# Patient Record
Sex: MALE | Race: WHITE | NOT HISPANIC OR LATINO | URBAN - METROPOLITAN AREA
[De-identification: names, ages, dates, MRNs, and addresses within clinical notes are randomized per-mention and may not be internally consistent; named-entity substitution may affect disease eponyms.]

---

## 2017-09-29 ENCOUNTER — OUTPATIENT (OUTPATIENT)
Dept: OUTPATIENT SERVICES | Facility: HOSPITAL | Age: 77
LOS: 1 days | Discharge: HOME | End: 2017-09-29

## 2017-09-29 DIAGNOSIS — R68.89 OTHER GENERAL SYMPTOMS AND SIGNS: ICD-10-CM

## 2017-09-29 DIAGNOSIS — I10 ESSENTIAL (PRIMARY) HYPERTENSION: ICD-10-CM

## 2021-03-08 ENCOUNTER — INPATIENT (INPATIENT)
Facility: HOSPITAL | Age: 81
LOS: 38 days | Discharge: NURSING, REHAB OR LONG-TERM CARE | End: 2021-04-16
Attending: HOSPITALIST | Admitting: HOSPITALIST
Payer: MEDICARE

## 2021-03-08 VITALS
SYSTOLIC BLOOD PRESSURE: 141 MMHG | TEMPERATURE: 97 F | OXYGEN SATURATION: 100 % | WEIGHT: 210.1 LBS | HEIGHT: 71 IN | DIASTOLIC BLOOD PRESSURE: 60 MMHG | HEART RATE: 107 BPM | RESPIRATION RATE: 19 BRPM

## 2021-03-08 DIAGNOSIS — R26.2 DIFFICULTY IN WALKING, NOT ELSEWHERE CLASSIFIED: ICD-10-CM

## 2021-03-08 DIAGNOSIS — M53.3 SACROCOCCYGEAL DISORDERS, NOT ELSEWHERE CLASSIFIED: ICD-10-CM

## 2021-03-08 DIAGNOSIS — I50.9 HEART FAILURE, UNSPECIFIED: ICD-10-CM

## 2021-03-08 DIAGNOSIS — Z95.1 PRESENCE OF AORTOCORONARY BYPASS GRAFT: Chronic | ICD-10-CM

## 2021-03-08 DIAGNOSIS — M27.8 OTHER SPECIFIED DISEASES OF JAWS: ICD-10-CM

## 2021-03-08 LAB
ALBUMIN SERPL ELPH-MCNC: 4.1 G/DL — SIGNIFICANT CHANGE UP (ref 3.5–5.2)
ALP SERPL-CCNC: 213 U/L — HIGH (ref 30–115)
ALT FLD-CCNC: 8 U/L — SIGNIFICANT CHANGE UP (ref 0–41)
ANION GAP SERPL CALC-SCNC: 13 MMOL/L — SIGNIFICANT CHANGE UP (ref 7–14)
ANION GAP SERPL CALC-SCNC: 9 MMOL/L — SIGNIFICANT CHANGE UP (ref 7–14)
APPEARANCE UR: CLEAR — SIGNIFICANT CHANGE UP
APTT BLD: 30.6 SEC — SIGNIFICANT CHANGE UP (ref 27–39.2)
AST SERPL-CCNC: 21 U/L — SIGNIFICANT CHANGE UP (ref 0–41)
BASOPHILS # BLD AUTO: 0.02 K/UL — SIGNIFICANT CHANGE UP (ref 0–0.2)
BASOPHILS NFR BLD AUTO: 0.2 % — SIGNIFICANT CHANGE UP (ref 0–1)
BILIRUB SERPL-MCNC: 0.6 MG/DL — SIGNIFICANT CHANGE UP (ref 0.2–1.2)
BILIRUB UR-MCNC: NEGATIVE — SIGNIFICANT CHANGE UP
BUN SERPL-MCNC: 26 MG/DL — HIGH (ref 10–20)
BUN SERPL-MCNC: 26 MG/DL — HIGH (ref 10–20)
CALCIUM SERPL-MCNC: 10.7 MG/DL — HIGH (ref 8.5–10.1)
CALCIUM SERPL-MCNC: 11.7 MG/DL — HIGH (ref 8.5–10.1)
CHLORIDE SERPL-SCNC: 104 MMOL/L — SIGNIFICANT CHANGE UP (ref 98–110)
CHLORIDE SERPL-SCNC: 107 MMOL/L — SIGNIFICANT CHANGE UP (ref 98–110)
CO2 SERPL-SCNC: 21 MMOL/L — SIGNIFICANT CHANGE UP (ref 17–32)
CO2 SERPL-SCNC: 24 MMOL/L — SIGNIFICANT CHANGE UP (ref 17–32)
COLOR SPEC: YELLOW — SIGNIFICANT CHANGE UP
CREAT SERPL-MCNC: 0.9 MG/DL — SIGNIFICANT CHANGE UP (ref 0.7–1.5)
CREAT SERPL-MCNC: 0.9 MG/DL — SIGNIFICANT CHANGE UP (ref 0.7–1.5)
DIFF PNL FLD: NEGATIVE — SIGNIFICANT CHANGE UP
EOSINOPHIL # BLD AUTO: 0.01 K/UL — SIGNIFICANT CHANGE UP (ref 0–0.7)
EOSINOPHIL NFR BLD AUTO: 0.1 % — SIGNIFICANT CHANGE UP (ref 0–8)
GLUCOSE SERPL-MCNC: 105 MG/DL — HIGH (ref 70–99)
GLUCOSE SERPL-MCNC: 113 MG/DL — HIGH (ref 70–99)
GLUCOSE UR QL: NEGATIVE MG/DL — SIGNIFICANT CHANGE UP
HCT VFR BLD CALC: 37.2 % — LOW (ref 42–52)
HCT VFR BLD CALC: 41 % — LOW (ref 42–52)
HGB BLD-MCNC: 11.9 G/DL — LOW (ref 14–18)
HGB BLD-MCNC: 13.1 G/DL — LOW (ref 14–18)
IMM GRANULOCYTES NFR BLD AUTO: 0.2 % — SIGNIFICANT CHANGE UP (ref 0.1–0.3)
INR BLD: 1.29 RATIO — SIGNIFICANT CHANGE UP (ref 0.65–1.3)
KETONES UR-MCNC: NEGATIVE — SIGNIFICANT CHANGE UP
LEUKOCYTE ESTERASE UR-ACNC: NEGATIVE — SIGNIFICANT CHANGE UP
LYMPHOCYTES # BLD AUTO: 1.08 K/UL — LOW (ref 1.2–3.4)
LYMPHOCYTES # BLD AUTO: 12.9 % — LOW (ref 20.5–51.1)
MAGNESIUM SERPL-MCNC: 1.9 MG/DL — SIGNIFICANT CHANGE UP (ref 1.8–2.4)
MCHC RBC-ENTMCNC: 27.1 PG — SIGNIFICANT CHANGE UP (ref 27–31)
MCHC RBC-ENTMCNC: 27.1 PG — SIGNIFICANT CHANGE UP (ref 27–31)
MCHC RBC-ENTMCNC: 32 G/DL — SIGNIFICANT CHANGE UP (ref 32–37)
MCHC RBC-ENTMCNC: 32 G/DL — SIGNIFICANT CHANGE UP (ref 32–37)
MCV RBC AUTO: 84.7 FL — SIGNIFICANT CHANGE UP (ref 80–94)
MCV RBC AUTO: 84.9 FL — SIGNIFICANT CHANGE UP (ref 80–94)
MONOCYTES # BLD AUTO: 0.76 K/UL — HIGH (ref 0.1–0.6)
MONOCYTES NFR BLD AUTO: 9.1 % — SIGNIFICANT CHANGE UP (ref 1.7–9.3)
NEUTROPHILS # BLD AUTO: 6.49 K/UL — SIGNIFICANT CHANGE UP (ref 1.4–6.5)
NEUTROPHILS NFR BLD AUTO: 77.5 % — HIGH (ref 42.2–75.2)
NITRITE UR-MCNC: NEGATIVE — SIGNIFICANT CHANGE UP
NRBC # BLD: 0 /100 WBCS — SIGNIFICANT CHANGE UP (ref 0–0)
NRBC # BLD: 0 /100 WBCS — SIGNIFICANT CHANGE UP (ref 0–0)
NT-PROBNP SERPL-SCNC: 2564 PG/ML — HIGH (ref 0–300)
PH UR: 5.5 — SIGNIFICANT CHANGE UP (ref 5–8)
PLATELET # BLD AUTO: 294 K/UL — SIGNIFICANT CHANGE UP (ref 130–400)
PLATELET # BLD AUTO: 315 K/UL — SIGNIFICANT CHANGE UP (ref 130–400)
POTASSIUM SERPL-MCNC: 4.9 MMOL/L — SIGNIFICANT CHANGE UP (ref 3.5–5)
POTASSIUM SERPL-MCNC: 5.2 MMOL/L — HIGH (ref 3.5–5)
POTASSIUM SERPL-SCNC: 4.9 MMOL/L — SIGNIFICANT CHANGE UP (ref 3.5–5)
POTASSIUM SERPL-SCNC: 5.2 MMOL/L — HIGH (ref 3.5–5)
PROT SERPL-MCNC: 7.5 G/DL — SIGNIFICANT CHANGE UP (ref 6–8)
PROT UR-MCNC: NEGATIVE MG/DL — SIGNIFICANT CHANGE UP
PROTHROM AB SERPL-ACNC: 14.8 SEC — HIGH (ref 9.95–12.87)
RAPID RVP RESULT: SIGNIFICANT CHANGE UP
RBC # BLD: 4.39 M/UL — LOW (ref 4.7–6.1)
RBC # BLD: 4.83 M/UL — SIGNIFICANT CHANGE UP (ref 4.7–6.1)
RBC # FLD: 14.5 % — SIGNIFICANT CHANGE UP (ref 11.5–14.5)
RBC # FLD: 14.6 % — HIGH (ref 11.5–14.5)
SARS-COV-2 RNA SPEC QL NAA+PROBE: SIGNIFICANT CHANGE UP
SODIUM SERPL-SCNC: 138 MMOL/L — SIGNIFICANT CHANGE UP (ref 135–146)
SODIUM SERPL-SCNC: 140 MMOL/L — SIGNIFICANT CHANGE UP (ref 135–146)
SP GR SPEC: >=1.03 (ref 1.01–1.03)
TROPONIN T SERPL-MCNC: <0.01 NG/ML — SIGNIFICANT CHANGE UP
UROBILINOGEN FLD QL: 0.2 MG/DL — SIGNIFICANT CHANGE UP (ref 0.2–0.2)
WBC # BLD: 7.16 K/UL — SIGNIFICANT CHANGE UP (ref 4.8–10.8)
WBC # BLD: 8.38 K/UL — SIGNIFICANT CHANGE UP (ref 4.8–10.8)
WBC # FLD AUTO: 7.16 K/UL — SIGNIFICANT CHANGE UP (ref 4.8–10.8)
WBC # FLD AUTO: 8.38 K/UL — SIGNIFICANT CHANGE UP (ref 4.8–10.8)

## 2021-03-08 PROCEDURE — 99223 1ST HOSP IP/OBS HIGH 75: CPT

## 2021-03-08 PROCEDURE — 74176 CT ABD & PELVIS W/O CONTRAST: CPT | Mod: 26

## 2021-03-08 PROCEDURE — 70490 CT SOFT TISSUE NECK W/O DYE: CPT | Mod: 26

## 2021-03-08 PROCEDURE — 71045 X-RAY EXAM CHEST 1 VIEW: CPT | Mod: 26

## 2021-03-08 PROCEDURE — 99285 EMERGENCY DEPT VISIT HI MDM: CPT

## 2021-03-08 RX ORDER — SODIUM CHLORIDE 9 MG/ML
1000 INJECTION INTRAMUSCULAR; INTRAVENOUS; SUBCUTANEOUS ONCE
Refills: 0 | Status: COMPLETED | OUTPATIENT
Start: 2021-03-08 | End: 2021-03-08

## 2021-03-08 RX ORDER — SODIUM CHLORIDE 9 MG/ML
1000 INJECTION, SOLUTION INTRAVENOUS
Refills: 0 | Status: DISCONTINUED | OUTPATIENT
Start: 2021-03-08 | End: 2021-03-08

## 2021-03-08 RX ORDER — ACETAMINOPHEN 500 MG
650 TABLET ORAL EVERY 6 HOURS
Refills: 0 | Status: DISCONTINUED | OUTPATIENT
Start: 2021-03-08 | End: 2021-03-15

## 2021-03-08 RX ORDER — MORPHINE SULFATE 50 MG/1
2 CAPSULE, EXTENDED RELEASE ORAL EVERY 4 HOURS
Refills: 0 | Status: DISCONTINUED | OUTPATIENT
Start: 2021-03-08 | End: 2021-03-12

## 2021-03-08 RX ORDER — LATANOPROST 0.05 MG/ML
1 SOLUTION/ DROPS OPHTHALMIC; TOPICAL AT BEDTIME
Refills: 0 | Status: DISCONTINUED | OUTPATIENT
Start: 2021-03-08 | End: 2021-04-16

## 2021-03-08 RX ORDER — SODIUM CHLORIDE 9 MG/ML
1000 INJECTION INTRAMUSCULAR; INTRAVENOUS; SUBCUTANEOUS
Refills: 0 | Status: DISCONTINUED | OUTPATIENT
Start: 2021-03-08 | End: 2021-03-08

## 2021-03-08 RX ORDER — BRIMONIDINE TARTRATE 2 MG/MG
1 SOLUTION/ DROPS OPHTHALMIC EVERY 12 HOURS
Refills: 0 | Status: DISCONTINUED | OUTPATIENT
Start: 2021-03-08 | End: 2021-03-15

## 2021-03-08 RX ORDER — KETOROLAC TROMETHAMINE 30 MG/ML
15 SYRINGE (ML) INJECTION EVERY 6 HOURS
Refills: 0 | Status: DISCONTINUED | OUTPATIENT
Start: 2021-03-08 | End: 2021-03-12

## 2021-03-08 RX ORDER — METOPROLOL TARTRATE 50 MG
50 TABLET ORAL DAILY
Refills: 0 | Status: DISCONTINUED | OUTPATIENT
Start: 2021-03-08 | End: 2021-03-11

## 2021-03-08 RX ORDER — DEXAMETHASONE 0.5 MG/5ML
4 ELIXIR ORAL EVERY 6 HOURS
Refills: 0 | Status: DISCONTINUED | OUTPATIENT
Start: 2021-03-08 | End: 2021-03-20

## 2021-03-08 RX ORDER — LISINOPRIL 2.5 MG/1
10 TABLET ORAL
Refills: 0 | Status: DISCONTINUED | OUTPATIENT
Start: 2021-03-08 | End: 2021-03-11

## 2021-03-08 RX ORDER — CHLORHEXIDINE GLUCONATE 213 G/1000ML
1 SOLUTION TOPICAL DAILY
Refills: 0 | Status: DISCONTINUED | OUTPATIENT
Start: 2021-03-08 | End: 2021-04-02

## 2021-03-08 RX ORDER — FUROSEMIDE 40 MG
20 TABLET ORAL DAILY
Refills: 0 | Status: DISCONTINUED | OUTPATIENT
Start: 2021-03-08 | End: 2021-03-11

## 2021-03-08 RX ORDER — DEXAMETHASONE 0.5 MG/5ML
10 ELIXIR ORAL ONCE
Refills: 0 | Status: COMPLETED | OUTPATIENT
Start: 2021-03-08 | End: 2021-03-08

## 2021-03-08 RX ORDER — DEXAMETHASONE 0.5 MG/5ML
10 ELIXIR ORAL ONCE
Refills: 0 | Status: DISCONTINUED | OUTPATIENT
Start: 2021-03-08 | End: 2021-03-08

## 2021-03-08 RX ORDER — APIXABAN 2.5 MG/1
5 TABLET, FILM COATED ORAL
Refills: 0 | Status: DISCONTINUED | OUTPATIENT
Start: 2021-03-08 | End: 2021-03-08

## 2021-03-08 RX ORDER — SPIRONOLACTONE 25 MG/1
25 TABLET, FILM COATED ORAL DAILY
Refills: 0 | Status: DISCONTINUED | OUTPATIENT
Start: 2021-03-08 | End: 2021-03-11

## 2021-03-08 RX ADMIN — Medication 4 MILLIGRAM(S): at 11:36

## 2021-03-08 RX ADMIN — SODIUM CHLORIDE 125 MILLILITER(S): 9 INJECTION, SOLUTION INTRAVENOUS at 05:06

## 2021-03-08 RX ADMIN — MORPHINE SULFATE 2 MILLIGRAM(S): 50 CAPSULE, EXTENDED RELEASE ORAL at 11:54

## 2021-03-08 RX ADMIN — MORPHINE SULFATE 2 MILLIGRAM(S): 50 CAPSULE, EXTENDED RELEASE ORAL at 17:09

## 2021-03-08 RX ADMIN — Medication 50 MILLIGRAM(S): at 06:45

## 2021-03-08 RX ADMIN — LATANOPROST 1 DROP(S): 0.05 SOLUTION/ DROPS OPHTHALMIC; TOPICAL at 22:00

## 2021-03-08 RX ADMIN — Medication 15 MILLIGRAM(S): at 07:46

## 2021-03-08 RX ADMIN — Medication 102 MILLIGRAM(S): at 08:42

## 2021-03-08 RX ADMIN — CHLORHEXIDINE GLUCONATE 1 APPLICATION(S): 213 SOLUTION TOPICAL at 11:33

## 2021-03-08 RX ADMIN — Medication 20 MILLIGRAM(S): at 06:45

## 2021-03-08 RX ADMIN — LISINOPRIL 10 MILLIGRAM(S): 2.5 TABLET ORAL at 17:28

## 2021-03-08 RX ADMIN — LISINOPRIL 10 MILLIGRAM(S): 2.5 TABLET ORAL at 06:45

## 2021-03-08 RX ADMIN — SODIUM CHLORIDE 166.67 MILLILITER(S): 9 INJECTION INTRAMUSCULAR; INTRAVENOUS; SUBCUTANEOUS at 01:17

## 2021-03-08 RX ADMIN — Medication 4 MILLIGRAM(S): at 17:28

## 2021-03-08 RX ADMIN — SPIRONOLACTONE 25 MILLIGRAM(S): 25 TABLET, FILM COATED ORAL at 06:45

## 2021-03-08 RX ADMIN — Medication 15 MILLIGRAM(S): at 17:09

## 2021-03-08 RX ADMIN — APIXABAN 5 MILLIGRAM(S): 2.5 TABLET, FILM COATED ORAL at 06:45

## 2021-03-08 NOTE — H&P ADULT - NSHPPHYSICALEXAM_GEN_ALL_CORE
Vital Signs Last 24 Hrs  T(C): 36 (03-08-21 @ 00:20)  T(F): 96.8 (03-08-21 @ 00:20), Max: 96.8 (03-08-21 @ 00:20)  HR: 84 (03-08-21 @ 05:13) (83 - 107)  BP: 126/58 (03-08-21 @ 05:13)  BP(mean): --  RR: 18 (03-08-21 @ 05:04) (18 - 19)  SpO2: 99% (03-08-21 @ 05:04) (99% - 100%)  Wt(kg): --  < from: CT Neck Soft Tissue No Cont (03.08.21 @ 02:39) >      IMPRESSION:    Large right mandibular soft tissue density mass measuring 6.5 x 7.1 x 9.2 cm likely arising from the right mandible extending from the right mandibular condyle to the body of the mandible. There is an associated extensive bony erosion of the right mandible within the mass and the posterior wall of the right maxilla. Findings are highly suspicious for malignancy.    < end of copied text >  < from: CT Abdomen and Pelvis No Cont (03.08.21 @ 02:42) >      IMPRESSION:    Soft tissue mass within the sacrum at the levelsS2-S4 measuring approximately 6.1 x 5.2 x 4.9 cm. Additional smaller soft tissue mass within the right S1 transverse process measuring 2.6 x 2.3 cm. Lytic lesion in the L2 vertebral body. Findings are consistent with metastatic disease.    Nonspecific dilation of the appendix measuring 0.9 cm without surrounding inflammatory changes. Neoplasm cannot be excluded.    < end of copied text >

## 2021-03-08 NOTE — ED PROVIDER NOTE - PHYSICAL EXAMINATION
CONSTITUTIONAL: chronic ill elderly male; in no apparent distress.   EYES: PERRL; EOM intact.   ENT: + swelling ? mass to right cheek/mandible. non-tender. + mass/swelling with discharge noted to right side gum in mouth.   NECK: Supple; non-tender;   CARDIOVASCULAR: Normal S1, S2; 2/6 murmurs best heard over mitral region. irregular rhythm    RESPIRATORY: Normal chest excursion with respiration; breath sounds clear and equal bilaterally; no wheezes, rhonchi, or rales.  GI/: Normal bowel sounds; non-distended; non-tender; + round mobile mass to anterior abdominal wall ? lipoma non-tender. + large right inguinal hernia to right scrotal sac. non-tender. + bowel sound to hernia.   MS: 1 to 2+ pitting edema to b/l ankles.   SKIN: Normal for age and race; warm; dry; good turgor; no apparent lesions or exudate.   NEURO/PSYCH: A & O x 4; grossly unremarkable. CONSTITUTIONAL: chronic ill elderly male; in no apparent distress.   EYES: PERRL; EOM intact.   ENT: + swelling ? mass to right cheek/mandible. non-tender. + mass/swelling with discharge noted to right side gum in mouth.   NECK: Supple; non-tender;   CARDIOVASCULAR: Normal S1, S2; 2/6 murmurs best heard over mitral region. irregular rhythm    RESPIRATORY: Normal chest excursion with respiration; breath sounds clear and equal bilaterally; no wheezes, rhonchi, or rales.  GI/: Normal bowel sounds; non-distended; non-tender; + round mobile mass to anterior abdominal wall ? lipoma non-tender. + large right inguinal hernia to right scrotal sac. non-tender. + bowel sound to hernia.   MS: 1 to 2+ pitting edema to b/l ankles.   SKIN: Normal for age and race; warm; dry; good turgor; no apparent lesions or exudate.   NEURO/PSYCH: A & O x 4; CN II-XII grossly unremarkable. speaking coherently.

## 2021-03-08 NOTE — H&P ADULT - NSHPLABSRESULTS_GEN_ALL_CORE
13.1   8.38  )-----------( 315      ( 08 Mar 2021 01:05 )             41.0       03-08    138  |  104  |  26<H>  ----------------------------<  113<H>  4.9   |  21  |  0.9    Ca    11.7<H>      08 Mar 2021 01:05  Mg     1.9     03-08    TPro  7.5  /  Alb  4.1  /  TBili  0.6  /  DBili  x   /  AST  21  /  ALT  8   /  AlkPhos  213<H>  03-08              Urinalysis Basic - ( 08 Mar 2021 04:35 )    Color: Yellow / Appearance: Clear / SG: >=1.030 / pH: x  Gluc: x / Ketone: Negative  / Bili: Negative / Urobili: 0.2 mg/dL   Blood: x / Protein: Negative mg/dL / Nitrite: Negative   Leuk Esterase: Negative / RBC: x / WBC x   Sq Epi: x / Non Sq Epi: x / Bacteria: x        PT/INR - ( 08 Mar 2021 01:05 )   PT: 14.80 sec;   INR: 1.29 ratio         PTT - ( 08 Mar 2021 01:05 )  PTT:30.6 sec    Lactate Trend      CARDIAC MARKERS ( 08 Mar 2021 01:05 )  x     / <0.01 ng/mL / x     / x     / x            CAPILLARY BLOOD GLUCOSE

## 2021-03-08 NOTE — PROGRESS NOTE ADULT - SUBJECTIVE AND OBJECTIVE BOX
Spoke with Medical Attending regarding consult placed in gentleman with large sacral mass and new onset LE weakness, also has large right mandibular mass and further bony lesions in spin all suggestive of metastatic disease. Unknown primary. Recc transfer to [facility] for MRI +/- spine survery, MRI brain/Jaw +/-. Pt will require ENT consult as well for large facial mass. This was discussed with hospitalist wh was in agreement.

## 2021-03-08 NOTE — ED ADULT NURSE NOTE - NSIMPLEMENTINTERV_GEN_ALL_ED
Implemented All Fall with Harm Risk Interventions:  [place] to call system. Call bell, personal items and telephone within reach. Instruct patient to call for assistance. Room bathroom lighting operational. Non-slip footwear when patient is off stretcher. Physically safe environment: no spills, clutter or unnecessary equipment. Stretcher in lowest position, wheels locked, appropriate side rails in place. Provide visual cue, wrist band, yellow gown, etc. Monitor gait and stability. Monitor for mental status changes and reorient to person, place, and time. Review medications for side effects contributing to fall risk. Reinforce activity limits and safety measures with patient and family. Provide visual clues: red socks.

## 2021-03-08 NOTE — ED PROVIDER NOTE - CLINICAL SUMMARY MEDICAL DECISION MAKING FREE TEXT BOX
Labs unremarkable except for BNP 2564.  Covid negative.  EKG afib with controlled rate no stemi.  CXR right upper lung mass.  CT neck large mass right mandible.  CT abdo sacral mass, right inguinal hernia, enlarged prostate with distended bladder.  Patient unable to care for himself.  Will admit. Labs unremarkable except for BNP 2564.  Covid negative.  EKG afib with controlled rate no stemi.  CXR right upper lung mass.  CT neck large mass right mandible.  CT abdo sacral mass, right inguinal hernia, enlarged prostate with distended bladder.  Patient has ambulatory difficulty and is unable to care for himself.  Will admit.

## 2021-03-08 NOTE — ED PROVIDER NOTE - CARE PLAN
Principal Discharge DX:	Ambulatory dysfunction  Secondary Diagnosis:	Mass of jaw  Secondary Diagnosis:	Mass of sacrum  Secondary Diagnosis:	Mass of lung

## 2021-03-08 NOTE — H&P ADULT - ASSESSMENT
80 YEAR OLD MALE ADMIT TO THE MEDICAL FLOOR FOR AMBULATORY DYSFUNCTION, MASS IN THE JOW, MASS IN THE SACRUM AND MASS IN THE LUNG .

## 2021-03-08 NOTE — ED PROVIDER NOTE - OBJECTIVE STATEMENT
79 yo male hx of CAD s/p CABG s/p 20+ years ago/ CHF/ right sided large inguinal hernia BIBA from hotel room 2/2 unable to ambulate with right thigh weakness and numbness. reported it started from knee to hip. denies fall and injury. He was just sitting and the symptoms started. reported off/on back pain in the past. denies fever/chill/recent illness/coughing/chest pain/abd pain/n/v/d and urinary sxs.   Also has right sided facial swelling for about 1 year after 3 teeth extraction. reports swelling worsens after eating. swelling was better in the past and started swelling again in the past few months. didn't follow up with his dentist 2/2 COVID.   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 he used to be able to ambulate without assistant and he commutes with his car.

## 2021-03-08 NOTE — ED PROVIDER NOTE - NS ED ROS FT
Constitutional: no fever, chills, no recent weight loss, change in appetite or malaise  Eyes: no redness/discharge/pain/vision changes  ENT: see hpi  Cardiac: No chest pain, SOB or edema.  Respiratory: No cough or respiratory distress  GI: No nausea, vomiting, diarrhea or abdominal pain.  : No dysuria, frequency, urgency or hematuria  MS: see hpi  Neuro: see hpi. No headache or weakness. No LOC.  Skin: No skin rash.  Endocrine: No history of thyroid disease or diabetes.

## 2021-03-08 NOTE — H&P ADULT - ATTENDING COMMENTS
Patient seen at bedside independent of medical PA. Agree with assessment and plan with the following observations. When possible his cardiac history needs to be reviewed with his usual cardiologist, Dr Keita in particular his history of CHF (which based on meds I suspect a chronic HFrEF) and his Atrial Fibrillation (which I also suspect is chronic based on his mention of "skipped beats" and previous use of DOAC (which he stopped due to financial concerns) Patient seen at bedside independent of medical PA. Agree with assessment and plan with the following observations. When possible his cardiac history needs to be reviewed with his usual cardiologist, Dr Keita in particular his history of CHF (which based on meds I suspect a chronic HFrEF) and his Atrial Fibrillation (which I also suspect is chronic based on his mention of "on and off irregular heart beat when at [employer]" and previous use of DOAC (which he stopped due to financial concerns). As for hypercalcemia, hesitant to start IV fluids in view of history of CHF so await oncology input omn thet matter. In view of CAT scan findings at sacral area, will start IV decadron- suspect will need MRI studies of several areas

## 2021-03-08 NOTE — CONSULT NOTE ADULT - SUBJECTIVE AND OBJECTIVE BOX
INTERVENTIONAL RADIOLOGY CONSULT:     Procedure Requested: Sacral mass biopsy.    HPI:   79 yo male hx of CAD s/p CABG s/p 20+ years ago/ CHF/ right sided large inguinal hernia BIBA from hotel room 2/2 unable to ambulate with right thigh weakness and numbness. reported it started from knee to hip. denies fall and injury. He was just sitting and the symptoms started. reported off/on back pain in the past. denies fever/chill/recent illness/coughing/chest pain/abd pain/n/v/d and urinary sxs.   	Also has right sided facial swelling for about 1 year after 3 teeth extraction. reports swelling worsens after eating. swelling was better in the past and started swelling again in the past few months. didn't follow up with his dentist 2/2 COVID.   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 he used to be able to ambulate without assistant and he commutes with his car. (08 Mar 2021 05:03)      PAST MEDICAL & SURGICAL HISTORY:  Inguinal hernia    CHF (congestive heart failure)    S/P CABG x 3        MEDICATIONS  (STANDING):  chlorhexidine 4% Liquid 1 Application(s) Topical daily  dexAMETHasone  Injectable 4 milliGRAM(s) IV Push every 6 hours  furosemide    Tablet 20 milliGRAM(s) Oral daily  lisinopril 10 milliGRAM(s) Oral two times a day  metoprolol succinate ER 50 milliGRAM(s) Oral daily  spironolactone 25 milliGRAM(s) Oral daily    MEDICATIONS  (PRN):  acetaminophen   Tablet .. 650 milliGRAM(s) Oral every 6 hours PRN Mild Pain (1 - 3)  ketorolac   Injectable 15 milliGRAM(s) IV Push every 6 hours PRN Moderate Pain (4 - 6)  morphine  - Injectable 2 milliGRAM(s) IV Push every 4 hours PRN Severe Pain (7 - 10)      Allergies    No Known Allergies    Intolerances      Physical Exam:   Vital Signs Last 24 Hrs  T(C): 35.6 (08 Mar 2021 06:34), Max: 36 (08 Mar 2021 00:20)  T(F): 96 (08 Mar 2021 06:34), Max: 96.8 (08 Mar 2021 00:20)  HR: 72 (08 Mar 2021 06:34) (72 - 107)  BP: 142/74 (08 Mar 2021 06:34) (126/58 - 142/74)  BP(mean): --  RR: 18 (08 Mar 2021 06:34) (18 - 19)  SpO2: 99% (08 Mar 2021 05:04) (99% - 100%)    Labs:                         11.9   7.16  )-----------( 294      ( 08 Mar 2021 10:59 )             37.2     03-08    140  |  107  |  26<H>  ----------------------------<  105<H>  5.2<H>   |  24  |  0.9    Ca    10.7<H>      08 Mar 2021 10:59  Mg     1.9     03-08    TPro  7.5  /  Alb  4.1  /  TBili  0.6  /  DBili  x   /  AST  21  /  ALT  8   /  AlkPhos  213<H>  03-08    PT/INR - ( 08 Mar 2021 01:05 )   PT: 14.80 sec;   INR: 1.29 ratio         PTT - ( 08 Mar 2021 01:05 )  PTT:30.6 sec    Pertinent labs:                      11.9   7.16  )-----------( 294      ( 08 Mar 2021 10:59 )             37.2       03-08    140  |  107  |  26<H>  ----------------------------<  105<H>  5.2<H>   |  24  |  0.9    Ca    10.7<H>      08 Mar 2021 10:59  Mg     1.9     03-08    TPro  7.5  /  Alb  4.1  /  TBili  0.6  /  DBili  x   /  AST  21  /  ALT  8   /  AlkPhos  213<H>  03-08      PT/INR - ( 08 Mar 2021 01:05 )   PT: 14.80 sec;   INR: 1.29 ratio         PTT - ( 08 Mar 2021 01:05 )  PTT:30.6 sec    Radiology & Additional Studies:     < from: CT Abdomen and Pelvis No Cont (03.08.21 @ 02:42) >  IMPRESSION:    Findings compatible with metastatic disease as demonstrated by multiple bony lesions, the largest soft tissue mass centered at S2-S3 measuring up to 7.4 cm with associated bony destruction and obliteration of the exiting neural foramina. Additional smaller lesions at the right sacral ala, L2 and L3.    Moderate to severe bilateral hydroureteronephrosis to the level of a markedly distended urinary bladder. Findings presumably secondary to urinary bladder outlet obstruction or urinary retention. Enlarged prostate gland.    < end of copied text >      Radiology imaging reviewed.       ASSESSMENT/ PLAN:     80yoM w/extensive cardiac hx, p/w leg weakness and new sacral mass found on CT. IR consulted for biopsy of the sacral mass.  - Plan bx for Wednesday, 3/10  - Continue holding Eliquis. Plan last dose lovenox Tuesday night.   - Make NPO Tuesday night      Thank you for the courtesy of this consult, please call [phone] with any further questions.    INTERVENTIONAL RADIOLOGY CONSULT:     Procedure Requested: Sacral mass biopsy.    HPI:   79 yo male hx of CAD s/p CABG s/p 20+ years ago/ CHF/ right sided large inguinal hernia BIBA from hotel room 2/2 unable to ambulate with right thigh weakness and numbness. reported it started from knee to hip. denies fall and injury. He was just sitting and the symptoms started. reported off/on back pain in the past. denies fever/chill/recent illness/coughing/chest pain/abd pain/n/v/d and urinary sxs.   	Also has right sided facial swelling for about 1 year after 3 teeth extraction. reports swelling worsens after eating. swelling was better in the past and started swelling again in the past few months. didn't follow up with his dentist 2/2 COVID.   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 he used to be able to ambulate without assistant and he commutes with his car. (08 Mar 2021 05:03)      PAST MEDICAL & SURGICAL HISTORY:  Inguinal hernia    CHF (congestive heart failure)    S/P CABG x 3        MEDICATIONS  (STANDING):  chlorhexidine 4% Liquid 1 Application(s) Topical daily  dexAMETHasone  Injectable 4 milliGRAM(s) IV Push every 6 hours  furosemide    Tablet 20 milliGRAM(s) Oral daily  lisinopril 10 milliGRAM(s) Oral two times a day  metoprolol succinate ER 50 milliGRAM(s) Oral daily  spironolactone 25 milliGRAM(s) Oral daily    MEDICATIONS  (PRN):  acetaminophen   Tablet .. 650 milliGRAM(s) Oral every 6 hours PRN Mild Pain (1 - 3)  ketorolac   Injectable 15 milliGRAM(s) IV Push every 6 hours PRN Moderate Pain (4 - 6)  morphine  - Injectable 2 milliGRAM(s) IV Push every 4 hours PRN Severe Pain (7 - 10)      Allergies    No Known Allergies    Intolerances      Physical Exam:   Vital Signs Last 24 Hrs  T(C): 35.6 (08 Mar 2021 06:34), Max: 36 (08 Mar 2021 00:20)  T(F): 96 (08 Mar 2021 06:34), Max: 96.8 (08 Mar 2021 00:20)  HR: 72 (08 Mar 2021 06:34) (72 - 107)  BP: 142/74 (08 Mar 2021 06:34) (126/58 - 142/74)  BP(mean): --  RR: 18 (08 Mar 2021 06:34) (18 - 19)  SpO2: 99% (08 Mar 2021 05:04) (99% - 100%)    Labs:                         11.9   7.16  )-----------( 294      ( 08 Mar 2021 10:59 )             37.2     03-08    140  |  107  |  26<H>  ----------------------------<  105<H>  5.2<H>   |  24  |  0.9    Ca    10.7<H>      08 Mar 2021 10:59  Mg     1.9     03-08    TPro  7.5  /  Alb  4.1  /  TBili  0.6  /  DBili  x   /  AST  21  /  ALT  8   /  AlkPhos  213<H>  03-08    PT/INR - ( 08 Mar 2021 01:05 )   PT: 14.80 sec;   INR: 1.29 ratio         PTT - ( 08 Mar 2021 01:05 )  PTT:30.6 sec    Radiology & Additional Studies:     < from: CT Abdomen and Pelvis No Cont (03.08.21 @ 02:42) >  IMPRESSION:    Findings compatible with metastatic disease as demonstrated by multiple bony lesions, the largest soft tissue mass centered at S2-S3 measuring up to 7.4 cm with associated bony destruction and obliteration of the exiting neural foramina. Additional smaller lesions at the right sacral ala, L2 and L3.    Moderate to severe bilateral hydroureteronephrosis to the level of a markedly distended urinary bladder. Findings presumably secondary to urinary bladder outlet obstruction or urinary retention. Enlarged prostate gland.    < end of copied text >      Radiology imaging reviewed.       ASSESSMENT/ PLAN:     80yoM w/extensive cardiac hx, p/w leg weakness and new sacral mass found on CT. IR consulted for biopsy of the sacral mass.  - Plan bx for Wednesday, 3/10  - Continue holding Eliquis. Plan last dose lovenox Tuesday night.   - Make NPO Tuesday night      Thank you for the courtesy of this consult, please call [phone] with any further questions.

## 2021-03-08 NOTE — CHART NOTE - NSCHARTNOTEFT_GEN_A_CORE
Patient is adamant that he does not want to be transferred to [facility] (he has continuously indicated this since arrival to our ER). Further explained that [facility] does not have facilities to assess and treat his condition

## 2021-03-08 NOTE — CHART NOTE - NSCHARTNOTEFT_GEN_A_CORE
Patient was seen by kassi today   admitted with acute onset weakness of lower extremity   patient have large abdominal mass with obliteration of the exiting neural foramina  plan discussed with neurosurgery and hem/onc over phone - best plan would be transfer to [place] for further care as patient need multiple MRIs, IR biopsy and possible radiation onc.     Patient does not want to go to [place] as his wife  there. discussed with patient that it would be best for his care.

## 2021-03-08 NOTE — ED PROVIDER NOTE - PROGRESS NOTE DETAILS
CT found sacral lesion and patient c/o right thigh/penile numbness. d/w patient regarding admission to [facility] for more subspecialities consultation. patient refuses to be admitted to [place] because his wife passed away there and prefer to stay in [facility] now. patient understands the lack of subspecialities in [facility].

## 2021-03-08 NOTE — ED PROVIDER NOTE - ATTENDING CONTRIBUTION TO CARE
I personally evaluated the patient. I reviewed the Resident’s or Physician Assistant’s note (as assigned above), and agree with the findings and plan except as documented in my note.  Chart reviewed.  H/O CABG, CHF, brought in from extended stay hotel by EMS, complaining of right thigh numbness, RUQ pain and back pain.  Exam shows alert patient in no distress, HEENT swollen right jaw, poor dentition, throat clear, lungs clear, irregular rhythm, abdomen soft +BS +large non-tender rigt inguinal hernia, bilateral leg edema, neuro A&OX3 CN intact, no focal weakness. I personally evaluated the patient. I reviewed the Resident’s or Physician Assistant’s note (as assigned above), and agree with the findings and plan except as documented in my note.  Chart reviewed.  H/O CABG, CHF, brought in from extended stay hotel by EMS, complaining of right thigh numbness, RUQ pain, back pain and inability to ambulate.  States he hasn't eaten in 3 days.  Exam shows alert frail elderly patient in no distress, HEENT swollen right jaw, poor dentition, throat clear, lungs clear, irregular rhythm, abdomen soft +BS +large non-tender right inguinal hernia, bilateral leg edema, neuro A&OX3 CN intact, no focal weakness.

## 2021-03-09 LAB
ANION GAP SERPL CALC-SCNC: 10 MMOL/L — SIGNIFICANT CHANGE UP (ref 7–14)
BASOPHILS # BLD AUTO: 0.01 K/UL — SIGNIFICANT CHANGE UP (ref 0–0.2)
BASOPHILS NFR BLD AUTO: 0.1 % — SIGNIFICANT CHANGE UP (ref 0–1)
BUN SERPL-MCNC: 30 MG/DL — HIGH (ref 10–20)
CALCIUM SERPL-MCNC: 10.2 MG/DL — HIGH (ref 8.5–10.1)
CHLORIDE SERPL-SCNC: 106 MMOL/L — SIGNIFICANT CHANGE UP (ref 98–110)
CO2 SERPL-SCNC: 23 MMOL/L — SIGNIFICANT CHANGE UP (ref 17–32)
CREAT SERPL-MCNC: 1 MG/DL — SIGNIFICANT CHANGE UP (ref 0.7–1.5)
CULTURE RESULTS: NO GROWTH — SIGNIFICANT CHANGE UP
EOSINOPHIL # BLD AUTO: 0 K/UL — SIGNIFICANT CHANGE UP (ref 0–0.7)
EOSINOPHIL NFR BLD AUTO: 0 % — SIGNIFICANT CHANGE UP (ref 0–8)
GLUCOSE SERPL-MCNC: 135 MG/DL — HIGH (ref 70–99)
HCT VFR BLD CALC: 34.9 % — LOW (ref 42–52)
HGB BLD-MCNC: 10.9 G/DL — LOW (ref 14–18)
IMM GRANULOCYTES NFR BLD AUTO: 0.3 % — SIGNIFICANT CHANGE UP (ref 0.1–0.3)
INR BLD: 1.28 RATIO — SIGNIFICANT CHANGE UP (ref 0.65–1.3)
LYMPHOCYTES # BLD AUTO: 0.69 K/UL — LOW (ref 1.2–3.4)
LYMPHOCYTES # BLD AUTO: 7.4 % — LOW (ref 20.5–51.1)
MAGNESIUM SERPL-MCNC: 1.8 MG/DL — SIGNIFICANT CHANGE UP (ref 1.8–2.4)
MCHC RBC-ENTMCNC: 27 PG — SIGNIFICANT CHANGE UP (ref 27–31)
MCHC RBC-ENTMCNC: 31.2 G/DL — LOW (ref 32–37)
MCV RBC AUTO: 86.4 FL — SIGNIFICANT CHANGE UP (ref 80–94)
MONOCYTES # BLD AUTO: 0.39 K/UL — SIGNIFICANT CHANGE UP (ref 0.1–0.6)
MONOCYTES NFR BLD AUTO: 4.2 % — SIGNIFICANT CHANGE UP (ref 1.7–9.3)
NEUTROPHILS # BLD AUTO: 8.15 K/UL — HIGH (ref 1.4–6.5)
NEUTROPHILS NFR BLD AUTO: 88 % — HIGH (ref 42.2–75.2)
NRBC # BLD: 0 /100 WBCS — SIGNIFICANT CHANGE UP (ref 0–0)
PHOSPHATE SERPL-MCNC: 3.8 MG/DL — SIGNIFICANT CHANGE UP (ref 2.1–4.9)
PLATELET # BLD AUTO: 269 K/UL — SIGNIFICANT CHANGE UP (ref 130–400)
POTASSIUM SERPL-MCNC: 4.7 MMOL/L — SIGNIFICANT CHANGE UP (ref 3.5–5)
POTASSIUM SERPL-SCNC: 4.7 MMOL/L — SIGNIFICANT CHANGE UP (ref 3.5–5)
PROTHROM AB SERPL-ACNC: 14.7 SEC — HIGH (ref 9.95–12.87)
RBC # BLD: 4.04 M/UL — LOW (ref 4.7–6.1)
RBC # FLD: 14.5 % — SIGNIFICANT CHANGE UP (ref 11.5–14.5)
SARS-COV-2 IGG SERPL QL IA: POSITIVE
SARS-COV-2 IGM SERPL IA-ACNC: 184 INDEX — HIGH
SODIUM SERPL-SCNC: 139 MMOL/L — SIGNIFICANT CHANGE UP (ref 135–146)
SPECIMEN SOURCE: SIGNIFICANT CHANGE UP
WBC # BLD: 9.27 K/UL — SIGNIFICANT CHANGE UP (ref 4.8–10.8)
WBC # FLD AUTO: 9.27 K/UL — SIGNIFICANT CHANGE UP (ref 4.8–10.8)

## 2021-03-09 PROCEDURE — 99223 1ST HOSP IP/OBS HIGH 75: CPT

## 2021-03-09 PROCEDURE — 99233 SBSQ HOSP IP/OBS HIGH 50: CPT

## 2021-03-09 RX ADMIN — Medication 4 MILLIGRAM(S): at 11:09

## 2021-03-09 RX ADMIN — Medication 4 MILLIGRAM(S): at 06:13

## 2021-03-09 RX ADMIN — Medication 4 MILLIGRAM(S): at 02:03

## 2021-03-09 RX ADMIN — MORPHINE SULFATE 2 MILLIGRAM(S): 50 CAPSULE, EXTENDED RELEASE ORAL at 13:26

## 2021-03-09 RX ADMIN — BRIMONIDINE TARTRATE 1 DROP(S): 2 SOLUTION/ DROPS OPHTHALMIC at 17:39

## 2021-03-09 RX ADMIN — SPIRONOLACTONE 25 MILLIGRAM(S): 25 TABLET, FILM COATED ORAL at 06:12

## 2021-03-09 RX ADMIN — LATANOPROST 1 DROP(S): 0.05 SOLUTION/ DROPS OPHTHALMIC; TOPICAL at 21:17

## 2021-03-09 RX ADMIN — Medication 20 MILLIGRAM(S): at 06:12

## 2021-03-09 RX ADMIN — MORPHINE SULFATE 2 MILLIGRAM(S): 50 CAPSULE, EXTENDED RELEASE ORAL at 09:41

## 2021-03-09 RX ADMIN — Medication 4 MILLIGRAM(S): at 17:40

## 2021-03-09 RX ADMIN — Medication 4 MILLIGRAM(S): at 23:50

## 2021-03-09 RX ADMIN — MORPHINE SULFATE 2 MILLIGRAM(S): 50 CAPSULE, EXTENDED RELEASE ORAL at 13:19

## 2021-03-09 RX ADMIN — CHLORHEXIDINE GLUCONATE 1 APPLICATION(S): 213 SOLUTION TOPICAL at 11:10

## 2021-03-09 RX ADMIN — BRIMONIDINE TARTRATE 1 DROP(S): 2 SOLUTION/ DROPS OPHTHALMIC at 06:13

## 2021-03-09 NOTE — PROGRESS NOTE ADULT - SUBJECTIVE AND OBJECTIVE BOX
Patient admitted with acute onset weakness of lower extremity- patient has large abdominal mass with obliteration of the exiting neural foramina  plan discussed with neurosurgery and hem/onc over phone - best plan would be transfer to [place] for further care as patient need multiple MRIs, IR biopsy and possible radiation onc.    Today:  Patient seen at bedside, refusing to be transferred [facility] for fear of COVID 19.  I had an extensive discussion with him as did our RN manager regarding the reason for [place].  He understands and still refuses at this time.  He states he wants to speak face to face with a specialist from [facility] before making his decision on being transferred.  Oncologist Dr. Bagley will see him today.  Delaying transfer any further is detrimental to patient's care and patient has been made aware of this.          REVIEW OF SYSTEMS:  LE weakness      MEDICATIONS  (STANDING):  brimonidine 0.1% Ophthalmic Solution 1 Drop(s) Both EYES every 12 hours  chlorhexidine 4% Liquid 1 Application(s) Topical daily  dexAMETHasone  Injectable 4 milliGRAM(s) IV Push every 6 hours  furosemide    Tablet 20 milliGRAM(s) Oral daily  latanoprost 0.005% Ophthalmic Solution 1 Drop(s) Both EYES at bedtime  lisinopril 10 milliGRAM(s) Oral two times a day  metoprolol succinate ER 50 milliGRAM(s) Oral daily  spironolactone 25 milliGRAM(s) Oral daily    MEDICATIONS  (PRN):  acetaminophen   Tablet .. 650 milliGRAM(s) Oral every 6 hours PRN Mild Pain (1 - 3)  ketorolac   Injectable 15 milliGRAM(s) IV Push every 6 hours PRN Moderate Pain (4 - 6)  morphine  - Injectable 2 milliGRAM(s) IV Push every 4 hours PRN Severe Pain (7 - 10)      Allergies  No Known Allergies          Vital Signs Last 24 Hrs  T(C): 35.9 (09 Mar 2021 14:08), Max: 36.9 (08 Mar 2021 21:23)  T(F): 96.7 (09 Mar 2021 14:08), Max: 98.4 (08 Mar 2021 21:23)  HR: 69 (09 Mar 2021 14:08) (55 - 77)  BP: 92/49 (09 Mar 2021 14:08) (92/49 - 106/52)  RR: 16 (09 Mar 2021 14:08) (16 - 16)      PHYSICAL EXAM:    GENERAL: NAD, well-groomed, well-developed  HEAD:  Atraumatic, Normocephalic  EYES: EOMI, PERRLA, conjunctiva and sclera clear  NECK: Supple, No JVD, Normal thyroid  NERVOUS SYSTEM:  Alert & Oriented X3  CHEST/LUNG: Clear to percussion bilaterally; No rales, rhonchi, wheezing, or rubs  HEART: Regular rate and rhythm; No murmurs, rubs, or gallops  ABDOMEN: Soft, Nontender, Nondistended; Bowel sounds present      LABS:                        10.9   9.27  )-----------( 269      ( 09 Mar 2021 07:14 )             34.9     03-09    139  |  106  |  30<H>  ----------------------------<  135<H>  4.7   |  23  |  1.0    Ca    10.2<H>      09 Mar 2021 07:14  Phos  3.8     03-09  Mg     1.8     03-09    TPro  7.5  /  Alb  4.1  /  TBili  0.6  /  DBili  x   /  AST  21  /  ALT  8   /  AlkPhos  213<H>  03-08    PT/INR - ( 09 Mar 2021 07:14 )   PT: 14.70 sec;   INR: 1.28 ratio         PTT - ( 08 Mar 2021 01:05 )  PTT:30.6 sec  Urinalysis Basic - ( 08 Mar 2021 04:35 )    Color: Yellow / Appearance: Clear / SG: >=1.030 / pH: x  Gluc: x / Ketone: Negative  / Bili: Negative / Urobili: 0.2 mg/dL   Blood: x / Protein: Negative mg/dL / Nitrite: Negative   Leuk Esterase: Negative / RBC: x / WBC x   Sq Epi: x / Non Sq Epi: x / Bacteria: x

## 2021-03-09 NOTE — CONSULT NOTE ADULT - SUBJECTIVE AND OBJECTIVE BOX
79 yo man is admitted for difficuly ambulating. CT A/P 3.8.21 (results below) indicate suspicion for bone mets. CT neck also done due to right jaw swelling (results below) 3.8.21. Neurosurgery were called and advised to transfer pt to [place] immediately to coordinate his care including establishing the diagnosis and getting MRI C/T/L spine asap  to r/o cord compression and MR BRAIN/JAW to evaluate further jaw mass/swelling. IR were consulted and were ready to proceed with biopsy. Pt apparently refused the transfer on 3.8.21. Steroids were started with minimal improvement of LE muscle strength    PMH; CAD/ CABG/ CHF  ROS unable to ambulate since 2 days prior to admission; no fever; no cough; no hemoptysis; no chest pain ;no sob;   PMH; smoking is not clear  FH :;is not clear    EXAM  NAD  wears a face mask  RRR S1S2NL no murmurs  CTA B/L  no organomegaly  no legs edema        EXAM:  CT NECK SOFT TISSUE          *** ADDENDUM 03/08/2021  ***    ADDENDUM: Also identified is a soft tissue mass in the medial right posterior pleural/paravertebral region partially encasing the medial right second rib. There is mixed lytic/blastic changes of the encased medial right second rib and the right lateral T2 vertebra and transverse process.    *** END OF ADDENDUM 03/08/2021  ***        PROCEDURE DATE:  03/08/2021            INTERPRETATION:  CLINICAL HISTORY/REASON FOR EXAM: Right neck mass.    TECHNIQUE: Contiguous axial CT images of the soft tissues of the neck were obtained without the administration of intravenous contrast . Sagittal and coronal reformatted images were generated.    COMPARISON: None.      FINDINGS:    Large right mandibular soft tissue mass measuring 6.5 x 7.1 x 9.2 cm likely arising from the right mandible extending from the right condyle to body of the mandible. There is an associated extensive bony erosion of the right mandible within the mass and the posterior wall of the right maxilla.    Displacement of the right parotid gland by the mass. Atrophic bilateral parotid and submandibular glands. No sialoliths.    The visualized brain parenchyma is unremarkable. Mucosal thickening in the left maxillary sinus. The remaining visualized paranasal sinuses and mastoid air cells are unremarkable.    Thyroid gland unremarkable.    No lymphadenopathy.    The visualized upper lung fields included on the study demonstrate atelectatic changes. The visualized upper mediastinum is unremarkable.    Degenerative changes of the cervical spine.      IMPRESSION:    Large right mandibular soft tissue density mass measuring 6.5 x 7.1 x 9.2 cm likely arising from the right mandible extending from the right mandibular condyle to the body of the mandible. There is an associated extensive bony erosion of the right mandible within the mass and the posterior wall of the right maxilla. Findings are highly suspicious for malignancy.        ATTENDING ADDENDUM:  Agree with the resident report.  Limited noncontrast CT demonstrates a large neoplastic soft tissue mass centered in the right mandible with associated severe erosion of the body/ramus of the right mandible (sparing the mandibular condyle), as well as the posterior lateral wall of the right maxillary sinus and the buccal cortex of the right maxillary alveolar process and the involved molar/premolars. The right pterygoid plates appear preserved. The parapharyngeal space appears preserved. 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    The mass infiltrates the muscles of the right  space, as well as the right medial/lateral pterygoid muscles. The right parotid gland is displaced posterolaterally and there is a questionable invasion of the tumor. The mass infiltrates the right sublingual space.    There is a borderline enlarged right level III lymph node measuring 1.2 cm (series 4, image 91) abutting the right hyoid.      ***Please see the addendum at the top of this report. It may contain additional important information or changes.****      JOSE RAUL SALTER M.D., RESIDENT RADIOLOGIST  This document has been electronically signed.  KAILA MATHIAS M.D., ATTENDING RADIOLOGIST  This document has been electronically signed. Mar  8 2021  9:57AM  Addend:KAILA MATHIAS M.D., ATTENDING RADIOLOGIST          EXAM:  CT ABDOMEN AND PELVIS            PROCEDURE DATE:  03/08/2021            INTERPRETATION:  CLINICAL STATEMENT: Abdominal pain, right leg numbness.    TECHNIQUE: Contiguous axial CT images were obtained from the lower chest to the pubic symphysis without intravenous contrast.  Oral contrast was not administered.  Reformatted images in the coronal and sagittal planes were acquired.    COMPARISON CT: None.      FINDINGS:    LOWER CHEST: Bilateral interlobular septal thickening and subpleural reticulations. Granulomas at the left lung base. Cardiomegaly.    HEPATOBILIARY: Unremarkable.    SPLEEN: Unremarkable aside from punctate granulomas.    PANCREAS: Unremarkable.    ADRENAL GLANDS: Unremarkable.    KIDNEYS: Moderate to severe bilateral hydroureteronephrosis to the level of a markedly distended urinary bladder without evidence of obstructing renal calculus.    ABDOMINOPELVIC NODES: Unremarkable.    PELVIC ORGANS: Markedly distended urinary bladder. Enlarged prostate gland.    PERITONEUM/MESENTERY/BOWEL: No evidence for bowel obstruction. Essentially unremarkable appendix. A large right inguinal hernia contains multiple loops of nonobstructed small bowel. No ascites or free air. 6 cm fat-containing ventral epigastric hernia.    BONES/SOFT TISSUES: There is a sacral soft tissue mass predominantly centered at S2-S3 measuring approximately 7.4 x 5.2 x 6.2 cm. There is associated bony destruction and extension into the presacral space as well as obliteration of the exiting neural foramen. Additional bony soft tissue lesions at the right sacrum, measuring 3 x 2.4 cm (4/185) and presumably additional metastatic lesions at the vertebral bodies of L2 and L3 with marrow heterogeneity.    OTHER: Atherosclerotic vascular calcifications.      IMPRESSION:    Findings compatible with metastatic disease as demonstrated by multiple bony lesions, the largest soft tissue mass centered at S2-S3 measuring up to 7.4 cm with associated bony destruction and obliteration of the exiting neural foramina. Additional smaller lesions at the right sacral ala, L2 and L3.    Moderate to severe bilateral hydroureteronephrosis to the level of a markedly distended urinary bladder. Findings presumably secondary to urinary bladder outlet obstruction or urinary retention. Enlarged prostate gland.                        JOSE RAUL SALTER M.D., RESIDENT RADIOLOGIST  This document has been electronically signed.  ANDREINA PETTIT MD; Attending Radiologist

## 2021-03-09 NOTE — PROGRESS NOTE ADULT - ASSESSMENT
80yoM w/extensive cardiac hx, p/w leg weakness and new sacral mass found on CT, new onset LE weakness, also has large right mandibular mass and further bony lesions in spin all suggestive of metastatic disease. Unknown primary    Sacral mass with spinal involvement:  -Needs IR-guided Bx  -neurosurgery consult appreciated; will need eval at [facility] given bony lesions in spine.  Neurosurgery has recommended transferring the patient to [facility].  -Patient currently refusing transfer [facility]  -Continue Decadron    Facial mass:  -Needs ENT, refusing [facility]    CHF with unknown EF though likely systolic based on meds:  -Continue Lasix, lisinopril, BB, aldactone    Suspected Afib:  Based on medications (DOAC, stopped himself due to financial reasons)  BB  -Eliquis on hold for possible IR-guided Bx of sacral mass though patient currently refusing transfer [facility].    Dispo:  In order to manage patient he requires transfer [place] as he will need a several specialists and procedures which we do not offer at [facility].  This has been explained to the patient several times and he has refused transfer due to concerns over COVID 19.  He states that he will consider transfer [facility] if our oncologist sees him here at bedside at [facility] and advises transfer [facility].  Transfer [facility] has been approved by Dr. Mabry and was supposed to happen yesterday but patient refused at that time as per the chart.

## 2021-03-10 ENCOUNTER — RESULT REVIEW (OUTPATIENT)
Age: 81
End: 2021-03-10

## 2021-03-10 LAB
ALBUMIN SERPL ELPH-MCNC: 3.6 G/DL — SIGNIFICANT CHANGE UP (ref 3.5–5.2)
ALP SERPL-CCNC: 154 U/L — HIGH (ref 30–115)
ALT FLD-CCNC: 10 U/L — SIGNIFICANT CHANGE UP (ref 0–41)
ANION GAP SERPL CALC-SCNC: 12 MMOL/L — SIGNIFICANT CHANGE UP (ref 7–14)
AST SERPL-CCNC: 27 U/L — SIGNIFICANT CHANGE UP (ref 0–41)
BASOPHILS # BLD AUTO: 0.01 K/UL — SIGNIFICANT CHANGE UP (ref 0–0.2)
BASOPHILS NFR BLD AUTO: 0.1 % — SIGNIFICANT CHANGE UP (ref 0–1)
BILIRUB SERPL-MCNC: 0.3 MG/DL — SIGNIFICANT CHANGE UP (ref 0.2–1.2)
BUN SERPL-MCNC: 38 MG/DL — HIGH (ref 10–20)
CALCIUM SERPL-MCNC: 10.2 MG/DL — HIGH (ref 8.5–10.1)
CHLORIDE SERPL-SCNC: 99 MMOL/L — SIGNIFICANT CHANGE UP (ref 98–110)
CO2 SERPL-SCNC: 24 MMOL/L — SIGNIFICANT CHANGE UP (ref 17–32)
CREAT SERPL-MCNC: 1 MG/DL — SIGNIFICANT CHANGE UP (ref 0.7–1.5)
EOSINOPHIL # BLD AUTO: 0 K/UL — SIGNIFICANT CHANGE UP (ref 0–0.7)
EOSINOPHIL NFR BLD AUTO: 0 % — SIGNIFICANT CHANGE UP (ref 0–8)
GLUCOSE SERPL-MCNC: 117 MG/DL — HIGH (ref 70–99)
HCT VFR BLD CALC: 37.5 % — LOW (ref 42–52)
HGB BLD-MCNC: 11.8 G/DL — LOW (ref 14–18)
IMM GRANULOCYTES NFR BLD AUTO: 0.5 % — HIGH (ref 0.1–0.3)
LYMPHOCYTES # BLD AUTO: 0.58 K/UL — LOW (ref 1.2–3.4)
LYMPHOCYTES # BLD AUTO: 5.1 % — LOW (ref 20.5–51.1)
MCHC RBC-ENTMCNC: 27.1 PG — SIGNIFICANT CHANGE UP (ref 27–31)
MCHC RBC-ENTMCNC: 31.5 G/DL — LOW (ref 32–37)
MCV RBC AUTO: 86 FL — SIGNIFICANT CHANGE UP (ref 80–94)
MONOCYTES # BLD AUTO: 0.4 K/UL — SIGNIFICANT CHANGE UP (ref 0.1–0.6)
MONOCYTES NFR BLD AUTO: 3.5 % — SIGNIFICANT CHANGE UP (ref 1.7–9.3)
NEUTROPHILS # BLD AUTO: 10.25 K/UL — HIGH (ref 1.4–6.5)
NEUTROPHILS NFR BLD AUTO: 90.8 % — HIGH (ref 42.2–75.2)
NRBC # BLD: 0 /100 WBCS — SIGNIFICANT CHANGE UP (ref 0–0)
PLATELET # BLD AUTO: 291 K/UL — SIGNIFICANT CHANGE UP (ref 130–400)
POTASSIUM SERPL-MCNC: 5.3 MMOL/L — HIGH (ref 3.5–5)
POTASSIUM SERPL-SCNC: 5.3 MMOL/L — HIGH (ref 3.5–5)
PROT SERPL-MCNC: 6.5 G/DL — SIGNIFICANT CHANGE UP (ref 6–8)
RBC # BLD: 4.36 M/UL — LOW (ref 4.7–6.1)
RBC # FLD: 14.5 % — SIGNIFICANT CHANGE UP (ref 11.5–14.5)
SODIUM SERPL-SCNC: 135 MMOL/L — SIGNIFICANT CHANGE UP (ref 135–146)
WBC # BLD: 11.3 K/UL — HIGH (ref 4.8–10.8)
WBC # FLD AUTO: 11.3 K/UL — HIGH (ref 4.8–10.8)

## 2021-03-10 PROCEDURE — 77012 CT SCAN FOR NEEDLE BIOPSY: CPT | Mod: 26

## 2021-03-10 PROCEDURE — 99232 SBSQ HOSP IP/OBS MODERATE 35: CPT

## 2021-03-10 PROCEDURE — 88172 CYTP DX EVAL FNA 1ST EA SITE: CPT | Mod: 26

## 2021-03-10 PROCEDURE — 88341 IMHCHEM/IMCYTCHM EA ADD ANTB: CPT | Mod: 26

## 2021-03-10 PROCEDURE — 99152 MOD SED SAME PHYS/QHP 5/>YRS: CPT

## 2021-03-10 PROCEDURE — 20225 BONE BIOPSY TROCAR/NDL DEEP: CPT

## 2021-03-10 PROCEDURE — 88305 TISSUE EXAM BY PATHOLOGIST: CPT | Mod: 26

## 2021-03-10 PROCEDURE — 88342 IMHCHEM/IMCYTCHM 1ST ANTB: CPT | Mod: 26

## 2021-03-10 PROCEDURE — 88173 CYTOPATH EVAL FNA REPORT: CPT | Mod: 26

## 2021-03-10 RX ORDER — GABAPENTIN 400 MG/1
100 CAPSULE ORAL THREE TIMES A DAY
Refills: 0 | Status: DISCONTINUED | OUTPATIENT
Start: 2021-03-10 | End: 2021-03-15

## 2021-03-10 RX ORDER — OXYCODONE AND ACETAMINOPHEN 5; 325 MG/1; MG/1
1 TABLET ORAL ONCE
Refills: 0 | Status: DISCONTINUED | OUTPATIENT
Start: 2021-03-10 | End: 2021-03-11

## 2021-03-10 RX ADMIN — LISINOPRIL 10 MILLIGRAM(S): 2.5 TABLET ORAL at 05:21

## 2021-03-10 RX ADMIN — MORPHINE SULFATE 2 MILLIGRAM(S): 50 CAPSULE, EXTENDED RELEASE ORAL at 19:12

## 2021-03-10 RX ADMIN — MORPHINE SULFATE 2 MILLIGRAM(S): 50 CAPSULE, EXTENDED RELEASE ORAL at 05:37

## 2021-03-10 RX ADMIN — MORPHINE SULFATE 2 MILLIGRAM(S): 50 CAPSULE, EXTENDED RELEASE ORAL at 21:20

## 2021-03-10 RX ADMIN — LATANOPROST 1 DROP(S): 0.05 SOLUTION/ DROPS OPHTHALMIC; TOPICAL at 21:19

## 2021-03-10 RX ADMIN — Medication 20 MILLIGRAM(S): at 05:21

## 2021-03-10 RX ADMIN — Medication 50 MILLIGRAM(S): at 05:21

## 2021-03-10 RX ADMIN — Medication 4 MILLIGRAM(S): at 17:28

## 2021-03-10 RX ADMIN — SPIRONOLACTONE 25 MILLIGRAM(S): 25 TABLET, FILM COATED ORAL at 05:20

## 2021-03-10 RX ADMIN — MORPHINE SULFATE 2 MILLIGRAM(S): 50 CAPSULE, EXTENDED RELEASE ORAL at 07:03

## 2021-03-10 RX ADMIN — CHLORHEXIDINE GLUCONATE 1 APPLICATION(S): 213 SOLUTION TOPICAL at 12:35

## 2021-03-10 RX ADMIN — Medication 4 MILLIGRAM(S): at 05:21

## 2021-03-10 RX ADMIN — Medication 4 MILLIGRAM(S): at 12:35

## 2021-03-10 RX ADMIN — LISINOPRIL 10 MILLIGRAM(S): 2.5 TABLET ORAL at 17:28

## 2021-03-10 RX ADMIN — BRIMONIDINE TARTRATE 1 DROP(S): 2 SOLUTION/ DROPS OPHTHALMIC at 05:09

## 2021-03-10 NOTE — DIETITIAN INITIAL EVALUATION ADULT. - CONTINUE CURRENT NUTRITION CARE PLAN
1. As medically able, provide dysphagia III mechanical soft, cut-up, thin liquid diet PRN. Provide extra sauce + gravy to ensure food is soft & moist enough to chew PRN. 2. Add Ensure Enlive BID. 3. Monitor signd refeeding syndrome since pt reports inconsistent/ inadequate access to food for extended period of time.

## 2021-03-10 NOTE — DIETITIAN INITIAL EVALUATION ADULT. - OTHER INFO
Pt presented to [facility] for worsening inability to ambulate & swelling to jaw. Hospital course complicated by findings of new sacral mass w. spinal involvement & large right mandibular mass, concerning for metastatic disease. Transferred to [facility] for workup. s/p IR guided bx of sacral mass today 3/10. Awaiting ENT c/s. Per NeuroSx needs MRI C/T/L spine w/wo contrast to r/o cord compression and MR head and jaw w/wo IVC. HemeOnc following, plan pending nature of diagnosis. Suspected Afib. CHF with unknown EF though.

## 2021-03-10 NOTE — DIETITIAN INITIAL EVALUATION ADULT. - MALNUTRITION
severe PCM in setting of social/environmental circumstances (r/t food insecurity) & suspected chronic illness (high suspicion of malignancy) AEB <50% energy intake x>1 month, severe muscle wasting (clavicle & shoulder regions) & severe subcutaneous fat loss (tricep region).

## 2021-03-10 NOTE — DIETITIAN INITIAL EVALUATION ADULT. - PHYSCIAL ASSESSMENT
wt loss above stated by pt, no EMR wt hx to confirm but pt reliable historian. wt loss does not fit PCM criteria but muscle wasting & fat loss observed, also considering pt consistent food insecurity w. now possible malignancy pt meeting PCM criteria. see NFPE below.  stage II pressure ulcer to sacral spine.

## 2021-03-10 NOTE — DIETITIAN INITIAL EVALUATION ADULT. - ENERGY INTAKE
Currently NPO < 5 days Pt NPO today for IR procedure, awaiting MD clearance for meal. Pt able to eat yesterday & reports consuming 75% or more if food is soft, tolerated salmon & broccoli well last night. Agreeable to Ensure when able. Recs d/w LIP (Dr. Woodruff).

## 2021-03-10 NOTE — PROCEDURE NOTE - PROCEDURE FINDINGS AND DETAILS
Patient was positioned and prepped in a sterile fashion. Biopsy of sacral mass was performed in a sterile manner. Three biopsy samples were obtained. Pathology was available during the procedure to confirm adequate samples.

## 2021-03-10 NOTE — DIETITIAN INITIAL EVALUATION ADULT. - PERSON TAUGHT/METHOD
Pt aware of need for softer foods & benefit of supplements. Pt aware of healthy diet just did not have means to do so PTA. Declined informational packet. will reassess need for further ed throughout stay./verbal instruction/patient instructed

## 2021-03-10 NOTE — DIETITIAN NUTRITION RISK NOTIFICATION - TREATMENT: THE FOLLOWING DIET HAS BEEN RECOMMENDED
Diet, Dysphagia 3 Soft-Thin Liquids:   Supplement Feeding Modality:  Oral  Ensure Enlive Servings Per Day:  1       Frequency:  Two Times a day (03-10-21 @ 12:51) [Pending Verification By Attending]  Diet, NPO:   NPO for Procedure/Test     NPO Start Date: 10-Mar-2021,   NPO Start Time: 00:00 (03-10-21 @ 03:53) [Active]

## 2021-03-10 NOTE — DIETITIAN INITIAL EVALUATION ADULT. - FEEDING SKILL
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 Pt has been residing in hotel x1yr, previously received food from food pantries but stopped going to facilities d/t fear of COVID exposure while waiting on line for food. Reported that friends would sometimes deliver food to hotel, hotel would provide pt w. nonperishable food items, received delivery of boxes of food from food pantry but nothing was consistent. Pt reports meal consumption of 1-2 per day if he had access to food. Also notes that swelling in mouth significantly worsened x2-3months & was unable to chew most foods he had access too. Received Ensure but also not consistent, enjoys vanilla flavor. NKFA. No cultural/Muslim food preferences.

## 2021-03-10 NOTE — DIETITIAN INITIAL EVALUATION ADULT. - ADD RECOMMEND
RD will monitor diet order, energy intake, nutrition related labs, body composition, NFPF (PO tolerance, GI s/s).

## 2021-03-10 NOTE — DIETITIAN NUTRITION RISK NOTIFICATION - COMMENTS
Additional Recommendations: 1. Provide extra sauce + gravy to ensure food is soft & moist enough to chew PRN. 2. Monitor signs refeeding syndrome since pt reports inconsistent/ inadequate access to food for extended period of time.

## 2021-03-11 LAB
ALBUMIN SERPL ELPH-MCNC: 3.2 G/DL — LOW (ref 3.5–5.2)
ALP SERPL-CCNC: 129 U/L — HIGH (ref 30–115)
ALT FLD-CCNC: 10 U/L — SIGNIFICANT CHANGE UP (ref 0–41)
ANION GAP SERPL CALC-SCNC: 11 MMOL/L — SIGNIFICANT CHANGE UP (ref 7–14)
AST SERPL-CCNC: 16 U/L — SIGNIFICANT CHANGE UP (ref 0–41)
BASOPHILS # BLD AUTO: 0 K/UL — SIGNIFICANT CHANGE UP (ref 0–0.2)
BASOPHILS NFR BLD AUTO: 0 % — SIGNIFICANT CHANGE UP (ref 0–1)
BILIRUB SERPL-MCNC: 0.3 MG/DL — SIGNIFICANT CHANGE UP (ref 0.2–1.2)
BUN SERPL-MCNC: 48 MG/DL — HIGH (ref 10–20)
CALCIUM SERPL-MCNC: 9.5 MG/DL — SIGNIFICANT CHANGE UP (ref 8.5–10.1)
CHLORIDE SERPL-SCNC: 103 MMOL/L — SIGNIFICANT CHANGE UP (ref 98–110)
CO2 SERPL-SCNC: 23 MMOL/L — SIGNIFICANT CHANGE UP (ref 17–32)
CREAT SERPL-MCNC: 1.1 MG/DL — SIGNIFICANT CHANGE UP (ref 0.7–1.5)
EOSINOPHIL # BLD AUTO: 0 K/UL — SIGNIFICANT CHANGE UP (ref 0–0.7)
EOSINOPHIL NFR BLD AUTO: 0 % — SIGNIFICANT CHANGE UP (ref 0–8)
GLUCOSE SERPL-MCNC: 126 MG/DL — HIGH (ref 70–99)
HCT VFR BLD CALC: 35.1 % — LOW (ref 42–52)
HGB BLD-MCNC: 11.2 G/DL — LOW (ref 14–18)
IMM GRANULOCYTES NFR BLD AUTO: 0.5 % — HIGH (ref 0.1–0.3)
LYMPHOCYTES # BLD AUTO: 0.51 K/UL — LOW (ref 1.2–3.4)
LYMPHOCYTES # BLD AUTO: 5.4 % — LOW (ref 20.5–51.1)
MAGNESIUM SERPL-MCNC: 1.9 MG/DL — SIGNIFICANT CHANGE UP (ref 1.8–2.4)
MCHC RBC-ENTMCNC: 27 PG — SIGNIFICANT CHANGE UP (ref 27–31)
MCHC RBC-ENTMCNC: 31.9 G/DL — LOW (ref 32–37)
MCV RBC AUTO: 84.6 FL — SIGNIFICANT CHANGE UP (ref 80–94)
MONOCYTES # BLD AUTO: 0.42 K/UL — SIGNIFICANT CHANGE UP (ref 0.1–0.6)
MONOCYTES NFR BLD AUTO: 4.4 % — SIGNIFICANT CHANGE UP (ref 1.7–9.3)
NEUTROPHILS # BLD AUTO: 8.48 K/UL — HIGH (ref 1.4–6.5)
NEUTROPHILS NFR BLD AUTO: 89.7 % — HIGH (ref 42.2–75.2)
NON-GYNECOLOGICAL CYTOLOGY STUDY: SIGNIFICANT CHANGE UP
NRBC # BLD: 0 /100 WBCS — SIGNIFICANT CHANGE UP (ref 0–0)
PLATELET # BLD AUTO: 295 K/UL — SIGNIFICANT CHANGE UP (ref 130–400)
POTASSIUM SERPL-MCNC: 5.2 MMOL/L — HIGH (ref 3.5–5)
POTASSIUM SERPL-SCNC: 5.2 MMOL/L — HIGH (ref 3.5–5)
PROT SERPL-MCNC: 5.8 G/DL — LOW (ref 6–8)
RBC # BLD: 4.15 M/UL — LOW (ref 4.7–6.1)
RBC # FLD: 14.5 % — SIGNIFICANT CHANGE UP (ref 11.5–14.5)
SODIUM SERPL-SCNC: 137 MMOL/L — SIGNIFICANT CHANGE UP (ref 135–146)
WBC # BLD: 9.46 K/UL — SIGNIFICANT CHANGE UP (ref 4.8–10.8)
WBC # FLD AUTO: 9.46 K/UL — SIGNIFICANT CHANGE UP (ref 4.8–10.8)

## 2021-03-11 PROCEDURE — 70540 MRI ORBIT/FACE/NECK W/O DYE: CPT | Mod: 26

## 2021-03-11 PROCEDURE — 72148 MRI LUMBAR SPINE W/O DYE: CPT | Mod: 26

## 2021-03-11 PROCEDURE — 31575 DIAGNOSTIC LARYNGOSCOPY: CPT

## 2021-03-11 PROCEDURE — 99232 SBSQ HOSP IP/OBS MODERATE 35: CPT | Mod: GC

## 2021-03-11 PROCEDURE — 99232 SBSQ HOSP IP/OBS MODERATE 35: CPT

## 2021-03-11 RX ORDER — LIDOCAINE 4 G/100G
2 CREAM TOPICAL ONCE
Refills: 0 | Status: COMPLETED | OUTPATIENT
Start: 2021-03-11 | End: 2021-03-11

## 2021-03-11 RX ORDER — PANTOPRAZOLE SODIUM 20 MG/1
40 TABLET, DELAYED RELEASE ORAL
Refills: 0 | Status: DISCONTINUED | OUTPATIENT
Start: 2021-03-11 | End: 2021-03-13

## 2021-03-11 RX ORDER — APIXABAN 2.5 MG/1
5 TABLET, FILM COATED ORAL EVERY 12 HOURS
Refills: 0 | Status: DISCONTINUED | OUTPATIENT
Start: 2021-03-11 | End: 2021-03-12

## 2021-03-11 RX ORDER — SODIUM ZIRCONIUM CYCLOSILICATE 10 G/10G
5 POWDER, FOR SUSPENSION ORAL ONCE
Refills: 0 | Status: COMPLETED | OUTPATIENT
Start: 2021-03-11 | End: 2021-03-11

## 2021-03-11 RX ADMIN — LIDOCAINE 2 PATCH: 4 CREAM TOPICAL at 22:52

## 2021-03-11 RX ADMIN — APIXABAN 5 MILLIGRAM(S): 2.5 TABLET, FILM COATED ORAL at 17:47

## 2021-03-11 RX ADMIN — Medication 4 MILLIGRAM(S): at 05:35

## 2021-03-11 RX ADMIN — LIDOCAINE 2 PATCH: 4 CREAM TOPICAL at 17:47

## 2021-03-11 RX ADMIN — Medication 4 MILLIGRAM(S): at 00:31

## 2021-03-11 RX ADMIN — Medication 4 MILLIGRAM(S): at 11:19

## 2021-03-11 RX ADMIN — LATANOPROST 1 DROP(S): 0.05 SOLUTION/ DROPS OPHTHALMIC; TOPICAL at 22:52

## 2021-03-11 RX ADMIN — GABAPENTIN 100 MILLIGRAM(S): 400 CAPSULE ORAL at 22:52

## 2021-03-11 RX ADMIN — SODIUM ZIRCONIUM CYCLOSILICATE 5 GRAM(S): 10 POWDER, FOR SUSPENSION ORAL at 17:37

## 2021-03-11 RX ADMIN — OXYCODONE AND ACETAMINOPHEN 1 TABLET(S): 5; 325 TABLET ORAL at 02:09

## 2021-03-11 RX ADMIN — LISINOPRIL 10 MILLIGRAM(S): 2.5 TABLET ORAL at 05:35

## 2021-03-11 RX ADMIN — Medication 4 MILLIGRAM(S): at 17:46

## 2021-03-11 RX ADMIN — Medication 30 MILLILITER(S): at 00:59

## 2021-03-11 RX ADMIN — OXYCODONE AND ACETAMINOPHEN 1 TABLET(S): 5; 325 TABLET ORAL at 00:34

## 2021-03-11 RX ADMIN — GABAPENTIN 100 MILLIGRAM(S): 400 CAPSULE ORAL at 00:59

## 2021-03-11 NOTE — CONSULT NOTE ADULT - SUBJECTIVE AND OBJECTIVE BOX
ENT: Pt is a 81y/o M admitted with inability to ambulate. ENT consulted for large mandibular mass seen on CT scan. Pt reports smoking hx in his teens - cigarettes/cigars/pipe - lasting two years, more of a social smoker. 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 Pt states that he has had to have teeth pulled on three separate occasions over the past year due to loose teeth, and that during the second extraction he developed right sided facial swelling which has persisted until now. States that he has had pain upon chewing for the past year, has been chewing on the left side of his mouth due to feeling extreme pain when chewing on the right side. He also needs all his food to be chopped up/pureed due to pain experienced during mastication. Denies any choking with solids/liquids, SOB, or difficulty breathing.     < from: CT Neck Soft Tissue No Cont (03.08.21 @ 02:39) >  IMPRESSION:  Large right mandibular soft tissue density mass measuring 6.5 x 7.1 x 9.2 cm likely arising from the right mandible extending from the right mandibular condyle to the body of the mandible. There is an associated extensive bony erosion of the right mandible within the mass and the posterior wall of the right maxilla. Findings are highly suspicious for malignancy.    ATTENDING ADDENDUM:  Agree with the resident report.  Limited noncontrast CT demonstrates a large neoplastic soft tissue mass centered in the right mandible with associated severe erosion of the body/ramus of the right mandible (sparing the mandibular condyle), as well as the posterior lateral wall of the right maxillary sinus and the buccal cortex of the right maxillary alveolar process and the involved molar/premolars. The right pterygoid plates appear preserved. The parapharyngeal space appears preserved. 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  The mass infiltrates the muscles of the right  space, as well as the right medial/lateral pterygoid muscles. The right parotid gland is displaced posterolaterally and there is a questionable invasion of the tumor. The mass infiltrates the right sublingual space.  There is a borderline enlarged right level III lymph node measuring 1.2 cm (series 4, image 91) abutting the right hyoid.    < end of copied text >    HPI:   79 yo male hx of CAD s/p CABG s/p 20+ years ago/ CHF/ right sided large inguinal hernia BIBA from hotel room 2/2 unable to ambulate with right thigh weakness and numbness. reported it started from knee to hip. denies fall and injury. He was just sitting and the symptoms started. reported off/on back pain in the past. denies fever/chill/recent illness/coughing/chest pain/abd pain/n/v/d and urinary sxs.   	Also has right sided facial swelling for about 1 year after 3 teeth extraction. reports swelling worsens after eating. swelling was better in the past and started swelling again in the past few months. didn't follow up with his dentist 2/2 COVID.   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 he used to be able to ambulate without assistant and he commutes with his car. (08 Mar 2021 05:03)      PAST MEDICAL & SURGICAL HISTORY:  Inguinal hernia  CHF (congestive heart failure)  S/P CABG x 3    Allergies  No Known Allergies    MEDICATIONS  (STANDING):  apixaban 5 milliGRAM(s) Oral every 12 hours  brimonidine 0.1% Ophthalmic Solution 1 Drop(s) Both EYES every 12 hours  chlorhexidine 4% Liquid 1 Application(s) Topical daily  dexAMETHasone  Injectable 4 milliGRAM(s) IV Push every 6 hours  latanoprost 0.005% Ophthalmic Solution 1 Drop(s) Both EYES at bedtime  pantoprazole    Tablet 40 milliGRAM(s) Oral before breakfast    MEDICATIONS  (PRN):  acetaminophen   Tablet .. 650 milliGRAM(s) Oral every 6 hours PRN Mild Pain (1 - 3)  aluminum hydroxide/magnesium hydroxide/simethicone Suspension 30 milliLiter(s) Oral every 6 hours PRN Dyspepsia  gabapentin 100 milliGRAM(s) Oral three times a day PRN Neuropathic pain  ketorolac   Injectable 15 milliGRAM(s) IV Push every 6 hours PRN Moderate Pain (4 - 6)  morphine  - Injectable 2 milliGRAM(s) IV Push every 4 hours PRN Severe Pain (7 - 10)    FAMILY HISTORY:    Social History:  PATIENT IS LIVE AT [facility] BY HIM SELF AND HE IS NOT SMOKING . (08 Mar 2021 05:03)      ROS:   ENT: all negative except as noted in HPI   CV: denies palpitations  Pulm: denies SOB, cough, hemoptysis  GI: denies change in apetite, indigestion, n/v  : denies pertinent urinary symptoms, urgency  Neuro: denies numbness/tingling, loss of sensation  Psych: denies anxiety  MS: denies muscle weakness, instability  Heme: denies easy bruising or bleeding  Endo: denies heat/cold intolerance, excessive sweating  Vascular: denies LE edema    Vital Signs Last 24 Hrs  T(C): 36.2 (11 Mar 2021 14:32), Max: 36.7 (10 Mar 2021 20:13)  T(F): 97.2 (11 Mar 2021 14:32), Max: 98.1 (10 Mar 2021 20:13)  HR: 56 (11 Mar 2021 14:32) (46 - 61)  BP: 105/59 (11 Mar 2021 14:32) (92/45 - 123/45)  RR: 19 (11 Mar 2021 14:32) (18 - 20)               11.2   9.46  )-----------( 295      ( 11 Mar 2021 06:59 )             35.1    03-11    137  |  103  |  48<H>  ----------------------------<  126<H>  5.2<H>   |  23  |  1.1    Ca    9.5      11 Mar 2021 06:59  Mg     1.9     03-11    TPro  5.8<L>  /  Alb  3.2<L>  /  TBili  0.3  /  DBili  x   /  AST  16  /  ALT  10  /  AlkPhos  129<H>  03-11     PHYSICAL EXAM:  Gen: awake, alert, NAD. No drooling or pooling of secretions. No trismus. Good vocal quality, no changes as per pt   Skin: No obvious rashes or bruises  HEENT: Head NC/AT. Nares bilaterally patent with no blood/discharge noted. Large round right facial mass extending from right TMJ down to submandibular area, over parotid area, +firm, tender by TMJ and on palpating center of mass - feels pain intraorally. Oral cavity with extremely poor dentition, edentulous along bottom right gum, irregular smooth tissue replacing bottom right gum, contiguous with right buccal mucosa, firm and tender upon palpation. Flexible Laryngoscopy performed at bedside, no obvious masses noted in NP/post OP. Possible BOT mass noted. Hypopharyngeal structures intact, TVC approximating well, tolerating secretions.   Resp: breathing easily, no stridor, no accessory muscle use   CV: no peripheral edema/cyanosis  GI: soft, nontender, nondistended  Neuro: A&Ox3  Psych: normal mood, normal affect    IMAGING/ADDITIONAL STUDIES:   < from: CT Neck Soft Tissue No Cont (03.08.21 @ 02:39) >    EXAM:  CT NECK SOFT TISSUE          *** ADDENDUM 03/08/2021  ***  ADDENDUM: Also identified is a soft tissue mass in the medial right posterior pleural/paravertebral region partially encasing the medial right second rib. There is mixed lytic/blastic changes of the encased medial right second rib and the right lateral T2 vertebra and transverse process.  *** END OF ADDENDUM 03/08/2021  ***    PROCEDURE DATE:  03/08/2021    INTERPRETATION:  CLINICAL HISTORY/REASON FOR EXAM: Right neck mass.  TECHNIQUE: Contiguous axial CT images of the soft tissues of the neck were obtained without the administration of intravenous contrast . Sagittal and coronal reformatted images were generated.  COMPARISON: None.    FINDINGS:  Largeright mandibular soft tissue mass measuring 6.5 x 7.1 x 9.2 cm likely arising from the right mandible extending from the right condyle to body of the mandible. There is an associated extensive bony erosion of the right mandible within the mass and the posterior wall of the right maxilla.  Displacement of the right parotid gland by the mass. Atrophic bilateral parotid and submandibular glands. No sialoliths.  The visualized brain parenchyma is unremarkable. Mucosal thickening in the left maxillary sinus. The remaining visualized paranasal sinuses and mastoid air cells are unremarkable.  Thyroid gland unremarkable.  No lymphadenopathy.  The visualized upper lung fields included on the study demonstrate atelectatic changes. The visualized upper mediastinum is unremarkable.  Degenerative changes of the cervical spine.    IMPRESSION:  Large right mandibular soft tissue density mass measuring 6.5 x 7.1 x 9.2 cm likely arising from the right mandible extending from the right mandibular condyle to the body of the mandible. There is an associated extensive bony erosion of the right mandible within the mass and the posterior wall of the right maxilla. Findings are highly suspicious for malignancy.    ATTENDING ADDENDUM:    Agree with the resident report.  Limited noncontrast CT demonstrates a large neoplastic soft tissue mass centered in the right mandible with associated severe erosion of the body/ramus of the right mandible (sparing the mandibular condyle), as well as the posterior lateral wall of the right maxillary sinus and the buccal cortex of the right maxillary alveolar process and the involved molar/premolars. The right pterygoid plates appear preserved. The parapharyngeal space appears preserved. 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  The mass infiltrates the muscles of the right  space, as well as the right medial/lateral pterygoid muscles. The right parotid gland is displaced posterolaterally and there is a questionable invasion of the tumor. The mass infiltrates the right sublingual space.  There is a borderline enlarged right level III lymph node measuring 1.2 cm (series 4, image 91) abutting the right hyoid.  ***Please see the addendum atthe top of this report. It may contain additional important information or changes.****    JOSE RAUL SALTER M.D., RESIDENT RADIOLOGIST  This document has been electronically signed.  KAILA MATHIAS M.D., ATTENDING RADIOLOGIST  This document has been electronically signed. Mar  8 2021  9:57AM  Addend:KAILA MATHIAS M.D., ATTENDING RADIOLOGIST  This addendum was electronically signed on: Mar  8 2021 11:17AM.    < from: CT Abdomen and Pelvis No Cont (03.08.21 @ 02:42) >    EXAM:  CT ABDOMEN AND PELVIS          PROCEDURE DATE:  03/08/2021    INTERPRETATION:  CLINICAL STATEMENT: Abdominal pain, right leg numbness.  TECHNIQUE: Contiguous axial CT images were obtained from the lower chest to the pubic symphysis without intravenous contrast.  Oral contrast was not administered.  Reformatted images in the coronal and sagittal planes were acquired.  COMPARISON CT: None.    FINDINGS:  LOWER CHEST: Bilateral interlobular septal thickening and subpleural reticulations. Granulomas at the left lung base. Cardiomegaly.  HEPATOBILIARY: Unremarkable.  SPLEEN: Unremarkable aside from punctate granulomas.  PANCREAS: Unremarkable.  ADRENAL GLANDS: Unremarkable.  KIDNEYS: Moderate to severe bilateralhydroureteronephrosis to the level of a markedly distended urinary bladder without evidence of obstructing renal calculus.  ABDOMINOPELVIC NODES: Unremarkable.  PELVIC ORGANS: Markedly distended urinary bladder. Enlarged prostate gland.  PERITONEUM/MESENTERY/BOWEL: No evidence for bowel obstruction. Essentially unremarkable appendix. A large right inguinal hernia contains multiple loops of nonobstructed small bowel. No ascites or free air. 6 cm fat-containing ventral epigastric hernia.  BONES/SOFT TISSUES: There is a sacral soft tissue mass predominantly centered at S2-S3 measuring approximately 7.4 x 5.2 x 6.2 cm. There is associated bony destruction and extension into the presacral space as well as obliteration of the exiting neuralforamen. Additional bony soft tissue lesions at the right sacrum, measuring 3 x 2.4 cm (4/185) and presumably additional metastatic lesions at the vertebral bodies of L2 and L3 with marrow heterogeneity.  OTHER: Atherosclerotic vascular calcifications.    IMPRESSION:  Findings compatible with metastatic disease as demonstrated by multiple bony lesions, the largest soft tissue mass centered at S2-S3 measuring up to 7.4 cm with associated bony destruction and obliteration of the exiting neural foramina. Additional smaller lesions at the right sacral ala, L2 and L3.  Moderate to severe bilateral hydroureteronephrosis to the level of a markedly distended urinary bladder. Findings presumably secondary to urinary bladder outlet obstruction or urinary retention. Enlarged prostate gland.    JOSE RAUL SALTER M.D., RESIDENT RADIOLOGIST  This document has been electronically signed.  ANDREINA PETTIT MD; Attending Radiologist  This document has been electronically signed. Mar  8 2021  5:31AM

## 2021-03-11 NOTE — PHYSICAL THERAPY INITIAL EVALUATION ADULT - IMPAIRMENTS CONTRIBUTING TO GAIT DEVIATIONS, PT EVAL
to amb further due to impaired balance and safety concerns/impaired balance/impaired coordination/narrow base of support/decreased strength

## 2021-03-11 NOTE — PHYSICAL THERAPY INITIAL EVALUATION ADULT - TRANSFER TRAINING, PT EVAL
The patient will improve transfers to Min A using rolling walker by discharge  to decrease burden of care.

## 2021-03-11 NOTE — PHYSICAL THERAPY INITIAL EVALUATION ADULT - GAIT TRAINING, PT EVAL
The patient will improve gait using  rolling walker with Min A for 50 feet by discharge to decrease burden of care.

## 2021-03-11 NOTE — CONSULT NOTE ADULT - ASSESSMENT
81y/o M with a 6.5 x 7.1 x 9.2cm RIGHT mandibular mass with severe erosion of the RIGHT mandible, posterior lateral wall of RIGHT maxillary sinus, and the buccal cortex of the RIGHT maxillary alveolar process and involved molar/premolars, as well as multiple other bony mets, and is s/p IR biopsy of a sacral mass POD # 1.     - Case and imaging discussed with Dr. Hernández, plan as per attending.   - Follow up MRI neck/brain  - Will re-scope with attending, will possibly perform bedside biopsy of mandibular mass.

## 2021-03-11 NOTE — PROGRESS NOTE ADULT - SUBJECTIVE AND OBJECTIVE BOX
----------Daily Progress Note----------    HISTORY OF PRESENT ILLNESS:  Patient is a 80y old Male who presents with a chief complaint of 80 YEAR OLD MALE C/O MULTIPLE MEDICAL COMPLAINTS . (11 Mar 2021 08:02)    Currently admitted to medicine with the primary diagnosis of Ambulatory dysfunction       Today is hospital day 3d.     INTERVAL HOSPITAL COURSE / OVERNIGHT EVENTS:    Patient was examined and seen at bedside. This morning patients blood pressure medications were held given low BP 90/40 and HR 46, however asymptomatic. No overnight events    Review of Systems: Otherwise unremarkable     <<<<<PAST MEDICAL & SURGICAL HISTORY>>>>>  Inguinal hernia    CHF (congestive heart failure)    S/P CABG x 3      ALLERGIES  No Known Allergies    MEDICATIONS  STANDING MEDICATIONS  apixaban 5 milliGRAM(s) Oral every 12 hours  brimonidine 0.1% Ophthalmic Solution 1 Drop(s) Both EYES every 12 hours  chlorhexidine 4% Liquid 1 Application(s) Topical daily  dexAMETHasone  Injectable 4 milliGRAM(s) IV Push every 6 hours  latanoprost 0.005% Ophthalmic Solution 1 Drop(s) Both EYES at bedtime  pantoprazole    Tablet 40 milliGRAM(s) Oral before breakfast  sodium zirconium cyclosilicate 5 Gram(s) Oral once    PRN MEDICATIONS  acetaminophen   Tablet .. 650 milliGRAM(s) Oral every 6 hours PRN  aluminum hydroxide/magnesium hydroxide/simethicone Suspension 30 milliLiter(s) Oral every 6 hours PRN  gabapentin 100 milliGRAM(s) Oral three times a day PRN  ketorolac   Injectable 15 milliGRAM(s) IV Push every 6 hours PRN  morphine  - Injectable 2 milliGRAM(s) IV Push every 4 hours PRN    VITALS:  T(F): 97.6  HR: 52  BP: 123/45  RR: 20  SpO2: --    <<<<<LABS>>>>>                        11.2   9.46  )-----------( 295      ( 11 Mar 2021 06:59 )             35.1     03-11    137  |  103  |  48<H>  ----------------------------<  126<H>  5.2<H>   |  23  |  1.1    Ca    9.5      11 Mar 2021 06:59  Mg     1.9     03-11    TPro  5.8<L>  /  Alb  3.2<L>  /  TBili  0.3  /  DBili  x   /  AST  16  /  ALT  10  /  AlkPhos  129<H>  03-11            [id number]        <<<<<RADIOLOGY>>>>>    <<<<<PHYSICAL EXAM>>>>>  GENERAL: resting comfortably in bed, cachetic   HEENT: Normocephalic, atraumatic, no icterus  Neck: + Right mandibular mass  PULMONARY: Clear to auscultation bilaterally. No rales, rhonchi, or wheezing.  CARDIOVASCULAR: Regular rate and rhythm, S1-S2, no murmurs  GASTROINTESTINAL: Soft, non-tender, non-distended, no guarding.  RENAL: No CVA tenderness.  SKIN/EXTREMITIES: No clubbing or edema  NEUROLOGIC/MUSCULOSKELETAL: AOx4, grossly moving all extremities, no focal deficits.      -----------------------------------------------------------------------------------------------------------------------------------------------------------------------------------------------

## 2021-03-11 NOTE — PROGRESS NOTE ADULT - ATTENDING COMMENTS
seen/examined w/hemonc fellow  plan discussed  note reviewed    s/p sacral bx; will follow result; pathology to state if molecular markers could be sent from bone specimen : if not, recommend jaw mass bx for better evaluation including PDL1/ NGS    MRI CTL spine  MRI brain   MRI jaw    ENT consult  Neurosurgery f/u    management will depend on bx result and MRI results and further staging    cont steroids  consider PSYCH eval

## 2021-03-11 NOTE — PHYSICAL THERAPY INITIAL EVALUATION ADULT - GENERAL OBSERVATIONS, REHAB EVAL
PT IE completed. 0930-1000. Patient encountered semifowler in bed, in NAD, +IV lock, +swelling R jaw area, VS: pre:102/45 60 post: 115/52 62. No c/o pain or dizziness.

## 2021-03-11 NOTE — CONSULT NOTE ADULT - SUBJECTIVE AND OBJECTIVE BOX
HPI:   79 yo male hx of CAD s/p CABG s/p 20+ years ago/ CHF/ right sided large inguinal hernia BIBA from hotel room 2/2 unable to ambulate with right thigh weakness and numbness. reported it started from knee to hip. denies fall and injury. He was just sitting and the symptoms started. reported off/on back pain in the past. denies fever/chill/recent illness/coughing/chest pain/abd pain/n/v/d and urinary sxs.   	Also has right sided facial swelling for about 1 year after 3 teeth extraction. reports swelling worsens after eating. swelling was better in the past and started swelling again in the past few months. didn't follow up with his dentist 2/2 COVID.   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 he used to be able to ambulate without assistant and he commutes with his car. (08 Mar 2021 05:03)        PAST MEDICAL & SURGICAL HISTORY:  Inguinal hernia    CHF (congestive heart failure)    S/P CABG x 3        Home Medications:  Eliquis 5 mg oral tablet: 1 tab(s) orally 2 times a day (08 Mar 2021 05:11)  Lasix 20 mg oral tablet: 10  orally once a day, As Needed (08 Mar 2021 05:11)  lisinopril 10 mg oral tablet: 10  orally 2 times a day (08 Mar 2021 05:11)  metoprolol succinate 50 mg oral tablet, extended release: 1 tab(s) orally once a day (08 Mar 2021 05:11)  spironolactone 25 mg oral tablet: 1 tab(s) orally 2 times a day (08 Mar 2021 05:11)      Allergies    No Known Allergies    Intolerances        MEDICATIONS  (STANDING):  apixaban 5 milliGRAM(s) Oral every 12 hours  brimonidine 0.1% Ophthalmic Solution 1 Drop(s) Both EYES every 12 hours  chlorhexidine 4% Liquid 1 Application(s) Topical daily  dexAMETHasone  Injectable 4 milliGRAM(s) IV Push every 6 hours  latanoprost 0.005% Ophthalmic Solution 1 Drop(s) Both EYES at bedtime  pantoprazole    Tablet 40 milliGRAM(s) Oral before breakfast    MEDICATIONS  (PRN):  acetaminophen   Tablet .. 650 milliGRAM(s) Oral every 6 hours PRN Mild Pain (1 - 3)  aluminum hydroxide/magnesium hydroxide/simethicone Suspension 30 milliLiter(s) Oral every 6 hours PRN Dyspepsia  gabapentin 100 milliGRAM(s) Oral three times a day PRN Neuropathic pain  ketorolac   Injectable 15 milliGRAM(s) IV Push every 6 hours PRN Moderate Pain (4 - 6)  morphine  - Injectable 2 milliGRAM(s) IV Push every 4 hours PRN Severe Pain (7 - 10)      ICU Vital Signs Last 24 Hrs  T(C): 36.2 (11 Mar 2021 14:32), Max: 36.7 (10 Mar 2021 20:13)  T(F): 97.2 (11 Mar 2021 14:32), Max: 98.1 (10 Mar 2021 20:13)  HR: 56 (11 Mar 2021 14:32) (46 - 61)  BP: 105/59 (11 Mar 2021 14:32) (92/45 - 123/45)  BP(mean): --  ABP: --  ABP(mean): --  RR: 19 (11 Mar 2021 14:32) (18 - 20)  SpO2: --      I&O's Detail    10 Mar 2021 07:01  -  11 Mar 2021 07:00  --------------------------------------------------------  IN:  Total IN: 0 mL    OUT:    Indwelling Catheter - Urethral (mL): 1400 mL  Total OUT: 1400 mL    Total NET: -1400 mL          CBC Full  -  ( 11 Mar 2021 06:59 )  WBC Count : 9.46 K/uL  RBC Count : 4.15 M/uL  Hemoglobin : 11.2 g/dL  Hematocrit : 35.1 %  Platelet Count - Automated : 295 K/uL  Mean Cell Volume : 84.6 fL  Mean Cell Hemoglobin : 27.0 pg  Mean Cell Hemoglobin Concentration : 31.9 g/dL  Auto Neutrophil # : 8.48 K/uL  Auto Lymphocyte # : 0.51 K/uL  Auto Monocyte # : 0.42 K/uL  Auto Eosinophil # : 0.00 K/uL  Auto Basophil # : 0.00 K/uL  Auto Neutrophil % : 89.7 %  Auto Lymphocyte % : 5.4 %  Auto Monocyte % : 4.4 %  Auto Eosinophil % : 0.0 %  Auto Basophil % : 0.0 %    03-11    137  |  103  |  48<H>  ----------------------------<  126<H>  5.2<H>   |  23  |  1.1    Ca    9.5      11 Mar 2021 06:59  Mg     1.9     03-11    TPro  5.8<L>  /  Alb  3.2<L>  /  TBili  0.3  /  DBili  x   /  AST  16  /  ALT  10  /  AlkPhos  129<H>  03-11            AAOX3. Verbal function intact  tongue midline, facial motions symmetric  PERRLA, EOMI  Pronator Drift: neg  Finger to Nose intact  Motor: MAEx4, 5/5 power in b/l UE,  LLE4/5, RLE prox 3+/5, PF 3/5, DF 1/5  Sensation: intact to touch in all extremities      Imaging: < from: CT Abdomen and Pelvis No Cont (03.08.21 @ 02:42) >    IMPRESSION:    Findings compatible with metastatic disease as demonstrated by multiple bony lesions, the largest soft tissue mass centered at S2-S3 measuring up to 7.4 cm with associated bony destruction and obliteration of the exiting neural foramina. Additional smaller lesions at the right sacral ala, L2 and L3.    Moderate to severe bilateral hydroureteronephrosis to the level of a markedly distended urinary bladder. Findings presumably secondary to urinary bladder outlet obstruction or urinary retention. Enlarged prostate gland.    < end of copied text >      Assessment/Plan  Sacral mass, lower extremity weakness improvement noted on steroids, recommend ENT consult MRI +/- contrast of C/T/LS spine, continue decadron

## 2021-03-11 NOTE — PHYSICAL THERAPY INITIAL EVALUATION ADULT - PERTINENT HX OF CURRENT PROBLEM, REHAB EVAL
81 yo male hx of CAD s/p CABG s/p 20+ years ago/ CHF/ right sided large inguinal hernia BIBA from hotel room 2/2 unable to ambulate with right thigh weakness and numbness. reported it started from knee to hip, denies fall and injury.

## 2021-03-11 NOTE — PROGRESS NOTE ADULT - SUBJECTIVE AND OBJECTIVE BOX
24H events:  IR biopsy of sacral mass completed 3/10    PAST MEDICAL & SURGICAL HISTORY  Inguinal hernia    CHF (congestive heart failure)    S/P CABG x 3      SOCIAL HISTORY:  Negative for smoking/alcohol/drug use.     ALLERGIES:  No Known Allergies    MEDICATIONS:  STANDING MEDICATIONS  brimonidine 0.1% Ophthalmic Solution 1 Drop(s) Both EYES every 12 hours  chlorhexidine 4% Liquid 1 Application(s) Topical daily  dexAMETHasone  Injectable 4 milliGRAM(s) IV Push every 6 hours  furosemide    Tablet 20 milliGRAM(s) Oral daily  latanoprost 0.005% Ophthalmic Solution 1 Drop(s) Both EYES at bedtime  lisinopril 10 milliGRAM(s) Oral two times a day  metoprolol succinate ER 50 milliGRAM(s) Oral daily  spironolactone 25 milliGRAM(s) Oral daily    PRN MEDICATIONS  acetaminophen   Tablet .. 650 milliGRAM(s) Oral every 6 hours PRN  aluminum hydroxide/magnesium hydroxide/simethicone Suspension 30 milliLiter(s) Oral every 6 hours PRN  gabapentin 100 milliGRAM(s) Oral three times a day PRN  ketorolac   Injectable 15 milliGRAM(s) IV Push every 6 hours PRN  morphine  - Injectable 2 milliGRAM(s) IV Push every 4 hours PRN    VITALS:   T(F): 97.6  HR: 46  BP: 92/45  RR: 20  SpO2: 100%    LABS:                        11.8   11.30 )-----------( 291      ( 10 Mar 2021 07:05 )             37.5     03-10    135  |  99  |  38<H>  ----------------------------<  117<H>  5.3<H>   |  24  |  1.0    Ca    10.2<H>      10 Mar 2021 07:05    TPro  6.5  /  Alb  3.6  /  TBili  0.3  /  DBili  x   /  AST  27  /  ALT  10  /  AlkPhos  154<H>  03-10                  RADIOLOGY:  < from: CT Abdomen and Pelvis No Cont (03.08.21 @ 02:42) >  IMPRESSION:    Findings compatible with metastatic disease as demonstrated by multiple bony lesions, the largest soft tissue mass centered at S2-S3 measuring up to 7.4 cm with associated bony destruction and obliteration of the exiting neural foramina. Additional smaller lesions at the right sacral ala, L2 and L3.    Moderate to severe bilateral hydroureteronephrosis to the level of a markedly distended urinary bladder. Findings presumably secondary to urinary bladder outlet obstruction or urinary retention. Enlarged prostate gland.    < end of copied text >  < from: CT Neck Soft Tissue No Cont (03.08.21 @ 02:39) >  IMPRESSION:    Large right mandibular soft tissue density mass measuring 6.5 x 7.1 x 9.2 cm likely arising from the right mandible extending from the right mandibular condyle to the body of the mandible. There is an associated extensive bony erosion of the right mandible within the mass and the posterior wall of the right maxilla. Findings are highly suspicious for malignancy.        ATTENDING ADDENDUM:  Agree with the resident report.  Limited noncontrast CT demonstrates a large neoplastic soft tissue mass centered in the right mandible with associated severe erosion of the body/ramus of the right mandible (sparing the mandibular condyle), as well as the posterior lateral wall of the right maxillary sinus and the buccal cortex of the right maxillary alveolar process and the involved molar/premolars. The right pterygoid plates appear preserved. The parapharyngeal space appears preserved. 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    The mass infiltrates the muscles of the right  space, as well as the right medial/lateral pterygoid muscles. The right parotid gland is displaced posterolaterally and there is a questionable invasion of the tumor. The mass infiltrates the right sublingual space.    There is a borderline enlarged right level III lymph node measuring 1.2 cm (series 4, image 91) abutting the right hyoid.    < end of copied text >      PHYSICAL EXAM:  GEN: No acute distress  HENT: Mandibular mass  LYMPH: No appreciable adenopathy  LUNGS: No respiratory distress, clear to auscultation bilaterally   HEART: regular rate   ABD: Soft, non-tender, non-distended  SKIN: No rash  EXT: No edema  NEURO:      24H events:  IR biopsy of sacral mass completed 3/10    PAST MEDICAL & SURGICAL HISTORY  Inguinal hernia    CHF (congestive heart failure)    S/P CABG x 3      SOCIAL HISTORY:  Negative for smoking/alcohol/drug use.     ALLERGIES:  No Known Allergies    MEDICATIONS:  STANDING MEDICATIONS  brimonidine 0.1% Ophthalmic Solution 1 Drop(s) Both EYES every 12 hours  chlorhexidine 4% Liquid 1 Application(s) Topical daily  dexAMETHasone  Injectable 4 milliGRAM(s) IV Push every 6 hours  furosemide    Tablet 20 milliGRAM(s) Oral daily  latanoprost 0.005% Ophthalmic Solution 1 Drop(s) Both EYES at bedtime  lisinopril 10 milliGRAM(s) Oral two times a day  metoprolol succinate ER 50 milliGRAM(s) Oral daily  spironolactone 25 milliGRAM(s) Oral daily    PRN MEDICATIONS  acetaminophen   Tablet .. 650 milliGRAM(s) Oral every 6 hours PRN  aluminum hydroxide/magnesium hydroxide/simethicone Suspension 30 milliLiter(s) Oral every 6 hours PRN  gabapentin 100 milliGRAM(s) Oral three times a day PRN  ketorolac   Injectable 15 milliGRAM(s) IV Push every 6 hours PRN  morphine  - Injectable 2 milliGRAM(s) IV Push every 4 hours PRN    VITALS:   T(F): 97.6  HR: 46  BP: 92/45  RR: 20  SpO2: 100%    LABS:                        11.8   11.30 )-----------( 291      ( 10 Mar 2021 07:05 )             37.5     03-10    135  |  99  |  38<H>  ----------------------------<  117<H>  5.3<H>   |  24  |  1.0    Ca    10.2<H>      10 Mar 2021 07:05    TPro  6.5  /  Alb  3.6  /  TBili  0.3  /  DBili  x   /  AST  27  /  ALT  10  /  AlkPhos  154<H>  03-10                  RADIOLOGY:  < from: CT Abdomen and Pelvis No Cont (03.08.21 @ 02:42) >  IMPRESSION:    Findings compatible with metastatic disease as demonstrated by multiple bony lesions, the largest soft tissue mass centered at S2-S3 measuring up to 7.4 cm with associated bony destruction and obliteration of the exiting neural foramina. Additional smaller lesions at the right sacral ala, L2 and L3.    Moderate to severe bilateral hydroureteronephrosis to the level of a markedly distended urinary bladder. Findings presumably secondary to urinary bladder outlet obstruction or urinary retention. Enlarged prostate gland.    < end of copied text >  < from: CT Neck Soft Tissue No Cont (03.08.21 @ 02:39) >  IMPRESSION:    Large right mandibular soft tissue density mass measuring 6.5 x 7.1 x 9.2 cm likely arising from the right mandible extending from the right mandibular condyle to the body of the mandible. There is an associated extensive bony erosion of the right mandible within the mass and the posterior wall of the right maxilla. Findings are highly suspicious for malignancy.        ATTENDING ADDENDUM:  Agree with the resident report.  Limited noncontrast CT demonstrates a large neoplastic soft tissue mass centered in the right mandible with associated severe erosion of the body/ramus of the right mandible (sparing the mandibular condyle), as well as the posterior lateral wall of the right maxillary sinus and the buccal cortex of the right maxillary alveolar process and the involved molar/premolars. The right pterygoid plates appear preserved. The parapharyngeal space appears preserved. 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    The mass infiltrates the muscles of the right  space, as well as the right medial/lateral pterygoid muscles. The right parotid gland is displaced posterolaterally and there is a questionable invasion of the tumor. The mass infiltrates the right sublingual space.    There is a borderline enlarged right level III lymph node measuring 1.2 cm (series 4, image 91) abutting the right hyoid.    < end of copied text >      PHYSICAL EXAM:  GEN: No acute distress  HENT: Mandibular swelling  LYMPH: No appreciable adenopathy  LUNGS: No respiratory distress, clear to auscultation bilaterally   HEART: regular rate   ABD: Soft, non-tender, non-distended  SKIN: No rash  EXT: LLE edema  NEURO: nowmoving all extremities

## 2021-03-11 NOTE — PROGRESS NOTE ADULT - ASSESSMENT
81 yo male hx of CAD s/p CABG s/p 20+ years ago/ CHF/ right sided large inguinal hernia BIBA from hotel room 2/2 unable to ambulate with right thigh weakness and numbness, now found to have metastatic disease with a mandibular mass and large sacral mass    # Multiple metastatic osseous lesions  # Lower extremity weakness  - s/p IR biopsy of sacral mass 3/10  - Needs MR C/T/L w/wo and MR brain and jaw w/wo  - NSx f/u  PLAN;   - had prolonged discussion w/ the pt  - explained the management will depend on the nature of diagnosis (benign vs malignant; which malignancy if present) , extent of the disease (stage), his performance status, his motivation to undergo treatment, the social support system  - he has multiple questions, all based on his fear of what his diagnosis is and his prognosis is; I suggested that we need to concentrate on establishing dx soon and its extent asap; he agreed: floor attending was contacted to reinitiate the transfer   - once transferred;  a) iR re-evaluation to get tissue bx asap  b) MRI as suggested by neurosurgery: MRI C/T/L spine w/wo contrast to r/o cord comppression and MR head and jaw w/wo IVC  C) ENT consult  d) neurosurg consult  e) yfn steroids  f) radonc consult pending MRI results  g) palliative care consut, /    consult  dentist consult  nutrition consutt  DVT prophylaxis.    79 yo male hx of CAD s/p CABG s/p 20+ years ago/ CHF/ right sided large inguinal hernia BIBA from hotel room 2/2 unable to ambulate with right thigh weakness and numbness, now found to have metastatic disease with a mandibular mass and large sacral mass    # Multiple metastatic osseous lesions  # Lower extremity weakness  - s/p IR biopsy of sacral mass 3/10  - Needs MR C/T/L w/wo and MR brain and jaw w/wo  - NSx f/u  - Currently on dexamethasone 4mg q6, f/u NSx for taper  - ENT consult/dental consult  - Nutrition consult  - Rad onc following pathology report   - Dr. Bagley discussed with patient that management will depend on the biopsy findings as well as his performance status, motivation and social support system    # Mild normocytic anemia  - Can check iron studies    # Mild hypercalcemia, likely due to bone mets    DVT ppx if no contraindication (Eliquis currently stopped)   79 yo male hx of CAD s/p CABG s/p 20+ years ago/ CHF/ right sided large inguinal hernia BIBA from hotel room 2/2 unable to ambulate with right thigh weakness and numbness, now found to have metastatic disease with a mandibular mass and large sacral mass    # Multiple metastatic osseous lesions  # Lower extremity weakness  - s/p IR biopsy of sacral mass 3/10  - Needs MR C/T/L w/wo and MR brain and jaw w/wo  - NSx f/u  - Currently on dexamethasone 4mg q6, f/u NSx for taper  - ENT consult/dental consult  - Nutrition consult  - Rad onc following pathology report   - Dr. Bagley discussed with patient that management will depend on the biopsy findings as well as his performance status, motivation and social support system    # Mild normocytic anemia  - Can check iron studies    # Mild hypercalcemia, likely due to bone mets    # LLE edema  - Check LE US    DVT ppx if no contraindication (Eliquis currently stopped)

## 2021-03-11 NOTE — PROGRESS NOTE ADULT - ASSESSMENT
81 yo male hx of CAD s/p CABG s/p 20+ years ago/ CHF/ right sided large inguinal hernia BIBA from hotel room 2/2 unable to ambulate with right thigh weakness and numbness, now found to have metastatic disease with a mandibular mass and large sacral mass    # Multiple metastatic osseous lesions  # Lower extremity weakness  - s/p IR biopsy of sacral mass 3/10  - Needs MR C/T/L w/wo and MR brain and jaw w/wo  - NSx f/u  - Currently on dexamethasone 4mg q6, f/u NSx for taper  - ENT consult/dental consult  - Nutrition consult  - Rad onc following pathology report   - Dr. Bagley discussed with patient that management will depend on the biopsy findings as well as his performance status, motivation and social support system    # Mild normocytic anemia  - Can check iron studies    #Hx of Afib  - on eliquis 5 mg bid rate and rhythm controlled     # Mild hypercalcemia, likely due to bone mets    # LLE edema  - Check LE US      Diet: dash  Activity: as tolerated  DVT Prophylaxis: eliquis 5 mg bid  GI Prophylaxis: protonix  CHG Order  Code Status: full  Disposition: pending as patient was living in [facility] prior; awaiting NSX recommendations, MRI results and biopsy reports

## 2021-03-12 LAB
ALBUMIN SERPL ELPH-MCNC: 3.1 G/DL — LOW (ref 3.5–5.2)
ALP SERPL-CCNC: 127 U/L — HIGH (ref 30–115)
ALT FLD-CCNC: 17 U/L — SIGNIFICANT CHANGE UP (ref 0–41)
ANION GAP SERPL CALC-SCNC: 13 MMOL/L — SIGNIFICANT CHANGE UP (ref 7–14)
AST SERPL-CCNC: 17 U/L — SIGNIFICANT CHANGE UP (ref 0–41)
BASOPHILS # BLD AUTO: 0.01 K/UL — SIGNIFICANT CHANGE UP (ref 0–0.2)
BASOPHILS NFR BLD AUTO: 0.1 % — SIGNIFICANT CHANGE UP (ref 0–1)
BILIRUB SERPL-MCNC: 0.3 MG/DL — SIGNIFICANT CHANGE UP (ref 0.2–1.2)
BUN SERPL-MCNC: 58 MG/DL — HIGH (ref 10–20)
CALCIUM SERPL-MCNC: 9.2 MG/DL — SIGNIFICANT CHANGE UP (ref 8.5–10.1)
CHLORIDE SERPL-SCNC: 102 MMOL/L — SIGNIFICANT CHANGE UP (ref 98–110)
CO2 SERPL-SCNC: 21 MMOL/L — SIGNIFICANT CHANGE UP (ref 17–32)
CREAT SERPL-MCNC: 1.2 MG/DL — SIGNIFICANT CHANGE UP (ref 0.7–1.5)
EOSINOPHIL # BLD AUTO: 0 K/UL — SIGNIFICANT CHANGE UP (ref 0–0.7)
EOSINOPHIL NFR BLD AUTO: 0 % — SIGNIFICANT CHANGE UP (ref 0–8)
GLUCOSE SERPL-MCNC: 125 MG/DL — HIGH (ref 70–99)
HCT VFR BLD CALC: 36.8 % — LOW (ref 42–52)
HGB BLD-MCNC: 11.5 G/DL — LOW (ref 14–18)
IMM GRANULOCYTES NFR BLD AUTO: 0.8 % — HIGH (ref 0.1–0.3)
LYMPHOCYTES # BLD AUTO: 0.72 K/UL — LOW (ref 1.2–3.4)
LYMPHOCYTES # BLD AUTO: 6.8 % — LOW (ref 20.5–51.1)
MAGNESIUM SERPL-MCNC: 1.9 MG/DL — SIGNIFICANT CHANGE UP (ref 1.8–2.4)
MCHC RBC-ENTMCNC: 26.7 PG — LOW (ref 27–31)
MCHC RBC-ENTMCNC: 31.3 G/DL — LOW (ref 32–37)
MCV RBC AUTO: 85.4 FL — SIGNIFICANT CHANGE UP (ref 80–94)
MONOCYTES # BLD AUTO: 0.64 K/UL — HIGH (ref 0.1–0.6)
MONOCYTES NFR BLD AUTO: 6 % — SIGNIFICANT CHANGE UP (ref 1.7–9.3)
NEUTROPHILS # BLD AUTO: 9.16 K/UL — HIGH (ref 1.4–6.5)
NEUTROPHILS NFR BLD AUTO: 86.3 % — HIGH (ref 42.2–75.2)
NRBC # BLD: 0 /100 WBCS — SIGNIFICANT CHANGE UP (ref 0–0)
PLATELET # BLD AUTO: 289 K/UL — SIGNIFICANT CHANGE UP (ref 130–400)
POTASSIUM SERPL-MCNC: 5.3 MMOL/L — HIGH (ref 3.5–5)
POTASSIUM SERPL-SCNC: 5.3 MMOL/L — HIGH (ref 3.5–5)
PROT SERPL-MCNC: 5.6 G/DL — LOW (ref 6–8)
RBC # BLD: 4.31 M/UL — LOW (ref 4.7–6.1)
RBC # FLD: 14.5 % — SIGNIFICANT CHANGE UP (ref 11.5–14.5)
SODIUM SERPL-SCNC: 136 MMOL/L — SIGNIFICANT CHANGE UP (ref 135–146)
WBC # BLD: 10.62 K/UL — SIGNIFICANT CHANGE UP (ref 4.8–10.8)
WBC # FLD AUTO: 10.62 K/UL — SIGNIFICANT CHANGE UP (ref 4.8–10.8)

## 2021-03-12 PROCEDURE — 99222 1ST HOSP IP/OBS MODERATE 55: CPT

## 2021-03-12 PROCEDURE — 99232 SBSQ HOSP IP/OBS MODERATE 35: CPT | Mod: GC

## 2021-03-12 RX ORDER — OXYCODONE AND ACETAMINOPHEN 5; 325 MG/1; MG/1
2 TABLET ORAL EVERY 4 HOURS
Refills: 0 | Status: DISCONTINUED | OUTPATIENT
Start: 2021-03-12 | End: 2021-03-14

## 2021-03-12 RX ORDER — ENOXAPARIN SODIUM 100 MG/ML
70 INJECTION SUBCUTANEOUS
Refills: 0 | Status: DISCONTINUED | OUTPATIENT
Start: 2021-03-12 | End: 2021-03-14

## 2021-03-12 RX ORDER — LIDOCAINE 4 G/100G
2 CREAM TOPICAL DAILY
Refills: 0 | Status: DISCONTINUED | OUTPATIENT
Start: 2021-03-12 | End: 2021-04-16

## 2021-03-12 RX ADMIN — Medication 4 MILLIGRAM(S): at 00:10

## 2021-03-12 RX ADMIN — LIDOCAINE 2 PATCH: 4 CREAM TOPICAL at 22:11

## 2021-03-12 RX ADMIN — Medication 15 MILLIGRAM(S): at 12:58

## 2021-03-12 RX ADMIN — LIDOCAINE 2 PATCH: 4 CREAM TOPICAL at 17:02

## 2021-03-12 RX ADMIN — LATANOPROST 1 DROP(S): 0.05 SOLUTION/ DROPS OPHTHALMIC; TOPICAL at 21:30

## 2021-03-12 RX ADMIN — Medication 4 MILLIGRAM(S): at 11:33

## 2021-03-12 RX ADMIN — OXYCODONE AND ACETAMINOPHEN 2 TABLET(S): 5; 325 TABLET ORAL at 01:11

## 2021-03-12 RX ADMIN — APIXABAN 5 MILLIGRAM(S): 2.5 TABLET, FILM COATED ORAL at 05:51

## 2021-03-12 RX ADMIN — PANTOPRAZOLE SODIUM 40 MILLIGRAM(S): 20 TABLET, DELAYED RELEASE ORAL at 05:52

## 2021-03-12 RX ADMIN — ENOXAPARIN SODIUM 70 MILLIGRAM(S): 100 INJECTION SUBCUTANEOUS at 17:03

## 2021-03-12 RX ADMIN — OXYCODONE AND ACETAMINOPHEN 2 TABLET(S): 5; 325 TABLET ORAL at 01:36

## 2021-03-12 RX ADMIN — Medication 4 MILLIGRAM(S): at 05:50

## 2021-03-12 RX ADMIN — Medication 4 MILLIGRAM(S): at 17:03

## 2021-03-12 NOTE — PROGRESS NOTE ADULT - SUBJECTIVE AND OBJECTIVE BOX
ENT: Pt is a 81y/o M with large mandibular mass. Pt seen and examined at bedside with Dr. Hernández. No acute events overnight, denies any diff. breathing, SOB, or worsened swallowing issues.     [ x ] A 10 Point Review of Systems was negative except where noted.    Vital Signs Last 24 Hrs  T(C): 36.5 (12 Mar 2021 19:01), Max: 36.5 (12 Mar 2021 12:54)  T(F): 97.7 (12 Mar 2021 19:01), Max: 97.7 (12 Mar 2021 12:54)  HR: 74 (12 Mar 2021 19:01) (67 - 74)  BP: 92/40 (12 Mar 2021 19:01) (92/40 - 98/47)  RR: 19 (12 Mar 2021 19:01) (19 - 20)    PHYSICAL EXAM:  Gen: NAD, well-developed  Skin: Good color  HEENT: Head NC/AT. Nares bilaterally patent with no blood/discharge noted. Large right facial mass extending from right TMJ down to submandibular area, over parotid area, +firm, tender by TMJ and on palpating center of mass - feels pain intraorally. Oral cavity with extremely poor dentition, tumor seeming to involve right bottom alveolar ridge and gingiva, contiguous with right buccal mucosa, firm and tender upon palpation  Resp: breathing easily, no stridor  CV: no peripheral edema/cyanosis  Abd: soft, nontender  Ext: CRUZ x 4    LABS:                     11.5   10.62 )-----------( 289      ( 12 Mar 2021 06:32 )             36.8

## 2021-03-12 NOTE — PROGRESS NOTE ADULT - ATTENDING COMMENTS
Patient seen and examined. Patient states steroids have improved his LE weakness significantly. C/w decadron, f/u ENT, Neuro-sx f/u, Oncology f/u.      #Progress Note Handoff  Pending (specify):  ENT, Neurosx f/u, Oncology f/u   Family discussion: d/w pt plan as above   Disposition: Home

## 2021-03-12 NOTE — PROGRESS NOTE ADULT - SUBJECTIVE AND OBJECTIVE BOX
----------Daily Progress Note----------    HISTORY OF PRESENT ILLNESS:  Patient is a 80y old Male who presents with a chief complaint of 80 YEAR OLD MALE C/O MULTIPLE MEDICAL COMPLAINTS . (11 Mar 2021 19:14)    Currently admitted to medicine with the primary diagnosis of Ambulatory dysfunction       Today is hospital day 4d.     INTERVAL HOSPITAL COURSE / OVERNIGHT EVENTS:    Patient was examined and seen at bedside. This morning he is resting comfortably in bed. Over night patient was scheduled for MRI neck soft tissue, MR cervical/thoracic/lumbar however refusing MR thoracic and lumbar spine as he felt overwhelmed. Patient was hypotensive today morning BP:94/52 HR:67 asymptomatic; blood pressure meds currently on hold.     Review of Systems: Otherwise unremarkable     <<<<<PAST MEDICAL & SURGICAL HISTORY>>>>>  Inguinal hernia    CHF (congestive heart failure)    S/P CABG x 3      ALLERGIES  No Known Allergies    MEDICATIONS  STANDING MEDICATIONS  brimonidine 0.1% Ophthalmic Solution 1 Drop(s) Both EYES every 12 hours  chlorhexidine 4% Liquid 1 Application(s) Topical daily  dexAMETHasone  Injectable 4 milliGRAM(s) IV Push every 6 hours  enoxaparin Injectable 70 milliGRAM(s) SubCutaneous two times a day  latanoprost 0.005% Ophthalmic Solution 1 Drop(s) Both EYES at bedtime  pantoprazole    Tablet 40 milliGRAM(s) Oral before breakfast    PRN MEDICATIONS  acetaminophen   Tablet .. 650 milliGRAM(s) Oral every 6 hours PRN  aluminum hydroxide/magnesium hydroxide/simethicone Suspension 30 milliLiter(s) Oral every 6 hours PRN  gabapentin 100 milliGRAM(s) Oral three times a day PRN  ketorolac   Injectable 15 milliGRAM(s) IV Push every 6 hours PRN  oxycodone    5 mG/acetaminophen 325 mG 2 Tablet(s) Oral every 4 hours PRN    VITALS:  T(F): 95.9  HR: 67  BP: 94/52  RR: 20  SpO2: --    <<<<<LABS>>>>>                        11.5   10.62 )-----------( 289      ( 12 Mar 2021 06:32 )             36.8     03-12    136  |  102  |  58<H>  ----------------------------<  125<H>  5.3<H>   |  21  |  1.2    Ca    9.2      12 Mar 2021 06:32  Mg     1.9     03-12    TPro  5.6<L>  /  Alb  3.1<L>  /  TBili  0.3  /  DBili  x   /  AST  17  /  ALT  17  /  AlkPhos  127<H>  03-12            [id number]  < from: CT Abdomen and Pelvis No Cont (03.08.21 @ 02:42) >    IMPRESSION:    Findings compatible with metastatic disease as demonstrated by multiple bony lesions, the largest soft tissue mass centered at S2-S3 measuring up to 7.4 cm with associated bony destruction and obliteration of the exiting neural foramina. Additional smaller lesions at the right sacral ala, L2 and L3.    Moderate to severe bilateral hydroureteronephrosis to the level of a markedly distended urinary bladder. Findings presumably secondary to urinary bladder outlet obstruction or urinary retention. Enlarged prostate gland.      < end of copied text >        < from: CT Neck Soft Tissue No Cont (03.08.21 @ 02:39) >  Large right mandibular soft tissue density mass measuring 6.5 x 7.1 x 9.2 cm likely arising from the right mandible extending from the right mandibular condyle to the body of the mandible. There is an associated extensive bony erosion of the right mandible within the mass and the posterior wall of the right maxilla. Findings are highly suspicious for malignancy.        ATTENDING ADDENDUM:  Agree with the resident report.  Limited noncontrast CT demonstrates a large neoplastic soft tissue mass centered in the right mandible with associated severe erosion of the body/ramus of the right mandible (sparing the mandibular condyle), as well as the posterior lateral wall of the right maxillary sinus and the buccal cortex of the right maxillary alveolar process and the involved molar/premolars. The right pterygoid plates appear preserved. The parapharyngeal space appears preserved. 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    The mass infiltrates the muscles of the right  space, as well as the right medial/lateral pterygoid muscles. The right parotid gland is displaced posterolaterally and there is a questionable invasion of the tumor. The mass infiltrates the right sublingual space.    There is a borderline enlarged right level III lymph node measuring 1.2 cm (series 4, image 91) abutting the right hyoid.      < end of copied text >      < from: MR Lumbar Spine No Cont (03.11.21 @ 21:02) >  MPRESSION:    1.  Findings consistent with extensive diffuse osseous metastatic disease.    2.  The largest lesion is within the sacrum (S2-S4) measuring 8.4 cm, impinging and obliterating the neural foramen.    3.  There is also right S1 mass invading the right L5-S1 neural foramen, and a mildly expansile L2 mass without significant spinal stenosis.    < end of copied text >            <<<<<PHYSICAL EXAM>>>>>  GENERAL: Well developed, well nourished and in no acute distress. Resting comfortably in bed.  HEENT: Normocephalic, atraumatic, mucous membranes moist, EOMI, PERRLA, bilateral sclera anicteric, no conjunctival injection  Neck: Supple, non-tender, no lymphadenopathy.  PULMONARY: Clear to auscultation bilaterally. No rales, rhonchi, or wheezing.  CARDIOVASCULAR: Regular rate and rhythm, S1-S2, no murmurs  < from: MR Orbit, Face, and/or Neck No Cont (03.11.21 @ 21:40) >  IMPRESSION:    Please note the patient declined IV contrast which limits tumor assessment. There is also motion artifact.    1.  Numerous foci of bone marrow signal abnormality consistent with osseous metastatic disease. Notable lesions include:    2.  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    3.  Metastatic lesions of the T2 and T3 vertebral bodies on the right with a associated large rightparaspinal soft tissue mass invading the right second rib and right T2-3 neural foramen. Small right lateral epidural component noted without cord compression.    4.  Left occipital bone mass measuring 4 cm with overlying subgaleal component and underlying small epidural component.        -----------------------------------------------------------------------------------------------------------------------------------------------------------------------------------------------

## 2021-03-12 NOTE — PROGRESS NOTE ADULT - ASSESSMENT
81 yo male hx of CAD s/p CABG s/p 20+ years ago/ CHF/ right sided large inguinal hernia BIBA from hotel room 2/2 unable to ambulate with right thigh weakness and numbness, now found to have metastatic disease with a mandibular mass and large sacral mass    # Multiple metastatic osseous lesions with newly diagnosed sacral and mandibular mass (unknown primary) with LE weakness concerning for spinal cord compression  - Patient noticed symptoms for past year however due to pandemic was scared to address it in hospital setting  - Was initially admitted to [facility] and started on IV decadron for concern of cord compression; patient was reluctant to coming to [facility] as he required additional oncological workup  - CT scan abd/pelvis on admission: Findings compatible with metastatic disease as demonstrated by multiple bony lesions, the largest soft tissue mass centered at S2-S3 measuring up to 7.4 cm with associated bony destruction and obliteration of the exiting neural foramina. Additional smaller lesions at the right sacral ala, L2 and L3.  Moderate to severe bilateral hydroureteronephrosis to the level of a markedly distended urinary bladder. Findings presumably secondary to urinary bladder outlet obstruction or urinary retention. Enlarged prostate gland.  - CT Neck Soft Tissue No Cont:Large right mandibular soft tissue density mass measuring 6.5 x 7.1 x 9.2 cm likely arising from the right mandible extending from the right mandibular condyle to the body of the mandible. There is an associated extensive bony erosion of the right mandible within the mass and the posterior wall of the right maxilla. Findings are highly suspicious for malignancy.  - s/p IR biopsy of sacral mass 3/10  - Patient requring additional imaging such as MR C/T/L w/wo and MR brain and jaw w/wo however refusing -> only underwent MR soft tissue neck and MR cervical spine. -> refusing thoracic and lumbar   - Currently on dexamethasone 4mg q6, f/u NSx for taper  - As per neurosurgery: continue steroids as patients weakness is improving  - ENT consult placed: patient was scoped, plan for possible bedside mandibular biopsy  - will consult oral maxillofacial surgery for further intervention   - Rad onc following pathology report   - Dr. Bagley discussed with patient that management will depend on the biopsy findings as well as his performance status, motivation and social support system    # Mild normocytic anemia  - Can check iron studies    #Hx of Afib  - on eliquis 5 mg bid rate and rhythm controlled     #HTN  - BP running on lower end in morning, hence BP med currently on hold     # Mild hypercalcemia, likely due to bone mets    # LLE edema  - f/u LE duplex       Diet: dash  Activity: as tolerated  DVT Prophylaxis: eliquis 5 mg bid  GI Prophylaxis: protonix  CHG Order  Code Status: full  Disposition: pending as patient was living in hotel prior; awaiting NSX recommendations, MRI results and biopsy reports

## 2021-03-12 NOTE — PROGRESS NOTE ADULT - ASSESSMENT
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, IHC pending, patient aware  - Completed MR jaw and lumbar, results above, refusing further imaging (requested MR cervical, thoracic spine and MR head)    # Mild normocytic anemia  - Can check iron studies    # Mild hypercalcemia, likely due to bone mets    # LLE edema  - Check LE US    DVT ppx if no contraindication (Eliquis currently stopped)   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

## 2021-03-12 NOTE — PROGRESS NOTE ADULT - ATTENDING COMMENTS
seen/examined w/hemonc fellow  note reviewed   case discussed    MRI head/orbit reviewed  MRI lumbar spine reviewed    contacted neurosurgery to review and janna  contacted ENT to review and advise  biopsy of sacral mass pending  MRI C/T/L spine pending  MR jaw pending    need multidisciplinary team discussion

## 2021-03-12 NOTE — PROGRESS NOTE ADULT - SUBJECTIVE AND OBJECTIVE BOX
24H events:    His sacral biopsy was positive for malignant cells, IHC pending  He was not able to complete all of the MRIs and is refusing further imaging  His LE strength is improving    PAST MEDICAL & SURGICAL HISTORY  Inguinal hernia    CHF (congestive heart failure)    S/P CABG x 3      SOCIAL HISTORY:  Negative for smoking/alcohol/drug use.     ALLERGIES:  No Known Allergies    MEDICATIONS:  STANDING MEDICATIONS  brimonidine 0.1% Ophthalmic Solution 1 Drop(s) Both EYES every 12 hours  chlorhexidine 4% Liquid 1 Application(s) Topical daily  dexAMETHasone  Injectable 4 milliGRAM(s) IV Push every 6 hours  enoxaparin Injectable 70 milliGRAM(s) SubCutaneous two times a day  latanoprost 0.005% Ophthalmic Solution 1 Drop(s) Both EYES at bedtime  pantoprazole    Tablet 40 milliGRAM(s) Oral before breakfast    PRN MEDICATIONS  acetaminophen   Tablet .. 650 milliGRAM(s) Oral every 6 hours PRN  aluminum hydroxide/magnesium hydroxide/simethicone Suspension 30 milliLiter(s) Oral every 6 hours PRN  gabapentin 100 milliGRAM(s) Oral three times a day PRN  ketorolac   Injectable 15 milliGRAM(s) IV Push every 6 hours PRN  oxycodone    5 mG/acetaminophen 325 mG 2 Tablet(s) Oral every 4 hours PRN    VITALS:   T(F): 97.7  HR: 68  BP: 98/47  RR: 19  SpO2: --    LABS:                        11.5   10.62 )-----------( 289      ( 12 Mar 2021 06:32 )             36.8     03-12    136  |  102  |  58<H>  ----------------------------<  125<H>  5.3<H>   |  21  |  1.2    Ca    9.2      12 Mar 2021 06:32  Mg     1.9     03-12    TPro  5.6<L>  /  Alb  3.1<L>  /  TBili  0.3  /  DBili  x   /  AST  17  /  ALT  17  /  AlkPhos  127<H>  03-12                  RADIOLOGY:  < from: MR Orbit, Face, and/or Neck No Cont (03.11.21 @ 21:40) >  IMPRESSION:    Please note the patient declined IV contrast which limits tumor assessment. There is also motion artifact.    1.  Numerous foci of bone marrow signal abnormality consistent with osseous metastatic disease. Notable lesions include:    2.  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    3.  Metastatic lesions of the T2 and T3 vertebral bodies on the right with a associated large rightparaspinal soft tissue mass invading the right second rib and right T2-3 neural foramen. Small right lateral epidural component noted without cord compression.    4.  Left occipital bone mass measuring 4 cm with overlying subgaleal component and underlying small epidural component.        < from: MR Lumbar Spine No Cont (03.11.21 @ 21:02) >    IMPRESSION:    1.  Findings consistent with extensive diffuse osseous metastatic disease.    2.  The largest lesion is within the sacrum (S2-S4) measuring 8.4 cm, impinging and obliterating the neural foramen.    3.  There is also right S1 mass invading the right L5-S1 neural foramen, and a mildly expansile L2 mass without significant spinal stenosis.    < end of copied text >      PHYSICAL EXAM:  GEN: No acute distress  HENT: NCAT, EOMI  LYMPH: No appreciable adenopathy  LUNGS: No respiratory distress,   HEART: regular rate and rhythm  ABD: Soft, non-tender, non-distended  SKIN: No rash  NEURO: AAOX3, improving strength in legs

## 2021-03-12 NOTE — PROGRESS NOTE ADULT - ASSESSMENT
81y/o M with RIGHT mandibular mass    - Pt seen and examined at bedside with Dr. Hernández, imaging reviewed, plan as per attending  - Mandibular biopsy deferred at this time, will await final IR path results.  - Case was discussed with Dr. Méndez  - No acute ENT intervention at this time.   - Will follow.

## 2021-03-12 NOTE — CHART NOTE - NSCHARTNOTEFT_GEN_A_CORE
Registered Dietitian Follow-Up     Patient Profile Reviewed                           Yes [x]   No []     Nutrition History Previously Obtained        Yes [x]  No []       Pertinent Subjective Information: Pt sitting comfortably in bed at time of assessment. Reports appetite remains adequate & food is soft enough for pt to chew & swallow. As per pt he's ordering a lot of salmon as it is softest source of protein, has done well w. turkey & chicken also but needs side of gravy. Kitchen staff aware of need & delivers w. meals. Pt will eat % meal if soft enough. Drinks Ensure 1-2x/day if supplement is cold. RD poured in cup of ice for pt. RD let pt know of options of ground & pureed foods if he starts to experience worsening pain w intake, pt verbalizes understanding but denies need for texture change at this time. RD to continue to f/u.      Pertinent Medical Interventions:  1. Multiple metastatic osseous lesions with newly diagnosed sacral and mandibular mass  --per ENT: pt scoped, plan for possible bedside bx of mandibular mass. oral maxillofacial surgery c/s pending.   --s/p MRI soft tissue neck and cervical spine but refusing MRI thoracic & lumbar spine.  --per HemeOnc: sacral Bx positive for malignant cells . IHC pending. treatment recs pending final pathology.   2. Mild hypercalcemia--likely d/t bone mets.  3. LLE edema--f/u LE duplex   4. Lower extremity weakness likely d/t osseous lesions     Diet order: Dysphagia III mechanical Soft, cut-up, thin liquids + Ensure Enlive BID.     Anthropometrics:  - Ht. 180.3cm   - Wt. 67kg (3/8/21) no new wt   - BMI 20.6   - IBW     Pertinent Lab Data: (3/12/21) RBC 4.31, H/H 11.5/36.8, K+ 5.3, BUN 58, glucose 125, GFR 57     Pertinent Meds: lovenox, maalox, protonix, gabapentin, decadron,      Physical Findings:  - Appearance: AAOx4   - GI function: none per pt, LBM 3/9   - Tubes: n/a   - Oral/Mouth cavity: tolerating current texture well   - Skin: stage II PU to sacrum; no edema per RN flowsheet       Nutrition Requirements  Weight Used: 67kg (continued from RD initial assessment)      est kcal needs = 1695-1975kcal/day (MSJ x1.2-1.4 considering PCM, possible metastatic disease & avd age). est protein needs = 80-94 g/day (1.2-1.4 g/kg CBW, reasons mentioned above).  est fluid needs = 1mL/kcal or per LIP.     Nutrient Intake: intake varies based on meal options but typically % + supplements      [x] Previous Nutrition Diagnosis: (1) severe PCM; (2) inadequate oral intake--both ongoing but improving             [x] Ongoing          [] Resolved     Nutrition Intervention: meals & snacks, medical food supplements  Recommend:  1. Continue Dysphagia III mechanical Soft, cut-up, thin liquids + Ensure Enlive BID. Diet texture adjustment per pt request. Provide extra gravy w. meals.      Goal/Expected Outcome: Pt to consistently consume 75% or more of meals & supplements upon f/u in 4 days.      Indicator/Monitoring: RD will monitor diet order, energy intake, nutrition related labs, body composition, NFPF (PO tolerance, GI s/s).

## 2021-03-13 LAB
ALBUMIN SERPL ELPH-MCNC: 2.9 G/DL — LOW (ref 3.5–5.2)
ALP SERPL-CCNC: 118 U/L — HIGH (ref 30–115)
ALT FLD-CCNC: 81 U/L — HIGH (ref 0–41)
ANION GAP SERPL CALC-SCNC: 10 MMOL/L — SIGNIFICANT CHANGE UP (ref 7–14)
AST SERPL-CCNC: 66 U/L — HIGH (ref 0–41)
BASOPHILS # BLD AUTO: 0.03 K/UL — SIGNIFICANT CHANGE UP (ref 0–0.2)
BASOPHILS NFR BLD AUTO: 0.2 % — SIGNIFICANT CHANGE UP (ref 0–1)
BILIRUB SERPL-MCNC: 0.2 MG/DL — SIGNIFICANT CHANGE UP (ref 0.2–1.2)
BUN SERPL-MCNC: 77 MG/DL — CRITICAL HIGH (ref 10–20)
CALCIUM SERPL-MCNC: 9.5 MG/DL — SIGNIFICANT CHANGE UP (ref 8.5–10.1)
CHLORIDE SERPL-SCNC: 100 MMOL/L — SIGNIFICANT CHANGE UP (ref 98–110)
CO2 SERPL-SCNC: 20 MMOL/L — SIGNIFICANT CHANGE UP (ref 17–32)
CREAT SERPL-MCNC: 1.2 MG/DL — SIGNIFICANT CHANGE UP (ref 0.7–1.5)
D DIMER BLD IA.RAPID-MCNC: 224 NG/ML DDU — SIGNIFICANT CHANGE UP (ref 0–230)
EOSINOPHIL # BLD AUTO: 0 K/UL — SIGNIFICANT CHANGE UP (ref 0–0.7)
EOSINOPHIL NFR BLD AUTO: 0 % — SIGNIFICANT CHANGE UP (ref 0–8)
FERRITIN SERPL-MCNC: 164 NG/ML — SIGNIFICANT CHANGE UP (ref 30–400)
GLUCOSE SERPL-MCNC: 139 MG/DL — HIGH (ref 70–99)
HCT VFR BLD CALC: 34.7 % — LOW (ref 42–52)
HGB BLD-MCNC: 11.3 G/DL — LOW (ref 14–18)
IMM GRANULOCYTES NFR BLD AUTO: 2.1 % — HIGH (ref 0.1–0.3)
IRON SATN MFR SERPL: 141 UG/DL — SIGNIFICANT CHANGE UP (ref 35–150)
IRON SATN MFR SERPL: 67 % — HIGH (ref 15–50)
LYMPHOCYTES # BLD AUTO: 1.05 K/UL — LOW (ref 1.2–3.4)
LYMPHOCYTES # BLD AUTO: 7.2 % — LOW (ref 20.5–51.1)
MAGNESIUM SERPL-MCNC: 2 MG/DL — SIGNIFICANT CHANGE UP (ref 1.8–2.4)
MCHC RBC-ENTMCNC: 27.4 PG — SIGNIFICANT CHANGE UP (ref 27–31)
MCHC RBC-ENTMCNC: 32.6 G/DL — SIGNIFICANT CHANGE UP (ref 32–37)
MCV RBC AUTO: 84.2 FL — SIGNIFICANT CHANGE UP (ref 80–94)
MONOCYTES # BLD AUTO: 1.29 K/UL — HIGH (ref 0.1–0.6)
MONOCYTES NFR BLD AUTO: 8.8 % — SIGNIFICANT CHANGE UP (ref 1.7–9.3)
NEUTROPHILS # BLD AUTO: 11.93 K/UL — HIGH (ref 1.4–6.5)
NEUTROPHILS NFR BLD AUTO: 81.7 % — HIGH (ref 42.2–75.2)
NRBC # BLD: 0 /100 WBCS — SIGNIFICANT CHANGE UP (ref 0–0)
PLATELET # BLD AUTO: 282 K/UL — SIGNIFICANT CHANGE UP (ref 130–400)
POTASSIUM SERPL-MCNC: 5.3 MMOL/L — HIGH (ref 3.5–5)
POTASSIUM SERPL-SCNC: 5.3 MMOL/L — HIGH (ref 3.5–5)
PROT SERPL-MCNC: 5.2 G/DL — LOW (ref 6–8)
PSA FLD-MCNC: 969 NG/ML — HIGH (ref 0–4)
RBC # BLD: 4.12 M/UL — LOW (ref 4.7–6.1)
RBC # FLD: 14.6 % — HIGH (ref 11.5–14.5)
SODIUM SERPL-SCNC: 130 MMOL/L — LOW (ref 135–146)
TIBC SERPL-MCNC: 211 UG/DL — LOW (ref 220–430)
UIBC SERPL-MCNC: 70 UG/DL — LOW (ref 110–370)
WBC # BLD: 14.6 K/UL — HIGH (ref 4.8–10.8)
WBC # FLD AUTO: 14.6 K/UL — HIGH (ref 4.8–10.8)

## 2021-03-13 PROCEDURE — 99232 SBSQ HOSP IP/OBS MODERATE 35: CPT

## 2021-03-13 PROCEDURE — 99233 SBSQ HOSP IP/OBS HIGH 50: CPT | Mod: GC

## 2021-03-13 RX ORDER — PANTOPRAZOLE SODIUM 20 MG/1
40 TABLET, DELAYED RELEASE ORAL
Refills: 0 | Status: DISCONTINUED | OUTPATIENT
Start: 2021-03-13 | End: 2021-03-14

## 2021-03-13 RX ORDER — DEXLANSOPRAZOLE 30 MG/1
60 CAPSULE, DELAYED RELEASE ORAL DAILY
Refills: 0 | Status: DISCONTINUED | OUTPATIENT
Start: 2021-03-13 | End: 2021-03-14

## 2021-03-13 RX ADMIN — PANTOPRAZOLE SODIUM 40 MILLIGRAM(S): 20 TABLET, DELAYED RELEASE ORAL at 06:30

## 2021-03-13 RX ADMIN — OXYCODONE AND ACETAMINOPHEN 2 TABLET(S): 5; 325 TABLET ORAL at 15:00

## 2021-03-13 RX ADMIN — ENOXAPARIN SODIUM 70 MILLIGRAM(S): 100 INJECTION SUBCUTANEOUS at 06:30

## 2021-03-13 RX ADMIN — DEXLANSOPRAZOLE 60 MILLIGRAM(S): 30 CAPSULE, DELAYED RELEASE ORAL at 21:39

## 2021-03-13 RX ADMIN — Medication 4 MILLIGRAM(S): at 11:21

## 2021-03-13 RX ADMIN — PANTOPRAZOLE SODIUM 40 MILLIGRAM(S): 20 TABLET, DELAYED RELEASE ORAL at 18:28

## 2021-03-13 RX ADMIN — BRIMONIDINE TARTRATE 1 DROP(S): 2 SOLUTION/ DROPS OPHTHALMIC at 18:29

## 2021-03-13 RX ADMIN — OXYCODONE AND ACETAMINOPHEN 2 TABLET(S): 5; 325 TABLET ORAL at 14:35

## 2021-03-13 RX ADMIN — LIDOCAINE 2 PATCH: 4 CREAM TOPICAL at 07:00

## 2021-03-13 RX ADMIN — ENOXAPARIN SODIUM 70 MILLIGRAM(S): 100 INJECTION SUBCUTANEOUS at 18:27

## 2021-03-13 RX ADMIN — Medication 4 MILLIGRAM(S): at 18:27

## 2021-03-13 RX ADMIN — OXYCODONE AND ACETAMINOPHEN 2 TABLET(S): 5; 325 TABLET ORAL at 00:12

## 2021-03-13 RX ADMIN — LATANOPROST 1 DROP(S): 0.05 SOLUTION/ DROPS OPHTHALMIC; TOPICAL at 21:38

## 2021-03-13 RX ADMIN — LIDOCAINE 2 PATCH: 4 CREAM TOPICAL at 20:14

## 2021-03-13 RX ADMIN — Medication 4 MILLIGRAM(S): at 00:12

## 2021-03-13 RX ADMIN — GABAPENTIN 100 MILLIGRAM(S): 400 CAPSULE ORAL at 13:09

## 2021-03-13 RX ADMIN — LIDOCAINE 2 PATCH: 4 CREAM TOPICAL at 11:23

## 2021-03-13 RX ADMIN — Medication 4 MILLIGRAM(S): at 06:32

## 2021-03-13 NOTE — PROGRESS NOTE ADULT - ASSESSMENT
79 yo male hx of CAD s/p CABG s/p 20+ years ago/ CHF/ right sided large inguinal hernia BIBA from hotel room 2/2 unable to ambulate with right thigh weakness and numbness, now found to have metastatic disease with a mandibular mass and large sacral mass    # Multiple metastatic osseous lesions with newly diagnosed sacral and mandibular mass (unknown primary) with LE weakness concerning for spinal cord compression  - Patient noticed symptoms for past year however due to pandemic was scared to address it in hospital setting  - Was initially admitted to [facility] and started on IV decadron for concern of cord compression; patient was reluctant to coming to [facility] as he required additional oncological workup  - CT scan abd/pelvis on admission: Findings compatible with metastatic disease as demonstrated by multiple bony lesions, the largest soft tissue mass centered at S2-S3 measuring up to 7.4 cm with associated bony destruction and obliteration of the exiting neural foramina. Additional smaller lesions at the right sacral ala, L2 and L3.  Moderate to severe bilateral hydroureteronephrosis to the level of a markedly distended urinary bladder. Findings presumably secondary to urinary bladder outlet obstruction or urinary retention. Enlarged prostate gland.  - CT Neck Soft Tissue No Cont:Large right mandibular soft tissue density mass measuring 6.5 x 7.1 x 9.2 cm likely arising from the right mandible extending from the right mandibular condyle to the body of the mandible. There is an associated extensive bony erosion of the right mandible within the mass and the posterior wall of the right maxilla. Findings are highly suspicious for malignancy.  - s/p IR biopsy of sacral mass 3/10  - Patient requring additional imaging such as MR C/T/L w/wo and MR brain and jaw w/wo however refusing -> only underwent MR soft tissue neck and MR cervical spine. -> refusing thoracic and lumbar   - Currently on dexamethasone 4mg q6, f/u NSx for taper  - As per neurosurgery: continue steroids as patients weakness is improving  - ENT consult placed: patient was scoped, bedside mandibular biopsy deferred for now; awaiting IR final sacral biopsy reports  - will consult oral maxillofacial surgery for further intervention depending on report  - Rad onc following pathology report   - Dr. Bagley discussed with patient that management will depend on the biopsy findings as well as his performance status, motivation and social support system  - Given his MR soft tissue findings-> concern for occipital bone mass patient would most likely be beneficial will ask patient if he is agreeable and he does not want additional imaging studies prior        #Hx of Afib  - on eliquis 5 mg bid rate and rhythm controlled -> currently switched to lovenox    #HTN  - BP running on lower end in morning, hence BP med currently on hold     # Mild hypercalcemia, likely due to bone mets      Diet: dash  Activity: as tolerated  DVT Prophylaxis: lovenox  GI Prophylaxis: protonix  CHG Order  Code Status: full  Disposition: pending as patient was living in hotel prior; awaiting final sacral mass biopsy following that ONC/NSX/ENT recommendations 79 yo male hx of CAD s/p CABG s/p 20+ years ago/ CHF/ right sided large inguinal hernia BIBA from hotel room 2/2 unable to ambulate with right thigh weakness and numbness, now found to have metastatic disease with a mandibular mass and large sacral mass    # Multiple metastatic osseous lesions with newly diagnosed sacral and mandibular mass (unknown primary) with LE weakness concerning for spinal cord compression  - Patient noticed symptoms for past year however due to pandemic was scared to address it in hospital setting  - Was initially admitted to [facility] and started on IV decadron for concern of cord compression; patient was reluctant to coming to [facility] as he required additional oncological workup  - CT scan abd/pelvis on admission: Findings compatible with metastatic disease as demonstrated by multiple bony lesions, the largest soft tissue mass centered at S2-S3 measuring up to 7.4 cm with associated bony destruction and obliteration of the exiting neural foramina. Additional smaller lesions at the right sacral ala, L2 and L3.  Moderate to severe bilateral hydroureteronephrosis to the level of a markedly distended urinary bladder. Findings presumably secondary to urinary bladder outlet obstruction or urinary retention. Enlarged prostate gland.  - CT Neck Soft Tissue No Cont:Large right mandibular soft tissue density mass measuring 6.5 x 7.1 x 9.2 cm likely arising from the right mandible extending from the right mandibular condyle to the body of the mandible. There is an associated extensive bony erosion of the right mandible within the mass and the posterior wall of the right maxilla. Findings are highly suspicious for malignancy.  - s/p IR biopsy of sacral mass 3/10  - Patient requring additional imaging such as MR C/T/L w/wo and MR brain and jaw w/wo however refusing -> only underwent MR soft tissue neck and MR cervical spine. -> refusing thoracic and lumbar   - Currently on dexamethasone 4mg q6, f/u NSx for taper  - As per neurosurgery: continue steroids as patients weakness is improving  - ENT consult placed: patient was scoped, bedside mandibular biopsy deferred for now; awaiting IR final sacral biopsy reports  - will consult oral maxillofacial surgery for further intervention depending on report  - Rad onc following pathology report   - Dr. Bagley discussed with patient that management will depend on the biopsy findings as well as his performance status, motivation and social support system  - Given his MR soft tissue findings-> concern for occipital bone mass, patient would most likely benefit for dedicated MR brain to evaluate extent. Will ask patient if he is agreeable as he does not want additional imaging studies prior        #Hx of Afib  - on eliquis 5 mg bid rate and rhythm controlled -> currently switched to lovenox    #HTN  - BP running on lower end in morning, hence BP med currently on hold     # Mild hypercalcemia, likely due to bone mets      Diet: dash  Activity: as tolerated  DVT Prophylaxis: lovenox  GI Prophylaxis: protonix  CHG Order  Code Status: full  Disposition: pending as patient was living in hotel prior; awaiting final sacral mass biopsy following that ONC/NSX/ENT recommendations

## 2021-03-13 NOTE — PROGRESS NOTE ADULT - ATTENDING COMMENTS
Patient seen and examined. Patient reports heartburn. Will increase PPI to BID. Hgb is stable. C/w decadron, f/u ENT, Neuro-sx f/u, Oncology f/u.      #Progress Note Handoff  Pending (specify):  ENT, Neurosx f/u, Oncology f/u   Family discussion: d/w pt plan as above   Disposition: Home, refuses STR

## 2021-03-13 NOTE — PROGRESS NOTE ADULT - SUBJECTIVE AND OBJECTIVE BOX
----------Daily Progress Note----------    HISTORY OF PRESENT ILLNESS:  Patient is a 80y old Male who presents with a chief complaint of 80 YEAR OLD MALE C/O MULTIPLE MEDICAL COMPLAINTS . (12 Mar 2021 22:27)    Currently admitted to medicine with the primary diagnosis of Ambulatory dysfunction       Today is hospital day 5d.     INTERVAL HOSPITAL COURSE / OVERNIGHT EVENTS:    Patient was examined and seen at bedside. This morning he is resting comfortably in bed and reports no new issues or overnight events.     Review of Systems: Otherwise unremarkable     <<<<<PAST MEDICAL & SURGICAL HISTORY>>>>>  Inguinal hernia    CHF (congestive heart failure)    S/P CABG x 3      ALLERGIES  No Known Allergies    MEDICATIONS  STANDING MEDICATIONS  brimonidine 0.1% Ophthalmic Solution 1 Drop(s) Both EYES every 12 hours  chlorhexidine 4% Liquid 1 Application(s) Topical daily  dexAMETHasone  Injectable 4 milliGRAM(s) IV Push every 6 hours  enoxaparin Injectable 70 milliGRAM(s) SubCutaneous two times a day  latanoprost 0.005% Ophthalmic Solution 1 Drop(s) Both EYES at bedtime  lidocaine   Patch 2 Patch Transdermal daily  pantoprazole    Tablet 40 milliGRAM(s) Oral before breakfast    PRN MEDICATIONS  acetaminophen   Tablet .. 650 milliGRAM(s) Oral every 6 hours PRN  aluminum hydroxide/magnesium hydroxide/simethicone Suspension 30 milliLiter(s) Oral every 6 hours PRN  gabapentin 100 milliGRAM(s) Oral three times a day PRN  ketorolac   Injectable 15 milliGRAM(s) IV Push every 6 hours PRN  oxycodone    5 mG/acetaminophen 325 mG 2 Tablet(s) Oral every 4 hours PRN    VITALS:  T(F): 97.3  HR: 73  BP: 95/53  RR: 20  SpO2: --    <<<<<LABS>>>>>                        11.3   14.60 )-----------( 282      ( 13 Mar 2021 04:30 )             34.7     03-13    130<L>  |  100  |  77<HH>  ----------------------------<  139<H>  5.3<H>   |  20  |  1.2    Ca    9.5      13 Mar 2021 04:30  Mg     2.0     03-13    TPro  5.2<L>  /  Alb  2.9<L>  /  TBili  0.2  /  DBili  x   /  AST  66<H>  /  ALT  81<H>  /  AlkPhos  118<H>  03-13            [id number]        <<<<<RADIOLOGY>>>>>    < from: CT Abdomen and Pelvis No Cont (03.08.21 @ 02:42) >    IMPRESSION:    Findings compatible with metastatic disease as demonstrated by multiple bony lesions, the largest soft tissue mass centered at S2-S3 measuring up to 7.4 cm with associated bony destruction and obliteration of the exiting neural foramina. Additional smaller lesions at the right sacral ala, L2 and L3.    Moderate to severe bilateral hydroureteronephrosis to the level of a markedly distended urinary bladder. Findings presumably secondary to urinary bladder outlet obstruction or urinary retention. Enlarged prostate gland.      < end of copied text >        < from: CT Neck Soft Tissue No Cont (03.08.21 @ 02:39) >  Large right mandibular soft tissue density mass measuring 6.5 x 7.1 x 9.2 cm likely arising from the right mandible extending from the right mandibular condyle to the body of the mandible. There is an associated extensive bony erosion of the right mandible within the mass and the posterior wall of the right maxilla. Findings are highly suspicious for malignancy.        ATTENDING ADDENDUM:  Agree with the resident report.  Limited noncontrast CT demonstrates a large neoplastic soft tissue mass centered in the right mandible with associated severe erosion of the body/ramus of the right mandible (sparing the mandibular condyle), as well as the posterior lateral wall of the right maxillary sinus and the buccal cortex of the right maxillary alveolar process and the involved molar/premolars. The right pterygoid plates appear preserved. The parapharyngeal space appears preserved. 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    The mass infiltrates the muscles of the right  space, as well as the right medial/lateral pterygoid muscles. The right parotid gland is displaced posterolaterally and there is a questionable invasion of the tumor. The mass infiltrates the right sublingual space.    There is a borderline enlarged right level III lymph node measuring 1.2 cm (series 4, image 91) abutting the right hyoid.      < end of copied text >      < from: MR Lumbar Spine No Cont (03.11.21 @ 21:02) >  MPRESSION:    1.  Findings consistent with extensive diffuse osseous metastatic disease.    2.  The largest lesion is within the sacrum (S2-S4) measuring 8.4 cm, impinging and obliterating the neural foramen.    3.  There is also right S1 mass invading the right L5-S1 neural foramen, and a mildly expansile L2 mass without significant spinal stenosis.    < end of copied text >< from: MR Orbit, Face, and/or Neck No Cont (03.11.21 @ 21:40) >  IMPRESSION:    Please note the patient declined IV contrast which limits tumor assessment. There is also motion artifact.    1.  Numerous foci of bone marrow signal abnormality consistent with osseous metastatic disease. Notable lesions include:    2.  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    3.  Metastatic lesions of the T2 and T3 vertebral bodies on the right with a associated large rightparaspinal soft tissue mass invading the right second rib and right T2-3 neural foramen. Small right lateral epidural component noted without cord compression.    4.  Left occipital bone mass measuring 4 cm with overlying subgaleal component and underlying small epidural component.    <<<<<PHYSICAL EXAM>>>>>  GENERAL: Well developed, well nourished and in no acute distress. Resting comfortably in bed, trouble ambulating  HEENT: Normocephalic, atraumatic, mucous membranes moist, EOMI, PERRLA, bilateral sclera anicteric, no conjunctival injection  Neck: Supple, non-tender, no lymphadenopathy.  PULMONARY: Clear to auscultation bilaterally. No rales, rhonchi, or wheezing.  CARDIOVASCULAR: Regular rate and rhythm, S1-S2, no murmurs      -----------------------------------------------------------------------------------------------------------------------------------------------------------------------------------------------

## 2021-03-13 NOTE — PROGRESS NOTE ADULT - ASSESSMENT
81 yo male hx of CAD s/p CABG s/p 20+ years ago/ CHF/ right sided large inguinal hernia BIBA from hotel room 2/2 unable to ambulate with right thigh weakness and numbness, now found to have metastatic disease with a mandibular mass and large sacral mass    # Multiple metastatic osseous lesions  # Lower extremity weakness likely due to osseous lesions, improved with steroids  - NSx f/u  - Currently on dexamethasone 4mg q6, f/u NSx for taper  - ENT consulted; pt refused biopsy of mandibular lesion   - Nutrition on board   - Rad onc eval following pathology report   - s/p IR biopsy of sacral mass 3/10 which is positive for malignant cells, IHC pending, patient aware  - Completed MR jaw and lumbar, results above  -will order MR head and MR cervical, thoracic spine today   - Treatment recommendations to follow once pathology report is final    # Mild normocytic anemia  - Can check iron studies    # Mild hypercalcemia, likely due to bone mets    # LLE edema  - Check LE US    DVT ppx if no contraindication (Eliquis currently stopped)    Discussed with medical resident  81 yo male hx of CAD s/p CABG s/p 20+ years ago/ CHF/ right sided large inguinal hernia BIBA from hotel room 2/2 unable to ambulate with right thigh weakness and numbness, now found to have metastatic disease with a mandibular mass and large sacral mass    # Multiple metastatic osseous lesions  # Lower extremity weakness likely due to osseous lesions, improved with steroids  - NSx f/u  - Currently on dexamethasone 4mg q6, f/u NSx for taper  - ENT following the pt  - Nutrition f/u  - Rad onc eval following pathology report   - s/p IR biopsy of sacral mass 3/10 : full path report is pending  - Completed MR jaw and lumbar, results above  -will order MR head and MR cervical, thoracic spine today   - Treatment recommendations to follow once pathology report is final    # Mild normocytic anemia  - Can check iron studies    # Mild hypercalcemia, likely due to bone mets    # LLE edema  - Check LE US    DVT ppx if no contraindication (Eliquis currently stopped)    Discussed with medical resident  Anesthesia Type: 1% lidocaine with epinephrine and a 1:10 solution of 8.4% sodium bicarbonate

## 2021-03-13 NOTE — PROGRESS NOTE ADULT - SUBJECTIVE AND OBJECTIVE BOX
Patient is a 80y old  Male who presents with a chief complaint of 80 YEAR OLD MALE C/O MULTIPLE MEDICAL COMPLAINTS . (13 Mar 2021 09:46)      Subjective:  Pt seen examined , lying on bed . Reports of pain in his face . Afebrile .     Vital Signs Last 24 Hrs  T(C): 36.3 (13 Mar 2021 04:19), Max: 36.5 (12 Mar 2021 12:54)  T(F): 97.3 (13 Mar 2021 04:19), Max: 97.7 (12 Mar 2021 12:54)  HR: 73 (13 Mar 2021 04:19) (68 - 74)  BP: 95/53 (13 Mar 2021 04:19) (92/40 - 98/47)  BP(mean): --  RR: 20 (13 Mar 2021 04:19) (19 - 20)  SpO2: 97% (13 Mar 2021 09:55) (97% - 97%)    PHYSICAL EXAM  GEN: lying on bed c/o pain   HENT: right side face swelling , tender to palpation   LUNGS: cta b/l   HEART: regular rate and rhythm  ABD: Soft, non-tender, non-distended  SKIN: No rash  NEURO: AAOX3, improving strength in legs    MEDICATIONS  (STANDING):  brimonidine 0.1% Ophthalmic Solution 1 Drop(s) Both EYES every 12 hours  chlorhexidine 4% Liquid 1 Application(s) Topical daily  dexAMETHasone  Injectable 4 milliGRAM(s) IV Push every 6 hours  enoxaparin Injectable 70 milliGRAM(s) SubCutaneous two times a day  latanoprost 0.005% Ophthalmic Solution 1 Drop(s) Both EYES at bedtime  lidocaine   Patch 2 Patch Transdermal daily  pantoprazole    Tablet 40 milliGRAM(s) Oral two times a day    MEDICATIONS  (PRN):  acetaminophen   Tablet .. 650 milliGRAM(s) Oral every 6 hours PRN Mild Pain (1 - 3)  aluminum hydroxide/magnesium hydroxide/simethicone Suspension 30 milliLiter(s) Oral every 6 hours PRN Dyspepsia  gabapentin 100 milliGRAM(s) Oral three times a day PRN Neuropathic pain  ketorolac   Injectable 15 milliGRAM(s) IV Push every 6 hours PRN Moderate Pain (4 - 6)  oxycodone    5 mG/acetaminophen 325 mG 2 Tablet(s) Oral every 4 hours PRN Moderate Pain (4 - 6)      LABS:                          11.3   14.60 )-----------( 282      ( 13 Mar 2021 04:30 )             34.7         Mean Cell Volume : 84.2 fL  Mean Cell Hemoglobin : 27.4 pg  Mean Cell Hemoglobin Concentration : 32.6 g/dL  Auto Neutrophil # : 11.93 K/uL  Auto Lymphocyte # : 1.05 K/uL  Auto Monocyte # : 1.29 K/uL  Auto Eosinophil # : 0.00 K/uL  Auto Basophil # : 0.03 K/uL  Auto Neutrophil % : 81.7 %  Auto Lymphocyte % : 7.2 %  Auto Monocyte % : 8.8 %  Auto Eosinophil % : 0.0 %  Auto Basophil % : 0.2 %      Serial CBC's  03-13 @ 04:30  Hct-34.7 / Hgb-11.3 / Plat-282 / RBC-4.12 / WBC-14.60  Serial CBC's  03-12 @ 06:32  Hct-36.8 / Hgb-11.5 / Plat-289 / RBC-4.31 / WBC-10.62  Serial CBC's  03-11 @ 06:59  Hct-35.1 / Hgb-11.2 / Plat-295 / RBC-4.15 / WBC-9.46  Serial CBC's  03-10 @ 07:05  Hct-37.5 / Hgb-11.8 / Plat-291 / RBC-4.36 / WBC-11.30      03-13    130<L>  |  100  |  77<HH>  ----------------------------<  139<H>  5.3<H>   |  20  |  1.2    Ca    9.5      13 Mar 2021 04:30  Mg     2.0     03-13    TPro  5.2<L>  /  Alb  2.9<L>  /  TBili  0.2  /  DBili  x   /  AST  66<H>  /  ALT  81<H>  /  AlkPhos  118<H>  03-13          Iron - Total Binding Capacity.: 211 ug/dL (03-13 @ 07:00)                          BLOOD SMEAR INTERPRETATION:       RADIOLOGY & ADDITIONAL STUDIES:

## 2021-03-13 NOTE — PROGRESS NOTE ADULT - ATTENDING COMMENTS
seen/examined w/hemonc fellow  note reviewed  plan discussed    biopsy of sacral mass 3/10/21: result pending  MR brain and MR C/L spine are pending  pt suggested he would see dr Antonia VIERA: I do not encourage this option;   advised  to wait until bx report and MRI findings;   will need CT CAP for staging

## 2021-03-14 LAB
ABO RH CONFIRMATION: SIGNIFICANT CHANGE UP
ANION GAP SERPL CALC-SCNC: 10 MMOL/L — SIGNIFICANT CHANGE UP (ref 7–14)
ANION GAP SERPL CALC-SCNC: 11 MMOL/L — SIGNIFICANT CHANGE UP (ref 7–14)
ANION GAP SERPL CALC-SCNC: 14 MMOL/L — SIGNIFICANT CHANGE UP (ref 7–14)
BASOPHILS # BLD AUTO: 0.05 K/UL — SIGNIFICANT CHANGE UP (ref 0–0.2)
BASOPHILS # BLD AUTO: 0.05 K/UL — SIGNIFICANT CHANGE UP (ref 0–0.2)
BASOPHILS NFR BLD AUTO: 0.2 % — SIGNIFICANT CHANGE UP (ref 0–1)
BASOPHILS NFR BLD AUTO: 0.3 % — SIGNIFICANT CHANGE UP (ref 0–1)
BLD GP AB SCN SERPL QL: SIGNIFICANT CHANGE UP
BUN SERPL-MCNC: 112 MG/DL — CRITICAL HIGH (ref 10–20)
BUN SERPL-MCNC: 122 MG/DL — CRITICAL HIGH (ref 10–20)
BUN SERPL-MCNC: 123 MG/DL — CRITICAL HIGH (ref 10–20)
CALCIUM SERPL-MCNC: 8.3 MG/DL — LOW (ref 8.5–10.1)
CALCIUM SERPL-MCNC: 8.6 MG/DL — SIGNIFICANT CHANGE UP (ref 8.5–10.1)
CALCIUM SERPL-MCNC: 9.2 MG/DL — SIGNIFICANT CHANGE UP (ref 8.5–10.1)
CHLORIDE SERPL-SCNC: 103 MMOL/L — SIGNIFICANT CHANGE UP (ref 98–110)
CHLORIDE SERPL-SCNC: 97 MMOL/L — LOW (ref 98–110)
CHLORIDE SERPL-SCNC: 98 MMOL/L — SIGNIFICANT CHANGE UP (ref 98–110)
CO2 SERPL-SCNC: 15 MMOL/L — LOW (ref 17–32)
CO2 SERPL-SCNC: 17 MMOL/L — SIGNIFICANT CHANGE UP (ref 17–32)
CO2 SERPL-SCNC: 19 MMOL/L — SIGNIFICANT CHANGE UP (ref 17–32)
CREAT SERPL-MCNC: 1.6 MG/DL — HIGH (ref 0.7–1.5)
CREAT SERPL-MCNC: 1.7 MG/DL — HIGH (ref 0.7–1.5)
CREAT SERPL-MCNC: 1.9 MG/DL — HIGH (ref 0.7–1.5)
EOSINOPHIL # BLD AUTO: 0 K/UL — SIGNIFICANT CHANGE UP (ref 0–0.7)
EOSINOPHIL # BLD AUTO: 0 K/UL — SIGNIFICANT CHANGE UP (ref 0–0.7)
EOSINOPHIL NFR BLD AUTO: 0 % — SIGNIFICANT CHANGE UP (ref 0–8)
EOSINOPHIL NFR BLD AUTO: 0 % — SIGNIFICANT CHANGE UP (ref 0–8)
GLUCOSE BLDC GLUCOMTR-MCNC: 160 MG/DL — HIGH (ref 70–99)
GLUCOSE BLDC GLUCOMTR-MCNC: 227 MG/DL — HIGH (ref 70–99)
GLUCOSE SERPL-MCNC: 136 MG/DL — HIGH (ref 70–99)
GLUCOSE SERPL-MCNC: 153 MG/DL — HIGH (ref 70–99)
GLUCOSE SERPL-MCNC: 208 MG/DL — HIGH (ref 70–99)
HCT VFR BLD CALC: 25.1 % — LOW (ref 42–52)
HCT VFR BLD CALC: 27.4 % — LOW (ref 42–52)
HCT VFR BLD CALC: 28.3 % — LOW (ref 42–52)
HCT VFR BLD CALC: 31 % — LOW (ref 42–52)
HGB BLD-MCNC: 7.9 G/DL — LOW (ref 14–18)
HGB BLD-MCNC: 8.5 G/DL — LOW (ref 14–18)
HGB BLD-MCNC: 9 G/DL — LOW (ref 14–18)
HGB BLD-MCNC: 9.7 G/DL — LOW (ref 14–18)
IMM GRANULOCYTES NFR BLD AUTO: 4.2 % — HIGH (ref 0.1–0.3)
IMM GRANULOCYTES NFR BLD AUTO: 4.8 % — HIGH (ref 0.1–0.3)
LYMPHOCYTES # BLD AUTO: 1 K/UL — LOW (ref 1.2–3.4)
LYMPHOCYTES # BLD AUTO: 1.37 K/UL — SIGNIFICANT CHANGE UP (ref 1.2–3.4)
LYMPHOCYTES # BLD AUTO: 5.3 % — LOW (ref 20.5–51.1)
LYMPHOCYTES # BLD AUTO: 5.5 % — LOW (ref 20.5–51.1)
MAGNESIUM SERPL-MCNC: 1.9 MG/DL — SIGNIFICANT CHANGE UP (ref 1.8–2.4)
MCHC RBC-ENTMCNC: 26.9 PG — LOW (ref 27–31)
MCHC RBC-ENTMCNC: 26.9 PG — LOW (ref 27–31)
MCHC RBC-ENTMCNC: 27.3 PG — SIGNIFICANT CHANGE UP (ref 27–31)
MCHC RBC-ENTMCNC: 27.3 PG — SIGNIFICANT CHANGE UP (ref 27–31)
MCHC RBC-ENTMCNC: 31 G/DL — LOW (ref 32–37)
MCHC RBC-ENTMCNC: 31.3 G/DL — LOW (ref 32–37)
MCHC RBC-ENTMCNC: 31.5 G/DL — LOW (ref 32–37)
MCHC RBC-ENTMCNC: 31.8 G/DL — LOW (ref 32–37)
MCV RBC AUTO: 85.8 FL — SIGNIFICANT CHANGE UP (ref 80–94)
MCV RBC AUTO: 86.1 FL — SIGNIFICANT CHANGE UP (ref 80–94)
MCV RBC AUTO: 86.7 FL — SIGNIFICANT CHANGE UP (ref 80–94)
MCV RBC AUTO: 86.9 FL — SIGNIFICANT CHANGE UP (ref 80–94)
MONOCYTES # BLD AUTO: 0.76 K/UL — HIGH (ref 0.1–0.6)
MONOCYTES # BLD AUTO: 1.03 K/UL — HIGH (ref 0.1–0.6)
MONOCYTES NFR BLD AUTO: 3.1 % — SIGNIFICANT CHANGE UP (ref 1.7–9.3)
MONOCYTES NFR BLD AUTO: 5.4 % — SIGNIFICANT CHANGE UP (ref 1.7–9.3)
NEUTROPHILS # BLD AUTO: 16 K/UL — HIGH (ref 1.4–6.5)
NEUTROPHILS # BLD AUTO: 21.6 K/UL — HIGH (ref 1.4–6.5)
NEUTROPHILS NFR BLD AUTO: 84.2 % — HIGH (ref 42.2–75.2)
NEUTROPHILS NFR BLD AUTO: 87 % — HIGH (ref 42.2–75.2)
NRBC # BLD: 0 /100 WBCS — SIGNIFICANT CHANGE UP (ref 0–0)
PLATELET # BLD AUTO: 249 K/UL — SIGNIFICANT CHANGE UP (ref 130–400)
PLATELET # BLD AUTO: 268 K/UL — SIGNIFICANT CHANGE UP (ref 130–400)
PLATELET # BLD AUTO: 276 K/UL — SIGNIFICANT CHANGE UP (ref 130–400)
PLATELET # BLD AUTO: 308 K/UL — SIGNIFICANT CHANGE UP (ref 130–400)
POTASSIUM SERPL-MCNC: 5.4 MMOL/L — HIGH (ref 3.5–5)
POTASSIUM SERPL-MCNC: 5.8 MMOL/L — HIGH (ref 3.5–5)
POTASSIUM SERPL-MCNC: 6 MMOL/L — CRITICAL HIGH (ref 3.5–5)
POTASSIUM SERPL-SCNC: 5.4 MMOL/L — HIGH (ref 3.5–5)
POTASSIUM SERPL-SCNC: 5.8 MMOL/L — HIGH (ref 3.5–5)
POTASSIUM SERPL-SCNC: 6 MMOL/L — CRITICAL HIGH (ref 3.5–5)
RBC # BLD: 2.89 M/UL — LOW (ref 4.7–6.1)
RBC # BLD: 3.16 M/UL — LOW (ref 4.7–6.1)
RBC # BLD: 3.3 M/UL — LOW (ref 4.7–6.1)
RBC # BLD: 3.6 M/UL — LOW (ref 4.7–6.1)
RBC # FLD: 14.6 % — HIGH (ref 11.5–14.5)
RBC # FLD: 14.7 % — HIGH (ref 11.5–14.5)
RBC # FLD: 14.9 % — HIGH (ref 11.5–14.5)
RBC # FLD: 14.9 % — HIGH (ref 11.5–14.5)
SODIUM SERPL-SCNC: 126 MMOL/L — LOW (ref 135–146)
SODIUM SERPL-SCNC: 128 MMOL/L — LOW (ref 135–146)
SODIUM SERPL-SCNC: 130 MMOL/L — LOW (ref 135–146)
WBC # BLD: 19 K/UL — HIGH (ref 4.8–10.8)
WBC # BLD: 19.55 K/UL — HIGH (ref 4.8–10.8)
WBC # BLD: 20.37 K/UL — HIGH (ref 4.8–10.8)
WBC # BLD: 24.81 K/UL — HIGH (ref 4.8–10.8)
WBC # FLD AUTO: 19 K/UL — HIGH (ref 4.8–10.8)
WBC # FLD AUTO: 19.55 K/UL — HIGH (ref 4.8–10.8)
WBC # FLD AUTO: 20.37 K/UL — HIGH (ref 4.8–10.8)
WBC # FLD AUTO: 24.81 K/UL — HIGH (ref 4.8–10.8)

## 2021-03-14 PROCEDURE — 93010 ELECTROCARDIOGRAM REPORT: CPT

## 2021-03-14 PROCEDURE — 99223 1ST HOSP IP/OBS HIGH 75: CPT

## 2021-03-14 PROCEDURE — 99232 SBSQ HOSP IP/OBS MODERATE 35: CPT

## 2021-03-14 RX ORDER — BICALUTAMIDE 50 MG/1
50 TABLET, FILM COATED ORAL DAILY
Refills: 0 | Status: DISCONTINUED | OUTPATIENT
Start: 2021-03-14 | End: 2021-04-16

## 2021-03-14 RX ORDER — DEXTROSE 50 % IN WATER 50 %
50 SYRINGE (ML) INTRAVENOUS ONCE
Refills: 0 | Status: COMPLETED | OUTPATIENT
Start: 2021-03-14 | End: 2021-03-14

## 2021-03-14 RX ORDER — SODIUM ZIRCONIUM CYCLOSILICATE 10 G/10G
10 POWDER, FOR SUSPENSION ORAL THREE TIMES A DAY
Refills: 0 | Status: DISCONTINUED | OUTPATIENT
Start: 2021-03-14 | End: 2021-03-18

## 2021-03-14 RX ORDER — INSULIN HUMAN 100 [IU]/ML
10 INJECTION, SOLUTION SUBCUTANEOUS ONCE
Refills: 0 | Status: COMPLETED | OUTPATIENT
Start: 2021-03-14 | End: 2021-03-14

## 2021-03-14 RX ORDER — CALCIUM GLUCONATE 100 MG/ML
1 VIAL (ML) INTRAVENOUS ONCE
Refills: 0 | Status: COMPLETED | OUTPATIENT
Start: 2021-03-14 | End: 2021-03-14

## 2021-03-14 RX ORDER — NOREPINEPHRINE BITARTRATE/D5W 8 MG/250ML
0.05 PLASTIC BAG, INJECTION (ML) INTRAVENOUS
Qty: 8 | Refills: 0 | Status: DISCONTINUED | OUTPATIENT
Start: 2021-03-14 | End: 2021-03-17

## 2021-03-14 RX ORDER — SODIUM BICARBONATE 1 MEQ/ML
650 SYRINGE (ML) INTRAVENOUS THREE TIMES A DAY
Refills: 0 | Status: DISCONTINUED | OUTPATIENT
Start: 2021-03-14 | End: 2021-04-03

## 2021-03-14 RX ORDER — CALCIUM GLUCONATE 100 MG/ML
1 VIAL (ML) INTRAVENOUS DAILY
Refills: 0 | Status: DISCONTINUED | OUTPATIENT
Start: 2021-03-14 | End: 2021-03-15

## 2021-03-14 RX ORDER — SODIUM CHLORIDE 9 MG/ML
1000 INJECTION INTRAMUSCULAR; INTRAVENOUS; SUBCUTANEOUS ONCE
Refills: 0 | Status: COMPLETED | OUTPATIENT
Start: 2021-03-14 | End: 2021-03-14

## 2021-03-14 RX ORDER — PANTOPRAZOLE SODIUM 20 MG/1
8 TABLET, DELAYED RELEASE ORAL
Qty: 80 | Refills: 0 | Status: DISCONTINUED | OUTPATIENT
Start: 2021-03-14 | End: 2021-03-15

## 2021-03-14 RX ORDER — PANTOPRAZOLE SODIUM 20 MG/1
80 TABLET, DELAYED RELEASE ORAL ONCE
Refills: 0 | Status: COMPLETED | OUTPATIENT
Start: 2021-03-14 | End: 2021-03-14

## 2021-03-14 RX ORDER — HYDROMORPHONE HYDROCHLORIDE 2 MG/ML
0.5 INJECTION INTRAMUSCULAR; INTRAVENOUS; SUBCUTANEOUS EVERY 4 HOURS
Refills: 0 | Status: DISCONTINUED | OUTPATIENT
Start: 2021-03-14 | End: 2021-03-18

## 2021-03-14 RX ORDER — MIDODRINE HYDROCHLORIDE 2.5 MG/1
10 TABLET ORAL ONCE
Refills: 0 | Status: COMPLETED | OUTPATIENT
Start: 2021-03-14 | End: 2021-03-14

## 2021-03-14 RX ORDER — SODIUM CHLORIDE 9 MG/ML
1000 INJECTION INTRAMUSCULAR; INTRAVENOUS; SUBCUTANEOUS
Refills: 0 | Status: DISCONTINUED | OUTPATIENT
Start: 2021-03-14 | End: 2021-03-15

## 2021-03-14 RX ADMIN — PANTOPRAZOLE SODIUM 80 MILLIGRAM(S): 20 TABLET, DELAYED RELEASE ORAL at 10:44

## 2021-03-14 RX ADMIN — Medication 4 MILLIGRAM(S): at 01:03

## 2021-03-14 RX ADMIN — Medication 4 MILLIGRAM(S): at 12:34

## 2021-03-14 RX ADMIN — BICALUTAMIDE 50 MILLIGRAM(S): 50 TABLET, FILM COATED ORAL at 16:40

## 2021-03-14 RX ADMIN — HYDROMORPHONE HYDROCHLORIDE 0.5 MILLIGRAM(S): 2 INJECTION INTRAMUSCULAR; INTRAVENOUS; SUBCUTANEOUS at 17:15

## 2021-03-14 RX ADMIN — SODIUM CHLORIDE 1000 MILLILITER(S): 9 INJECTION INTRAMUSCULAR; INTRAVENOUS; SUBCUTANEOUS at 16:39

## 2021-03-14 RX ADMIN — HYDROMORPHONE HYDROCHLORIDE 0.5 MILLIGRAM(S): 2 INJECTION INTRAMUSCULAR; INTRAVENOUS; SUBCUTANEOUS at 12:32

## 2021-03-14 RX ADMIN — Medication 650 MILLIGRAM(S): at 21:49

## 2021-03-14 RX ADMIN — Medication 100 GRAM(S): at 12:32

## 2021-03-14 RX ADMIN — INSULIN HUMAN 10 UNIT(S): 100 INJECTION, SOLUTION SUBCUTANEOUS at 10:51

## 2021-03-14 RX ADMIN — BRIMONIDINE TARTRATE 1 DROP(S): 2 SOLUTION/ DROPS OPHTHALMIC at 05:17

## 2021-03-14 RX ADMIN — LIDOCAINE 2 PATCH: 4 CREAM TOPICAL at 00:53

## 2021-03-14 RX ADMIN — ENOXAPARIN SODIUM 70 MILLIGRAM(S): 100 INJECTION SUBCUTANEOUS at 05:16

## 2021-03-14 RX ADMIN — Medication 650 MILLIGRAM(S): at 16:41

## 2021-03-14 RX ADMIN — HYDROMORPHONE HYDROCHLORIDE 0.5 MILLIGRAM(S): 2 INJECTION INTRAMUSCULAR; INTRAVENOUS; SUBCUTANEOUS at 16:41

## 2021-03-14 RX ADMIN — PANTOPRAZOLE SODIUM 40 MILLIGRAM(S): 20 TABLET, DELAYED RELEASE ORAL at 05:16

## 2021-03-14 RX ADMIN — BRIMONIDINE TARTRATE 1 DROP(S): 2 SOLUTION/ DROPS OPHTHALMIC at 17:45

## 2021-03-14 RX ADMIN — SODIUM ZIRCONIUM CYCLOSILICATE 10 GRAM(S): 10 POWDER, FOR SUSPENSION ORAL at 21:49

## 2021-03-14 RX ADMIN — Medication 4 MILLIGRAM(S): at 17:46

## 2021-03-14 RX ADMIN — SODIUM CHLORIDE 80 MILLILITER(S): 9 INJECTION INTRAMUSCULAR; INTRAVENOUS; SUBCUTANEOUS at 10:52

## 2021-03-14 RX ADMIN — SODIUM ZIRCONIUM CYCLOSILICATE 10 GRAM(S): 10 POWDER, FOR SUSPENSION ORAL at 10:40

## 2021-03-14 RX ADMIN — SODIUM CHLORIDE 1000 MILLILITER(S): 9 INJECTION INTRAMUSCULAR; INTRAVENOUS; SUBCUTANEOUS at 10:52

## 2021-03-14 RX ADMIN — MIDODRINE HYDROCHLORIDE 10 MILLIGRAM(S): 2.5 TABLET ORAL at 04:59

## 2021-03-14 RX ADMIN — PANTOPRAZOLE SODIUM 10 MG/HR: 20 TABLET, DELAYED RELEASE ORAL at 10:43

## 2021-03-14 RX ADMIN — HYDROMORPHONE HYDROCHLORIDE 0.5 MILLIGRAM(S): 2 INJECTION INTRAMUSCULAR; INTRAVENOUS; SUBCUTANEOUS at 13:00

## 2021-03-14 RX ADMIN — OXYCODONE AND ACETAMINOPHEN 2 TABLET(S): 5; 325 TABLET ORAL at 01:14

## 2021-03-14 RX ADMIN — Medication 4 MILLIGRAM(S): at 05:16

## 2021-03-14 RX ADMIN — Medication 6.28 MICROGRAM(S)/KG/MIN: at 20:45

## 2021-03-14 RX ADMIN — LATANOPROST 1 DROP(S): 0.05 SOLUTION/ DROPS OPHTHALMIC; TOPICAL at 21:48

## 2021-03-14 RX ADMIN — Medication 50 MILLILITER(S): at 10:42

## 2021-03-14 RX ADMIN — LIDOCAINE 2 PATCH: 4 CREAM TOPICAL at 12:33

## 2021-03-14 NOTE — CHART NOTE - NSCHARTNOTEFT_GEN_A_CORE
Palliative care chart note - patient not seen    80 year old man with history of CAD, CHF presented with thigh weakness and numbness, found to have metastatic disease with mandinular mass and large sacral mass and concern for spinal cord compression.    Spoke with Dr. Meyer of the primary team.  The patient had been on percocet in the 24 hours prior to this afternoon and use 2 PRNs in 24 hours.  The primary team started the patient on hydromorphone 0.5mg IV q4h PRN for pain.  Per discussion with Dr. Meyer, will plan to see the patient tomorrow.    Recommendations  -continue hydromorphone 0.5mg IV q4h PRN for pain (hold for sedation, confusion, RR<10)  -will assess need for standing medication tomorrow based on speaking with patient and PRN use  -continue dexamethasone, gabapentin, lidocaine patch per primary team  -palliative care will see patient for a full consult tomorrow, 3/15    [phone]

## 2021-03-14 NOTE — CHART NOTE - NSCHARTNOTEFT_GEN_A_CORE
Called by RN, pt's BP 84/38 MAP 53, HR 76. Pt asymptomatic. Pt had drop of Hb from 11.3 to 9.0. AC. BP stabilized after 2L NS bolus.   Gastric lavage with coffee colored aspirate, JEAN MARIE negative.   GI notified, pt NPO for EGD tomorrow.  Further drop in Hb to 7.9, will get 1 unit PRBCs Called by RN, pt's BP 84/38 MAP 53, HR 76. Pt asymptomatic. Pt had drop of Hb from 11.3 to 9.0. AC. BP stabilized after 2L NS bolus.   Gastric lavage with coffee colored aspirate, JEAN MARIE negative.   GI notified, pt NPO for EGD tomorrow. Started protonix drip.  Further drop in Hb to 7.9, will get 1 unit PRBCs Called by RN, pt's BP 84/38 MAP 53, HR 76. Pt asymptomatic. Pt had drop of Hb from 11.3 to 9.0. AC. BP stabilized after 2L NS bolus.   Gastric lavage with coffee colored aspirate, JEAN MARIE negative.   GI notified, pt NPO for EGD tomorrow. Started protonix drip.  Further drop in Hb to 7.9, will get 1 unit PRBCs  Had GOC discussion, pt is full code Called by RN, pt's BP 84/38 MAP 53, HR 76. Pt asymptomatic. Pt had drop of Hb from 11.3 to 9.0. AC. BP stabilized after 2L NS bolus.   Gastric lavage with coffee colored aspirate, JEAN MARIE negative.   GI notified, pt NPO for EGD tomorrow. Started protonix drip.  Further drop in Hb to 7.9, will get 1 unit PRBCs  Had GOC discussion, pt is full code      Update:  Around 19:00  Called by RN, pt's BP 71/43, HR 82  At bedside, pt asymptomatic  Started low dose levo  Senior resident notified, who called for pt to be upgraded    SIGN OUT GIVEN TO night resident Dr. Silva

## 2021-03-14 NOTE — PROGRESS NOTE ADULT - ASSESSMENT
81 yo male hx of CAD s/p CABG s/p 20+ years ago/ CHF/ right sided large inguinal hernia BIBA from hotel room 2/2 unable to ambulate with right thigh weakness and numbness, now found to have metastatic disease with a mandibular mass and large sacral mass.     #Multiple metastatic osseous lesions with newly diagnosed sacral and mandibular mass with LE weakness concerning for spinal cord compression  - CT scan abd/pelvis on admission: Findings compatible with metastatic disease as demonstrated by multiple bony lesions, the largest soft tissue mass centered at S2-S3 measuring up to 7.4 cm with associated bony destruction and obliteration of the exiting neural foramina. Additional smaller lesions at the right sacral ala, L2 and L3.  - CT Neck Soft Tissue No Cont: Large right mandibular soft tissue density mass measuring 6.5 x 7.1 x 9.2 cm likely arising from the right mandible extending from the right mandibular condyle to the body of the mandible. There is an associated extensive bony erosion of the right mandible within the mass and the posterior wall of the right maxilla. Findings are highly suspicious for malignancy.  - s/p IR biopsy of sacral mass 3/10, showing metastatic prostate ca, PSA significantly elevated   Plan:   - C/w dexamethasone 4mg q6h  - As per neurosurgery: no surgical interventions planned   - ENT consult placed: patient was scoped, bedside mandibular biopsy deferred for now; will need f/u   - Oncology following - started on hormonal treatment   - Palliative consult for pain control     #Acute on chronic anemia, concern for possible UGIB   - hgb dropped, BUN elevated, patient on full dose Lovenox and steroids   - NPO, trend CBC, active type/screen, hold lovenox for now, SCDs for ppx   - PPI drip     #DENISE, possibly pre-renal - c/w IVF   #Metabolic acidosis - start sodium bicarbonate 650mg TID   #Hyperkalemia - s/p calcium gluconate and d50/insulin, c/w sodium bicarbonate, c/w Lokelma, trend BMP, low k diet once diet is resumed     #Acute urinary retention   - CT scan showed on admission moderate to severe bilateral hydroureteronephrosis to the level of a markedly distended urinary bladder. Findings presumably secondary to urinary bladder outlet obstruction or urinary retention. Enlarged prostate gland.   - C/w Santos catheter     #Hx of Afib  - on eliquis 5 mg bid, switched to lovenox inpatient, on hold for now     #HTN  - hold aldactone, lasix, acei and metoprolol     # Mild hypercalcemia, likely due to bone mets - resolved     DVT Prophylaxis: SCDs   GI Prophylaxis: protonix    Code Status: full     Prognosis guarded.

## 2021-03-14 NOTE — PROGRESS NOTE ADULT - SUBJECTIVE AND OBJECTIVE BOX
Patient is a 80y old  Male who presents with a chief complaint of 80 YEAR OLD MALE C/O MULTIPLE MEDICAL COMPLAINTS . (14 Mar 2021 12:29)      Subjective: Pt seen and examined . Lying on bed , reports is still in pain .   Afebrile       Vital Signs Last 24 Hrs  T(C): 36.6 (14 Mar 2021 04:50), Max: 36.6 (14 Mar 2021 04:50)  T(F): 97.8 (14 Mar 2021 04:50), Max: 97.8 (14 Mar 2021 04:50)  HR: 72 (14 Mar 2021 04:50) (70 - 72)  BP: 85/60 (14 Mar 2021 04:58) (85/60 - 92/46)  BP(mean): --  RR: 16 (14 Mar 2021 04:58) (16 - 20)  SpO2: 96% (13 Mar 2021 19:52) (96% - 96%)    PHYSICAL EXAM  GEN: lying on bed c/o pain   HENT: right side face swelling , tender to palpation   LUNGS: cta b/l   HEART: regular rate and rhythm  ABD: Soft, non-tender, non-distended  SKIN: No rash  NEURO: AAOX3, improving strength in legs    MEDICATIONS  (STANDING):  bicalutamide 50 milliGRAM(s) Oral daily  brimonidine 0.1% Ophthalmic Solution 1 Drop(s) Both EYES every 12 hours  calcium gluconate IVPB 1 Gram(s) IV Intermittent daily  chlorhexidine 4% Liquid 1 Application(s) Topical daily  dexAMETHasone  Injectable 4 milliGRAM(s) IV Push every 6 hours  latanoprost 0.005% Ophthalmic Solution 1 Drop(s) Both EYES at bedtime  lidocaine   Patch 2 Patch Transdermal daily  pantoprazole Infusion 8 mG/Hr (10 mL/Hr) IV Continuous <Continuous>  sodium chloride 0.9%. 1000 milliLiter(s) (80 mL/Hr) IV Continuous <Continuous>  sodium zirconium cyclosilicate 10 Gram(s) Oral three times a day    MEDICATIONS  (PRN):  acetaminophen   Tablet .. 650 milliGRAM(s) Oral every 6 hours PRN Mild Pain (1 - 3)  aluminum hydroxide/magnesium hydroxide/simethicone Suspension 30 milliLiter(s) Oral every 6 hours PRN Dyspepsia  gabapentin 100 milliGRAM(s) Oral three times a day PRN Neuropathic pain  HYDROmorphone  Injectable 0.5 milliGRAM(s) IV Push every 4 hours PRN Severe Pain (7 - 10)  LORazepam   Injectable 1 milliGRAM(s) IV Push once PRN see instructions      LABS:                          9.7    19.55 )-----------( 276      ( 14 Mar 2021 11:45 )             31.0         Mean Cell Volume : 86.1 fL  Mean Cell Hemoglobin : 26.9 pg  Mean Cell Hemoglobin Concentration : 31.3 g/dL  Auto Neutrophil # : x  Auto Lymphocyte # : x  Auto Monocyte # : x  Auto Eosinophil # : x  Auto Basophil # : x  Auto Neutrophil % : x  Auto Lymphocyte % : x  Auto Monocyte % : x  Auto Eosinophil % : x  Auto Basophil % : x      Serial CBC's  03-14 @ 11:45  Hct-31.0 / Hgb-9.7 / Plat-276 / RBC-3.60 / WBC-19.55  Serial CBC's  03-14 @ 07:32  Hct-28.3 / Hgb-9.0 / Plat-268 / RBC-3.30 / WBC-19.00  Serial CBC's  03-13 @ 04:30  Hct-34.7 / Hgb-11.3 / Plat-282 / RBC-4.12 / WBC-14.60  Serial CBC's  03-12 @ 06:32  Hct-36.8 / Hgb-11.5 / Plat-289 / RBC-4.31 / WBC-10.62  Serial CBC's  03-11 @ 06:59  Hct-35.1 / Hgb-11.2 / Plat-295 / RBC-4.15 / WBC-9.46      03-14    126<L>  |  97<L>  |  112<HH>  ----------------------------<  208<H>  5.4<H>   |  15<L>  |  1.7<H>    Ca    8.6      14 Mar 2021 11:45  Mg     1.9     03-14    TPro  5.2<L>  /  Alb  2.9<L>  /  TBili  0.2  /  DBili  x   /  AST  66<H>  /  ALT  81<H>  /  AlkPhos  118<H>  03-13          Iron - Total Binding Capacity.: 211 ug/dL (03-13 @ 07:00)  Ferritin, Serum: 164 ng/mL (03-13 @ 07:00)          Prostate Ca Screen, PSA Total: 969.00: Test Repeated  Method: Roche Electrochemiluminescence Immuno Assay  Values obtained with different assay methods or kits cannot be  used interchangeably.  Results cannot be interpreted as absolute evidence of the presence  or absence of malignant disease. ng/mL (03.13.21 @ 04:30)        Cytopathology - Non Gyn Report:   ACCESSION No:  [id number]    MANUELITO HEDRICK                      2        Cytopathology Addendum Report          Addendum  Reporting results of immunohistochemical studies:  The neoplastic cells are positive for AE1/AE3, PSA and CK7  (focal), while negative for PSAP, CK20, synaptophysin,  chromogranin, CD56, EMA, SOX-10, Melan-A, HMB-45, S100, TTF-1,  desmin, CD45, calretinin, WT-1, CD34, PAX-8 and SMA. CD68 shows  background staining.  The immunoprofile is in favor of metastatic carcinoma,favor  prostate origin.    Molecular testing in progress; addendum to follow.    Verified by: Marybel Enrique MD  (Electronic Signature)  Reported on: 03/12/21 14:20 EST, [facility], [address]rd Floor,  [place] [address]  Histology technical processing performed at [address] [place] [address]  Phone: [phone]   Fax: [phone]  _________________________________________________________________      Cytopathology Report            Final Diagnosis  SACRUM, CT-GUIDED FNA,CORE BIOPSY AND IMPRINTS  POSITIVE FOR MALIGNANT CELLS.  Malignant neoplasm. (See note)    Note: Cellular smears composed of cohesive groups and single-  lying cells with uniform small, round nuclei surrouded by finely  vacuolated cytoplasm.  Immunohistochemical studies on core biopsy pending; addendum to  follow.    Screened by: Batsheva WILSON(ASCP)  Verified by: Marybel Enrique MD  (Electronic Signature)  Reported on: 03/11/21 15:39 EST, [facility], [address]rd Floor,  [place] [address]  Phone: [phone]   Fax: [phone]  _________________________________________________________________              MANUELITO HEDRICK                      2        Cytopathology Report            Specimen(s) Submitted  Sacrum      Statementof Adequacy  Satisfactory for interpretation.  Immediate cytologic on-site assessment using a Diff-Quick stain  was performed at [facility], [facility], [facility] [address], [place] [address]. Specimen  acceptable for further evaluation by Dr. Marybel Enrique, 3/10/21. Dr. Malcolm was notified.      Clinical Information  80 year old male with lower extremity weakness. CT shows 7.4 x  5.2 x 6.2 cm sacral soft tissue mass with bony destruction,  likely representing metastatic disease; also has face/joint  masses; metastatic disease (squamous cell) vs chondrosarcoma vs  chordoma      Comment  This case was reviewed in intradepartmental QA conference and the  diagnosis represents a consensus opinion.  Dr. Malcolm was notified of the findings via encrypted email sent  with read receipt on 3/11/21.  Case reported to Tumor Registry.      Gross Description  Received: 4 DQ stained smears, a formalin container with multiple  tissue fragments measuring 0.1 to 0.6 cm in length with a  diameter of < 0.1 cm. Tissue fragments are submitted entirely in  3 cassettes. Also received a Cytolyt container with 30 ml of  brown fluid; one monolayer slide (Thinprep) is prepared and  stained with Papanicolaou stain. One cell block is made. All  slides and container are labeled with patient’s name and date of  birth.  Grossing by Viet Eli 3/10/2021 @ 12:10pm. (03.10.21 @ 10:50)                BLOOD SMEAR INTERPRETATION:       RADIOLOGY & ADDITIONAL STUDIES:

## 2021-03-14 NOTE — CHART NOTE - NSCHARTNOTEFT_GEN_A_CORE
HPI:   81 yo male hx of CAD s/p CABG s/p 20+ years ago/ CHF/ right sided large inguinal hernia BIBA from hotel room 2/2 unable to ambulate with right thigh weakness and numbness. reported it started from knee to hip. denies fall and injury. He was just sitting and the symptoms started. reported off/on back pain in the past. denies fever/chill/recent illness/coughing/chest pain/abd pain/n/v/d and urinary sxs.   	Also has right sided facial swelling for about 1 year after 3 teeth extraction. reports swelling worsens after eating. swelling was better in the past and started swelling again in the past few months. didn't follow up with his dentist 2/2 COVID.   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 he used to be able to ambulate without assistant and he commutes with his car. (08 Mar 2021 05:03)      Interval history    Called by RN, pt's BP 84/38 MAP 53, HR 76. Pt asymptomatic. Pt had drop of Hb from 11.3 to 9.0. AC. BP stabilized after 2L NS bolus. Gastric lavage with coffee colored aspirate, JEAN MARIE negative. GI notified, pt NPO for EGD tomorrow. Started protonix drip. Further drop in Hb to 7.9 with borderline BP. Started on low dose levo. Also received 1 unit PRBCs. Pt BP remains borderline, upgraded to CCU for closer monitoring.     F/U CBC in AM and PRN       Assessment and Plan  #Multiple metastatic osseous lesions with newly diagnosed sacral and mandibular mass with LE weakness concerning for spinal cord compression  - CT scan abd/pelvis on admission: Findings compatible with metastatic disease as demonstrated by multiple bony lesions, the largest soft tissue mass centered at S2-S3 measuring up to 7.4 cm with associated bony destruction and obliteration of the exiting neural foramina. Additional smaller lesions at the right sacral ala, L2 and L3.  - CT Neck Soft Tissue No Cont: Large right mandibular soft tissue density mass measuring 6.5 x 7.1 x 9.2 cm likely arising from the right mandible extending from the right mandibular condyle to the body of the mandible. There is an associated extensive bony erosion of the right mandible within the mass and the posterior wall of the right maxilla. Findings are highly suspicious for malignancy.  - s/p IR biopsy of sacral mass 3/10, showing metastatic prostate ca, PSA significantly elevated   Plan:   - C/w dexamethasone 4mg q6h  - As per neurosurgery: no surgical interventions planned   - ENT consult placed: patient was scoped, bedside mandibular biopsy deferred for now; will need f/u   - Oncology following - started on hormonal treatment   - Palliative consult for pain control     #Acute on chronic anemia, concern for possible UGIB   - hgb dropped, BUN elevated, patient on full dose Lovenox and steroids   - NPO, trend CBC, active type/screen, hold lovenox for now, SCDs for ppx   - PPI drip     #DENISE, possibly pre-renal - c/w IVF   #Metabolic acidosis - start sodium bicarbonate 650mg TID   #Hyperkalemia - s/p calcium gluconate and d50/insulin, c/w sodium bicarbonate, c/w Lokelma, trend BMP, low k diet once diet is resumed     #Acute urinary retention   - CT scan showed on admission moderate to severe bilateral hydroureteronephrosis to the level of a markedly distended urinary bladder. Findings presumably secondary to urinary bladder outlet obstruction or urinary retention. Enlarged prostate gland.   - C/w Santos catheter     #Hx of Afib  - on eliquis 5 mg bid, switched to lovenox inpatient, on hold for now     #HTN  - hold aldactone, lasix, acei and metoprolol     # Mild hypercalcemia, likely due to bone mets - resolved     DVT Prophylaxis: SCDs   GI Prophylaxis: protonix    Code Status: full HPI:   79 yo male hx of CAD s/p CABG s/p 20+ years ago/ CHF/ right sided large inguinal hernia BIBA from hotel room 2/2 unable to ambulate with right thigh weakness and numbness. reported it started from knee to hip. denies fall and injury. He was just sitting and the symptoms started. reported off/on back pain in the past. denies fever/chill/recent illness/coughing/chest pain/abd pain/n/v/d and urinary sxs.   	Also has right sided facial swelling for about 1 year after 3 teeth extraction. reports swelling worsens after eating. swelling was better in the past and started swelling again in the past few months. didn't follow up with his dentist 2/2 COVID.   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 he used to be able to ambulate without assistant and he commutes with his car. (08 Mar 2021 05:03)      Interval history    Called by RN, pt's BP 84/38 MAP 53, HR 76. Pt asymptomatic. Pt had drop of Hb from 11.3 to 9.0. AC. BP stabilized after 2L NS bolus. Gastric lavage with coffee colored aspirate, JEAN MARIE negative. GI notified, pt NPO for EGD tomorrow. Started protonix drip. Further drop in Hb to 7.9 with borderline BP. Started on low dose levo. Also received 1 unit PRBCs. Pt BP remains borderline, upgraded to Vent ICU but no critical care nurses available in vent ICU. Pt upgraded to CCU for closer monitoring.    F/U CBC in AM and PRN       Assessment and Plan  #Multiple metastatic osseous lesions with newly diagnosed sacral and mandibular mass with LE weakness concerning for spinal cord compression  - CT scan abd/pelvis on admission: Findings compatible with metastatic disease as demonstrated by multiple bony lesions, the largest soft tissue mass centered at S2-S3 measuring up to 7.4 cm with associated bony destruction and obliteration of the exiting neural foramina. Additional smaller lesions at the right sacral ala, L2 and L3.  - CT Neck Soft Tissue No Cont: Large right mandibular soft tissue density mass measuring 6.5 x 7.1 x 9.2 cm likely arising from the right mandible extending from the right mandibular condyle to the body of the mandible. There is an associated extensive bony erosion of the right mandible within the mass and the posterior wall of the right maxilla. Findings are highly suspicious for malignancy.  - s/p IR biopsy of sacral mass 3/10, showing metastatic prostate ca, PSA significantly elevated   Plan:   - C/w dexamethasone 4mg q6h  - As per neurosurgery: no surgical interventions planned   - ENT consult placed: patient was scoped, bedside mandibular biopsy deferred for now; will need f/u   - Oncology following - started on hormonal treatment   - Palliative consult for pain control     #Acute on chronic anemia, concern for possible UGIB   - hgb dropped, BUN elevated, patient on full dose Lovenox and steroids   - NPO, trend CBC, active type/screen, hold lovenox for now, SCDs for ppx   - PPI drip     #DENISE, possibly pre-renal - c/w IVF   #Metabolic acidosis - start sodium bicarbonate 650mg TID   #Hyperkalemia - s/p calcium gluconate and d50/insulin, c/w sodium bicarbonate, c/w Lokelma, trend BMP, low k diet once diet is resumed     #Acute urinary retention   - CT scan showed on admission moderate to severe bilateral hydroureteronephrosis to the level of a markedly distended urinary bladder. Findings presumably secondary to urinary bladder outlet obstruction or urinary retention. Enlarged prostate gland.   - C/w Santos catheter     #Hx of Afib  - on eliquis 5 mg bid, switched to lovenox inpatient, on hold for now     #HTN  - hold aldactone, lasix, acei and metoprolol     # Mild hypercalcemia, likely due to bone mets - resolved     DVT Prophylaxis: SCDs   GI Prophylaxis: protonix    Code Status: full

## 2021-03-14 NOTE — PROGRESS NOTE ADULT - SUBJECTIVE AND OBJECTIVE BOX
Pt seen and examined at bedside. Reports right jaw pain, no other complaints. No BM. No signs of bleeding.       VITAL SIGNS (Last 24 hrs):  T(C): 36.6 (03-14-21 @ 04:50), Max: 36.6 (03-14-21 @ 04:50)  HR: 72 (03-14-21 @ 04:50) (70 - 72)  BP: 84/38 (03-14-21 @ 15:14) (84/38 - 92/46)  RR: 16 (03-14-21 @ 04:58) (16 - 20)  SpO2: 96% (03-13-21 @ 19:52) (96% - 96%)       13 Mar 2021 06:01  -  14 Mar 2021 07:00  --------------------------------------------------------  IN: 4 mL / OUT: 600 mL / NET: -596 mL    14 Mar 2021 07:01  -  14 Mar 2021 15:46  --------------------------------------------------------  IN: 0 mL / OUT: 500 mL / NET: -500 mL        PHYSICAL EXAM:  GENERAL: NAD   HEAD:  Atraumatic, Normocephalic  EYES:  conjunctiva and sclera clear  NECK: Supple, No JVD  CHEST/LUNG: Clear to auscultation bilaterally; No wheeze  HEART: Regular rate and rhythm; No murmurs, rubs, or gallops  ABDOMEN: Soft, Nontender, Nondistended; Bowel sounds present  EXTREMITIES:  2+ Peripheral Pulses, No clubbing, cyanosis, or edema  PSYCH: AAOx3  SKIN: No rashes or lesions    Labs Reviewed  Spoke to patient in regards to abnormal labs.    CBC Full  -  ( 14 Mar 2021 11:45 )  WBC Count : 19.55 K/uL  Hemoglobin : 9.7 g/dL  Hematocrit : 31.0 %  Platelet Count - Automated : 276 K/uL  Mean Cell Volume : 86.1 fL  Mean Cell Hemoglobin : 26.9 pg  Mean Cell Hemoglobin Concentration : 31.3 g/dL  Auto Neutrophil # : x  Auto Lymphocyte # : x  Auto Monocyte # : x  Auto Eosinophil # : x  Auto Basophil # : x  Auto Neutrophil % : x  Auto Lymphocyte % : x  Auto Monocyte % : x  Auto Eosinophil % : x  Auto Basophil % : x    BMP:    03-14 @ 11:45    Blood Urea Nitrogen - 112  Calcium - 8.6  Carbon Dioxide - 15  Chloride - 97  Creatinine - 1.7  Glucose - 208  Potassium - 5.4  Sodium - 126      MEDICATIONS  (STANDING):  bicalutamide 50 milliGRAM(s) Oral daily  brimonidine 0.1% Ophthalmic Solution 1 Drop(s) Both EYES every 12 hours  calcium gluconate IVPB 1 Gram(s) IV Intermittent daily  chlorhexidine 4% Liquid 1 Application(s) Topical daily  dexAMETHasone  Injectable 4 milliGRAM(s) IV Push every 6 hours  latanoprost 0.005% Ophthalmic Solution 1 Drop(s) Both EYES at bedtime  lidocaine   Patch 2 Patch Transdermal daily  pantoprazole Infusion 8 mG/Hr (10 mL/Hr) IV Continuous <Continuous>  sodium bicarbonate 650 milliGRAM(s) Oral three times a day  sodium chloride 0.9% Bolus 1000 milliLiter(s) IV Bolus once  sodium chloride 0.9%. 1000 milliLiter(s) (80 mL/Hr) IV Continuous <Continuous>  sodium zirconium cyclosilicate 10 Gram(s) Oral three times a day    MEDICATIONS  (PRN):  acetaminophen   Tablet .. 650 milliGRAM(s) Oral every 6 hours PRN Mild Pain (1 - 3)  aluminum hydroxide/magnesium hydroxide/simethicone Suspension 30 milliLiter(s) Oral every 6 hours PRN Dyspepsia  gabapentin 100 milliGRAM(s) Oral three times a day PRN Neuropathic pain  HYDROmorphone  Injectable 0.5 milliGRAM(s) IV Push every 4 hours PRN Severe Pain (7 - 10)  LORazepam   Injectable 1 milliGRAM(s) IV Push once PRN see instructions

## 2021-03-14 NOTE — PROGRESS NOTE ADULT - ASSESSMENT
79 yo male hx of CAD s/p CABG s/p 20+ years ago/ CHF/ right sided large inguinal hernia BIBA from hotel room 2/2 unable to ambulate with right thigh weakness and numbness, now found to have metastatic disease with a mandibular mass and large sacral mass    # Multiple metastatic osseous lesions, elevated  suggestive of Primary Prostate Cancer     biopsy showed:  The neoplastic cells are positive for AE1/AE3, PSA and CK7  (focal)  The immunoprofile is in favor of metastatic carcinoma,favor  prostate origin.    -discussed with pt the diagnosis . He was told that we will start him on casodex 50 mg daily for 3 wks , followed by lupron .   -his symptoms will improve with rx and will get Rad onc f/u to see if pt will benefit from palliative RT .   - NSx f/u appreciated .  - will c/w  dexamethasone 4mg q6, for now.  - Nutrition f/u  - Completed MR jaw and lumbar, results above  -will order MR head and MR cervical, thoracic spine today  - will get dental eval for bisphosphonates in future .     # Mild normocytic anemia  - from metastatic disease , monitor .  -transfuse if hb < 7.    # Mild hypercalcemia, likely due to bone mets    please start DVT ppx   Discussed with pt and neurosurgery team.   81 yo male hx of CAD s/p CABG s/p 20+ years ago/ CHF/ right sided large inguinal hernia BIBA from hotel room 2/2 unable to ambulate with right thigh weakness and numbness, now found to have metastatic disease with a mandibular mass and large sacral mass    # Multiple metastatic osseous lesions, elevated  suggestive of Primary Prostate Cancer     biopsy showed:  The neoplastic cells are positive for AE1/AE3, PSA and CK7  (focal)  The immunoprofile is in favor of metastatic carcinoma,favor  prostate origin.    -discussed the significance of high PSA and suspicious diagnosis:  -follow official biopsy report: however, will start on systemic therapy with hormonal blockade regardless .   -  casodex 50 mg daily for 3 wks , followed by lupron IM regular injections (based on the MR results and plan from [facility]ON[facility]/ENT, might need to add taxotere/steroids to hormonal blockade to improve response rate  - will need MR of C/L spine to r/o cord compression and MR of brain  - following bx result , will consult [facility] and ENT about options .   .   - NSx f/u appreciated .  - will c/w  dexamethasone 4mg q6, for now.  - Nutrition f/u  - Completed MR jaw and lumbar, results above    - will get dental eval on this admssion; will neeed  bisphosphonates   - eval about disposition options  -palliative care eval    # Mild normocytic anemia  - from metastatic disease , monitor .  -transfuse if hb < 7.    # Mild hypercalcemia, likely due to bone mets    please start DVT ppx   Discussed with pt and neurosurgery team.

## 2021-03-14 NOTE — PROGRESS NOTE ADULT - SUBJECTIVE AND OBJECTIVE BOX
Subjective: Pt with improved BLE weakness on steroids, now able to stand.  PSA significantly elevated, prelim Bx of sacral mass likely prostate.     T(C): 36.6 (03-14-21 @ 04:50), Max: 36.6 (03-14-21 @ 04:50)  HR: 72 (03-14-21 @ 04:50) (70 - 72)  BP: 85/60 (03-14-21 @ 04:58) (85/60 - 99/56)  RR: 16 (03-14-21 @ 04:58) (16 - 20)  SpO2: 96% (03-13-21 @ 19:52) (96% - 96%)  Wt(kg): --      CBC Full  -  ( 14 Mar 2021 11:45 )  WBC Count : 19.55 K/uL  RBC Count : 3.60 M/uL  Hemoglobin : 9.7 g/dL  Hematocrit : 31.0 %  Platelet Count - Automated : 276 K/uL  Mean Cell Volume : 86.1 fL  Mean Cell Hemoglobin : 26.9 pg  Mean Cell Hemoglobin Concentration : 31.3 g/dL  Auto Neutrophil # : x  Auto Lymphocyte # : x  Auto Monocyte # : x  Auto Eosinophil # : x  Auto Basophil # : x  Auto Neutrophil % : x  Auto Lymphocyte % : x  Auto Monocyte % : x  Auto Eosinophil % : x  Auto Basophil % : x    03-14    128<L>  |  98  |  122<HH>  ----------------------------<  153<H>  6.0<HH>   |  19  |  1.9<H>    Ca    9.2      14 Mar 2021 07:32  Mg     1.9     03-14    TPro  5.2<L>  /  Alb  2.9<L>  /  TBili  0.2  /  DBili  x   /  AST  66<H>  /  ALT  81<H>  /  AlkPhos  118<H>  03-13            Assessment/Plan: D/w oncology. Pt with diffuse disease. Pt has had no treatment for what is likely diffuse stage 4 prostate CA. Full path pending. No neurosurgical intervention at this time. Cont steroids. chemo/radiation treatment per oncology. Please reconsult as needed.

## 2021-03-15 DIAGNOSIS — C79.51 SECONDARY MALIGNANT NEOPLASM OF BONE: ICD-10-CM

## 2021-03-15 DIAGNOSIS — R52 PAIN, UNSPECIFIED: ICD-10-CM

## 2021-03-15 DIAGNOSIS — Z51.5 ENCOUNTER FOR PALLIATIVE CARE: ICD-10-CM

## 2021-03-15 DIAGNOSIS — K59.00 CONSTIPATION, UNSPECIFIED: ICD-10-CM

## 2021-03-15 LAB
ALBUMIN SERPL ELPH-MCNC: 3 G/DL — LOW (ref 3.5–5.2)
ALP SERPL-CCNC: 87 U/L — SIGNIFICANT CHANGE UP (ref 30–115)
ALT FLD-CCNC: 41 U/L — SIGNIFICANT CHANGE UP (ref 0–41)
ANION GAP SERPL CALC-SCNC: 15 MMOL/L — HIGH (ref 7–14)
AST SERPL-CCNC: 19 U/L — SIGNIFICANT CHANGE UP (ref 0–41)
BASOPHILS # BLD AUTO: 0.12 K/UL — SIGNIFICANT CHANGE UP (ref 0–0.2)
BASOPHILS NFR BLD AUTO: 0.5 % — SIGNIFICANT CHANGE UP (ref 0–1)
BILIRUB DIRECT SERPL-MCNC: <0.2 MG/DL — SIGNIFICANT CHANGE UP (ref 0–0.2)
BILIRUB INDIRECT FLD-MCNC: >0.1 MG/DL — LOW (ref 0.2–1.2)
BILIRUB SERPL-MCNC: 0.3 MG/DL — SIGNIFICANT CHANGE UP (ref 0.2–1.2)
BUN SERPL-MCNC: 126 MG/DL — CRITICAL HIGH (ref 10–20)
CALCIUM SERPL-MCNC: 9.4 MG/DL — SIGNIFICANT CHANGE UP (ref 8.5–10.1)
CHLORIDE SERPL-SCNC: 102 MMOL/L — SIGNIFICANT CHANGE UP (ref 98–110)
CO2 SERPL-SCNC: 12 MMOL/L — LOW (ref 17–32)
CREAT SERPL-MCNC: 1.5 MG/DL — SIGNIFICANT CHANGE UP (ref 0.7–1.5)
EOSINOPHIL # BLD AUTO: 0 K/UL — SIGNIFICANT CHANGE UP (ref 0–0.7)
EOSINOPHIL NFR BLD AUTO: 0 % — SIGNIFICANT CHANGE UP (ref 0–8)
GLUCOSE BLDC GLUCOMTR-MCNC: 148 MG/DL — HIGH (ref 70–99)
GLUCOSE BLDC GLUCOMTR-MCNC: 155 MG/DL — HIGH (ref 70–99)
GLUCOSE SERPL-MCNC: 166 MG/DL — HIGH (ref 70–99)
HCT VFR BLD CALC: 29.3 % — LOW (ref 42–52)
HGB BLD-MCNC: 9.4 G/DL — LOW (ref 14–18)
IMM GRANULOCYTES NFR BLD AUTO: 4.8 % — HIGH (ref 0.1–0.3)
LYMPHOCYTES # BLD AUTO: 1.33 K/UL — SIGNIFICANT CHANGE UP (ref 1.2–3.4)
LYMPHOCYTES # BLD AUTO: 5.1 % — LOW (ref 20.5–51.1)
MAGNESIUM SERPL-MCNC: 1.9 MG/DL — SIGNIFICANT CHANGE UP (ref 1.8–2.4)
MCHC RBC-ENTMCNC: 28.1 PG — SIGNIFICANT CHANGE UP (ref 27–31)
MCHC RBC-ENTMCNC: 32.1 G/DL — SIGNIFICANT CHANGE UP (ref 32–37)
MCV RBC AUTO: 87.5 FL — SIGNIFICANT CHANGE UP (ref 80–94)
MONOCYTES # BLD AUTO: 1.47 K/UL — HIGH (ref 0.1–0.6)
MONOCYTES NFR BLD AUTO: 5.6 % — SIGNIFICANT CHANGE UP (ref 1.7–9.3)
NEUTROPHILS # BLD AUTO: 21.9 K/UL — HIGH (ref 1.4–6.5)
NEUTROPHILS NFR BLD AUTO: 84 % — HIGH (ref 42.2–75.2)
NRBC # BLD: 0 /100 WBCS — SIGNIFICANT CHANGE UP (ref 0–0)
PLATELET # BLD AUTO: 268 K/UL — SIGNIFICANT CHANGE UP (ref 130–400)
POTASSIUM SERPL-MCNC: 5.8 MMOL/L — HIGH (ref 3.5–5)
POTASSIUM SERPL-SCNC: 5.8 MMOL/L — HIGH (ref 3.5–5)
PROT SERPL-MCNC: 5.1 G/DL — LOW (ref 6–8)
RBC # BLD: 3.35 M/UL — LOW (ref 4.7–6.1)
RBC # FLD: 15.2 % — HIGH (ref 11.5–14.5)
SARS-COV-2 RNA SPEC QL NAA+PROBE: SIGNIFICANT CHANGE UP
SODIUM SERPL-SCNC: 129 MMOL/L — LOW (ref 135–146)
WBC # BLD: 26.06 K/UL — HIGH (ref 4.8–10.8)
WBC # FLD AUTO: 26.06 K/UL — HIGH (ref 4.8–10.8)

## 2021-03-15 PROCEDURE — 99233 SBSQ HOSP IP/OBS HIGH 50: CPT

## 2021-03-15 PROCEDURE — 99223 1ST HOSP IP/OBS HIGH 75: CPT

## 2021-03-15 PROCEDURE — 99221 1ST HOSP IP/OBS SF/LOW 40: CPT

## 2021-03-15 PROCEDURE — 93970 EXTREMITY STUDY: CPT | Mod: 26

## 2021-03-15 PROCEDURE — 93306 TTE W/DOPPLER COMPLETE: CPT | Mod: 26

## 2021-03-15 PROCEDURE — 99291 CRITICAL CARE FIRST HOUR: CPT

## 2021-03-15 RX ORDER — SODIUM CHLORIDE 9 MG/ML
1000 INJECTION, SOLUTION INTRAVENOUS ONCE
Refills: 0 | Status: COMPLETED | OUTPATIENT
Start: 2021-03-15 | End: 2021-03-15

## 2021-03-15 RX ORDER — SODIUM CHLORIDE 9 MG/ML
1000 INJECTION INTRAMUSCULAR; INTRAVENOUS; SUBCUTANEOUS
Refills: 0 | Status: DISCONTINUED | OUTPATIENT
Start: 2021-03-15 | End: 2021-03-16

## 2021-03-15 RX ORDER — PANTOPRAZOLE SODIUM 20 MG/1
40 TABLET, DELAYED RELEASE ORAL
Refills: 0 | Status: DISCONTINUED | OUTPATIENT
Start: 2021-03-15 | End: 2021-03-17

## 2021-03-15 RX ORDER — BRIMONIDINE TARTRATE 2 MG/MG
1 SOLUTION/ DROPS OPHTHALMIC EVERY 8 HOURS
Refills: 0 | Status: DISCONTINUED | OUTPATIENT
Start: 2021-03-15 | End: 2021-04-16

## 2021-03-15 RX ORDER — ONDANSETRON 8 MG/1
4 TABLET, FILM COATED ORAL ONCE
Refills: 0 | Status: COMPLETED | OUTPATIENT
Start: 2021-03-15 | End: 2021-03-15

## 2021-03-15 RX ORDER — LANOLIN ALCOHOL/MO/W.PET/CERES
5 CREAM (GRAM) TOPICAL ONCE
Refills: 0 | Status: COMPLETED | OUTPATIENT
Start: 2021-03-15 | End: 2021-03-15

## 2021-03-15 RX ORDER — SODIUM CHLORIDE 9 MG/ML
3000 INJECTION INTRAMUSCULAR; INTRAVENOUS; SUBCUTANEOUS ONCE
Refills: 0 | Status: COMPLETED | OUTPATIENT
Start: 2021-03-15 | End: 2021-03-15

## 2021-03-15 RX ADMIN — CHLORHEXIDINE GLUCONATE 1 APPLICATION(S): 213 SOLUTION TOPICAL at 11:03

## 2021-03-15 RX ADMIN — INSULIN HUMAN 10 UNIT(S): 100 INJECTION, SOLUTION SUBCUTANEOUS at 02:32

## 2021-03-15 RX ADMIN — HYDROMORPHONE HYDROCHLORIDE 0.5 MILLIGRAM(S): 2 INJECTION INTRAMUSCULAR; INTRAVENOUS; SUBCUTANEOUS at 13:14

## 2021-03-15 RX ADMIN — Medication 650 MILLIGRAM(S): at 15:23

## 2021-03-15 RX ADMIN — Medication 4 MILLIGRAM(S): at 05:23

## 2021-03-15 RX ADMIN — LATANOPROST 1 DROP(S): 0.05 SOLUTION/ DROPS OPHTHALMIC; TOPICAL at 22:17

## 2021-03-15 RX ADMIN — SODIUM ZIRCONIUM CYCLOSILICATE 10 GRAM(S): 10 POWDER, FOR SUSPENSION ORAL at 22:17

## 2021-03-15 RX ADMIN — SODIUM ZIRCONIUM CYCLOSILICATE 10 GRAM(S): 10 POWDER, FOR SUSPENSION ORAL at 05:22

## 2021-03-15 RX ADMIN — Medication 650 MILLIGRAM(S): at 22:16

## 2021-03-15 RX ADMIN — CHLORHEXIDINE GLUCONATE 1 APPLICATION(S): 213 SOLUTION TOPICAL at 05:21

## 2021-03-15 RX ADMIN — Medication 4 MILLIGRAM(S): at 23:11

## 2021-03-15 RX ADMIN — Medication 100 GRAM(S): at 11:03

## 2021-03-15 RX ADMIN — PANTOPRAZOLE SODIUM 40 MILLIGRAM(S): 20 TABLET, DELAYED RELEASE ORAL at 17:37

## 2021-03-15 RX ADMIN — Medication 4 MILLIGRAM(S): at 11:02

## 2021-03-15 RX ADMIN — Medication 50 MILLILITER(S): at 02:48

## 2021-03-15 RX ADMIN — Medication 650 MILLIGRAM(S): at 05:21

## 2021-03-15 RX ADMIN — Medication 4 MILLIGRAM(S): at 17:37

## 2021-03-15 RX ADMIN — SODIUM CHLORIDE 150 MILLILITER(S): 9 INJECTION INTRAMUSCULAR; INTRAVENOUS; SUBCUTANEOUS at 16:06

## 2021-03-15 RX ADMIN — BICALUTAMIDE 50 MILLIGRAM(S): 50 TABLET, FILM COATED ORAL at 11:02

## 2021-03-15 RX ADMIN — HYDROMORPHONE HYDROCHLORIDE 0.5 MILLIGRAM(S): 2 INJECTION INTRAMUSCULAR; INTRAVENOUS; SUBCUTANEOUS at 13:29

## 2021-03-15 RX ADMIN — SODIUM ZIRCONIUM CYCLOSILICATE 10 GRAM(S): 10 POWDER, FOR SUSPENSION ORAL at 15:23

## 2021-03-15 RX ADMIN — SODIUM CHLORIDE 600 MILLILITER(S): 9 INJECTION INTRAMUSCULAR; INTRAVENOUS; SUBCUTANEOUS at 12:04

## 2021-03-15 RX ADMIN — SODIUM CHLORIDE 1000 MILLILITER(S): 9 INJECTION, SOLUTION INTRAVENOUS at 18:57

## 2021-03-15 RX ADMIN — Medication 1 MILLIGRAM(S): at 23:11

## 2021-03-15 RX ADMIN — Medication 100 GRAM(S): at 02:32

## 2021-03-15 RX ADMIN — Medication 4 MILLIGRAM(S): at 02:49

## 2021-03-15 NOTE — CONSULT NOTE ADULT - ATTENDING COMMENTS
79 yo man with difficulty ambulating found to have bone masses suspicious for bone mets in addition to right jaw swelling    PLAN;   - had prolonged discussion w/ the pt  - explained the management will depend on the nature of diagnosis (benign vs malignant; which malignancy if present) , extent of the disease (stage), his performance status, his motivation to undergo treatment, the social support system  - he has multiple questions, all based on his fear of what his diagnosis is and his prognosis is; I suggested that we need to concentrate on establishing dx soon and its extent asap; he agreed: floor attending was contacted to reinitiate the transfer   - once transferred;  a) iR re-evaluation to get tissue bx asap  b) MRI as suggested by neurosurgery: MRI C/T/L spine w/wo contrast to r/o cord comppression and MR head and jaw w/wo IVC  C) ENT consult  d) neurosurg consult  e) yfn steroids  f) radonc consult pending MRI results  g) palliative care consut, /    consult  dentist consult  nutrition consutt  DVT prophylaxis
MRI lumbar reviewed  diffuse metastatic disease seen in lumbar spine with large expansile lesion of S2-S4 again seen, large right jaw lesion. Sacral biopsy prelim ? prostate. Given extensiveness of disease likely no surgical intervention and hospice to be recommended but will await final pathology to discuss with ENT and oncology for full recommendations. Cont steroids at this sasha. Recc send PSA.
Patient with mandibular mass in setting of adenocarcinoma of sacral mass, will plan for biopsy of mandible.
Recent events noted.  Diffusely metastatic prostate cancer with notable large sacral mass and mandibular mass.  High PSA.  CHF, hemodynamically unstable, in ICU. Consider starting androgen ablation for now.  May add in palliative XRT to sacrum and mandible once he's off pressors and hemodynamically stable to do so.  Will plan for 5 fractions to each site.  Please call with questions.

## 2021-03-15 NOTE — CONSULT NOTE ADULT - ASSESSMENT
80yMale being evaluated for pain control in the setting of       MEDD (morphine equivalent daily dose):      See Recs below.    Please call [phone] with questions or concerns 24/7.   We will continue to follow.    80yMale being evaluated for pain control in the setting of newly diagnosed metastatic prostate cancer to the spine and jaw bone. Heme-onc and rad onc following, hormonal therapy initiated. Patient was upgraded to CCU from floor due to acute anemia and hypotension for closer monitoring. Palliative consulted for pain management.     Used 2 PRN doses of Dilaudid IVP yesterday.       MEDD (morphine equivalent daily dose):      See Recs below.    Please call [phone] with questions or concerns 24/7.   We will continue to follow.    80y Male with PMH including CAD s/p CABG, CHF, R side inguinal hernia, being evaluated for pain control in the setting of newly diagnosed metastatic prostate cancer to the sacrum and mandible. Heme-onc and rad onc following, hormonal therapy initiated. Patient was upgraded to CCU from floor due to acute anemia and hypotension for closer monitoring. Palliative consulted for pain management.     Discussed options with patient and he is agreeable to trying Dilaudid again for the pain. Re-assured patient that likely he will become tolerant of the side effects if he uses the medication more regularly. Also discussed adding a bowel regimen to avoid opioid induced constipation.     *Plan discussed with resident physician.       MEDD (morphine equivalent daily dose): 10 mg MEDD/24 H      See Recs below.    Please call [phone] with questions or concerns 24/7.   We will continue to follow.

## 2021-03-15 NOTE — CONSULT NOTE ADULT - PROBLEM SELECTOR RECOMMENDATION 2
Start bowel regimen to avoid opioid induced constipation  - Senna 2 tabs QHS  - Miralax 17 g QD PRN for constipation

## 2021-03-15 NOTE — PROGRESS NOTE ADULT - ASSESSMENT
79 yo male hx of CAD s/p CABG s/p 20+ years ago/ CHF/ right sided large inguinal hernia BIBA from hotel room 2/2 unable to ambulate with right thigh weakness and numbness, now found to have metastatic disease with a mandibular mass and large sacral mass    Stage IV Prostate cancer with diffuse osseous involvement:  -   - Sacral biopsy favors prostate ( neoplastic cells are positive for AE1/AE3, PSA and CK7)  - Started on casodex 50mg 3/14  - Will likely start leupron in 2 weeks  - Will also likely start chemotherapy (taxotere) when stable  - Rad Onc evaluation when stable  - Recommend CT CAP when able   - Still nees MR head, and MR C/T spine  - Dental evaluation to start bisphosphonates  - will c/w  dexamethasone 4mg, f/u NSx or rad onc consult for taper  - Nutrition f/u  - Completed MR jaw and lumbar  - Palliative care evaluation for goals of care and pain symptoms      # Mild normocytic anemia  - from metastatic disease , monitor .  -transfuse if hb < 7.    # Mild hypercalcemia, likely due to bone mets    please start DVT ppx   Discussed with pt and neurosurgery team.   79 yo male hx of CAD s/p CABG s/p 20+ years ago/ CHF/ right sided large inguinal hernia BIBA from hotel room 2/2 unable to ambulate with right thigh weakness and numbness, now found to have metastatic disease with a mandibular mass and large sacral mass    Stage IV Prostate cancer with diffuse osseous involvement:  -   - Sacral biopsy favors prostate ( neoplastic cells are positive for AE1/AE3, PSA and CK7)  - Started on casodex 50mg 3/14  - Will likely start leupron in 2 weeks  - Will also likely start chemotherapy (taxotere) when stable  - Rad Onc evaluation when stable  - Recommend CT CAP when able   - Still needs MR head, and MR C/T spine  - Dental evaluation to start bisphosphonates  - will c/w  dexamethasone, f/u NSx or rad onc consult for taper  - Nutrition f/u  - Completed MR jaw and lumbar  - F/u palliative care evaluation for goals of care and symptom management    Anemia hypotension, r/o GIB: Patient became hypotensive with small drop in hemoglobin, possible coffee ground emesis  - Discussed with GI, CT AP to r/o RP bleed, if getting this please get CT chest as well for staging/visceral involvement  - Check hemolysis w/u (retic, LUCIA/[facility], hapto, LDH)    # Mild hypercalcemia, likely due to bone mets

## 2021-03-15 NOTE — CONSULT NOTE ADULT - PROBLEM SELECTOR RECOMMENDATION 9
Dilaudid 0.5 mg IVP Q 4 H PRN for mild, moderate, severe pain (1-10)   D/C Tylenol  Continue Lidoderm patches to Ribs QD Dilaudid 0.5 mg IVP Q 4 H PRN for mild, moderate, severe pain (1-10)  (hold for sedation, confusion, RR<10)  D/C Tylenol as patient cannot tolerate tylenol  Continue Lidoderm patches to Ribs QD  Continue dexamethasone

## 2021-03-15 NOTE — PROGRESS NOTE ADULT - SUBJECTIVE AND OBJECTIVE BOX
24H events:    Patient is a 80y old Male who presents with a chief complaint of 80 YEAR OLD MALE C/O MULTIPLE MEDICAL COMPLAINTS . (15 Mar 2021 09:34)    Primary diagnosis of Ambulatory dysfunction       Today is hospital day 7d.     PAST MEDICAL & SURGICAL HISTORY  Inguinal hernia    CHF (congestive heart failure)    S/P CABG x 3      SOCIAL HISTORY:  Negative for smoking/alcohol/drug use.     ALLERGIES:  No Known Allergies    MEDICATIONS:  STANDING MEDICATIONS  bicalutamide 50 milliGRAM(s) Oral daily  brimonidine 0.1% Ophthalmic Solution 1 Drop(s) Both EYES every 12 hours  chlorhexidine 4% Liquid 1 Application(s) Topical daily  dexAMETHasone  Injectable 4 milliGRAM(s) IV Push every 6 hours  latanoprost 0.005% Ophthalmic Solution 1 Drop(s) Both EYES at bedtime  lidocaine   Patch 2 Patch Transdermal daily  norepinephrine Infusion 0.05 MICROgram(s)/kG/Min IV Continuous <Continuous>  pantoprazole  Injectable 40 milliGRAM(s) IV Push two times a day  sodium bicarbonate 650 milliGRAM(s) Oral three times a day  sodium chloride 0.9%. 1000 milliLiter(s) IV Continuous <Continuous>  sodium zirconium cyclosilicate 10 Gram(s) Oral three times a day    PRN MEDICATIONS  acetaminophen   Tablet .. 650 milliGRAM(s) Oral every 6 hours PRN  aluminum hydroxide/magnesium hydroxide/simethicone Suspension 30 milliLiter(s) Oral every 6 hours PRN  HYDROmorphone  Injectable 0.5 milliGRAM(s) IV Push every 4 hours PRN  LORazepam   Injectable 1 milliGRAM(s) IV Push once PRN    VITALS:   T(F): 97.1  HR: 80  BP: 117/49  RR: 15  SpO2: 99%    LABS:                        9.4    26.06 )-----------( 268      ( 15 Mar 2021 05:18 )             29.3     03-15    129<L>  |  102  |  126<HH>  ----------------------------<  166<H>  5.8<H>   |  12<L>  |  1.5    Ca    9.4      15 Mar 2021 05:18  Mg     1.9     03-15    TPro  5.1<L>  /  Alb  3.0<L>  /  TBili  0.3  /  DBili  <0.2  /  AST  19  /  ALT  41  /  AlkPhos  87  03-15    RADIOLOGY:  < from: MR Orbit, Face, and/or Neck No Cont (03.11.21 @ 21:40) >  IMPRESSION:    Please note the patient declined IV contrast which limits tumor assessment. There is also motion artifact.    1.  Numerous foci of bone marrow signal abnormality consistent with osseous metastatic disease. Notable lesions include:    2.  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    3.  Metastatic lesions of the T2 and T3 vertebral bodies on the right with a associated large rightparaspinal soft tissue mass invading the right second rib and right T2-3 neural foramen. Small right lateral epidural component noted without cord compression.    4.  Left occipital bone mass measuring 4 cm with overlying subgaleal component and underlying small epidural component.      PHYSICAL EXAM:  GEN: No acute distress  HENT: NCAT, EOMI  LYMPH: No appreciable adenopathy  LUNGS: No respiratory distress, clear to auscultation bilaterally   HEART: regular rate and rhythm  ABD: Soft, non-tender, non-distended  SKIN: No rash  EXT: No edema  NEURO: AAOX3, lower extremity weakness improving, can hold legs up but not against resistance     24H events:  patient upgraded to MICU due to hypotension and concern for UGIB/coffee ground emesis  No on 0.2 of levophed  No evidence of bleeding    PAST MEDICAL & SURGICAL HISTORY  Inguinal hernia    CHF (congestive heart failure)    S/P CABG x 3      SOCIAL HISTORY:  Negative for smoking/alcohol/drug use.     ALLERGIES:  No Known Allergies    MEDICATIONS:  STANDING MEDICATIONS  bicalutamide 50 milliGRAM(s) Oral daily  brimonidine 0.1% Ophthalmic Solution 1 Drop(s) Both EYES every 12 hours  chlorhexidine 4% Liquid 1 Application(s) Topical daily  dexAMETHasone  Injectable 4 milliGRAM(s) IV Push every 6 hours  latanoprost 0.005% Ophthalmic Solution 1 Drop(s) Both EYES at bedtime  lidocaine   Patch 2 Patch Transdermal daily  norepinephrine Infusion 0.05 MICROgram(s)/kG/Min IV Continuous <Continuous>  pantoprazole  Injectable 40 milliGRAM(s) IV Push two times a day  sodium bicarbonate 650 milliGRAM(s) Oral three times a day  sodium chloride 0.9%. 1000 milliLiter(s) IV Continuous <Continuous>  sodium zirconium cyclosilicate 10 Gram(s) Oral three times a day    PRN MEDICATIONS  acetaminophen   Tablet .. 650 milliGRAM(s) Oral every 6 hours PRN  aluminum hydroxide/magnesium hydroxide/simethicone Suspension 30 milliLiter(s) Oral every 6 hours PRN  HYDROmorphone  Injectable 0.5 milliGRAM(s) IV Push every 4 hours PRN  LORazepam   Injectable 1 milliGRAM(s) IV Push once PRN    VITALS:   T(F): 97.1  HR: 80  BP: 117/49  RR: 15  SpO2: 99%    LABS:                        9.4    26.06 )-----------( 268      ( 15 Mar 2021 05:18 )             29.3     03-15    129<L>  |  102  |  126<HH>  ----------------------------<  166<H>  5.8<H>   |  12<L>  |  1.5    Ca    9.4      15 Mar 2021 05:18  Mg     1.9     03-15    TPro  5.1<L>  /  Alb  3.0<L>  /  TBili  0.3  /  DBili  <0.2  /  AST  19  /  ALT  41  /  AlkPhos  87  03-15    RADIOLOGY:  < from: MR Orbit, Face, and/or Neck No Cont (03.11.21 @ 21:40) >  IMPRESSION:    Please note the patient declined IV contrast which limits tumor assessment. There is also motion artifact.    1.  Numerous foci of bone marrow signal abnormality consistent with osseous metastatic disease. Notable lesions include:    2.  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    3.  Metastatic lesions of the T2 and T3 vertebral bodies on the right with a associated large rightparaspinal soft tissue mass invading the right second rib and right T2-3 neural foramen. Small right lateral epidural component noted without cord compression.    4.  Left occipital bone mass measuring 4 cm with overlying subgaleal component and underlying small epidural component.      PHYSICAL EXAM:  GEN: No acute distress  HENT: NCAT, EOMI  LYMPH: No appreciable adenopathy  LUNGS: No respiratory distress, clear to auscultation bilaterally   HEART: regular rate and rhythm  ABD: Soft, non-tender, non-distended  SKIN: No rash  EXT: No edema  NEURO: AAOX3, lower extremity weakness improving, can hold legs up but not against resistance

## 2021-03-15 NOTE — PROGRESS NOTE ADULT - SUBJECTIVE AND OBJECTIVE BOX
Patient is a 80y old  Male who presents with a chief complaint of 80 YEAR OLD MALE C/O MULTIPLE MEDICAL COMPLAINTS . (15 Mar 2021 12:40)      pt remains critically ill on vasopressors in ICU      ROS:     All ROS are negative except HPI         PHYSICAL EXAM    ICU Vital Signs Last 24 Hrs  T(C): 36.2 (15 Mar 2021 12:00), Max: 37.2 (15 Mar 2021 04:00)  T(F): 97.1 (15 Mar 2021 12:00), Max: 98.9 (15 Mar 2021 04:00)  HR: 92 (15 Mar 2021 13:00) (71 - 100)  BP: 96/48 (15 Mar 2021 13:00) (65/39 - 139/54)  BP(mean): 56 (15 Mar 2021 13:00) (47 - 84)  ABP: --  ABP(mean): --  RR: 15 (15 Mar 2021 12:00) (15 - 20)  SpO2: 99% (15 Mar 2021 13:00) (96% - 100%)      CONSTITUTIONAL:  appears critically ill on vasopressors    ENT:   right mandibular mass noted    EYES:   Pupils equal,   Round and reactive to light.    CARDIAC:   Normal rate,   Regular rhythm.    No edema      Vascular:  Normal systolic impulse  No Carotid bruits    RESPIRATORY:   No wheezing  Bilateral BS  Normal chest expansion  Not tachypneic,  No use of accessory muscles    GASTROINTESTINAL:  Abdomen soft,   Non-tender,   No guarding,   + BS    MUSCULOSKELETAL:   Range of motion is not limited,  No clubbing, cyanosis    NEUROLOGICAL:   Alert and oriented   No motor  deficits.    SKIN:   Skin normal color for race,   Warm and dry and intact.   No evidence of rash.    PSYCHIATRIC:   anxious    HEMATOLOGICAL:  No cervical  lymphadenopathy.  no inguinal lymphadenopathy      03-14-21 @ 07:01  -  03-15-21 @ 07:00  --------------------------------------------------------  IN:    Norepinephrine: 92 mL    Pantoprazole: 80 mL    sodium chloride 0.9%: 1040 mL    Sodium Chloride 0.9% Bolus: 2000 mL  Total IN: 3212 mL    OUT:    Indwelling Catheter - Urethral (mL): 1500 mL  Total OUT: 1500 mL    Total NET: 1712 mL      03-15-21 @ 07:01  -  03-15-21 @ 14:24  --------------------------------------------------------  IN:    Norepinephrine: 122 mL    Pantoprazole: 45 mL    Sodium Chloride 0.9% Bolus: 2000 mL  Total IN: 2167 mL    OUT:    Indwelling Catheter - Urethral (mL): 915 mL  Total OUT: 915 mL    Total NET: 1252 mL          LABS:                            9.4    26.06 )-----------( 268      ( 15 Mar 2021 05:18 )             29.3                                               03-15    129<L>  |  102  |  126<HH>  ----------------------------<  166<H>  5.8<H>   |  12<L>  |  1.5    Ca    9.4      15 Mar 2021 05:18  Mg     1.9     03-15    TPro  5.1<L>  /  Alb  3.0<L>  /  TBili  0.3  /  DBili  <0.2  /  AST  19  /  ALT  41  /  AlkPhos  87  03-15                                                                                           LIVER FUNCTIONS - ( 15 Mar 2021 05:18 )  Alb: 3.0 g/dL / Pro: 5.1 g/dL / ALK PHOS: 87 U/L / ALT: 41 U/L / AST: 19 U/L / GGT: x                                                                                                                                       MEDICATIONS  (STANDING):  bicalutamide 50 milliGRAM(s) Oral daily  brimonidine 0.1% Ophthalmic Solution 1 Drop(s) Both EYES every 12 hours  chlorhexidine 4% Liquid 1 Application(s) Topical daily  dexAMETHasone  Injectable 4 milliGRAM(s) IV Push every 6 hours  latanoprost 0.005% Ophthalmic Solution 1 Drop(s) Both EYES at bedtime  lidocaine   Patch 2 Patch Transdermal daily  norepinephrine Infusion 0.05 MICROgram(s)/kG/Min (6.28 mL/Hr) IV Continuous <Continuous>  ondansetron Injectable 4 milliGRAM(s) IV Push once  pantoprazole  Injectable 40 milliGRAM(s) IV Push two times a day  sodium bicarbonate 650 milliGRAM(s) Oral three times a day  sodium chloride 0.9% Bolus 3000 milliLiter(s) IV Bolus once  sodium chloride 0.9%. 1000 milliLiter(s) (150 mL/Hr) IV Continuous <Continuous>  sodium zirconium cyclosilicate 10 Gram(s) Oral three times a day    MEDICATIONS  (PRN):  acetaminophen   Tablet .. 650 milliGRAM(s) Oral every 6 hours PRN Mild Pain (1 - 3)  aluminum hydroxide/magnesium hydroxide/simethicone Suspension 30 milliLiter(s) Oral every 6 hours PRN Dyspepsia  HYDROmorphone  Injectable 0.5 milliGRAM(s) IV Push every 4 hours PRN Severe Pain (7 - 10)  LORazepam   Injectable 1 milliGRAM(s) IV Push once PRN see instructions      New X-rays reviewed:                                                                                  ECHO    CXR interpreted by me:

## 2021-03-15 NOTE — CONSULT NOTE ADULT - SUBJECTIVE AND OBJECTIVE BOX
MANUELITO HEDRICK          MRN-[id number]              HPI:     81 yo male hx of CAD s/p CABG s/p 20+ years ago/ CHF/ right sided large inguinal hernia BIBA from hotel room 2/2 unable to ambulate with right thigh weakness and numbness. reported it started from knee to hip. denies fall and injury. He was just sitting and the symptoms started. reported off/on back pain in the past. denies fever/chill/recent illness/coughing/chest pain/abd pain/n/v/d and urinary sxs.   	Also has right sided facial swelling for about 1 year after 3 teeth extraction. reports swelling worsens after eating. swelling was better in the past and started swelling again in the past few months. didn't follow up with his dentist 2/2 COVID.   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 he used to be able to ambulate without assistant and he commutes with his car. (08 Mar 2021 05:03)      PAST MEDICAL & SURGICAL HISTORY:  Inguinal hernia    CHF (congestive heart failure)    S/P CABG x 3        FAMILY HISTORY:   Reviewed and found non contributory in mother or father    SOCIAL HISTORY:     ROS:    Unable to attain due to:                      Dyspnea (Ruthy 0-10): 0                       N/V (Y/N): No                             Secretions (Y/N) : No                                          Agitation(Y/N): No                              Pain (Y/N): No                                 -Provocation/Palliation: N/A  -Quality/Quantity: N/A  -Radiating: N/A  -Severity: No pain  -Timing/Frequency: N/A  -Impact on ADLs: N/A    General:  Denied  HEENT:    Denied  Neck:  Denied  CVS:  Denied  Resp:  Denied  GI:  Denied    :  Denied  Musc:  Denied  Neuro:  Denied  Psych:  Denied  Skin:  Denied  Lymph:  Denied    Allergies    No Known Allergies    Intolerances      Opiate Naive (Y/N):   -iStop reviewed (Y/N):   Ref#:              Medications:      MEDICATIONS  (STANDING):  bicalutamide 50 milliGRAM(s) Oral daily  brimonidine 0.1% Ophthalmic Solution 1 Drop(s) Both EYES every 12 hours  calcium gluconate IVPB 1 Gram(s) IV Intermittent daily  chlorhexidine 4% Liquid 1 Application(s) Topical daily  dexAMETHasone  Injectable 4 milliGRAM(s) IV Push every 6 hours  latanoprost 0.005% Ophthalmic Solution 1 Drop(s) Both EYES at bedtime  lidocaine   Patch 2 Patch Transdermal daily  norepinephrine Infusion 0.05 MICROgram(s)/kG/Min (6.28 mL/Hr) IV Continuous <Continuous>  pantoprazole Infusion 8 mG/Hr (10 mL/Hr) IV Continuous <Continuous>  sodium bicarbonate 650 milliGRAM(s) Oral three times a day  sodium chloride 0.9%. 1000 milliLiter(s) (80 mL/Hr) IV Continuous <Continuous>  sodium zirconium cyclosilicate 10 Gram(s) Oral three times a day    MEDICATIONS  (PRN):  acetaminophen   Tablet .. 650 milliGRAM(s) Oral every 6 hours PRN Mild Pain (1 - 3)  aluminum hydroxide/magnesium hydroxide/simethicone Suspension 30 milliLiter(s) Oral every 6 hours PRN Dyspepsia  gabapentin 100 milliGRAM(s) Oral three times a day PRN Neuropathic pain  HYDROmorphone  Injectable 0.5 milliGRAM(s) IV Push every 4 hours PRN Severe Pain (7 - 10)  LORazepam   Injectable 1 milliGRAM(s) IV Push once PRN see instructions      Labs:    CBC:                        9.4    26.06 )-----------( 268      ( 15 Mar 2021 05:18 )             29.3     CMP:    03-15    129<L>  |  102  |  126<HH>  ----------------------------<  166<H>  5.8<H>   |  12<L>  |  1.5    Ca    9.4      15 Mar 2021 05:18  Mg     1.9     03-15    TPro  5.1<L>  /  Alb  3.0<L>  /  TBili  0.3  /  DBili  <0.2  /  AST  19  /  ALT  41  /  AlkPhos  87  03-15     Albumin, Serum: 3.0 g/dL (03-15-21 @ 05:18)             Imaging:  Reviewed    PEx:  T(C): 36.1 (03-15-21 @ 08:00), Max: 37.2 (03-15-21 @ 04:00)  HR: 80 (03-15-21 @ 08:00) (71 - 100)  BP: 138/50 (03-15-21 @ 08:00) (67/45 - 138/50)  RR: 18 (03-15-21 @ 08:00) (18 - 20)  SpO2: 99% (03-15-21 @ 09:22) (96% - 100%)  Wt(kg): --  Daily Height in cm: 180.34 (15 Mar 2021 00:51)    Daily Weight in k.1 (15 Mar 2021 06:00)    General: AAOx3    found in bed in NAD  HEENT:  NCAT PERRL EOMI Non icteric MOM  Neck: Supple no masses  CVS: RR S1S2 No M/G/R  Resp: Unlabored Non tachypneic No increased WOB  GI:  Soft NT ND BS+  :  Voiding / Santos / PrimaFit  Musc: No C/C/E    Neuro: Follows commands No focal deficits  Psych: Calm Pleasant  Skin: Non jaundiced   Lymph: Normal    Preadmit Karnofsky:  %           Current Karnofsky:     %  http://www.npcrc.org/files/news/karnofsky_performance_scale.pdf   http://www.npcrc.org/files/news/palliative_performance_scale_PPSv2.pdf  Cachexia (Y/N):   BMI:    Advanced Directives:     Full Code    Decision maker: The patient is able to participate in complex medical decision making conversations.       GOALS OF CARE DISCUSSION    	    REFERRALS	        MANUELITO HEDRICK          MRN-[id number]              HPI:     81 yo male hx of CAD s/p CABG s/p 20+ years ago/ CHF/ right sided large inguinal hernia BIBA from hotel room / unable to ambulate with right thigh weakness and numbness. reported it started from knee to hip. denies fall and injury. He was just sitting and the symptoms started. reported off/on back pain in the past. denies fever/chill/recent illness/coughing/chest pain/abd pain/n/v/d and urinary sxs.     Also has right sided facial swelling for about 1 year after 3 teeth extraction. reports swelling worsens after eating. swelling was better in the past and started swelling again in the past few months. didn't follow up with his dentist / COVID.   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 he used to be able to ambulate without assistant and he commutes with his car. (08 Mar 2021 05:03)    Imaging shows multiple metastatic osseous lesions with newly diagnosed sacral and mandibular mass with LE weakness concerning for spinal cord compression. s/p IR biopsy of sacral mass 3/10, showing metastatic prostate ca, PSA significantly elevated.    3/14 Pt had drop of Hb from 11.3 to 9.0. AC. Gastric lavage with coffee colored aspirate. GI notified, pt NPO for EGD tomorrow. Started protonix drip. Further drop in Hb to 7.9 with borderline BP. Started on low dose levo. Also received 1 unit PRBCs. Pt BP remains borderline, upgraded to Vent ICU but no critical care nurses available in vent ICU. Pt upgraded to CCU for closer monitoring.      PAST MEDICAL & SURGICAL HISTORY:  Inguinal hernia    CHF (congestive heart failure)    S/P CABG x 3      FAMILY HISTORY:   Reviewed and found non contributory in mother or father    SOCIAL HISTORY: Living in [facility] X 3 years, at [facility] & [facility] X 1 year. , with one step-daughter. Lives alone. Walks with cane at baseline.     ROS:    Unable to attain due to:                      Dyspnea (Ruthy 0-10): 0                       N/V (Y/N): No                             Secretions (Y/N) : No                                          Agitation(Y/N): No                              Pain (Y/N): No                                 -Provocation/Palliation: N/A  -Quality/Quantity: N/A  -Radiating: N/A  -Severity: No pain  -Timing/Frequency: N/A  -Impact on ADLs: N/A    General:  Denied  HEENT:    Denied  Neck:  Denied  CVS:  Denied  Resp:  Denied  GI:  Denied    :  Denied  Musc:  Denied  Neuro:  Denied  Psych:  Denied  Skin:  Denied  Lymph:  Denied    Allergies    No Known Allergies    Intolerances      -iStop reviewed (Y/N): Yes  Ref#:    This report was requested by: Yesica Coley | Reference #: [id number]       Medications:      MEDICATIONS  (STANDING):  bicalutamide 50 milliGRAM(s) Oral daily  brimonidine 0.1% Ophthalmic Solution 1 Drop(s) Both EYES every 12 hours  calcium gluconate IVPB 1 Gram(s) IV Intermittent daily  chlorhexidine 4% Liquid 1 Application(s) Topical daily  dexAMETHasone  Injectable 4 milliGRAM(s) IV Push every 6 hours  latanoprost 0.005% Ophthalmic Solution 1 Drop(s) Both EYES at bedtime  lidocaine   Patch 2 Patch Transdermal daily  norepinephrine Infusion 0.05 MICROgram(s)/kG/Min (6.28 mL/Hr) IV Continuous <Continuous>  pantoprazole Infusion 8 mG/Hr (10 mL/Hr) IV Continuous <Continuous>  sodium bicarbonate 650 milliGRAM(s) Oral three times a day  sodium chloride 0.9%. 1000 milliLiter(s) (80 mL/Hr) IV Continuous <Continuous>  sodium zirconium cyclosilicate 10 Gram(s) Oral three times a day    MEDICATIONS  (PRN):  acetaminophen   Tablet .. 650 milliGRAM(s) Oral every 6 hours PRN Mild Pain (1 - 3)  aluminum hydroxide/magnesium hydroxide/simethicone Suspension 30 milliLiter(s) Oral every 6 hours PRN Dyspepsia  gabapentin 100 milliGRAM(s) Oral three times a day PRN Neuropathic pain  HYDROmorphone  Injectable 0.5 milliGRAM(s) IV Push every 4 hours PRN Severe Pain (7 - 10)  LORazepam   Injectable 1 milliGRAM(s) IV Push once PRN see instructions      Labs:    CBC:                        9.4    26.06 )-----------( 268      ( 15 Mar 2021 05:18 )             29.3     CMP:    03-15    129<L>  |  102  |  126<HH>  ----------------------------<  166<H>  5.8<H>   |  12<L>  |  1.5    Ca    9.4      15 Mar 2021 05:18  Mg     1.9     03-15    TPro  5.1<L>  /  Alb  3.0<L>  /  TBili  0.3  /  DBili  <0.2  /  AST  19  /  ALT  41  /  AlkPhos  87  03-15     Albumin, Serum: 3.0 g/dL (03-15-21 @ 05:18)             Imaging:  Reviewed    PEx:  T(C): 36.1 (03-15-21 @ 08:00), Max: 37.2 (03-15-21 @ 04:00)  HR: 80 (03-15-21 @ 08:00) (71 - 100)  BP: 138/50 (03-15-21 @ 08:00) (67/45 - 138/50)  RR: 18 (03-15-21 @ 08:00) (18 - 20)  SpO2: 99% (03-15-21 @ 09:22) (96% - 100%)  Wt(kg): --  Daily Height in cm: 180.34 (15 Mar 2021 00:51)    Daily Weight in k.1 (15 Mar 2021 06:00)    General: AAOx3    found in bed in NAD  HEENT:  NCAT PERRL EOMI Non icteric MOM  Neck: Supple no masses  CVS: RR S1S2 No M/G/R  Resp: Unlabored Non tachypneic No increased WOB  GI:  Soft NT ND BS+  :  Voiding / Santos / PrimaFit  Musc: No C/C/E    Neuro: Follows commands No focal deficits  Psych: Calm Pleasant  Skin: Non jaundiced   Lymph: Normal    Preadmit Karnofsky: 90 %           Current Karnofsky:     %    Cachexia (Y/N):     Advanced Directives:     Full Code    Decision maker: The patient is able to participate in complex medical decision making conversations.       GOALS OF CARE DISCUSSION    	    REFERRALS	        MANUELITO HEDRICK          MRN-[id number]              HPI:     81 yo male hx of CAD s/p CABG s/p 20+ years ago/ CHF/ right sided large inguinal hernia BIBA from hotel room / unable to ambulate with right thigh weakness and numbness. reported it started from knee to hip. denies fall and injury. He was just sitting and the symptoms started. reported off/on back pain in the past. denies fever/chill/recent illness/coughing/chest pain/abd pain/n/v/d and urinary sxs.     Also has right sided facial swelling for about 1 year after 3 teeth extraction. reports swelling worsens after eating. swelling was better in the past and started swelling again in the past few months. didn't follow up with his dentist / COVID.   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 he used to be able to ambulate without assistant and he commutes with his car. (08 Mar 2021 05:03)    Imaging shows multiple metastatic osseous lesions with newly diagnosed sacral and mandibular mass with LE weakness concerning for spinal cord compression. s/p IR biopsy of sacral mass 3/10, showing metastatic prostate ca, PSA significantly elevated.    3/14 Pt had drop of Hb from 11.3 to 9.0. AC. Gastric lavage with coffee colored aspirate. GI notified, pt NPO for EGD tomorrow. Started protonix drip. Further drop in Hb to 7.9 with borderline BP. Started on low dose levo. Also received 1 unit PRBCs. Pt BP remains borderline, upgraded to Vent ICU but no critical care nurses available in vent ICU. Pt upgraded to CCU for closer monitoring.      PAST MEDICAL & SURGICAL HISTORY:  Inguinal hernia    CHF (congestive heart failure)    S/P CABG x 3      FAMILY HISTORY:   Reviewed and found non contributory in mother or father    SOCIAL HISTORY: Living in [facility] X 3 years, at [facility] & [facility] X 1 year. , with one step-daughter (unsure if they are in contact). Lives alone. Walks with cane at baseline. He has a very close relationship with his Scientologist. He reports he has "noone".     ROS:                Dyspnea (Ruthy 0-10): 0                       N/V (Y/N): No                             Secretions (Y/N) : No                                          Agitation(Y/N): No                              Pain (Y/N): Yes, mostly of the R jaw, and to a lesser extent the BL ribs. Lidoderm patches helped for a little bit on the ribs.                                 -Provocation/Palliation: eating, chewing, he does report the Dilaudid IVP helped, but made him feel a little "loopy" so he did not like it. It brought the pain down to a tolerable level. He expresses discomfort with taking opioid pain medications. He does not think Tylenol ever helps at all (he tried at home). He does think Advil helps, but only when taken in large doses.   -Quality/Quantity: it is aching and throbbing, on the R side jaw, he also has pain in his BL ribs  -Radiating: No   -Severity: 8/10 at this time  -Timing/Frequency: Constant, became worse after having a probing procedure last week with the oral surgeon   -Impact on ADLs: difficulty sleeping and eating    General:  + Fatigued   HEENT:    Denied  Neck:  Denied  CVS:  Denied  Resp:  Denied  GI:  + Constipation, unable to push out his BM   :  Denied  Musc:  Denied  Neuro:  + numbness and tingling and weakness in BL LE. these symptoms have lessened since starting Dexamethasone.   Psych:  Denied  Skin:  Denied  Lymph:  Denied      -iStop reviewed (Y/N): Yes  Ref#:    This report was requested by: Yesica Coley | Reference #: [id number]       Medications:      MEDICATIONS  (STANDING):  bicalutamide 50 milliGRAM(s) Oral daily  brimonidine 0.1% Ophthalmic Solution 1 Drop(s) Both EYES every 12 hours  calcium gluconate IVPB 1 Gram(s) IV Intermittent daily  chlorhexidine 4% Liquid 1 Application(s) Topical daily  dexAMETHasone  Injectable 4 milliGRAM(s) IV Push every 6 hours  latanoprost 0.005% Ophthalmic Solution 1 Drop(s) Both EYES at bedtime  lidocaine   Patch 2 Patch Transdermal daily  norepinephrine Infusion 0.05 MICROgram(s)/kG/Min (6.28 mL/Hr) IV Continuous <Continuous>  pantoprazole Infusion 8 mG/Hr (10 mL/Hr) IV Continuous <Continuous>  sodium bicarbonate 650 milliGRAM(s) Oral three times a day  sodium chloride 0.9%. 1000 milliLiter(s) (80 mL/Hr) IV Continuous <Continuous>  sodium zirconium cyclosilicate 10 Gram(s) Oral three times a day    MEDICATIONS  (PRN):  acetaminophen   Tablet .. 650 milliGRAM(s) Oral every 6 hours PRN Mild Pain (1 - 3)  aluminum hydroxide/magnesium hydroxide/simethicone Suspension 30 milliLiter(s) Oral every 6 hours PRN Dyspepsia  gabapentin 100 milliGRAM(s) Oral three times a day PRN Neuropathic pain  HYDROmorphone  Injectable 0.5 milliGRAM(s) IV Push every 4 hours PRN Severe Pain (7 - 10)  LORazepam   Injectable 1 milliGRAM(s) IV Push once PRN see instructions      Labs:    CBC:                        9.4    26.06 )-----------( 268      ( 15 Mar 2021 05:18 )             29.3     CMP:    03-15    129<L>  |  102  |  126<HH>  ----------------------------<  166<H>  5.8<H>   |  12<L>  |  1.5    Ca    9.4      15 Mar 2021 05:18  Mg     1.9     03-15    TPro  5.1<L>  /  Alb  3.0<L>  /  TBili  0.3  /  DBili  <0.2  /  AST  19  /  ALT  41  /  AlkPhos  87  03-15     Albumin, Serum: 3.0 g/dL (03-15-21 @ 05:18)        PEx:  T(C): 36.1 (03-15-21 @ 08:00), Max: 37.2 (03-15-21 @ 04:00)  HR: 80 (03-15-21 @ 08:00) (71 - 100)  BP: 138/50 (03-15-21 @ 08:00) (67/45 - 138/50)  RR: 18 (03-15-21 @ 08:00) (18 - 20)  SpO2: 99% (03-15-21 @ 09:22) (96% - 100%)  Daily Height in cm: 180.34 (15 Mar 2021 00:51)    Daily Weight in k.1 (15 Mar 2021 06:00)    General: AAOx3    found in bed in NAD  HEENT:  NCAT PERRL EOMI Non icteric MOM  Neck: Supple no masses  CVS: RR S1S2 No M/G/R  Resp: Unlabored Non tachypneic No increased WOB  GI:  Soft NT ND BS+  :  Voiding / Santos / PrimaFit  Musc: No C/C/E    Neuro: Follows commands No focal deficits  Psych: Calm Pleasant  Skin: Non jaundiced   Lymph: Normal    Preadmit Karnofsky: 90 %           Current Karnofsky:     60%    Advanced Directives:     Full Code    Decision maker: The patient is able to participate in complex medical decision making conversations.      GOALS OF CARE DISCUSSION   - not addressed today          MANUELITO HEDRICK          MRN-[id number]              HPI:     81 yo male hx of CAD s/p CABG s/p 20+ years ago/ CHF/ right sided large inguinal hernia BIBA from hotel room / unable to ambulate with right thigh weakness and numbness. reported it started from knee to hip. denies fall and injury. He was just sitting and the symptoms started. reported off/on back pain in the past. denies fever/chill/recent illness/coughing/chest pain/abd pain/n/v/d and urinary sxs.     Also has right sided facial swelling for about 1 year after 3 teeth extraction. reports swelling worsens after eating. swelling was better in the past and started swelling again in the past few months. didn't follow up with his dentist / COVID.   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 he used to be able to ambulate without assistant and he commutes with his car. (08 Mar 2021 05:03)    Imaging shows multiple metastatic osseous lesions with newly diagnosed sacral and mandibular mass with LE weakness concerning for spinal cord compression. s/p IR biopsy of sacral mass 3/10, showing metastatic prostate ca, PSA significantly elevated.    3/14 Pt had drop of Hb from 11.3 to 9.0. AC. Gastric lavage with coffee colored aspirate. GI notified, pt NPO for EGD tomorrow. Started protonix drip. Further drop in Hb to 7.9 with borderline BP. Started on low dose levo. Also received 1 unit PRBCs. Pt BP remains borderline, upgraded to Vent ICU but no critical care nurses available in vent ICU. Pt upgraded to CCU for closer monitoring.      PAST MEDICAL & SURGICAL HISTORY:  Inguinal hernia    CHF (congestive heart failure)    S/P CABG x 3      FAMILY HISTORY:   Reviewed and found non contributory in mother or father    SOCIAL HISTORY: Living in hospitals X 3 years, at [facility] & [facility] X 1 year. , with one step-daughter (unsure if they are in contact). Lives alone. Walks with cane at baseline. He has a very close relationship with his Oriental orthodox. He reports he has "noone".     ROS:                Dyspnea (Ruthy 0-10): 0                       N/V (Y/N): No                             Secretions (Y/N) : No                                          Agitation(Y/N): No                              Pain (Y/N): Yes, mostly of the R jaw, and to a lesser extent the BL ribs. Lidoderm patches helped for a little bit on the ribs.                                 -Provocation/Palliation: eating, chewing, he does report the Dilaudid IVP helped, but made him feel a little "loopy" so he did not like it. It brought the pain down to a tolerable level. He expresses discomfort with taking opioid pain medications. He does not think Tylenol ever helps at all (he tried at home). He does think Advil helps, but only when taken in large doses.   -Quality/Quantity: it is aching and throbbing, on the R side jaw, he also has pain in his BL ribs  -Radiating: No   -Severity: 8/10 at this time  -Timing/Frequency: Constant, became worse after having a probing procedure last week with the oral surgeon   -Impact on ADLs: difficulty sleeping and eating    General:  + Fatigued   HEENT:    Denied  Neck:  Denied  CVS:  Denied  Resp:  Denied  GI:  + Constipation, unable to push out his BM   :  Denied  Musc:  Denied  Neuro:  + numbness and tingling and weakness in BL LE. these symptoms have lessened since starting Dexamethasone.   Psych:  Denied  Skin:  Denied  Lymph:  Denied      -iStop reviewed (Y/N): Yes  Ref#:    This report was requested by: Yesica Coley | Reference #: [id number]       Medications:      MEDICATIONS  (STANDING):  bicalutamide 50 milliGRAM(s) Oral daily  brimonidine 0.1% Ophthalmic Solution 1 Drop(s) Both EYES every 12 hours  calcium gluconate IVPB 1 Gram(s) IV Intermittent daily  chlorhexidine 4% Liquid 1 Application(s) Topical daily  dexAMETHasone  Injectable 4 milliGRAM(s) IV Push every 6 hours  latanoprost 0.005% Ophthalmic Solution 1 Drop(s) Both EYES at bedtime  lidocaine   Patch 2 Patch Transdermal daily  norepinephrine Infusion 0.05 MICROgram(s)/kG/Min (6.28 mL/Hr) IV Continuous <Continuous>  pantoprazole Infusion 8 mG/Hr (10 mL/Hr) IV Continuous <Continuous>  sodium bicarbonate 650 milliGRAM(s) Oral three times a day  sodium chloride 0.9%. 1000 milliLiter(s) (80 mL/Hr) IV Continuous <Continuous>  sodium zirconium cyclosilicate 10 Gram(s) Oral three times a day    MEDICATIONS  (PRN):  acetaminophen   Tablet .. 650 milliGRAM(s) Oral every 6 hours PRN Mild Pain (1 - 3)  aluminum hydroxide/magnesium hydroxide/simethicone Suspension 30 milliLiter(s) Oral every 6 hours PRN Dyspepsia  gabapentin 100 milliGRAM(s) Oral three times a day PRN Neuropathic pain  HYDROmorphone  Injectable 0.5 milliGRAM(s) IV Push every 4 hours PRN Severe Pain (7 - 10)  LORazepam   Injectable 1 milliGRAM(s) IV Push once PRN see instructions      Labs:    CBC:                        9.4    26.06 )-----------( 268      ( 15 Mar 2021 05:18 )             29.3     CMP:    03-15    129<L>  |  102  |  126<HH>  ----------------------------<  166<H>  5.8<H>   |  12<L>  |  1.5    Ca    9.4      15 Mar 2021 05:18  Mg     1.9     03-15    TPro  5.1<L>  /  Alb  3.0<L>  /  TBili  0.3  /  DBili  <0.2  /  AST  19  /  ALT  41  /  AlkPhos  87  03-15     Albumin, Serum: 3.0 g/dL (03-15-21 @ 05:18)        PEx:  T(C): 36.1 (03-15-21 @ 08:00), Max: 37.2 (03-15-21 @ 04:00)  HR: 80 (03-15-21 @ 08:00) (71 - 100)  BP: 138/50 (03-15-21 @ 08:00) (67/45 - 138/50)  RR: 18 (03-15-21 @ 08:00) (18 - 20)  SpO2: 99% (03-15-21 @ 09:22) (96% - 100%)  Daily Height in cm: 180.34 (15 Mar 2021 00:51)    Daily Weight in k.1 (15 Mar 2021 06:00)    General: AAOx3  found in bed in NAD  HEENT:  NCAT EOMI Non icteric MOM: swelling of R side of face; poor dentition  Neck: Swelling on R sided of neck/face  CVS: no edema, no tachycardia  Resp: Unlabored Non tachypneic No increased WOB  GI:  nondistended, not obese  Musc: No C/C/E    Neuro: Follows commands No focal deficits; repeats stories  Psych: Calm Pleasant, mildly anxious, no depressed affect  Skin: Non jaundiced, warm/dry, no rashes  Lymph: Normal    Preadmit Karnofsky: 90 %           Current Karnofsky:     60%    Advanced Directives:     Full Code    Decision maker: The patient is able to participate in complex medical decision making conversations.      GOALS OF CARE DISCUSSION   - not addressed today

## 2021-03-15 NOTE — CONSULT NOTE ADULT - ASSESSMENT
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 started on Casodex.  patient has afib was on eliquis then switched to therapeutic lovenox last dose 3/14 Am  Also was found to have right Jaw mass scoped by dr Hernández ENT.   om 3/14 pt's BP 84/38 MAP 53, HR 76. Pt asymptomatic. Pt had drop of Hb from 11.3 to 9.0. . BP stabilized after 2L NS bolus. Gastric lavage with coffee colored aspirate?, JEAN MARIE negative. Started protonix drip / NPO. Further drop in Hb to 7.9 with borderline BP, repeat 8.4. Started on low dose levo. Also received 1 unit PRBCs. Pt BP remains borderline, upgraded to CCU for closer monitoring.  currently on levophed 0.15 and pantoprazole drip  no melena overnight, no hematemesis. Hb went up to 9.4.     *Upper GI bleed and drop in Hb  -currently on levophed low dose  -hx of heart failure, no recent echo  -hx of afib on eliquis / lovenox  -on steroids   -Hb stable 9.4. s/p 1 unit PBCs  -Right jaw mass, discussed with ENT 8580 no contraindication for EGD    Plan  -c/w PPI drip  -trend CBC every 8 hours  -active type and screen  -COVID-19 PCR sent  -cardiology risk stratification  -no endoscopic procedure as of now (needs cariology evaluation, medical optimization)  -keep NPO, if repeat Hb at 11 is stable can allow clear liquid diet    -for questions call [phone] during weekdays till 5pm and call GI service after 5pm and on weekends [phone]  81 yo male hx of CAD s/p CABG s/p 20+ years ago, HFrEF (EF 40% in 2011, follows with dr Moore), afib on eliquis right sided large inguinal hernia BIBA from hotel room 2/2 unable to ambulate with right thigh weakness and numbness, was found to have 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 started on Casodex.  patient has afib was on eliquis then switched to therapeutic lovenox last dose 3/14 Am  Also was found to have right Jaw mass scoped by dr Hernández ENT.   om 3/14 pt's BP 84/38 MAP 53, HR 76. Pt asymptomatic. Pt had drop of Hb from 11.3 to 9.0. . BP stabilized after 2L NS bolus. Gastric lavage with coffee colored aspirate?, JEAN MARIE negative. Started protonix drip / NPO. Further drop in Hb to 7.9 with borderline BP, repeat 8.4. Started on low dose levo. Also received 1 unit PRBCs. Pt BP remains borderline, upgraded to CCU for closer monitoring.  currently on levophed 0.15 and pantoprazole drip  no melena overnight, no hematemesis. Hb went up to 9.4.     *Upper GI bleed and drop in Hb  -currently on levophed low dose  -hx of heart failure, no recent echo  -hx of afib on eliquis / lovenox  -on steroids   -Hb stable 9.4. s/p 1 unit PBCs  -Right jaw mass, discussed with ENT 8512 no contraindication for EGD  -no hematemesis or melena currently     Plan  -c/w PPI drip  -trend CBC every 8 hours  -active type and screen  -COVID-19 PCR sent  -cardiology risk stratification  -no endoscopic procedure as of now (needs cariology evaluation, medical optimization)  -keep NPO, if repeat Hb at 11 is stable can allow clear liquid diet  -CT abdomen pelvis non contrast    -for questions call [phone] during weekdays till 5pm and call GI service after 5pm and on weekends [phone]  79 yo male hx of CAD s/p CABG s/p 20+ years ago, HFrEF (EF 40% in 2011, follows with dr Moore), afib on eliquis right sided large inguinal hernia BIBA from hotel room 2/2 unable to ambulate with right thigh weakness and numbness, was found to have 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 started on Casodex.  patient has afib was on eliquis then switched to therapeutic lovenox last dose 3/14 Am  Also was found to have right Jaw mass scoped by dr Hernández ENT.   om 3/14 pt's BP 84/38 MAP 53, HR 76. Pt asymptomatic. Pt had drop of Hb from 11.3 to 9.0. . BP stabilized after 2L NS bolus. Gastric lavage with coffee colored aspirate?, JEAN MARIE negative. Started protonix drip / NPO. Further drop in Hb to 7.9 with borderline BP, repeat 8.4. Started on low dose levo. Also received 1 unit PRBCs. Pt BP remains borderline, upgraded to CCU for closer monitoring.  currently on levophed 0.15 and pantoprazole drip  no melena overnight, no hematemesis. Hb went up to 9.4.     *Upper GI bleed and drop in Hb  -currently on levophed low dose  -hx of heart failure, no recent echo  -hx of afib on eliquis / lovenox  -on steroids   -Hb stable 9.4. s/p 1 unit PBCs  -Right jaw mass, discussed with ENT 8572 no contraindication for EGD  -no hematemesis or melena currently     Plan  -c/w PPI drip  -trend CBC every 8 hours  -active type and screen  -COVID-19 PCR sent  -cardiology risk stratification  -no endoscopic procedure as of now (needs cariology evaluation, medical optimization)  -keep NPO, if repeat Hb at 11 is stable can allow clear liquid diet  -CT abdomen pelvis non contrast (r/o retroperitoneal bleeding)    -for questions call [phone] during weekdays till 5pm and call GI service after 5pm and on weekends [phone]

## 2021-03-15 NOTE — CHART NOTE - NSCHARTNOTEFT_GEN_A_CORE
Palliative Care Biopsychosocial Assessment:   Pt. is a 79 y/o  male admitted for multiple complains, he has a recent diagnosis of possible mets, head and neck cancer.  Pt reports he was told to follow up with specialist by his dentist but then COVID happened and he wasn't able to follow.  He does know he has cancer.  Pt presents as A & O x 4, tangential.  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  Pt has been living in a hotel for the past two years which is paid for by his Faith.  Prior to admission pt was independent in ADLs, reports no support, no children.  He does report having step children but they are not involved.  Plan unclear at this point.  Palliative consulted for pain.  Plan:  continue to monitor for pain, further discuss GOC, HCP.  Palliative will follow. x [phone]       Patient Coping Status:       [   ]   coping well        [   ]    coping with  some difficulty       [ x  ]   difficulty coping     [   ]   other                                                       Patient Emotional Status:     [ x  ]   anxious         [   ]   depressed           [   ]  overwhelmed          [   ]   angry         [   ]accepting       [   ]   not accepting           [   ]   other     Patient Mental Status:      [x   ]   alert              [  x ]   oriented         [    ]   confused         [   ] lethargic         [   ]   non-responsive   [   ]   other     Advance Directives:     [   ]    Health Care Surrogate: Name:                             [   ]    Health Care Proxy: Name:   [   ]    MOLST  [   ]    Living Will  [   ]    DNR  [   ]    DNI    Patient Needs:     [   ]   Supportive Counseling                  [   ]   Family Meeting            [   ]    Education                           [ x  ]   Advance Care Planning         Caregiver Name:   Caregiver needs:     [   ]   Supportive Counseling      [   ]   Family Conference      [   ]   Education    [   ]   Other     Referral:      [   ]   Community Resources         [   ]   Cancer Support Group     [   ]    Hospice       [   ]   Bereavement support     [   ]   Pastoral Care      [   ]  [facility] [place]       [   ]  Child Life Services     [   ]   Other                    Spectra #: [phone]

## 2021-03-15 NOTE — PROGRESS NOTE ADULT - ATTENDING COMMENTS
seen/examined w/ [facility]monc fellow  note reviewed, and case discussed    1. prostate cancer, with large extensive mets  ECOG 1  Insight and judgement about his condition are under question  SOCIAL support is limited  - started ADH: casodex (anti-testosterone) beginning 3.14.21, after 2 weeks of which - will initiate GNRH agonist (Lupron IM)  - consider adding taxotere given immense extent of his disease (+low dose steroids  - palliative RT if/when he becomes stabilized    2. acute anemia , etiology is not clear  a) consider actute renal failure  - will get tumor lysis work up (even though TLS is not common in prostate cancer, would not be surprised given the extent  b)bone marrow infiltration is very likely present; however, doubt can attribute to the ACUTE decrease in hg  c)other reasons of acute decrease include bleeding, hemolysis , medications  - GI service is involved  - if CT is obtained to r/o retroperitoneal bleed, please get CT CAP as visceral involvement w/ cancer would be expected

## 2021-03-15 NOTE — PROGRESS NOTE ADULT - SUBJECTIVE AND OBJECTIVE BOX
Hospital Day:  7d    Subjective:    Patient is a 80y old  Male who presents with a chief complaint of 80 YEAR OLD MALE C/O MULTIPLE MEDICAL COMPLAINTS . (14 Mar 2021 15:45)      Admitted to medicine for a primary diagnosis of     Past Medical Hx:   Inguinal hernia    CHF (congestive heart failure)      Past Sx:  S/P CABG x 3      Allergies:  No Known Allergies    Current Meds:   Standng Meds:  bicalutamide 50 milliGRAM(s) Oral daily  brimonidine 0.1% Ophthalmic Solution 1 Drop(s) Both EYES every 12 hours  calcium gluconate IVPB 1 Gram(s) IV Intermittent daily  chlorhexidine 4% Liquid 1 Application(s) Topical daily  dexAMETHasone  Injectable 4 milliGRAM(s) IV Push every 6 hours  latanoprost 0.005% Ophthalmic Solution 1 Drop(s) Both EYES at bedtime  lidocaine   Patch 2 Patch Transdermal daily  norepinephrine Infusion 0.05 MICROgram(s)/kG/Min (6.28 mL/Hr) IV Continuous <Continuous>  pantoprazole Infusion 8 mG/Hr (10 mL/Hr) IV Continuous <Continuous>  sodium bicarbonate 650 milliGRAM(s) Oral three times a day  sodium chloride 0.9%. 1000 milliLiter(s) (80 mL/Hr) IV Continuous <Continuous>  sodium zirconium cyclosilicate 10 Gram(s) Oral three times a day    PRN Meds:  acetaminophen   Tablet .. 650 milliGRAM(s) Oral every 6 hours PRN Mild Pain (1 - 3)  aluminum hydroxide/magnesium hydroxide/simethicone Suspension 30 milliLiter(s) Oral every 6 hours PRN Dyspepsia  gabapentin 100 milliGRAM(s) Oral three times a day PRN Neuropathic pain  HYDROmorphone  Injectable 0.5 milliGRAM(s) IV Push every 4 hours PRN Severe Pain (7 - 10)  LORazepam   Injectable 1 milliGRAM(s) IV Push once PRN see instructions    HOME MEDICATIONS:  Eliquis 5 mg oral tablet: 1 tab(s) orally 2 times a day  Lasix 20 mg oral tablet: 10  orally once a day, As Needed  lisinopril 10 mg oral tablet: 10  orally 2 times a day  metoprolol succinate 50 mg oral tablet, extended release: 1 tab(s) orally once a day  spironolactone 25 mg oral tablet: 1 tab(s) orally 2 times a day      Vital Signs:   T(F): 98.9 (03-15-21 @ 04:00), Max: 98.9 (03-15-21 @ 04:00)  HR: 80 (03-15-21 @ 07:00) (71 - 100)  BP: 101/58 (03-15-21 @ 06:00) (67/45 - 115/69)  RR: 18 (03-15-21 @ 02:00) (18 - 20)  SpO2: 100% (03-15-21 @ 07:00) (96% - 100%)      03-14-21 @ 07:01  -  03-15-21 @ 07:00  --------------------------------------------------------  IN: 3212 mL / OUT: 1500 mL / NET: 1712 mL        Physical Exam:   GENERAL: NAD  HEENT: NCAT  CHEST/LUNG: CTAB  HEART: Regular rate and rhythm; s1 s2 appreciated, No murmurs, rubs, or gallops  ABDOMEN: Soft, Nontender, Nondistended; Bowel sounds present  EXTREMITIES: No LE edema b/l  SKIN: no rashes, no new lesions  NERVOUS SYSTEM:  Alert & Oriented X3  LINES/CATHETERS:        Labs:                         9.4    26.06 )-----------( 268      ( 15 Mar 2021 05:18 )             29.3     Neutophil% 84.0, Lymphocyte% 5.1, Monocyte% 5.6, Bands% 4.8 03-15-21 @ 05:18    14 Mar 2021 17:22    130    |  103    |  123    ----------------------------<  136    5.8     |  17     |  1.6      Ca    8.3        14 Mar 2021 17:22  Mg     1.9       15 Mar 2021 05:18    TPro  5.1    /  Alb  3.0    /  TBili  0.3    /  DBili  <0.2   /  AST  19     /  ALT  41     /  AlkPhos  87     15 Mar 2021 05:18            Serum Pro-Brain Natriuretic Peptide: 2564 pg/mL (03-08-21 @ 01:05)      Iron 141, TIBC 211, %Sat 67 Ferritin 164 03-13-21 @ 07:00               Hospital Day:  7d    Subjective:    Patient is a 80y old  Male who presents with a chief complaint of 80 YEAR OLD MALE C/O MULTIPLE MEDICAL COMPLAINTS. No overnight events. In no distress this am. states he is able to eat and would like to, c/o of severe thirst.       Admitted to medicine for a primary diagnosis of metastatic lesions     Past Medical Hx:   Inguinal hernia    CHF (congestive heart failure)      Past Sx:  S/P CABG x 3      Allergies:  No Known Allergies    Current Meds:   Stand Meds:  bicalutamide 50 milliGRAM(s) Oral daily  brimonidine 0.1% Ophthalmic Solution 1 Drop(s) Both EYES every 12 hours  calcium gluconate IVPB 1 Gram(s) IV Intermittent daily  chlorhexidine 4% Liquid 1 Application(s) Topical daily  dexAMETHasone  Injectable 4 milliGRAM(s) IV Push every 6 hours  latanoprost 0.005% Ophthalmic Solution 1 Drop(s) Both EYES at bedtime  lidocaine   Patch 2 Patch Transdermal daily  norepinephrine Infusion 0.05 MICROgram(s)/kG/Min (6.28 mL/Hr) IV Continuous <Continuous>  pantoprazole Infusion 8 mG/Hr (10 mL/Hr) IV Continuous <Continuous>  sodium bicarbonate 650 milliGRAM(s) Oral three times a day  sodium chloride 0.9%. 1000 milliLiter(s) (80 mL/Hr) IV Continuous <Continuous>  sodium zirconium cyclosilicate 10 Gram(s) Oral three times a day    PRN Meds:  acetaminophen   Tablet .. 650 milliGRAM(s) Oral every 6 hours PRN Mild Pain (1 - 3)  aluminum hydroxide/magnesium hydroxide/simethicone Suspension 30 milliLiter(s) Oral every 6 hours PRN Dyspepsia  gabapentin 100 milliGRAM(s) Oral three times a day PRN Neuropathic pain  HYDROmorphone  Injectable 0.5 milliGRAM(s) IV Push every 4 hours PRN Severe Pain (7 - 10)  LORazepam   Injectable 1 milliGRAM(s) IV Push once PRN see instructions    HOME MEDICATIONS:  Eliquis 5 mg oral tablet: 1 tab(s) orally 2 times a day  Lasix 20 mg oral tablet: 10  orally once a day, As Needed  lisinopril 10 mg oral tablet: 10  orally 2 times a day  metoprolol succinate 50 mg oral tablet, extended release: 1 tab(s) orally once a day  spironolactone 25 mg oral tablet: 1 tab(s) orally 2 times a day      Vital Signs:   T(F): 98.9 (03-15-21 @ 04:00), Max: 98.9 (03-15-21 @ 04:00)  HR: 80 (03-15-21 @ 07:00) (71 - 100)  BP: 101/58 (03-15-21 @ 06:00) (67/45 - 115/69)  RR: 18 (03-15-21 @ 02:00) (18 - 20)  SpO2: 100% (03-15-21 @ 07:00) (96% - 100%)      03-14-21 @ 07:01  -  03-15-21 @ 07:00  --------------------------------------------------------  IN: 3212 mL / OUT: 1500 mL / NET: 1712 mL        Physical Exam:   GENERAL: NAD  HEENT: right cheek and jaw enlargement  CHEST/LUNG: CTAB  HEART: Regular rate and rhythm; s1 s2 appreciated, No murmurs, rubs, or gallops  ABDOMEN: Soft, Nontender, Nondistended; Bowel sounds present  EXTREMITIES: No LE edema b/l  SKIN: no rashes, no new lesions  NERVOUS SYSTEM:  Alert & Oriented X3  LINES/CATHETERS: lemus, peripheral iv         Labs:                         9.4    26.06 )-----------( 268      ( 15 Mar 2021 05:18 )             29.3     Neutophil% 84.0, Lymphocyte% 5.1, Monocyte% 5.6, Bands% 4.8 03-15-21 @ 05:18    14 Mar 2021 17:22    130    |  103    |  123    ----------------------------<  136    5.8     |  17     |  1.6      Ca    8.3        14 Mar 2021 17:22  Mg     1.9       15 Mar 2021 05:18    TPro  5.1    /  Alb  3.0    /  TBili  0.3    /  DBili  <0.2   /  AST  19     /  ALT  41     /  AlkPhos  87     15 Mar 2021 05:18            Serum Pro-Brain Natriuretic Peptide: 2564 pg/mL (03-08-21 @ 01:05)      Iron 141, TIBC 211, %Sat 67 Ferritin 164 03-13-21 @ 07:00

## 2021-03-15 NOTE — PROGRESS NOTE ADULT - ASSESSMENT
1. Shock: most likely hypovolemic in nature from severe dehydration DENISE and possible GI bleeding, currently on levophed gtt will attempt to wean off for map greater then 65 after aggressive resuscitation Monitor for now.    2. DENISE: secondary to dehydration in setting of obstructive uropathy, tx IVF's monitor for now, continue lemus.    3. Anemia: possible GI bleed from PUD tx PPI IV BID, transfuse for hgb less then 7, monitor for now. F/u hemolysis workup.    4. B/L Hydronephrosis from Obstructive Uropathy: continue Lemus monitor I/O's now has post obstructive dieresis continue IVF's.    5. Metastatic Prostate CA with Multiple bones mets: continue decadron, f/u hem/ rad onc recs, currently on casodex, f/u Palliative care recs.    6. 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    7. CHF: unknown EF, f/u ECHO continue supportive care, monitor for now.    8. AFIB: continue supportive care, monitor for now.    9. HTN: continue supportive care, monitor for now.    10. CAD s/p CABG: continue supportive care, monitor for now.    11. DVT px: tx SCD's check lower ext US, hold AC.    12. NUT: tx clears advance as tolerated tx IVF's.    Critical Care Time not including procedures 31 mins

## 2021-03-15 NOTE — CONSULT NOTE ADULT - SUBJECTIVE AND OBJECTIVE BOX
Patient is a 80y old  Male who presents with a chief complaint of 80 YEAR OLD MALE C/O MULTIPLE MEDICAL COMPLAINTS . (15 Mar 2021 07:08)    HPI:   79 yo male hx of CAD s/p CABG s/p 20+ years ago/ CHF/ right sided large inguinal hernia BIBA from hotel room 2/2 unable to ambulate with right thigh weakness and numbness. reported it started from knee to hip. denies fall and injury. He was just sitting and the symptoms started. reported off/on back pain in the past. denies fever/chill/recent illness/coughing/chest pain/abd pain/n/v/d and urinary sxs.   	Also has right sided facial swelling for about 1 year after 3 teeth extraction. reports swelling worsens after eating. swelling was better in the past and started swelling again in the past few months. didn't follow up with his dentist 2/2 COVID.   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 he used to be able to ambulate without assistant and he commutes with his car. (08 Mar 2021 05:03)    < from: MR Orbit, Face, and/or Neck No Cont (21 @ 21:40) >  IMPRESSION:    Please note the patient declined IV contrast which limits tumor assessment. There is also motion artifact.    1.  Numerous foci of bone marrow signal abnormality consistent with osseous metastatic disease. Notable lesions include:    2.  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    3.  Metastatic lesions of the T2 and T3 vertebral bodies on the right with a associated large rightparaspinal soft tissue mass invading the right second rib and right T2-3 neural foramen. Small right lateral epidural component noted without cord compression.    4.  Left occipital bone mass measuring 4 cm with overlying subgaleal component and underlying small epidural component.    < end of copied text >  < from: MR Orbit, Face, and/or Neck No Cont (21 @ 21:40) >  IMPRESSION:    Please note the patient declined IV contrast which limits tumor assessment. There is also motion artifact.    1.  Numerous foci of bone marrow signal abnormality consistent with osseous metastatic disease. Notable lesions include:    2.  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    3.  Metastatic lesions of the T2 and T3 vertebral bodies on the right with a associated large rightparaspinal soft tissue mass invading the right second rib and right T2-3 neural foramen. Small right lateral epidural component noted without cord compression.    4.  Left occipital bone mass measuring 4 cm with overlying subgaleal component and underlying small epidural component.    < end of copied text >  < from: MR Lumbar Spine No Cont (21 @ 21:02) >  IMPRESSION:    1.  Findings consistent with extensive diffuse osseous metastatic disease.    2.  The largest lesion is within the sacrum (S2-S4) measuring 8.4 cm, impinging and obliterating the neural foramen.    3.  There is also right S1 mass invading the right L5-S1 neural foramen, and a mildly expansile L2 mass without significant spinal stenosis.    < end of copied text >  PROCEDURE:   · Procedure Name	Interventional Radiology  · Procedure Name	Sacral mass biopsy  · TIME OUT	Patient's first and last name, , procedure, and correct site confirmed prior to the start of procedure.  · Procedure Date/Time	10-Mar-2021 10:00  · Informed Consent	Benefits, risks, and possible complications of procedure explained to patient/caregiver who verbalized understanding and gave written consent.  · Procedure Performed By	Myself  · Procedure Assisted By	Resident, Ashish Lindquist MD  · Access Site (if applicable)	Midline, sacral area  · Specimen Obtained	Cores given to Cytopathology Technologist/Pathologist  · Estimated Blood Loss	Mild  · Complications	No complications  · Contrast	None  · Sedation	Conscious sedation, 25mcg IV Fentanyl and 1 mg IV Midazolam  · Local Anesthesia	1% Lidocaine  · Procedure Findings and Details	Patient was positioned and prepped in a sterile fashion. Biopsy of sacral mass was performed in a sterile manner. Three biopsy samples were obtained. Pathology was available during the procedure to confirm adequate samples.  · Patient Condition/Disposition	Back to floor  · Anticoagulation Management (if applicable)	N/A      Electronic Signatures:  Keith Malcolm)   (Signed 13-Mar-2021 10:39)  	Co-Signer: Ajit Zhou)   (Signed 10-Mar-2021 11:52)  	Authored: PROCEDURE< from: CT Abdomen and Pelvis No Cont (21 @ 02:42) >  IMPRESSION:    Findings compatible with metastatic disease as demonstrated by multiple bony lesions, the largest soft tissue mass centered at S2-S3 measuring up to 7.4 cm with associated bony destruction and obliteration of the exiting neural foramina. Additional smaller lesions at the right sacral ala, L2 and L3.    Moderate to severe bilateral hydroureteronephrosis to the level of a markedly distended urinary bladder. Findings presumably secondary to urinary bladder outlet obstruction or urinary retention. Enlarged prostate gland.        < end of copied text >  < from: CT Neck Soft Tissue No Cont (21 @ 02:39) >  ATTENDING ADDENDUM:  Agree with the resident report.  Limited noncontrast CT demonstrates a large neoplastic soft tissue mass centered in the right mandible with associated severe erosion of the body/ramus of the right mandible (sparing the mandibular condyle), as well as the posterior lateral wall of the right maxillary sinus and the buccal cortex of the right maxillary alveolar process and the involved molar/premolars. The right pterygoid plates appear preserved. The parapharyngeal space appears preserved. 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    The mass infiltrates the muscles of the right  space, as well as the right medial/lateral pterygoid muscles. The right parotid gland is displaced posterolaterally and there is a questionable invasion of the tumor. The mass infiltrates the right sublingual space.    There is a borderline enlarged right level III lymph node measuring 1.2 cm (series 4, image 91) abutting the right hyoid.      < end of copied text >   Patient is a 80y old  Male who presents with a chief complaint of 80 YEAR OLD MALE C/O MULTIPLE MEDICAL COMPLAINTS . (15 Mar 2021 07:08)    HPI:   81 yo male hx of CAD s/p CABG s/p 20+ years ago/ CHF/ right sided large inguinal hernia BIBA from hotel room 2/2 unable to ambulate with right thigh weakness and numbness. reported it started from knee to hip. denies fall and injury. He was just sitting and the symptoms started. reported off/on back pain in the past. denies fever/chill/recent illness/coughing/chest pain/abd pain/n/v/d and urinary sxs.   	Also has right sided facial swelling for about 1 year after 3 teeth extraction. reports swelling worsens after eating. swelling was better in the past and started swelling again in the past few months. didn't follow up with his dentist 2/2 COVID.   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 he used to be able to ambulate without assistant and he commutes with his car. (08 Mar 2021 05:03)    < from: MR Orbit, Face, and/or Neck No Cont (03.11.21 @ 21:40) >  IMPRESSION:    Please note the patient declined IV contrast which limits tumor assessment. There is also motion artifact.    1.  Numerous foci of bone marrow signal abnormality consistent with osseous metastatic disease. Notable lesions include:    2.  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    3.  Metastatic lesions of the T2 and T3 vertebral bodies on the right with a associated large rightparaspinal soft tissue mass invading the right second rib and right T2-3 neural foramen. Small right lateral epidural component noted without cord compression.    4.  Left occipital bone mass measuring 4 cm with overlying subgaleal component and underlying small epidural component.    < end of copied text >  < from: MR Orbit, Face, and/or Neck No Cont (03.11.21 @ 21:40) >  IMPRESSION:    Please note the patient declined IV contrast which limits tumor assessment. There is also motion artifact.    1.  Numerous foci of bone marrow signal abnormality consistent with osseous metastatic disease. Notable lesions include:    2.  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    3.  Metastatic lesions of the T2 and T3 vertebral bodies on the right with a associated large rightparaspinal soft tissue mass invading the right second rib and right T2-3 neural foramen. Small right lateral epidural component noted without cord compression.    4.  Left occipital bone mass measuring 4 cm with overlying subgaleal component and underlying small epidural component.    < end of copied text >  < from: MR Lumbar Spine No Cont (03.11.21 @ 21:02) >  IMPRESSION:    1.  Findings consistent with extensive diffuse osseous metastatic disease.    2.  The largest lesion is within the sacrum (S2-S4) measuring 8.4 cm, impinging and obliterating the neural foramen.    3.  There is also right S1 mass invading the right L5-S1 neural foramen, and a mildly expansile L2 mass without significant spinal stenosis.    IMPRESSION    Findings compatible with metastatic disease as demonstrated by multiple bony lesions, the largest soft tissue mass centered at S2-S3 measuring up to 7.4 cm with associated bony destruction and obliteration of the exiting neural foramina. Additional smaller lesions at the right sacral ala, L2 and L3.    Moderate to severe bilateral hydroureteronephrosis to the level of a markedly distended urinary bladder. Findings presumably secondary to urinary bladder outlet obstruction or urinary retention. Enlarged prostate gland.    Cytopathology Report   Final Diagnosis   SACRUM, CT-GUIDED FNA,CORE BIOPSY AND IMPRINTS   POSITIVE FOR MALIGNANT CELLS.   Malignant neoplasm. (See note)   Addendum   Reporting results of immunohistochemical studies:   The neoplastic cells are positive for AE1/AE3, PSA and CK7   (focal), while negative for PSAP, CK20, synaptophysin,   chromogranin, CD56, EMA, SOX-10, Melan-A, HMB-45, S100, TTF-1,   desmin, CD45, calretinin, WT-1, CD34, PAX-8 and SMA. CD68 shows   background staining.   The immunoprofile is in favor of metastatic carcinoma,favor   prostate origin.   Note: Cellular smears composed of cohesive groups and single-   lying cells with uniform small, round nuclei surrouded by finely   vacuolated cytoplasm.   Immunohistochemical studies on core biopsy pending; addendum to   follow.   Screened by: Batsheva WILSON(ASCP)   Verified by: Marybel Enrique MD   (Electronic Signature)   Reported on: 03/11/21 15:39 EST, [facility], 3rd Floor,   [place] [address]   Phone: [phone] Fax: [phone]     < end of copied text >    `< from: CT Neck Soft Tissue No Cont (03.08.21 @ 02:39) >  EXAM:  CT NECK SOFT TISSUE          *** ADDENDUM 03/08/2021  ***    ADDENDUM: Also identified is a soft tissue mass in the medial right posterior pleural/paravertebral region partially encasing the medial right second rib. There is mixed lytic/blastic changes of the encased medial right second rib and the right lateral T2 vertebra and transverse process.    *** END OF ADDENDUM 03/08/2021  ***    < end of copied text >  < from: CT Neck Soft Tissue No Cont (03.08.21 @ 02:39) >  Agree with the resident report.  Limited noncontrast CT demonstrates a large neoplastic soft tissue mass centered in the right mandible with associated severe erosion of the body/ramus of the right mandible (sparing the mandibular condyle), as well as the posterior lateral wall of the right maxillary sinus and the buccal cortex of the right maxillary alveolar process and the involved molar/premolars. The right pterygoid plates appear preserved. The parapharyngeal space appears preserved. 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    The mass infiltrates the muscles of the right  space, as well as the right medial/lateral pterygoid muscles. The right parotid gland is displaced posterolaterally and there is a questionable invasion of the tumor. The mass infiltrates the right sublingual space.    There is a borderline enlarged right level III lymph node measuring 1.2 cm (series 4, image 91) abutting the right hyoid.    < end of copied text >

## 2021-03-15 NOTE — CONSULT NOTE ADULT - PROBLEM SELECTOR RECOMMENDATION 3
Full Code  Will address GOC as appropriate  Would benefit from filling out HCP form Full Code (did not address today)  Will address GOC as appropriate  Would benefit from filling out HCP form

## 2021-03-15 NOTE — PROGRESS NOTE ADULT - ASSESSMENT
79 yo M hx of CAD s/p CABG s/p 20+ years ago/ CHF/ right sided large inguinal hernia BIBA from hotel room 2/2 unable to ambulate with right thigh weakness and numbness.      # Shock       #Multiple metastatic osseous lesions with newly diagnosed sacral and mandibular mass with LE weakness concerning for spinal cord compression due to malignancy   - CT scan abd/pelvis on admission: Findings compatible with metastatic disease as demonstrated by multiple bony lesions, the largest soft tissue mass centered at S2-S3 measuring up to 7.4 cm with associated bony destruction and obliteration of the exiting neural foramina. Additional smaller lesions at the right sacral ala, L2 and L3.  - CT Neck Soft Tissue No Cont: Large right mandibular soft tissue density mass measuring 6.5 x 7.1 x 9.2 cm likely arising from the right mandible extending from the right mandibular condyle to the body of the mandible. There is an associated extensive bony erosion of the right mandible within the mass and the posterior wall of the right maxilla. Findings are highly suspicious for malignancy.  - s/p IR biopsy of sacral mass 3/10, showing metastatic prostate ca, PSA significantly elevated   - Neurosx c/s appreciated: no intervention at this time  - Heme/Onc c/s appreciated: c/w Casodex; possible rad onc intervention when stable   - ENT c/s appreciated: patient was scoped, bedside mandibular biopsy deferred for now; will need f/u   - Palliative consult appreciated: Dilaudid for pain control    - C/w dexamethasone 4mg q6h    #Acute on chronic anemia  - concern for possible UGIB   - hgb dropped 9.7 -> 7.9. Currently 9.4  - BUN elevated  - GI c/s appreciated: NPO for possible scope tomorrow, CT A/P noncont  - Hold Lovenox for now, SCDs for ppx   - C/w PPI BID     #DENISE, possibly pre-renal  - Clinically dry  - c/w IVF @150cc/hr  - Give 3L bolus      #Metabolic acidosis   - C/w sodium bicarbonate 650mg TID     #Hyperkalemia  - s/p calcium gluconate and d50/insulin, c/w sodium bicarbonate, c/w Lokelma, trend BMP, low k diet once diet is resumed     #Acute urinary retention   - CT scan showed on admission moderate to severe bilateral hydroureteronephrosis to the level of a markedly distended urinary bladder. Findings presumably secondary to urinary bladder outlet obstruction or urinary retention. Enlarged prostate gland.   - C/w Santos catheter     #Hx of Afib  - on eliquis 5 mg bid, switched to lovenox inpatient, on hold for now     #HTN  - hold aldactone, lasix, acei and metoprolol     # Mild hypercalcemia, likely due to bone mets - resolved     DVT Prophylaxis: SCDs   GI Prophylaxis: protonix  Diet: clears  Activity: IAT  FULL CODE    Code Status: full.   81 yo M hx of CAD s/p CABG s/p 20+ years ago/ CHF/ right sided large inguinal hernia BIBA from hotel room 2/2 unable to ambulate with right thigh weakness and numbness.    #Multiple metastatic osseous lesions with newly diagnosed sacral and mandibular mass with LE weakness concerning for spinal cord compression due to malignancy   - CT scan abd/pelvis on admission: Findings compatible with metastatic disease as demonstrated by multiple bony lesions, the largest soft tissue mass centered at S2-S3 measuring up to 7.4 cm with associated bony destruction and obliteration of the exiting neural foramina. Additional smaller lesions at the right sacral ala, L2 and L3.  - CT Neck Soft Tissue No Cont: Large right mandibular soft tissue density mass measuring 6.5 x 7.1 x 9.2 cm likely arising from the right mandible extending from the right mandibular condyle to the body of the mandible. There is an associated extensive bony erosion of the right mandible within the mass and the posterior wall of the right maxilla. Findings are highly suspicious for malignancy.  - s/p IR biopsy of sacral mass 3/10, showing metastatic prostate ca, PSA significantly elevated   - Neurosx c/s appreciated: no intervention at this time  - Heme/Onc c/s appreciated: c/w Casodex; rad onc intervention when stable, MR head and MR C/T spine, Dental evaluation to start bisphosphonates  - ENT c/s appreciated: patient was scoped, bedside mandibular biopsy deferred for now; will need f/u   - Palliative consult appreciated: Dilaudid for pain control    - C/w dexamethasone 4mg q6h    #Hypovolemic shock   #Acute on chronic anemia  - concern for possible UGIB   - hgb dropped 9.7 -> 7.9. Currently 9.4  - BUN elevated  - GI c/s appreciated: NPO for possible scope tomorrow, CT A/P noncont  - Hold Lovenox for now, SCDs for ppx   - C/w PPI BID   - Levophed wean off     #DENISE, possibly pre-renal  - Clinically dry  - c/w IVF @150cc/hr  - Give 3L bolus      #Metabolic acidosis   - C/w sodium bicarbonate 650mg TID     #Hyperkalemia  - K 5.8  - c/w sodium bicarbonate  - c/w Lokelma 10mg  PO TID  - Low K when diet restarts     #Acute urinary retention   - CT scan showed on admission moderate to severe bilateral hydroureteronephrosis to the level of a markedly distended urinary bladder. Findings presumably secondary to urinary bladder outlet obstruction or urinary retention. Enlarged prostate gland.   - C/w Santos catheter     #Hx of Afib  - on eliquis 5 mg bid at home  - Was on Lovenox here, currently holding    #HTN  - hold aldactone, lasix, acei and metoprolol     # Mild hypercalcemia, likely due to bone mets - resolved     DVT Prophylaxis: SCDs   GI Prophylaxis: protonix  Diet: clears  Activity: IAT  FULL CODE    Code Status: full.

## 2021-03-15 NOTE — CONSULT NOTE ADULT - ASSESSMENT
80y Male with PMH including CAD s/p CABG, CHF, R side inguinal hernia, being evaluated for palliative radiation therapy in the setting of newly diagnosed metastatic prostate cancer to the sacrum and mandible. Heme-onc following, hormonal therapy initiated. Patient was upgraded to CCU from floor due to acute anemia s/p 1 unit PRBCs,  and hypotension for closer monitoring.       Patient seen at bedside. He reports of pain to the right side of jaw, b/l ribs and sacral region.  We discussed palliative radiation therapy to right mandible once he is stable and levophed drip is d/c. Contact information provided to patient. Discussed patient with Dr. Hoang.        EGD planned, once pt is hemodynamically stable r/o GI bleed  Until patient is stable, and off of IV Levophed, please contact rad/on for planning/discussion with patient and medical team.  c/s appreciated   Dr. Hoang and Dr. Bagley will discuss plan.         80y Male with PMH including CAD s/p CABG, CHF, R side inguinal hernia, being evaluated for palliative radiation therapy in the setting of newly diagnosed metastatic prostate cancer to the sacrum and mandible. Heme-onc following, hormonal therapy initiated. Patient was upgraded to CCU from floor due to acute anemia s/p 1 unit PRBCs,  and hypotension for closer monitoring.       Patient seen at bedside. He reports of pain to the right side of jaw, b/l ribs and sacral region.  We discussed palliative radiation therapy to right mandible and sacrum  once he is stable and levophed drip is d/c. Contact information provided to patient. Discussed patient with Dr. Hoang.        EGD planned, once pt is hemodynamically stable r/o GI bleed  Until patient is stable, and off of IV Levophed, please contact rad/on for planning/discussion with patient and medical team.  c/s appreciated   Dr. Hoang and Dr. Bagley will discuss plan.

## 2021-03-15 NOTE — CHART NOTE - NSCHARTNOTEFT_GEN_A_CORE
Pain consult placed in chart. Patient is currently managed for pain control by Palliative Care. Please contact Palliative care for uncontrolled pain.

## 2021-03-15 NOTE — CONSULT NOTE ADULT - SUBJECTIVE AND OBJECTIVE BOX
Gastroenterology Consultation:    Patient is a 80y old  Male who presents with a chief complaint of 80 YEAR OLD MALE C/O MULTIPLE MEDICAL COMPLAINTS . 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 started on Casodex.  patient has afib was on eliquis then switched to therapeutic lovenox last dose 3/14 Am  Also was found to have right Jaw mass scoped by dr Hernández ENT.   om 3/14 pt's BP 84/38 MAP 53, HR 76. Pt asymptomatic. Pt had drop of Hb from 11.3 to 9.0. . BP stabilized after 2L NS bolus. Gastric lavage with coffee colored aspirate?, JEAN MARIE negative. Started protonix drip / NPO. Further drop in Hb to 7.9 with borderline BP, repeat 8.4. Started on low dose levo. Also received 1 unit PRBCs. Pt BP remains borderline, upgraded to CCU for closer monitoring.  currently on levophed 0.15 and pantoprazole drip  no melena overnight, no hematemesis. Hb went up to 9.4.     Prior records Reviewed (Y/N): Y  History obtained from person other than patient (Y/N): N    Prior EGD: none on chart   Prior Colonoscopy: none on chart     PAST MEDICAL & SURGICAL HISTORY:  Inguinal hernia  CHF (congestive heart failure)  S/P CABG x 3    FAMILY HISTORY: no hx of colon cancer or gastric cancer       Social History: lives in hotel now   Tobacco: former  Alcohol: no   Drugs: no     Home Medications:  Eliquis 5 mg oral tablet: 1 tab(s) orally 2 times a day (08 Mar 2021 05:11)  Lasix 20 mg oral tablet: 10  orally once a day, As Needed (08 Mar 2021 05:11)  lisinopril 10 mg oral tablet: 10  orally 2 times a day (08 Mar 2021 05:11)  metoprolol succinate 50 mg oral tablet, extended release: 1 tab(s) orally once a day (08 Mar 2021 05:11)  spironolactone 25 mg oral tablet: 1 tab(s) orally 2 times a day (08 Mar 2021 05:11)    MEDICATIONS  (STANDING):  bicalutamide 50 milliGRAM(s) Oral daily  brimonidine 0.1% Ophthalmic Solution 1 Drop(s) Both EYES every 12 hours  calcium gluconate IVPB 1 Gram(s) IV Intermittent daily  chlorhexidine 4% Liquid 1 Application(s) Topical daily  dexAMETHasone  Injectable 4 milliGRAM(s) IV Push every 6 hours  latanoprost 0.005% Ophthalmic Solution 1 Drop(s) Both EYES at bedtime  lidocaine   Patch 2 Patch Transdermal daily  norepinephrine Infusion 0.05 MICROgram(s)/kG/Min (6.28 mL/Hr) IV Continuous <Continuous>  pantoprazole Infusion 8 mG/Hr (10 mL/Hr) IV Continuous <Continuous>  sodium bicarbonate 650 milliGRAM(s) Oral three times a day  sodium chloride 0.9%. 1000 milliLiter(s) (80 mL/Hr) IV Continuous <Continuous>  sodium zirconium cyclosilicate 10 Gram(s) Oral three times a day    MEDICATIONS  (PRN):  acetaminophen   Tablet .. 650 milliGRAM(s) Oral every 6 hours PRN Mild Pain (1 - 3)  aluminum hydroxide/magnesium hydroxide/simethicone Suspension 30 milliLiter(s) Oral every 6 hours PRN Dyspepsia  gabapentin 100 milliGRAM(s) Oral three times a day PRN Neuropathic pain  HYDROmorphone  Injectable 0.5 milliGRAM(s) IV Push every 4 hours PRN Severe Pain (7 - 10)  LORazepam   Injectable 1 milliGRAM(s) IV Push once PRN see instructions      Allergies  No Known Allergies      Review of Systems:   Constitutional:  No Fever, No Chills  ENT/Mouth:  right jaw swelling  Eyes:  No Eye Pain, No Vision Changes  Cardiovascular:  No Chest Pain, No Palpitations  Respiratory:  No Cough, No Dyspnea  Gastrointestinal:  As described in HPI  Musculoskeletal:  right leg weakness + numbness    Physical Examination:  T(C): 36.1 (03-15-21 @ 08:00), Max: 37.2 (03-15-21 @ 04:00)  HR: 80 (03-15-21 @ 08:00) (71 - 100)  BP: 138/50 (03-15-21 @ 08:00) (67/45 - 138/50)  RR: 18 (03-15-21 @ 08:00) (18 - 20)  SpO2: 99% (03-15-21 @ 09:22) (96% - 100%)  Height (cm): 180.3 (03-15-21 @ 00:51)  Weight (kg): 73.8 (03-15-21 @ 00:51)    Constitutional: No acute distress.  Eyes:. Conjunctivae are clear, Sclera is non-icteric.  Ears Nose and Throat: right jaw swelling  Respiratory:  No signs of respiratory distress. Lung sounds are clear bilaterally.  Cardiovascular:  S1 S2, Regular rate and rhythm.  GI: Abdomen is soft, symmetric, and non-tender without distention. There are no visible lesions or scars. Bowel sounds are present and normoactive in all four quadrants. Right inguinal hernia no change in color  Skin: No rashes, No Jaundice.    Data: (reviewed by attending)                        9.4    26.06 )-----------( 268      ( 15 Mar 2021 05:18 )             29.3     Hgb Trend:  9.4  03-15-21 @ 05:18  8.5  03-14-21 @ 20:41  7.9  03-14-21 @ 17:22  9.7  03-14-21 @ 11:45  9.0  03-14-21 @ 07:32  11.3  03-13-21 @ 04:30        03-15    129<L>  |  102  |  126<HH>  ----------------------------<  166<H>  5.8<H>   |  12<L>  |  1.5    Ca    9.4      15 Mar 2021 05:18  Mg     1.9     03-15    TPro  5.1<L>  /  Alb  3.0<L>  /  TBili  0.3  /  DBili  <0.2  /  AST  19  /  ALT  41  /  AlkPhos  87  03-15    Liver panel trend:  TBili 0.3   /   AST 19   /   ALT 41   /   AlkP 87   /   Tptn 5.1   /   Alb 3.0    /   DBili <0.2      03-15  TBili 0.2   /   AST 66   /   ALT 81   /   AlkP 118   /   Tptn 5.2   /   Alb 2.9    /   DBili --      03-13  TBili 0.3   /   AST 17   /   ALT 17   /   AlkP 127   /   Tptn 5.6   /   Alb 3.1    /   DBili --      03-12  TBili 0.3   /   AST 16   /   ALT 10   /   AlkP 129   /   Tptn 5.8   /   Alb 3.2    /   DBili --      03-11  TBili 0.3   /   AST 27   /   ALT 10   /   AlkP 154   /   Tptn 6.5   /   Alb 3.6    /   DBili --      03-10  TBili 0.6   /   AST 21   /   ALT 8   /   AlkP 213   /   Tptn 7.5   /   Alb 4.1    /   DBili --      03-08       Radiology:(reviewed by attending)  none new

## 2021-03-16 DIAGNOSIS — F41.9 ANXIETY DISORDER, UNSPECIFIED: ICD-10-CM

## 2021-03-16 LAB
ALBUMIN SERPL ELPH-MCNC: 2.7 G/DL — LOW (ref 3.5–5.2)
ALP SERPL-CCNC: 80 U/L — SIGNIFICANT CHANGE UP (ref 30–115)
ALT FLD-CCNC: 30 U/L — SIGNIFICANT CHANGE UP (ref 0–41)
ANION GAP SERPL CALC-SCNC: 12 MMOL/L — SIGNIFICANT CHANGE UP (ref 7–14)
ANION GAP SERPL CALC-SCNC: 12 MMOL/L — SIGNIFICANT CHANGE UP (ref 7–14)
APTT BLD: 22.8 SEC — CRITICAL LOW (ref 27–39.2)
AST SERPL-CCNC: 23 U/L — SIGNIFICANT CHANGE UP (ref 0–41)
BASOPHILS # BLD AUTO: 0.02 K/UL — SIGNIFICANT CHANGE UP (ref 0–0.2)
BASOPHILS # BLD AUTO: 0.02 K/UL — SIGNIFICANT CHANGE UP (ref 0–0.2)
BASOPHILS NFR BLD AUTO: 0.2 % — SIGNIFICANT CHANGE UP (ref 0–1)
BASOPHILS NFR BLD AUTO: 0.2 % — SIGNIFICANT CHANGE UP (ref 0–1)
BILIRUB DIRECT SERPL-MCNC: <0.2 MG/DL — SIGNIFICANT CHANGE UP (ref 0–0.2)
BILIRUB INDIRECT FLD-MCNC: >0.1 MG/DL — LOW (ref 0.2–1.2)
BILIRUB SERPL-MCNC: 0.3 MG/DL — SIGNIFICANT CHANGE UP (ref 0.2–1.2)
BILIRUB SERPL-MCNC: 0.3 MG/DL — SIGNIFICANT CHANGE UP (ref 0.2–1.2)
BUN SERPL-MCNC: 51 MG/DL — HIGH (ref 10–20)
BUN SERPL-MCNC: 79 MG/DL — CRITICAL HIGH (ref 10–20)
CALCIUM SERPL-MCNC: 8.5 MG/DL — SIGNIFICANT CHANGE UP (ref 8.5–10.1)
CALCIUM SERPL-MCNC: 9.2 MG/DL — SIGNIFICANT CHANGE UP (ref 8.5–10.1)
CHLORIDE SERPL-SCNC: 108 MMOL/L — SIGNIFICANT CHANGE UP (ref 98–110)
CHLORIDE SERPL-SCNC: 108 MMOL/L — SIGNIFICANT CHANGE UP (ref 98–110)
CO2 SERPL-SCNC: 16 MMOL/L — LOW (ref 17–32)
CO2 SERPL-SCNC: 17 MMOL/L — SIGNIFICANT CHANGE UP (ref 17–32)
CREAT SERPL-MCNC: 0.8 MG/DL — SIGNIFICANT CHANGE UP (ref 0.7–1.5)
CREAT SERPL-MCNC: 0.9 MG/DL — SIGNIFICANT CHANGE UP (ref 0.7–1.5)
D DIMER BLD IA.RAPID-MCNC: 2361 NG/ML DDU — HIGH (ref 0–230)
EOSINOPHIL # BLD AUTO: 0 K/UL — SIGNIFICANT CHANGE UP (ref 0–0.7)
EOSINOPHIL # BLD AUTO: 0 K/UL — SIGNIFICANT CHANGE UP (ref 0–0.7)
EOSINOPHIL NFR BLD AUTO: 0 % — SIGNIFICANT CHANGE UP (ref 0–8)
EOSINOPHIL NFR BLD AUTO: 0 % — SIGNIFICANT CHANGE UP (ref 0–8)
FIBRINOGEN PPP-MCNC: 373 MG/DL — SIGNIFICANT CHANGE UP (ref 204.4–570.6)
GLUCOSE SERPL-MCNC: 130 MG/DL — HIGH (ref 70–99)
GLUCOSE SERPL-MCNC: 171 MG/DL — HIGH (ref 70–99)
HAPTOGLOB SERPL-MCNC: 250 MG/DL — HIGH (ref 34–200)
HCT VFR BLD CALC: 21.5 % — LOW (ref 42–52)
HCT VFR BLD CALC: 21.9 % — LOW (ref 42–52)
HCT VFR BLD CALC: 22.1 % — LOW (ref 42–52)
HGB BLD-MCNC: 6.8 G/DL — CRITICAL LOW (ref 14–18)
HGB BLD-MCNC: 7 G/DL — LOW (ref 14–18)
HGB BLD-MCNC: 7.3 G/DL — LOW (ref 14–18)
IMM GRANULOCYTES NFR BLD AUTO: 3.5 % — HIGH (ref 0.1–0.3)
IMM GRANULOCYTES NFR BLD AUTO: 4 % — HIGH (ref 0.1–0.3)
INR BLD: 0.95 RATIO — SIGNIFICANT CHANGE UP (ref 0.65–1.3)
LDH SERPL L TO P-CCNC: 231 U/L — SIGNIFICANT CHANGE UP (ref 50–242)
LDH SERPL L TO P-CCNC: 325 U/L — HIGH (ref 50–242)
LYMPHOCYTES # BLD AUTO: 0.5 K/UL — LOW (ref 1.2–3.4)
LYMPHOCYTES # BLD AUTO: 0.64 K/UL — LOW (ref 1.2–3.4)
LYMPHOCYTES # BLD AUTO: 4.2 % — LOW (ref 20.5–51.1)
LYMPHOCYTES # BLD AUTO: 5.1 % — LOW (ref 20.5–51.1)
MAGNESIUM SERPL-MCNC: 1.7 MG/DL — LOW (ref 1.8–2.4)
MAGNESIUM SERPL-MCNC: 1.8 MG/DL — SIGNIFICANT CHANGE UP (ref 1.8–2.4)
MCHC RBC-ENTMCNC: 28.5 PG — SIGNIFICANT CHANGE UP (ref 27–31)
MCHC RBC-ENTMCNC: 28.7 PG — SIGNIFICANT CHANGE UP (ref 27–31)
MCHC RBC-ENTMCNC: 28.9 PG — SIGNIFICANT CHANGE UP (ref 27–31)
MCHC RBC-ENTMCNC: 31.6 G/DL — LOW (ref 32–37)
MCHC RBC-ENTMCNC: 32 G/DL — SIGNIFICANT CHANGE UP (ref 32–37)
MCHC RBC-ENTMCNC: 33 G/DL — SIGNIFICANT CHANGE UP (ref 32–37)
MCV RBC AUTO: 87.4 FL — SIGNIFICANT CHANGE UP (ref 80–94)
MCV RBC AUTO: 89.8 FL — SIGNIFICANT CHANGE UP (ref 80–94)
MCV RBC AUTO: 90 FL — SIGNIFICANT CHANGE UP (ref 80–94)
MONOCYTES # BLD AUTO: 0.64 K/UL — HIGH (ref 0.1–0.6)
MONOCYTES # BLD AUTO: 0.95 K/UL — HIGH (ref 0.1–0.6)
MONOCYTES NFR BLD AUTO: 5.3 % — SIGNIFICANT CHANGE UP (ref 1.7–9.3)
MONOCYTES NFR BLD AUTO: 7.6 % — SIGNIFICANT CHANGE UP (ref 1.7–9.3)
NEUTROPHILS # BLD AUTO: 10.36 K/UL — HIGH (ref 1.4–6.5)
NEUTROPHILS # BLD AUTO: 10.4 K/UL — HIGH (ref 1.4–6.5)
NEUTROPHILS NFR BLD AUTO: 83.6 % — HIGH (ref 42.2–75.2)
NEUTROPHILS NFR BLD AUTO: 86.3 % — HIGH (ref 42.2–75.2)
NRBC # BLD: 0 /100 WBCS — SIGNIFICANT CHANGE UP (ref 0–0)
PHOSPHATE SERPL-MCNC: 2.7 MG/DL — SIGNIFICANT CHANGE UP (ref 2.1–4.9)
PLATELET # BLD AUTO: 136 K/UL — SIGNIFICANT CHANGE UP (ref 130–400)
PLATELET # BLD AUTO: 154 K/UL — SIGNIFICANT CHANGE UP (ref 130–400)
PLATELET # BLD AUTO: 198 K/UL — SIGNIFICANT CHANGE UP (ref 130–400)
POTASSIUM SERPL-MCNC: 4.2 MMOL/L — SIGNIFICANT CHANGE UP (ref 3.5–5)
POTASSIUM SERPL-MCNC: 5.4 MMOL/L — HIGH (ref 3.5–5)
POTASSIUM SERPL-SCNC: 4.2 MMOL/L — SIGNIFICANT CHANGE UP (ref 3.5–5)
POTASSIUM SERPL-SCNC: 5.4 MMOL/L — HIGH (ref 3.5–5)
PROT SERPL-MCNC: 4.7 G/DL — LOW (ref 6–8)
PROTHROM AB SERPL-ACNC: 10.9 SEC — SIGNIFICANT CHANGE UP (ref 9.95–12.87)
RBC # BLD: 2.39 M/UL — LOW (ref 4.7–6.1)
RBC # BLD: 2.44 M/UL — LOW (ref 4.7–6.1)
RBC # BLD: 2.53 M/UL — LOW (ref 4.7–6.1)
RBC # BLD: 2.53 M/UL — LOW (ref 4.7–6.1)
RBC # FLD: 16 % — HIGH (ref 11.5–14.5)
RBC # FLD: 16 % — HIGH (ref 11.5–14.5)
RBC # FLD: 16.1 % — HIGH (ref 11.5–14.5)
RETICS #: 100 K/UL — SIGNIFICANT CHANGE UP (ref 25–125)
RETICS/RBC NFR: 4 % — HIGH (ref 0.5–1.5)
SODIUM SERPL-SCNC: 136 MMOL/L — SIGNIFICANT CHANGE UP (ref 135–146)
SODIUM SERPL-SCNC: 137 MMOL/L — SIGNIFICANT CHANGE UP (ref 135–146)
TSH SERPL-MCNC: 0.36 UIU/ML — SIGNIFICANT CHANGE UP (ref 0.27–4.2)
URATE SERPL-MCNC: 7.1 MG/DL — SIGNIFICANT CHANGE UP (ref 3.4–8.8)
WBC # BLD: 12 K/UL — HIGH (ref 4.8–10.8)
WBC # BLD: 12.45 K/UL — HIGH (ref 4.8–10.8)
WBC # BLD: 13.74 K/UL — HIGH (ref 4.8–10.8)
WBC # FLD AUTO: 12 K/UL — HIGH (ref 4.8–10.8)
WBC # FLD AUTO: 12.45 K/UL — HIGH (ref 4.8–10.8)
WBC # FLD AUTO: 13.74 K/UL — HIGH (ref 4.8–10.8)

## 2021-03-16 PROCEDURE — 71250 CT THORAX DX C-: CPT | Mod: 26

## 2021-03-16 PROCEDURE — 99233 SBSQ HOSP IP/OBS HIGH 50: CPT

## 2021-03-16 PROCEDURE — 99232 SBSQ HOSP IP/OBS MODERATE 35: CPT

## 2021-03-16 PROCEDURE — 74176 CT ABD & PELVIS W/O CONTRAST: CPT | Mod: 26

## 2021-03-16 RX ORDER — MAGNESIUM SULFATE 500 MG/ML
2 VIAL (ML) INJECTION ONCE
Refills: 0 | Status: COMPLETED | OUTPATIENT
Start: 2021-03-16 | End: 2021-03-16

## 2021-03-16 RX ORDER — METHOCARBAMOL 500 MG/1
500 TABLET, FILM COATED ORAL THREE TIMES A DAY
Refills: 0 | Status: DISCONTINUED | OUTPATIENT
Start: 2021-03-16 | End: 2021-04-12

## 2021-03-16 RX ORDER — SODIUM CHLORIDE 9 MG/ML
1000 INJECTION INTRAMUSCULAR; INTRAVENOUS; SUBCUTANEOUS ONCE
Refills: 0 | Status: COMPLETED | OUTPATIENT
Start: 2021-03-16 | End: 2021-03-16

## 2021-03-16 RX ORDER — SODIUM CHLORIDE 9 MG/ML
1000 INJECTION, SOLUTION INTRAVENOUS
Refills: 0 | Status: DISCONTINUED | OUTPATIENT
Start: 2021-03-16 | End: 2021-03-17

## 2021-03-16 RX ORDER — SODIUM CHLORIDE 9 MG/ML
2000 INJECTION INTRAMUSCULAR; INTRAVENOUS; SUBCUTANEOUS ONCE
Refills: 0 | Status: COMPLETED | OUTPATIENT
Start: 2021-03-16 | End: 2021-03-16

## 2021-03-16 RX ORDER — SODIUM CHLORIDE 9 MG/ML
2000 INJECTION INTRAMUSCULAR; INTRAVENOUS; SUBCUTANEOUS ONCE
Refills: 0 | Status: DISCONTINUED | OUTPATIENT
Start: 2021-03-16 | End: 2021-03-16

## 2021-03-16 RX ADMIN — SODIUM CHLORIDE 2000 MILLILITER(S): 9 INJECTION INTRAMUSCULAR; INTRAVENOUS; SUBCUTANEOUS at 11:03

## 2021-03-16 RX ADMIN — Medication 0.5 MILLIGRAM(S): at 22:46

## 2021-03-16 RX ADMIN — HYDROMORPHONE HYDROCHLORIDE 0.5 MILLIGRAM(S): 2 INJECTION INTRAMUSCULAR; INTRAVENOUS; SUBCUTANEOUS at 11:53

## 2021-03-16 RX ADMIN — LIDOCAINE 2 PATCH: 4 CREAM TOPICAL at 23:00

## 2021-03-16 RX ADMIN — METHOCARBAMOL 500 MILLIGRAM(S): 500 TABLET, FILM COATED ORAL at 21:10

## 2021-03-16 RX ADMIN — Medication 4 MILLIGRAM(S): at 23:02

## 2021-03-16 RX ADMIN — SODIUM CHLORIDE 150 MILLILITER(S): 9 INJECTION, SOLUTION INTRAVENOUS at 21:35

## 2021-03-16 RX ADMIN — SODIUM ZIRCONIUM CYCLOSILICATE 10 GRAM(S): 10 POWDER, FOR SUSPENSION ORAL at 21:10

## 2021-03-16 RX ADMIN — PANTOPRAZOLE SODIUM 40 MILLIGRAM(S): 20 TABLET, DELAYED RELEASE ORAL at 17:37

## 2021-03-16 RX ADMIN — SODIUM CHLORIDE 150 MILLILITER(S): 9 INJECTION, SOLUTION INTRAVENOUS at 11:00

## 2021-03-16 RX ADMIN — SODIUM ZIRCONIUM CYCLOSILICATE 10 GRAM(S): 10 POWDER, FOR SUSPENSION ORAL at 05:48

## 2021-03-16 RX ADMIN — SODIUM CHLORIDE 150 MILLILITER(S): 9 INJECTION INTRAMUSCULAR; INTRAVENOUS; SUBCUTANEOUS at 07:34

## 2021-03-16 RX ADMIN — Medication 650 MILLIGRAM(S): at 05:48

## 2021-03-16 RX ADMIN — Medication 50 GRAM(S): at 10:55

## 2021-03-16 RX ADMIN — PANTOPRAZOLE SODIUM 40 MILLIGRAM(S): 20 TABLET, DELAYED RELEASE ORAL at 05:48

## 2021-03-16 RX ADMIN — Medication 4 MILLIGRAM(S): at 12:16

## 2021-03-16 RX ADMIN — HYDROMORPHONE HYDROCHLORIDE 0.5 MILLIGRAM(S): 2 INJECTION INTRAMUSCULAR; INTRAVENOUS; SUBCUTANEOUS at 11:38

## 2021-03-16 RX ADMIN — SODIUM CHLORIDE 1000 MILLILITER(S): 9 INJECTION INTRAMUSCULAR; INTRAVENOUS; SUBCUTANEOUS at 16:03

## 2021-03-16 RX ADMIN — Medication 650 MILLIGRAM(S): at 13:31

## 2021-03-16 RX ADMIN — CHLORHEXIDINE GLUCONATE 1 APPLICATION(S): 213 SOLUTION TOPICAL at 11:40

## 2021-03-16 RX ADMIN — Medication 4 MILLIGRAM(S): at 17:36

## 2021-03-16 RX ADMIN — LATANOPROST 1 DROP(S): 0.05 SOLUTION/ DROPS OPHTHALMIC; TOPICAL at 21:10

## 2021-03-16 RX ADMIN — Medication 4 MILLIGRAM(S): at 05:48

## 2021-03-16 RX ADMIN — SODIUM ZIRCONIUM CYCLOSILICATE 10 GRAM(S): 10 POWDER, FOR SUSPENSION ORAL at 13:31

## 2021-03-16 RX ADMIN — Medication 6.28 MICROGRAM(S)/KG/MIN: at 07:35

## 2021-03-16 RX ADMIN — Medication 650 MILLIGRAM(S): at 21:10

## 2021-03-16 RX ADMIN — BICALUTAMIDE 50 MILLIGRAM(S): 50 TABLET, FILM COATED ORAL at 12:17

## 2021-03-16 RX ADMIN — LIDOCAINE 2 PATCH: 4 CREAM TOPICAL at 11:41

## 2021-03-16 NOTE — PROGRESS NOTE ADULT - PROBLEM SELECTOR PLAN 4
May consider PRN for anxiety should pain not improve with different classes of pain reliever Situational  May consider PRN for anxiety should pain not improve with different classes of pain reliever

## 2021-03-16 NOTE — PROGRESS NOTE ADULT - ASSESSMENT
79 yo M hx of CAD s/p CABG s/p 20+ years ago/ CHF/ right sided large inguinal hernia BIBA from hotel room 2/2 unable to ambulate with right thigh weakness and numbness.    #Multiple metastatic osseous lesions with newly diagnosed sacral and mandibular mass with LE weakness concerning for spinal cord compression due to malignancy   - CT scan abd/pelvis on admission: Findings compatible with metastatic disease as demonstrated by multiple bony lesions, the largest soft tissue mass centered at S2-S3 measuring up to 7.4 cm with associated bony destruction and obliteration of the exiting neural foramina. Additional smaller lesions at the right sacral ala, L2 and L3.  - CT Neck Soft Tissue No Cont: Large right mandibular soft tissue density mass measuring 6.5 x 7.1 x 9.2 cm likely arising from the right mandible extending from the right mandibular condyle to the body of the mandible. There is an associated extensive bony erosion of the right mandible within the mass and the posterior wall of the right maxilla. Findings are highly suspicious for malignancy.  - s/p IR biopsy of sacral mass 3/10, showing metastatic prostate ca, PSA significantly elevated   - Neurosx c/s appreciated: no intervention at this time  - Heme/Onc c/s appreciated: c/w Casodex; rad onc intervention when stable, MR head and MR C/T spine, Dental evaluation to start bisphosphonates  - ENT c/s appreciated: patient was scoped, bedside mandibular biopsy deferred for now; will need f/u   - Palliative consult appreciated: Dilaudid for pain control    - C/w dexamethasone 4mg q6h    #Hypovolemic shock   #Acute on chronic anemia  - concern for possible UGIB   - hgb now stable, 9.4  - BUN 79 <- 126  - GI c/s appreciated: NPO for possible scope tomorrow  - Hold Lovenox for now, SCDs for ppx   - C/w PPI BID   - Levophed wean off   - F/u CT A/P noncont    #DENISE, resolved  - possibly pre-renal  - S/p 3L LR bolus   - Cr 0.9. BUN 79 <- 126  - c/w IVF @150cc/hr     #Metabolic acidosis   - C/w sodium bicarbonate 650mg TID     #Hyperkalemia  - K 5.4  - c/w sodium bicarbonate  - c/w Lokelma 10mg  PO TID  - Low K when diet restarts     #Acute urinary retention   - CT scan showed on admission moderate to severe bilateral hydroureteronephrosis to the level of a markedly distended urinary bladder. Findings presumably secondary to urinary bladder outlet obstruction or urinary retention. Enlarged prostate gland.   - C/w Santos catheter     #Hx of Afib  - HR 60s  - on eliquis 5 mg bid at home  - Was on Lovenox here, currently holding    #HTN  - hold aldactone, lasix, acei and metoprolol     # Mild hypercalcemia, likely due to bone mets - resolved     DVT Prophylaxis: SCDs   GI Prophylaxis: protonix  Diet: clears  Activity: IAT  FULL CODE   81 yo M hx of CAD s/p CABG s/p 20+ years ago/ CHF/ right sided large inguinal hernia BIBA from hotel room 2/2 unable to ambulate with right thigh weakness and numbness.        #Hypovolemic shock   - concern for possible UGIB   - hgb now stable, 9.4  - BUN 79 <- 126  - GI c/s appreciated: NPO for possible scope tomorrow  - Hold Lovenox for now, SCDs for ppx   - C/w PPI BID   - Levophed wean off   - F/u CT A/P noncont    #DENISE, resolved  - possibly pre-renal  - S/p 3L LR bolus   - Cr 0.9. BUN 79 <- 126  - c/w IVF @150cc/hr     #Acute on chronic anemia    # B/l hydro  - Likely due to acute urinary retention   - CT scan showed on admission moderate to severe bilateral hydroureteronephrosis to the level of a markedly distended urinary bladder. Findings presumably secondary to urinary bladder outlet obstruction or urinary retention. Enlarged prostate gland.   - C/w Santos catheter     #Multiple metastatic osseous lesions with newly diagnosed sacral and mandibular mass with LE weakness concerning for spinal cord compression due to malignancy   - CT scan abd/pelvis on admission: Findings compatible with metastatic disease as demonstrated by multiple bony lesions, the largest soft tissue mass centered at S2-S3 measuring up to 7.4 cm with associated bony destruction and obliteration of the exiting neural foramina. Additional smaller lesions at the right sacral ala, L2 and L3.  - CT Neck Soft Tissue No Cont: Large right mandibular soft tissue density mass measuring 6.5 x 7.1 x 9.2 cm likely arising from the right mandible extending from the right mandibular condyle to the body of the mandible. There is an associated extensive bony erosion of the right mandible within the mass and the posterior wall of the right maxilla. Findings are highly suspicious for malignancy.  - s/p IR biopsy of sacral mass 3/10, showing metastatic prostate ca, PSA significantly elevated   - Neurosx c/s appreciated: no intervention at this time  - Heme/Onc c/s appreciated: c/w Casodex; rad onc intervention when stable, MR head and MR C/T spine, Dental evaluation to start bisphosphonates  - ENT c/s appreciated: patient was scoped, bedside mandibular biopsy deferred for now; will need f/u   - Palliative consult appreciated: Dilaudid for pain control    - C/w dexamethasone 4mg q6h    # Mandibular mass     # CHF    # Afib  - HR 60s  - on eliquis 5 mg bid at home  - Was on Lovenox here, currently holding    # HTN  - hold aldactone, lasix, acei and metoprolol     # CAD     # Metabolic acidosis   - C/w sodium bicarbonate 650mg TID     #Hyperkalemia  - K 5.4  - c/w Lokelma 10mg  PO TID  - Low K when diet restarts     # Mild hypercalcemia, likely due to bone mets - resolved     DVT Prophylaxis: SCDs   GI Prophylaxis: Protonix  Diet: clears  Activity: IAT  FULL CODE   79 yo M hx of CAD s/p CABG s/p 20+ years ago/ CHF/ right sided large inguinal hernia BIBA from hotel room 2/2 unable to ambulate with right thigh weakness and numbness.    #Hypovolemic shock   - Likely multifactorial, hypovolemia and possible UGIB given hb drop  - hgb now stable, 9.4  - S/p a total of 5L boluses  - c/w maintenance fluids  - BUN 79 <- 126  - Levophed wean off    #DENISE, resolving  - Likely pre-renal  - S/p 3L LR bolus yesterday. 2L LR bolus today  - Cr 0.9. BUN 79 <- 126  - Started IVF D5 + 1/2NS with 75meq bicarb @150cc/hr     #Acute on chronic anemia  - concern for possible UGIB   - hgb now stable, 9.4  - BUN 79 <- 126  - GI c/s appreciated: NPO for possible scope tomorrow. Pt refused to go today  - Hold Lovenox for now, SCDs for ppx   - C/w PPI BID   - F/u CT A/P noncont    # B/l hydro  - Likely due to acute urinary retention   - CT scan showed on admission moderate to severe bilateral hydroureteronephrosis to the level of a markedly distended urinary bladder. Findings presumably secondary to urinary bladder outlet obstruction or urinary retention. Enlarged prostate gland.   - C/w Santos catheter     #Multiple metastatic osseous lesions with newly diagnosed sacral and mandibular mass with LE weakness concerning for spinal cord compression due to malignancy   - CT scan abd/pelvis on admission: Findings compatible with metastatic disease as demonstrated by multiple bony lesions, the largest soft tissue mass centered at S2-S3 measuring up to 7.4 cm with associated bony destruction and obliteration of the exiting neural foramina. Additional smaller lesions at the right sacral ala, L2 and L3.  - CT Neck Soft Tissue No Cont: Large right mandibular soft tissue density mass measuring 6.5 x 7.1 x 9.2 cm likely arising from the right mandible extending from the right mandibular condyle to the body of the mandible. There is an associated extensive bony erosion of the right mandible within the mass and the posterior wall of the right maxilla. Findings are highly suspicious for malignancy.  - s/p IR biopsy of sacral mass 3/10, showing metastatic prostate ca, PSA significantly elevated   - Neurosx c/s appreciated: no intervention at this time  - Heme/Onc c/s appreciated: c/w Casodex; rad onc intervention when stable, MR head and MR C/T spine, Dental evaluation to start bisphosphonates  - ENT c/s appreciated: patient was scoped, bedside mandibular biopsy deferred for now; will need f/u   - Palliative consult appreciated: Dilaudid for pain control    - C/w dexamethasone 4mg q6h    # Mandibular mass     # CHF    # Afib  - HR 60s  - on eliquis 5 mg bid at home  - Was on Lovenox here, currently holding    # HTN  - hold aldactone, lasix, acei and metoprolol     # CAD     # Metabolic acidosis   - C/w sodium bicarbonate 650mg TID     #Hyperkalemia  - K 5.4  - c/w Lokelma 10mg  PO TID  - Low K when diet restarts     # Mild hypercalcemia, likely due to bone mets - resolved     DVT Prophylaxis: SCDs   GI Prophylaxis: Protonix  Diet: clears  Activity: IAT  FULL CODE   81 yo M hx of CAD s/p CABG s/p 20+ years ago/ CHF/ right sided large inguinal hernia BIBA from hotel room 2/2 unable to ambulate with right thigh weakness and numbness.    #Hypovolemic shock   - Likely multifactorial, hypovolemia and possible UGIB given hb drop  - hgb now stable, 9.4  - S/p a total of 5L boluses  - c/w maintenance fluids  - BUN 79 <- 126  - Levophed wean off    #DENISE, resolving  - Likely pre-renal  - S/p 3L LR bolus yesterday. 2L LR bolus today  - Cr 0.9. BUN 79 <- 126  - Started IVF D5 + 1/2NS with 75meq bicarb @150cc/hr     #Acute on chronic anemia  - concern for possible UGIB   - hgb now stable, 9.4  - BUN 79 <- 126  - GI c/s appreciated: NPO for possible scope tomorrow. Pt refused to go today  - Hold Lovenox for now, SCDs for ppx   - C/w PPI BID   - F/u CT A/P noncont    # B/l hydro  - Likely due to acute urinary retention   - CT scan showed on admission moderate to severe bilateral hydroureteronephrosis to the level of a markedly distended urinary bladder. Findings presumably secondary to urinary bladder outlet obstruction or urinary retention. Enlarged prostate gland.   - C/w Santos catheter     #Multiple metastatic osseous lesions with newly diagnosed sacral and mandibular mass with LE weakness concerning for spinal cord compression due to malignancy   - CT scan abd/pelvis on admission: Findings compatible with metastatic disease as demonstrated by multiple bony lesions, the largest soft tissue mass centered at S2-S3 measuring up to 7.4 cm with associated bony destruction and obliteration of the exiting neural foramina. Additional smaller lesions at the right sacral ala, L2 and L3.  - CT Neck Soft Tissue No Cont: Large right mandibular soft tissue density mass measuring 6.5 x 7.1 x 9.2 cm likely arising from the right mandible extending from the right mandibular condyle to the body of the mandible. There is an associated extensive bony erosion of the right mandible within the mass and the posterior wall of the right maxilla. Findings are highly suspicious for malignancy.  - s/p IR biopsy of sacral mass 3/10, showing metastatic prostate ca, PSA significantly elevated   - Neurosx c/s appreciated: no intervention at this time, no cord compression  - Heme/Onc c/s appreciated: c/w Casodex; rad onc intervention when stable, MR head and MR C/T spine, Dental evaluation to start bisphosphonates  - Palliative consult appreciated: Dilaudid for pain control    - C/w dexamethasone 4mg q6h    # Mandibular mass   - ENT c/s appreciated: patient was scoped, bedside mandibular biopsy deferred for now; will need f/u     # CHFrEF  - Clinically dry   - Echo EF 35%, mod mitral calcification, enlarged left atria    # Afib  - HR 60s  - on eliquis 5 mg bid at home  - Was on Lovenox here, currently holding    # HTN  - hold aldactone, lasix, acei and metoprolol     # CAD   - Stable     # Metabolic acidosis   - C/w sodium bicarbonate 650mg TID     #Hyperkalemia  - K 5.4  - c/w Lokelma 10mg  PO TID  - Low K when diet restarts     # Mild hypercalcemia, likely due to bone mets - resolved     DVT Prophylaxis: SCDs   GI Prophylaxis: Protonix  Diet: clears  Activity: IAT  FULL CODE   81 yo M hx of CAD s/p CABG s/p 20+ years ago/ CHF/ right sided large inguinal hernia BIBA from hotel room 2/2 unable to ambulate with right thigh weakness and numbness.    #Hypovolemic shock   - Likely multifactorial, hypovolemia and possible UGIB given hb drop  - hgb now stable, 9.4  - S/p a total of 5L boluses  - c/w maintenance fluids  - BUN 79 <- 126  - Levophed wean off    #DENISE, resolving  - Likely pre-renal  - S/p 3L LR bolus yesterday. 2L LR bolus today  - Cr 0.9. BUN 79 <- 126  - Started IVF D5 + 1/2NS with 75meq bicarb @150cc/hr     #Acute on chronic anemia  - concern for possible UGIB   - hgb now stable, 9.4  - Hemolysis w/u pending haptoglobin  - BUN 79 <- 126  - GI c/s appreciated: NPO for possible scope tomorrow. Pt refused to go today  - Hold Lovenox for now, SCDs for ppx   - C/w PPI BID   - F/u CT A/P noncont    # B/l hydro  - Likely due to acute urinary retention   - CT scan showed on admission moderate to severe bilateral hydroureteronephrosis to the level of a markedly distended urinary bladder. Findings presumably secondary to urinary bladder outlet obstruction or urinary retention. Enlarged prostate gland.   - C/w Santos catheter     # Multiple metastatic osseous lesions with newly diagnosed sacral and mandibular mass with LE weakness concerning for spinal cord compression due to malignancy   - CT scan abd/pelvis on admission: Findings compatible with metastatic disease as demonstrated by multiple bony lesions, the largest soft tissue mass centered at S2-S3 measuring up to 7.4 cm with associated bony destruction and obliteration of the exiting neural foramina. Additional smaller lesions at the right sacral ala, L2 and L3.  - CT Neck Soft Tissue No Cont: Large right mandibular soft tissue density mass measuring 6.5 x 7.1 x 9.2 cm likely arising from the right mandible extending from the right mandibular condyle to the body of the mandible. There is an associated extensive bony erosion of the right mandible within the mass and the posterior wall of the right maxilla. Findings are highly suspicious for malignancy.  - s/p IR biopsy of sacral mass 3/10, showing metastatic prostate ca, PSA significantly elevated   - Neurosx c/s appreciated: no intervention at this time, no cord compression  - Heme/Onc c/s appreciated: c/w Casodex; rad onc intervention when stable, MR head and MR C/T spine, Dental evaluation to start bisphosphonates  - Palliative consult appreciated: Dilaudid for pain control    - C/w dexamethasone 4mg q6h    # Mandibular mass   - Suspicion for SCC  - ENT c/s appreciated: patient was scoped, bedside mandibular biopsy deferred for now; will need f/u   - Heme/Onc c/s appreciated     # CHFrEF  - Clinically dry   - Echo EF 35%, mod mitral calcification, enlarged left atria    # Afib  - HR 60s  - on eliquis 5 mg bid at home  - Was on Lovenox here, currently holding    # HTN  - hold aldactone, lasix, acei and metoprolol     # CAD   - Stable     # Metabolic acidosis   - C/w sodium bicarbonate 650mg TID     #Hyperkalemia  - K 5.4  - c/w Lokelma 10mg  PO TID  - Low K when diet restarts   - BMP 20:00     # Mild hypercalcemia, likely due to bone mets - resolved     DVT Prophylaxis: SCDs   GI Prophylaxis: Protonix  Diet: clears  Activity: IAT  FULL CODE   79 yo M hx of CAD s/p CABG s/p 20+ years ago/ CHF/ right sided large inguinal hernia BIBA from hotel room 2/2 unable to ambulate with right thigh weakness and numbness.    #Hypovolemic shock   - Likely multifactorial, hypovolemia and possible UGIB given hb drop  - hgb now stable, 9.4  - S/p a total of 5L boluses  - c/w maintenance fluids  - BUN 79 <- 126  - Levophed wean off    #DENISE, resolving  - Likely pre-renal  - S/p 3L LR bolus yesterday. 2L LR bolus today  - Cr 0.9. BUN 79 <- 126  - Started IVF D5 + 1/2NS with 75meq bicarb @150cc/hr     #Acute on chronic anemia  - concern for possible UGIB   - hgb now stable, 9.4  - Hemolysis w/u pending haptoglobin  - BUN 79 <- 126  - GI c/s appreciated: NPO for possible scope tomorrow. Pt refused to go today  - Hold Lovenox for now, SCDs for ppx   - C/w PPI BID   - F/u CT A/P noncont    # B/l hydro  - Likely due to acute urinary retention   - CT scan showed on admission moderate to severe bilateral hydroureteronephrosis to the level of a markedly distended urinary bladder. Findings presumably secondary to urinary bladder outlet obstruction or urinary retention. Enlarged prostate gland.   - C/w Santos catheter     # Multiple metastatic osseous lesions with newly diagnosed sacral and mandibular mass with LE weakness concerning for spinal cord compression due to malignancy   - CT scan abd/pelvis on admission: Findings compatible with metastatic disease as demonstrated by multiple bony lesions, the largest soft tissue mass centered at S2-S3 measuring up to 7.4 cm with associated bony destruction and obliteration of the exiting neural foramina. Additional smaller lesions at the right sacral ala, L2 and L3.  - CT Neck Soft Tissue No Cont: Large right mandibular soft tissue density mass measuring 6.5 x 7.1 x 9.2 cm likely arising from the right mandible extending from the right mandibular condyle to the body of the mandible. There is an associated extensive bony erosion of the right mandible within the mass and the posterior wall of the right maxilla. Findings are highly suspicious for malignancy.  - s/p IR biopsy of sacral mass 3/10, showing metastatic prostate ca, PSA significantly elevated   - Neurosx c/s appreciated: no intervention at this time, no cord compression  - Heme/Onc c/s appreciated: c/w Casodex; rad onc intervention when stable,, Dental evaluation to start bisphosphonates  - Palliative consult appreciated: Dilaudid for pain control    - C/w dexamethasone 4mg q6h  - F/u MR head and MR C/T spine    # Mandibular mass   - Suspicion for SCC  - ENT c/s appreciated: patient was scoped, bedside mandibular biopsy deferred for now; will need f/u     # CHFrEF  - Clinically dry   - Echo EF 35%, mod mitral calcification, enlarged left atria    # Afib  - HR 60s  - on eliquis 5 mg bid at home  - Was on Lovenox here, currently holding    # HTN  - hold aldactone, lasix, acei and metoprolol     # CAD   - Stable     # Metabolic acidosis   - C/w sodium bicarbonate 650mg TID     #Hyperkalemia  - K 5.4  - c/w Lokelma 10mg  PO TID  - Low K when diet restarts   - BMP 20:00     # Mild hypercalcemia, likely due to bone mets - resolved     DVT Prophylaxis: SCDs   GI Prophylaxis: Protonix  Diet: clears  Activity: IAT  FULL CODE   79 yo M hx of CAD s/p CABG s/p 20+ years ago/ CHF/ right sided large inguinal hernia BIBA from hotel room 2/2 unable to ambulate with right thigh weakness and numbness.    #Hypovolemic shock   - Likely multifactorial, hypovolemia and possible UGIB given hb drop  - hgb 7.3 <- 9.4, likely dilutional   - S/p a total of 5L boluses  - c/w maintenance fluids  - BUN 79 <- 126  - Levophed weaned off  - F/u repeat CBC 16:00  - F/u CT a/p noncont to r/o bleed   - LE duplex neg for DVT     #DENISE, resolving  - Likely pre-renal  - S/p 3L LR bolus yesterday. 2L LR bolus today  - Cr 0.9. BUN 79 <- 126  - Started IVF D5 + 1/2NS with 75meq bicarb @150cc/hr     #Acute on chronic anemia  - concern for possible UGIB   - hgb as above  - Hemolysis w/u pending haptoglobin  - BUN 79 <- 126  - GI c/s appreciated: NPO for possible scope tomorrow. Pt refused to go today  - Hold Lovenox for now  - C/w PPI BID   - F/u CT A/P noncont    # B/l hydro  - Likely due to acute urinary retention   - CT scan showed on admission moderate to severe bilateral hydroureteronephrosis to the level of a markedly distended urinary bladder. Findings presumably secondary to urinary bladder outlet obstruction or urinary retention. Enlarged prostate gland.   - C/w Santos catheter     # Multiple metastatic osseous lesions with newly diagnosed sacral and mandibular mass with LE weakness concerning for spinal cord compression due to malignancy   - CT scan abd/pelvis on admission: Findings compatible with metastatic disease as demonstrated by multiple bony lesions, the largest soft tissue mass centered at S2-S3 measuring up to 7.4 cm with associated bony destruction and obliteration of the exiting neural foramina. Additional smaller lesions at the right sacral ala, L2 and L3.  - CT Neck Soft Tissue No Cont: Large right mandibular soft tissue density mass measuring 6.5 x 7.1 x 9.2 cm likely arising from the right mandible extending from the right mandibular condyle to the body of the mandible. There is an associated extensive bony erosion of the right mandible within the mass and the posterior wall of the right maxilla. Findings are highly suspicious for malignancy.  - s/p IR biopsy of sacral mass 3/10, showing metastatic prostate ca, PSA significantly elevated   - Neurosx c/s appreciated: no intervention at this time, no cord compression  - Heme/Onc c/s appreciated: c/w Casodex; rad onc intervention when stable,, Dental evaluation to start bisphosphonates  - Palliative consult appreciated: Dilaudid for pain control    - C/w dexamethasone 4mg q6h  - F/u MR head and MR C/T spine    # Mandibular mass   - Suspicion for SCC  - ENT c/s appreciated: patient was scoped, bedside mandibular biopsy deferred for now; will need f/u     # CHFrEF  - Clinically dry   - Echo EF 35%, mod mitral calcification, enlarged left atria    # Afib  - HR 60s  - on eliquis 5 mg bid at home  - Was on Lovenox here, currently holding    # HTN  - hold aldactone, lasix, acei and metoprolol     # CAD   - Stable     # Metabolic acidosis   - C/w sodium bicarbonate 650mg TID     #Hyperkalemia  - K 5.4  - c/w Lokelma 10mg  PO TID  - Low K when diet restarts   - BMP 20:00     # Mild hypercalcemia, likely due to bone mets - resolved     DVT Prophylaxis: SCDs   GI Prophylaxis: Protonix  Diet: soft, low K   Activity: IAT  FULL CODE

## 2021-03-16 NOTE — PROGRESS NOTE ADULT - ASSESSMENT
79 yo male hx of CAD s/p CABG s/p 20+ years ago/ CHF/ right sided large inguinal hernia BIBA from hotel room 2/2 unable to ambulate with right thigh weakness and numbness, now found to have metastatic disease with a mandibular mass and large sacral mass    Stage IV Prostate cancer with diffuse osseous involvement:  -   - Sacral biopsy favors prostate ( neoplastic cells are positive for AE1/AE3, PSA and CK7)  - Started on casodex 50mg 3/14  - Will likely start leupron in 2 weeks  - Will also likely start chemotherapy (taxotere) when stable  - Rad Onc evaluation when stable  - Recommend CT CAP when able   - Still needs MR head, and MR C/T spine  - Dental evaluation to start bisphosphonates  - will c/w  dexamethasone, f/u NSx or rad onc consult for taper  - Nutrition f/u  - Completed MR jaw and lumbar  - F/u palliative care evaluation for goals of care and symptom management  - Uric acid 7.1, phosph 2.7, he is hyperkalemic but Cr 0.9    Anemia hypotension, r/o GIB: Patient became hypotensive with small drop in hemoglobin, possible coffee ground emesis; likely invasion of bone marrow from prostate cancer but wouldn't explain acute drop  - Possible EGD, f/u GI  - Consider CT AP to r/o RP bleed, if getting this please get CT chest as well for staging/visceral involvement  - Check hemolysis w/u (retic, LUCIA/[facility], hapto, [facility]) NOT SENT    DENISE: Resolving    # Mild hypercalcemia, likely due to bone mets

## 2021-03-16 NOTE — PROGRESS NOTE ADULT - SUBJECTIVE AND OBJECTIVE BOX
Patient is a 80y old  Male who presents with a chief complaint of 80 YEAR OLD MALE C/O MULTIPLE MEDICAL COMPLAINTS . (16 Mar 2021 07:30)        Over Night Events:        ROS:     All ROS are negative except HPI         PHYSICAL EXAM    ICU Vital Signs Last 24 Hrs  T(C): 36.2 (15 Mar 2021 20:00), Max: 36.2 (15 Mar 2021 12:00)  T(F): 97.2 (15 Mar 2021 20:00), Max: 97.2 (15 Mar 2021 20:00)  HR: 84 (16 Mar 2021 11:00) (62 - 94)  BP: 111/50 (16 Mar 2021 11:00) (78/43 - 138/51)  BP(mean): 76 (16 Mar 2021 11:00) (51 - 89)  ABP: --  ABP(mean): --  RR: 18 (15 Mar 2021 20:24) (15 - 18)  SpO2: 100% (16 Mar 2021 11:00) (94% - 100%)      CONSTITUTIONAL:  Well nourished.  NAD    ENT:   Airway patent,   Mouth with normal mucosa.   No thrush    EYES:   Pupils equal,   Round and reactive to light.    CARDIAC:   Normal rate,   Regular rhythm.    No edema      Vascular:  Normal systolic impulse  No Carotid bruits    RESPIRATORY:   No wheezing  Bilateral BS  Normal chest expansion  Not tachypneic,  No use of accessory muscles    GASTROINTESTINAL:  Abdomen soft,   Non-tender,   No guarding,   + BS    MUSCULOSKELETAL:   Range of motion is not limited,  No clubbing, cyanosis    NEUROLOGICAL:   Alert and oriented   No motor  deficits.    SKIN:   Skin normal color for race,   Warm and dry and intact.   No evidence of rash.    PSYCHIATRIC:   Normal mood and affect.   No apparent risk to self or others.    HEMATOLOGICAL:  No cervical  lymphadenopathy.  no inguinal lymphadenopathy      03-15-21 @ 07:01  -  03-16-21 @ 07:00  --------------------------------------------------------  IN:    Norepinephrine: 189.2 mL    Pantoprazole: 45 mL    sodium chloride 0.9%: 2250 mL    Sodium Chloride 0.9% Bolus: 3000 mL  Total IN: 5484.2 mL    OUT:    Indwelling Catheter - Urethral (mL): 4595 mL  Total OUT: 4595 mL    Total NET: 889.2 mL          LABS:                            9.4    26.06 )-----------( 268      ( 15 Mar 2021 05:18 )             29.3                                               03-16    136  |  108  |  79<HH>  ----------------------------<  130<H>  5.4<H>   |  16<L>  |  0.9    Ca    9.2      16 Mar 2021 04:18  Phos  2.7     03-16  Mg     1.7     03-16    TPro  4.7<L>  /  Alb  2.7<L>  /  TBili  0.3  /  DBili  <0.2  /  AST  23  /  ALT  30  /  AlkPhos  80  03-16                                                                                           LIVER FUNCTIONS - ( 16 Mar 2021 04:18 )  Alb: 2.7 g/dL / Pro: 4.7 g/dL / ALK PHOS: 80 U/L / ALT: 30 U/L / AST: 23 U/L / GGT: x                                                                                                                                       MEDICATIONS  (STANDING):  bicalutamide 50 milliGRAM(s) Oral daily  chlorhexidine 4% Liquid 1 Application(s) Topical daily  dexAMETHasone  Injectable 4 milliGRAM(s) IV Push every 6 hours  dextrose 5% + sodium chloride 0.45% 1000 milliLiter(s) (150 mL/Hr) IV Continuous <Continuous>  latanoprost 0.005% Ophthalmic Solution 1 Drop(s) Both EYES at bedtime  lidocaine   Patch 2 Patch Transdermal daily  norepinephrine Infusion 0.05 MICROgram(s)/kG/Min (6.28 mL/Hr) IV Continuous <Continuous>  pantoprazole  Injectable 40 milliGRAM(s) IV Push two times a day  sodium bicarbonate 650 milliGRAM(s) Oral three times a day  sodium zirconium cyclosilicate 10 Gram(s) Oral three times a day    MEDICATIONS  (PRN):  aluminum hydroxide/magnesium hydroxide/simethicone Suspension 30 milliLiter(s) Oral every 6 hours PRN Dyspepsia  brimonidine 0.2% Ophthalmic Solution 1 Drop(s) Both EYES every 8 hours PRN dry eyes  HYDROmorphone  Injectable 0.5 milliGRAM(s) IV Push every 4 hours PRN Severe Pain (7 - 10)      New X-rays reviewed:                                                                                  ECHO    CXR interpreted by me:

## 2021-03-16 NOTE — PROGRESS NOTE ADULT - ASSESSMENT
1. Shock: most likely hypovolemic in nature from severe dehydration DENISE and possible GI bleeding, currently off levophed gtt will  Monitor for now. Allow for lower BP as pt is chronically low and is asymptomatic    2. DENISE: secondary to dehydration in setting of obstructive uropathy, tx IVF's monitor for now, continue lemus.    3. Anemia: possible GI bleed from PUD tx PPI IV BID, transfuse for hgb less then 7, monitor for now. F/u hemolysis workup.    4. B/L Hydronephrosis from Obstructive Uropathy: continue Lemus monitor I/O's now has post obstructive dieresis continue IVF's.    5. Metastatic Prostate CA with Multiple bones mets: continue decadron, f/u hem/ rad onc recs, currently on casodex, f/u Palliative care recs.    6. 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    7. Systolic CHF EF 35%: continue supportive care, monitor for now. Most likely ischemic in nature    8. AFIB: continue supportive care, monitor for now.    9. HTN: continue supportive care, monitor for now.    10. CAD s/p CABG: continue supportive care, monitor for now.    11. DVT px: tx SCD's check lower ext US, hold AC.    12. NUT: tx oral diet, tx IVF's.

## 2021-03-16 NOTE — PROGRESS NOTE ADULT - SUBJECTIVE AND OBJECTIVE BOX
Hospital Day:  8d    Subjective:    Patient is a 80y old  Male who presents with a chief complaint of 80 YEAR OLD MALE C/O MULTIPLE MEDICAL COMPLAINTS . (15 Mar 2021 14:24)      Admitted to medicine for a primary diagnosis of     Past Medical Hx:   Inguinal hernia    CHF (congestive heart failure)      Past Sx:  S/P CABG x 3      Allergies:  No Known Allergies    Current Meds:   Standng Meds:  bicalutamide 50 milliGRAM(s) Oral daily  chlorhexidine 4% Liquid 1 Application(s) Topical daily  dexAMETHasone  Injectable 4 milliGRAM(s) IV Push every 6 hours  latanoprost 0.005% Ophthalmic Solution 1 Drop(s) Both EYES at bedtime  lidocaine   Patch 2 Patch Transdermal daily  norepinephrine Infusion 0.05 MICROgram(s)/kG/Min (6.28 mL/Hr) IV Continuous <Continuous>  pantoprazole  Injectable 40 milliGRAM(s) IV Push two times a day  sodium bicarbonate 650 milliGRAM(s) Oral three times a day  sodium chloride 0.9%. 1000 milliLiter(s) (150 mL/Hr) IV Continuous <Continuous>  sodium zirconium cyclosilicate 10 Gram(s) Oral three times a day    PRN Meds:  aluminum hydroxide/magnesium hydroxide/simethicone Suspension 30 milliLiter(s) Oral every 6 hours PRN Dyspepsia  brimonidine 0.2% Ophthalmic Solution 1 Drop(s) Both EYES every 8 hours PRN dry eyes  HYDROmorphone  Injectable 0.5 milliGRAM(s) IV Push every 4 hours PRN Severe Pain (7 - 10)    HOME MEDICATIONS:  Eliquis 5 mg oral tablet: 1 tab(s) orally 2 times a day  Lasix 20 mg oral tablet: 10  orally once a day, As Needed  lisinopril 10 mg oral tablet: 10  orally 2 times a day  metoprolol succinate 50 mg oral tablet, extended release: 1 tab(s) orally once a day  spironolactone 25 mg oral tablet: 1 tab(s) orally 2 times a day      Vital Signs:   T(F): 97.2 (03-15-21 @ 20:00), Max: 97.2 (03-15-21 @ 20:00)  HR: 74 (03-16-21 @ 06:00) (64 - 94)  BP: 127/49 (03-16-21 @ 06:00) (65/39 - 139/54)  RR: 18 (03-15-21 @ 20:24) (15 - 18)  SpO2: 100% (03-16-21 @ 06:00) (94% - 100%)      03-15-21 @ 07:01  -  03-16-21 @ 07:00  --------------------------------------------------------  IN: 5484.2 mL / OUT: 4595 mL / NET: 889.2 mL        Physical Exam:   GENERAL: NAD  HEENT: NCAT  CHEST/LUNG: CTAB  HEART: Regular rate and rhythm; s1 s2 appreciated, No murmurs, rubs, or gallops  ABDOMEN: Soft, Nontender, Nondistended; Bowel sounds present  EXTREMITIES: No LE edema b/l  SKIN: no rashes, no new lesions  NERVOUS SYSTEM:  Alert & Oriented X3  LINES/CATHETERS:        Labs:                         9.4    26.06 )-----------( 268      ( 15 Mar 2021 05:18 )             29.3       16 Mar 2021 04:18    136    |  108    |  79     ----------------------------<  130    5.4     |  16     |  0.9      Ca    9.2        16 Mar 2021 04:18  Phos  2.7       16 Mar 2021 04:18  Mg     1.7       16 Mar 2021 04:18    TPro  4.7    /  Alb  2.7    /  TBili  0.3    /  DBili  <0.2   /  AST  23     /  ALT  30     /  AlkPhos  80     16 Mar 2021 04:18            Serum Pro-Brain Natriuretic Peptide: 2564 pg/mL (03-08-21 @ 01:05)                   Hospital Day:  8d    Subjective:    Patient is a 80y old  Male who presents with a chief complaint of 80 YEAR OLD MALE C/O MULTIPLE MEDICAL COMPLAINTS . No overnight events. Appears comfortable, but has multiple complaints this am.       Admitted to medicine for a primary diagnosis of Metastatic disease and hypovolemic shock     Past Medical Hx:   Inguinal hernia    CHF (congestive heart failure)      Past Sx:  S/P CABG x 3      Allergies:  No Known Allergies    Current Meds:   Standng Meds:  bicalutamide 50 milliGRAM(s) Oral daily  chlorhexidine 4% Liquid 1 Application(s) Topical daily  dexAMETHasone  Injectable 4 milliGRAM(s) IV Push every 6 hours  latanoprost 0.005% Ophthalmic Solution 1 Drop(s) Both EYES at bedtime  lidocaine   Patch 2 Patch Transdermal daily  norepinephrine Infusion 0.05 MICROgram(s)/kG/Min (6.28 mL/Hr) IV Continuous <Continuous>  pantoprazole  Injectable 40 milliGRAM(s) IV Push two times a day  sodium bicarbonate 650 milliGRAM(s) Oral three times a day  sodium chloride 0.9%. 1000 milliLiter(s) (150 mL/Hr) IV Continuous <Continuous>  sodium zirconium cyclosilicate 10 Gram(s) Oral three times a day    PRN Meds:  aluminum hydroxide/magnesium hydroxide/simethicone Suspension 30 milliLiter(s) Oral every 6 hours PRN Dyspepsia  brimonidine 0.2% Ophthalmic Solution 1 Drop(s) Both EYES every 8 hours PRN dry eyes  HYDROmorphone  Injectable 0.5 milliGRAM(s) IV Push every 4 hours PRN Severe Pain (7 - 10)    HOME MEDICATIONS:  Eliquis 5 mg oral tablet: 1 tab(s) orally 2 times a day  Lasix 20 mg oral tablet: 10  orally once a day, As Needed  lisinopril 10 mg oral tablet: 10  orally 2 times a day  metoprolol succinate 50 mg oral tablet, extended release: 1 tab(s) orally once a day  spironolactone 25 mg oral tablet: 1 tab(s) orally 2 times a day      Vital Signs:   T(F): 97.2 (03-15-21 @ 20:00), Max: 97.2 (03-15-21 @ 20:00)  HR: 74 (03-16-21 @ 06:00) (64 - 94)  BP: 127/49 (03-16-21 @ 06:00) (65/39 - 139/54)  RR: 18 (03-15-21 @ 20:24) (15 - 18)  SpO2: 100% (03-16-21 @ 06:00) (94% - 100%)      03-15-21 @ 07:01  -  03-16-21 @ 07:00  --------------------------------------------------------  IN: 5484.2 mL / OUT: 4595 mL / NET: 889.2 mL        Physical Exam:   GENERAL: NAD  HEENT: right cheek and jaw enlargement  CHEST/LUNG: CTAB  HEART: Regular rate and rhythm; s1 s2 appreciated, No murmurs, rubs, or gallops  ABDOMEN: Soft, Nontender, Nondistended; Bowel sounds present  EXTREMITIES: No LE edema b/l  SKIN: no rashes, no new lesions  NERVOUS SYSTEM:  Alert & Oriented X3  LINES/CATHETERS: lemus, peripheral iv       Labs:                         9.4    26.06 )-----------( 268      ( 15 Mar 2021 05:18 )             29.3       16 Mar 2021 04:18    136    |  108    |  79     ----------------------------<  130    5.4     |  16     |  0.9      Ca    9.2        16 Mar 2021 04:18  Phos  2.7       16 Mar 2021 04:18  Mg     1.7       16 Mar 2021 04:18    TPro  4.7    /  Alb  2.7    /  TBili  0.3    /  DBili  <0.2   /  AST  23     /  ALT  30     /  AlkPhos  80     16 Mar 2021 04:18            Serum Pro-Brain Natriuretic Peptide: 2564 pg/mL (03-08-21 @ 01:05)

## 2021-03-16 NOTE — PROGRESS NOTE ADULT - SUBJECTIVE AND OBJECTIVE BOX
MANUELITO HEDRICK             MRN-[id number]    CC: weakness    HPI:   81 yo male hx of CAD s/p CABG s/p 20+ years ago/ CHF/ right sided large inguinal hernia BIBA from hotel room 2/2 unable to ambulate with right thigh weakness and numbness. reported it started from knee to hip. denies fall and injury. He was just sitting and the symptoms started. reported off/on back pain in the past. denies fever/chill/recent illness/coughing/chest pain/abd pain/n/v/d and urinary sxs.   	Also has right sided facial swelling for about 1 year after 3 teeth extraction. reports swelling worsens after eating. swelling was better in the past and started swelling again in the past few months. didn't follow up with his dentist 2/2 COVID.   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 he used to be able to ambulate without assistant and he commutes with his car. (08 Mar 2021 05:03)      SUBJECTIVE:     ROS:  DYSPNEA: No   NAUS/VOM: No	  SECRETIONS: No 	  AGITATION: No   Pain (Y/N):     OTHER REVIEW OF SYSTEMS:  UNABLE TO OBTAIN  due to:    PEx:  80y              General: awake. well nourished, lying in bed, NAD, conversant  HEENT:  NCAT PERRL EOMI Non icteric   Neck: Supple no masses  CVS: RR S1S2 No M/G/R  Resp: Unlabored Non tachypneic No increased WOB, clear to ascultation bilaterally  GI:  Soft NT ND BS+  :  Voiding / Santos / [employer]  Musc: No C/C/E    Neuro: Follows commands No focal deficits  Psych: Calm, a & o x 3  Skin: Non jaundiced, no rash   Lymph: Normal      Last BM:       ALLERGIES:     OPIATE NAÏVE (Y/N):    MEDICATIONS: REVIEWED  MEDICATIONS  (STANDING):  bicalutamide 50 milliGRAM(s) Oral daily  chlorhexidine 4% Liquid 1 Application(s) Topical daily  dexAMETHasone  Injectable 4 milliGRAM(s) IV Push every 6 hours  dextrose 5% + sodium chloride 0.45% 1000 milliLiter(s) (150 mL/Hr) IV Continuous <Continuous>  latanoprost 0.005% Ophthalmic Solution 1 Drop(s) Both EYES at bedtime  lidocaine   Patch 2 Patch Transdermal daily  norepinephrine Infusion 0.05 MICROgram(s)/kG/Min (6.28 mL/Hr) IV Continuous <Continuous>  pantoprazole  Injectable 40 milliGRAM(s) IV Push two times a day  sodium bicarbonate 650 milliGRAM(s) Oral three times a day  sodium zirconium cyclosilicate 10 Gram(s) Oral three times a day    MEDICATIONS  (PRN):  aluminum hydroxide/magnesium hydroxide/simethicone Suspension 30 milliLiter(s) Oral every 6 hours PRN Dyspepsia  brimonidine 0.2% Ophthalmic Solution 1 Drop(s) Both EYES every 8 hours PRN dry eyes  HYDROmorphone  Injectable 0.5 milliGRAM(s) IV Push every 4 hours PRN Severe Pain (7 - 10)      LABS: REVIEWED  CBC:                        7.3    13.74 )-----------( 198      ( 16 Mar 2021 11:19 )             22.1     CMP:    03-16    136  |  108  |  79<HH>  ----------------------------<  130<H>  5.4<H>   |  16<L>  |  0.9    Ca    9.2      16 Mar 2021 04:18  Phos  2.7     03-16  Mg     1.7     03-16    TPro  4.7<L>  /  Alb  2.7<L>  /  TBili  0.3  /  DBili  <0.2  /  AST  23  /  ALT  30  /  AlkPhos  80  03-16  Albumin, Serum: 2.7 g/dL (03-16-21 @ 04:18)      IMAGING: REVIEWED    ADVANCED DIRECTIVES:            FULL CODE            DNR DNI            LIVING WILL            DPOA       HCP       MOLST    DECISION MAKER:   LEGAL SURROGATE:    PSYCHOSOCIAL-SPIRITUAL ASSESSMENT:       Reviewed       Care plan unchanged       Care plan adjusted as above	    GOALS OF CARE DISCUSSION       Palliative care info/counseling provided	           Family meeting       Advanced Directives addressed please see Advance Care Planning Note	           See previous Palliative Medicine Note       Documentation of GOC:    CURRENT DISPO PLAN:     WILL REMAIN IN HOSPITAL  [facility]  Hospice  Home      REFERRALS	        Palliative Med        Unit SW/Case Mgmt              Speech/Swallow       Patient/Family Support      Hospice       Nutrition       Dietician       PT/OT MAUNELITO HEDRICK             MRN-[id number]    CC: weakness    HPI:   81 yo male hx of CAD s/p CABG s/p 20+ years ago/ CHF/ right sided large inguinal hernia BIBA from hotel room 2/2 unable to ambulate with right thigh weakness and numbness. reported it started from knee to hip. denies fall and injury. He was just sitting and the symptoms started. reported off/on back pain in the past. denies fever/chill/recent illness/coughing/chest pain/abd pain/n/v/d and urinary sxs.   	Also has right sided facial swelling for about 1 year after 3 teeth extraction. reports swelling worsens after eating. swelling was better in the past and started swelling again in the past few months. didn't follow up with his dentist 2/2 COVID.   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 he used to be able to ambulate without assistant and he commutes with his car. (08 Mar 2021 05:03)      SUBJECTIVE:     ROS:  DYSPNEA: No   NAUS/VOM: No	  SECRETIONS: No 	  AGITATION: No   Pain (Y/N): Yes, in the R jaw, worse with eating. He also has new pain in the L shoulder, with numbness down the arm, and pain in the back. He feels tense. The shoulder and back pain are improved with massage and positioning.  He did take one dose of Dilaudid yesterday, which helped his pain. He does not want to be taking opioids, or to become a "pill popper".   ++ Anxiety: He felt like when he took a Valium or Xanax (he cant remember which) last night, he felt the best he has since hes been here. He was able to sleep a bit.     PEx:  Vital Signs Last 24 Hrs  T(C): 36.5 (16 Mar 2021 12:00), Max: 36.5 (16 Mar 2021 12:00)  T(F): 97.7 (16 Mar 2021 12:00), Max: 97.7 (16 Mar 2021 12:00)  HR: 90 (16 Mar 2021 13:00) (62 - 94)  BP: 103/48 (16 Mar 2021 13:00) (78/43 - 138/51)  BP(mean): 58 (16 Mar 2021 13:00) (51 - 85)  RR: 18 (15 Mar 2021 20:24) (16 - 18)  SpO2: 100% (16 Mar 2021 13:00) (94% - 100%)    General: awake. well nourished, lying in bed, NAD  HEENT:  NCAT PERRL EOMI Non icteric, large swelling of the R cheek and mandible  Neck: Supple no masses  CVS: RR, no tachycardia, no swelling  Resp: Unlabored Non tachypneic No increased WOB, on room air  GI:  Soft NT ND   :  Voiding   Musc: No C/C/E    Neuro: Follows commands, No focal deficits  Psych: anxious, a & o x 3  Skin: Non jaundiced, no rashes     MEDICATIONS: REVIEWED  MEDICATIONS  (STANDING):  bicalutamide 50 milliGRAM(s) Oral daily  chlorhexidine 4% Liquid 1 Application(s) Topical daily  dexAMETHasone  Injectable 4 milliGRAM(s) IV Push every 6 hours  dextrose 5% + sodium chloride 0.45% 1000 milliLiter(s) (150 mL/Hr) IV Continuous <Continuous>  latanoprost 0.005% Ophthalmic Solution 1 Drop(s) Both EYES at bedtime  lidocaine   Patch 2 Patch Transdermal daily  norepinephrine Infusion 0.05 MICROgram(s)/kG/Min (6.28 mL/Hr) IV Continuous <Continuous>  pantoprazole  Injectable 40 milliGRAM(s) IV Push two times a day  sodium bicarbonate 650 milliGRAM(s) Oral three times a day  sodium zirconium cyclosilicate 10 Gram(s) Oral three times a day    MEDICATIONS  (PRN):  aluminum hydroxide/magnesium hydroxide/simethicone Suspension 30 milliLiter(s) Oral every 6 hours PRN Dyspepsia  brimonidine 0.2% Ophthalmic Solution 1 Drop(s) Both EYES every 8 hours PRN dry eyes  HYDROmorphone  Injectable 0.5 milliGRAM(s) IV Push every 4 hours PRN Severe Pain (7 - 10)      LABS: REVIEWED  CBC:                        7.3    13.74 )-----------( 198      ( 16 Mar 2021 11:19 )             22.1     CMP:    03-16    136  |  108  |  79<HH>  ----------------------------<  130<H>  5.4<H>   |  16<L>  |  0.9    Ca    9.2      16 Mar 2021 04:18  Phos  2.7     03-16  Mg     1.7     03-16    TPro  4.7<L>  /  Alb  2.7<L>  /  TBili  0.3  /  DBili  <0.2  /  AST  23  /  ALT  30  /  AlkPhos  80  03-16  Albumin, Serum: 2.7 g/dL (03-16-21 @ 04:18)        ADVANCED DIRECTIVES:            FULL CODE         DECISION MAKER: The patient. He denies having anyone to make his HCP.     SPIRITUAL ASSESSMENT: He has a close relationship with his Confucianist. Prays a lot.     GOALS OF CARE DISCUSSION  - attempted to get HCP today, patient reports he has noone to assign   - his phone isnt activated so he cannot reach out to his Buddhist friends right now    CURRENT DISPO PLAN:     WILL REMAIN IN HOSPITAL  Homeless and living in a hotel at this time   MANUELITO HEDRICK             MRN-[id number]    CC: weakness    HPI:   79 yo male hx of CAD s/p CABG s/p 20+ years ago/ CHF/ right sided large inguinal hernia BIBA from hotel room 2/2 unable to ambulate with right thigh weakness and numbness. reported it started from knee to hip. denies fall and injury. He was just sitting and the symptoms started. reported off/on back pain in the past. denies fever/chill/recent illness/coughing/chest pain/abd pain/n/v/d and urinary sxs.   	Also has right sided facial swelling for about 1 year after 3 teeth extraction. reports swelling worsens after eating. swelling was better in the past and started swelling again in the past few months. didn't follow up with his dentist 2/2 COVID.   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 he used to be able to ambulate without assistant and he commutes with his car. (08 Mar 2021 05:03)      SUBJECTIVE:     ROS:  DYSPNEA: No   NAUS/VOM: No	  SECRETIONS: No 	  AGITATION: No   Pain (Y/N): Yes, in the R jaw, worse with eating. He also has new pain in the L shoulder, with numbness down the arm, and pain in the back. He feels tense. The shoulder and back pain are improved with massage and positioning.  He did take one dose of Dilaudid yesterday, which helped his pain. He does not want to be taking opioids, or to become a "pill popper".   ++ Anxiety: He felt like when he took a Valium or Xanax (he cant remember which) last night, he felt the best he has since hes been here. He was able to sleep a bit.     PEx:  Vital Signs Last 24 Hrs  T(C): 36.5 (16 Mar 2021 12:00), Max: 36.5 (16 Mar 2021 12:00)  T(F): 97.7 (16 Mar 2021 12:00), Max: 97.7 (16 Mar 2021 12:00)  HR: 90 (16 Mar 2021 13:00) (62 - 94)  BP: 103/48 (16 Mar 2021 13:00) (78/43 - 138/51)  BP(mean): 58 (16 Mar 2021 13:00) (51 - 85)  RR: 18 (15 Mar 2021 20:24) (16 - 18)  SpO2: 100% (16 Mar 2021 13:00) (94% - 100%)    General: awake. well nourished, lying in bed, NAD  HEENT:  NCAT EOMI Non icteric, large swelling of the R cheek and mandible  CVS: RR, no tachycardia, no swelling  Resp: Unlabored Non tachypneic No increased WOB, on room air  GI:  Soft NT ND   :  Voiding   Musc: No C/C/E    Neuro: Follows commands, No focal deficits  Psych: anxious, a & o x 3  Skin: Non jaundiced, no rashes     MEDICATIONS: REVIEWED  MEDICATIONS  (STANDING):  bicalutamide 50 milliGRAM(s) Oral daily  chlorhexidine 4% Liquid 1 Application(s) Topical daily  dexAMETHasone  Injectable 4 milliGRAM(s) IV Push every 6 hours  dextrose 5% + sodium chloride 0.45% 1000 milliLiter(s) (150 mL/Hr) IV Continuous <Continuous>  latanoprost 0.005% Ophthalmic Solution 1 Drop(s) Both EYES at bedtime  lidocaine   Patch 2 Patch Transdermal daily  norepinephrine Infusion 0.05 MICROgram(s)/kG/Min (6.28 mL/Hr) IV Continuous <Continuous>  pantoprazole  Injectable 40 milliGRAM(s) IV Push two times a day  sodium bicarbonate 650 milliGRAM(s) Oral three times a day  sodium zirconium cyclosilicate 10 Gram(s) Oral three times a day    MEDICATIONS  (PRN):  aluminum hydroxide/magnesium hydroxide/simethicone Suspension 30 milliLiter(s) Oral every 6 hours PRN Dyspepsia  brimonidine 0.2% Ophthalmic Solution 1 Drop(s) Both EYES every 8 hours PRN dry eyes  HYDROmorphone  Injectable 0.5 milliGRAM(s) IV Push every 4 hours PRN Severe Pain (7 - 10)      LABS: REVIEWED  CBC:                        7.3    13.74 )-----------( 198      ( 16 Mar 2021 11:19 )             22.1     CMP:    03-16    136  |  108  |  79<HH>  ----------------------------<  130<H>  5.4<H>   |  16<L>  |  0.9    Ca    9.2      16 Mar 2021 04:18  Phos  2.7     03-16  Mg     1.7     03-16    TPro  4.7<L>  /  Alb  2.7<L>  /  TBili  0.3  /  DBili  <0.2  /  AST  23  /  ALT  30  /  AlkPhos  80  03-16  Albumin, Serum: 2.7 g/dL (03-16-21 @ 04:18)        ADVANCED DIRECTIVES:            FULL CODE         DECISION MAKER: The patient. He denies having anyone to make his HCP.     SPIRITUAL ASSESSMENT: He has a close relationship with his Pentecostal. Prays a lot.     GOALS OF CARE DISCUSSION  - attempted to get HCP today, patient reports he has noone to assign   - his phone isnt activated so he cannot reach out to his Rastafari friends right now    CURRENT DISPO PLAN:     WILL REMAIN IN HOSPITAL  Homeless and living in a hotel at this time

## 2021-03-16 NOTE — PROGRESS NOTE ADULT - ASSESSMENT
81 yo male hx of CAD s/p CABG s/p 20+ years ago, HFrEF (EF 40% in 2011, follows with dr Moore), afib on eliquis right sided large inguinal hernia BIBA from hotel room 2/2 unable to ambulate with right thigh weakness and numbness, was found to have stage IV prostate cancer. started on Casodex.  Also was found to have right Jaw mass scoped by dr Hernández ENT.   on 3/14 pt's BP 84/38 MAP 53, HR 76. Pt asymptomatic. Pt had drop of Hb from 11.3 to 9.0. . BP stabilized after 2L NS bolus. Gastric lavage with coffee colored aspirate?, JEAN MARIE negative. Started protonix drip / NPO. Further drop in Hb to 7.9 with borderline BP, repeat 8.4. Started on low dose levo. Also received 1 unit PRBCs. Pt BP remains borderline, upgraded to CCU for closer monitoring.    *Possible Upper GI bleed and drop in Hb  -still on levophed low dose, less than yesterday  -Heart failure EF 35%  -hx of afib on eliquis / lovenox last dose last dose 3/14 Am  -on steroids   -Hb stable 9.4. s/p 1 unit PBCs  -Right jaw mass, discussed with ENT [id number] no contraindication for EGD  -No evidence of GI bleeding now    Plan  -c/w PPI can switch to BID  -trend CBC every 8 hours, no CBC today  -active type and screen  -cardiology risk stratification  -EGD when medically optimized (needs cariology evaluation, medical optimization, not actively bleeding)  -If repeat Hb at 11 is stable can allow clear liquid diet and can start heparin drip   -CT abdomen pelvis non contrast (r/o retroperitoneal bleeding)    -for questions call [phone] during weekdays till 5pm and call GI service after 5pm and on weekends [phone]

## 2021-03-16 NOTE — PROGRESS NOTE ADULT - ATTENDING COMMENTS
Patient seen at bedside  Patient has pain in setting of his malignancy, but also has some pain that appears to be muscular/spastic in nature, as well as total pain due to anxiety  Will try Robaxin as above and if no improvement, will likely discontinue  May also consider xanax PRN as patient feels pain improved after benzo given last night, but will only try second line  Will continue to follow

## 2021-03-16 NOTE — PROGRESS NOTE ADULT - SUBJECTIVE AND OBJECTIVE BOX
Gastroenterology progress note:     Patient is a 80y old  Male who presents with a chief complaint of 80 YEAR OLD MALE C/O MULTIPLE MEDICAL COMPLAINTS . (16 Mar 2021 11:11)       Admitted on: 03-08-21    We are following the patient for coffee ground vomiting     Interval History: patient was NPO, no vomiting, has 1 BM brown per the patient he feels tired and does not want to have the EGD today. still on low dose levophed    Patient's medical problems are stable    Prior records reviewed (Y/N): Y  History obtained from someone other than patient (Y/N): N      PAST MEDICAL & SURGICAL HISTORY:  Inguinal hernia  CHF (congestive heart failure)  S/P CABG x 3    MEDICATIONS  (STANDING):  bicalutamide 50 milliGRAM(s) Oral daily  chlorhexidine 4% Liquid 1 Application(s) Topical daily  dexAMETHasone  Injectable 4 milliGRAM(s) IV Push every 6 hours  dextrose 5% + sodium chloride 0.45% 1000 milliLiter(s) (150 mL/Hr) IV Continuous <Continuous>  latanoprost 0.005% Ophthalmic Solution 1 Drop(s) Both EYES at bedtime  lidocaine   Patch 2 Patch Transdermal daily  norepinephrine Infusion 0.05 MICROgram(s)/kG/Min (6.28 mL/Hr) IV Continuous <Continuous>  pantoprazole  Injectable 40 milliGRAM(s) IV Push two times a day  sodium bicarbonate 650 milliGRAM(s) Oral three times a day  sodium zirconium cyclosilicate 10 Gram(s) Oral three times a day    MEDICATIONS  (PRN):  aluminum hydroxide/magnesium hydroxide/simethicone Suspension 30 milliLiter(s) Oral every 6 hours PRN Dyspepsia  brimonidine 0.2% Ophthalmic Solution 1 Drop(s) Both EYES every 8 hours PRN dry eyes  HYDROmorphone  Injectable 0.5 milliGRAM(s) IV Push every 4 hours PRN Severe Pain (7 - 10)      Allergies  No Known Allergies      Review of Systems:   General: no fever  HEENT: no hemoptysis  Cardiovascular:  No Chest Pain, No Palpitations  Respiratory:  No Cough, No Dyspnea  Gastrointestinal:  As described in HPI  Hematology: no bruising or hematoma   Neurology: weakness  Skin: no new rash    Physical Examination:  T(C): 36.2 (03-15-21 @ 20:00), Max: 36.2 (03-15-21 @ 20:00)  HR: 84 (03-16-21 @ 11:00) (62 - 94)  BP: 111/50 (03-16-21 @ 11:00) (78/43 - 138/51)  RR: 18 (03-15-21 @ 20:24) (16 - 18)  SpO2: 100% (03-16-21 @ 11:00) (94% - 100%)     Constitutional: No acute distress.  Head: normocephalic  Neck: no palpable thyroid  Eyes: EOMI  Respiratory:  No signs of respiratory distress. Lung sounds are clear bilaterally.  Cardiovascular:  S1 S2, Regular rate and rhythm.  Abdominal: Abdomen is soft, symmetric, and non-tender without distention.    Skin: No Jaundice.        Data: (reviewed by attending)                        9.4    26.06 )-----------( 268      ( 15 Mar 2021 05:18 )             29.3     Hgb trend:  9.4  03-15-21 @ 05:18  8.5  03-14-21 @ 20:41  7.9  03-14-21 @ 17:22  9.7  03-14-21 @ 11:45  9.0  03-14-21 @ 07:32        03-16    136  |  108  |  79<HH>  ----------------------------<  130<H>  5.4<H>   |  16<L>  |  0.9    Ca    9.2      16 Mar 2021 04:18  Phos  2.7     03-16  Mg     1.7     03-16    TPro  4.7<L>  /  Alb  2.7<L>  /  TBili  0.3  /  DBili  <0.2  /  AST  23  /  ALT  30  /  AlkPhos  80  03-16    Liver panel trend:  TBili 0.3   /   AST 23   /   ALT 30   /   AlkP 80   /   Tptn 4.7   /   Alb 2.7    /   DBili <0.2      03-16  TBili 0.3   /   AST 19   /   ALT 41   /   AlkP 87   /   Tptn 5.1   /   Alb 3.0    /   DBili <0.2      03-15  TBili 0.2   /   AST 66   /   ALT 81   /   AlkP 118   /   Tptn 5.2   /   Alb 2.9    /   DBili --      03-13  TBili 0.3   /   AST 17   /   ALT 17   /   AlkP 127   /   Tptn 5.6   /   Alb 3.1    /   DBili --      03-12  TBili 0.3   /   AST 16   /   ALT 10   /   AlkP 129   /   Tptn 5.8   /   Alb 3.2    /   DBili --      03-11  TBili 0.3   /   AST 27   /   ALT 10   /   AlkP 154   /   Tptn 6.5   /   Alb 3.6    /   DBili --      03-10  TBili 0.6   /   AST 21   /   ALT 8   /   AlkP 213   /   Tptn 7.5   /   Alb 4.1    /   DBili --      03-08       Radiology: (reviewed by attending)  none new

## 2021-03-16 NOTE — CHART NOTE - NSCHARTNOTEFT_GEN_A_CORE
Registered Dietitian Follow-Up     Patient Profile Reviewed                           Yes [x]   No []     Nutrition History Previously Obtained        Yes [x]  No []       Pertinent Subjective Information: Pt. transferred to CCU, now on pressor. Frequently NPO for tests/procedures. Diet switched to soft from dysphagia III on 3/15, Ensure supplement not transferred. Pt. with adequate PO intake but will still rec supplement given pt. diagnosed with PCM.      Pertinent Medical Interventions:  Multiple metastatic osseous lesions with newly diagnosed sacral and mandibular mass: heme/onco following. Palliative following. Shock: most likely hypovolemic in nature from severe dehydration DENISE and possible GI bleeding, currently on levophed gtt.       Diet order: NPO for test/procedure, soft diet order also active      Anthropometrics:  - Ht. 180.3cm  - Wt. 76.3kg on 3/16 vs. 67kg on 3/12 ?bed scale error vs fluid shifts as pt. with edema   - %wt change  - BMI 22.7  - IBW 172lbs      Pertinent Lab Data: (3/15) WBC 26.06, RBC 3.35, Hg 9.4, Hct 29.4  (3/16) K 5.4, BUN 79, glu 130     Pertinent Meds: Protonix, Lokelma, Maalox     Physical Findings:  - Appearance: AAOx4, 3+ face, sacrum edema  - GI function: abd noted soft/nontender per flow sheets, last BM 3/15  - Tubes: none noted  - Oral/Mouth cavity: no symptoms noted  - Skin: pressure ulcer stg II to sacrum      Nutrition Requirements (from RD note on 3/12)   Weight Used: 67kg      Estimated Energy Needs    Continue [x]  Adjust [] 1695-1975kcal/day (MSJ x1.2-1.4 considering PCM, possible metastatic disease & avd age).  Adjusted Energy Recommendations:   kcal/day        Estimated Protein Needs    Continue [x]  Adjust [] 80-94 g/day (1.2-1.4 g/kg CBW, reasons mentioned above)  Adjusted Protein Recommendations:   gm/day        Estimated Fluid Needs        Continue []  Adjust [x] per CCU team   Adjusted Fluid Recommendations:   mL/day     Nutrient Intake: ~75% PO         [] Previous Nutrition Diagnosis: : (1) severe PCM; (2) inadequate oral intake--both ongoing but improving        Nutrition Intervention:  meals & snacks, medical food supplements    Rec: When medically feasible to resume diet provide dysphagia III soft thin liquid diet, add Ensure enlive BID     Goal/Expected Outcome: In 3 days pt. to consistently consume % PO and supplement      Indicator/Monitoring: diet order, energy intake, nutrition related labs, body composition, NFPF (PO tolerance, GI s/s).

## 2021-03-16 NOTE — PROGRESS NOTE ADULT - ATTENDING COMMENTS
seen/examined w/ hemonc fellow  note reviewed  plan discussed     will discuss w/ ENT re: jaw mass biopsy

## 2021-03-16 NOTE — PROGRESS NOTE ADULT - SUBJECTIVE AND OBJECTIVE BOX
24H events:    No acute events  TLS labs are ok  CT CAP and MR not done    PAST MEDICAL & SURGICAL HISTORY  Inguinal hernia    CHF (congestive heart failure)    S/P CABG x 3      SOCIAL HISTORY:  Negative for smoking/alcohol/drug use.     ALLERGIES:  No Known Allergies    MEDICATIONS:  STANDING MEDICATIONS  bicalutamide 50 milliGRAM(s) Oral daily  chlorhexidine 4% Liquid 1 Application(s) Topical daily  dexAMETHasone  Injectable 4 milliGRAM(s) IV Push every 6 hours  latanoprost 0.005% Ophthalmic Solution 1 Drop(s) Both EYES at bedtime  lidocaine   Patch 2 Patch Transdermal daily  norepinephrine Infusion 0.05 MICROgram(s)/kG/Min IV Continuous <Continuous>  pantoprazole  Injectable 40 milliGRAM(s) IV Push two times a day  sodium bicarbonate 650 milliGRAM(s) Oral three times a day  sodium chloride 0.9%. 1000 milliLiter(s) IV Continuous <Continuous>  sodium zirconium cyclosilicate 10 Gram(s) Oral three times a day    PRN MEDICATIONS  aluminum hydroxide/magnesium hydroxide/simethicone Suspension 30 milliLiter(s) Oral every 6 hours PRN  brimonidine 0.2% Ophthalmic Solution 1 Drop(s) Both EYES every 8 hours PRN  HYDROmorphone  Injectable 0.5 milliGRAM(s) IV Push every 4 hours PRN    VITALS:   T(F): 97.2  HR: 62  BP: 107/52  RR: 18  SpO2: 100%    LABS:                        9.4    26.06 )-----------( 268      ( 15 Mar 2021 05:18 )             29.3     03-16    136  |  108  |  79<HH>  ----------------------------<  130<H>  5.4<H>   |  16<L>  |  0.9    Ca    9.2      16 Mar 2021 04:18  Phos  2.7     03-16  Mg     1.7     03-16    TPro  4.7<L>  /  Alb  2.7<L>  /  TBili  0.3  /  DBili  <0.2  /  AST  23  /  ALT  30  /  AlkPhos  80  03-16                  RADIOLOGY:    PHYSICAL EXAM:  GEN: No acute distress  HENT: NCAT, EOMI, right facial swellng  LUNGS: No respiratory distress, clear to auscultation bilaterally   HEART: regular rate and rhythm  ABD: Soft, non-tender, non-distended  SKIN: No rash  NEURO: 3/5 LE strength

## 2021-03-16 NOTE — PROGRESS NOTE ADULT - ASSESSMENT
80yMale being evaluated for pain management in the setting of newly diagnosed metastatic prostate cancer to the mandible and sacrum. His hospital stay has been complicated by possible GIB and hypotension, upgraded to CCU.  Heme-onc following for tx recommendations, starting hormonal therapy. His pain appears to be due to both tumor invasion of the jaw as well as muscular in the back, shoulders. He has an aversion to opioid medications. There is a existential/anxiety component to his pain as well.     Discussed options with patient. Will try adding Robaxin 500 mg TID today, continue Dilaudid for PRN use.   Should be not improve with new medication, may consider adding something for anxiety such as Xanax.   Patient is in agreement with this plan going forward.       MEDD (morphine equivalent daily dose): 10 mg MEDD/24 H      See Recs below.    Please call [phone] with questions or concerns 24/7.   We will continue to follow.

## 2021-03-17 LAB
ALBUMIN SERPL ELPH-MCNC: 2.4 G/DL — LOW (ref 3.5–5.2)
ALP SERPL-CCNC: 81 U/L — SIGNIFICANT CHANGE UP (ref 30–115)
ALT FLD-CCNC: 65 U/L — HIGH (ref 0–41)
ANION GAP SERPL CALC-SCNC: 10 MMOL/L — SIGNIFICANT CHANGE UP (ref 7–14)
AST SERPL-CCNC: 73 U/L — HIGH (ref 0–41)
BASOPHILS # BLD AUTO: 0.02 K/UL — SIGNIFICANT CHANGE UP (ref 0–0.2)
BASOPHILS NFR BLD AUTO: 0.2 % — SIGNIFICANT CHANGE UP (ref 0–1)
BILIRUB SERPL-MCNC: 0.3 MG/DL — SIGNIFICANT CHANGE UP (ref 0.2–1.2)
BUN SERPL-MCNC: 47 MG/DL — HIGH (ref 10–20)
CALCIUM SERPL-MCNC: 8.8 MG/DL — SIGNIFICANT CHANGE UP (ref 8.5–10.1)
CHLORIDE SERPL-SCNC: 106 MMOL/L — SIGNIFICANT CHANGE UP (ref 98–110)
CO2 SERPL-SCNC: 19 MMOL/L — SIGNIFICANT CHANGE UP (ref 17–32)
CREAT SERPL-MCNC: 0.8 MG/DL — SIGNIFICANT CHANGE UP (ref 0.7–1.5)
EOSINOPHIL # BLD AUTO: 0 K/UL — SIGNIFICANT CHANGE UP (ref 0–0.7)
EOSINOPHIL NFR BLD AUTO: 0 % — SIGNIFICANT CHANGE UP (ref 0–8)
GLUCOSE SERPL-MCNC: 179 MG/DL — HIGH (ref 70–99)
HCT VFR BLD CALC: 21.5 % — LOW (ref 42–52)
HGB BLD-MCNC: 6.9 G/DL — CRITICAL LOW (ref 14–18)
IMM GRANULOCYTES NFR BLD AUTO: 4.9 % — HIGH (ref 0.1–0.3)
LYMPHOCYTES # BLD AUTO: 0.49 K/UL — LOW (ref 1.2–3.4)
LYMPHOCYTES # BLD AUTO: 3.9 % — LOW (ref 20.5–51.1)
MAGNESIUM SERPL-MCNC: 1.8 MG/DL — SIGNIFICANT CHANGE UP (ref 1.8–2.4)
MCHC RBC-ENTMCNC: 28 PG — SIGNIFICANT CHANGE UP (ref 27–31)
MCHC RBC-ENTMCNC: 32.1 G/DL — SIGNIFICANT CHANGE UP (ref 32–37)
MCV RBC AUTO: 87.4 FL — SIGNIFICANT CHANGE UP (ref 80–94)
MONOCYTES # BLD AUTO: 0.8 K/UL — HIGH (ref 0.1–0.6)
MONOCYTES NFR BLD AUTO: 6.3 % — SIGNIFICANT CHANGE UP (ref 1.7–9.3)
NEUTROPHILS # BLD AUTO: 10.67 K/UL — HIGH (ref 1.4–6.5)
NEUTROPHILS NFR BLD AUTO: 84.7 % — HIGH (ref 42.2–75.2)
NRBC # BLD: 0 /100 WBCS — SIGNIFICANT CHANGE UP (ref 0–0)
PLATELET # BLD AUTO: 178 K/UL — SIGNIFICANT CHANGE UP (ref 130–400)
POTASSIUM SERPL-MCNC: 4 MMOL/L — SIGNIFICANT CHANGE UP (ref 3.5–5)
POTASSIUM SERPL-SCNC: 4 MMOL/L — SIGNIFICANT CHANGE UP (ref 3.5–5)
PROT SERPL-MCNC: 4 G/DL — LOW (ref 6–8)
PSA FLD-MCNC: 819 NG/ML — HIGH (ref 0–4)
RBC # BLD: 2.46 M/UL — LOW (ref 4.7–6.1)
RBC # FLD: 16.2 % — HIGH (ref 11.5–14.5)
SODIUM SERPL-SCNC: 135 MMOL/L — SIGNIFICANT CHANGE UP (ref 135–146)
TROPONIN T SERPL-MCNC: 0.01 NG/ML — SIGNIFICANT CHANGE UP
WBC # BLD: 12.6 K/UL — HIGH (ref 4.8–10.8)
WBC # FLD AUTO: 12.6 K/UL — HIGH (ref 4.8–10.8)

## 2021-03-17 PROCEDURE — 99233 SBSQ HOSP IP/OBS HIGH 50: CPT

## 2021-03-17 RX ORDER — HEPARIN SODIUM 5000 [USP'U]/ML
5000 INJECTION INTRAVENOUS; SUBCUTANEOUS EVERY 8 HOURS
Refills: 0 | Status: DISCONTINUED | OUTPATIENT
Start: 2021-03-17 | End: 2021-03-22

## 2021-03-17 RX ORDER — MIDODRINE HYDROCHLORIDE 2.5 MG/1
10 TABLET ORAL THREE TIMES A DAY
Refills: 0 | Status: DISCONTINUED | OUTPATIENT
Start: 2021-03-17 | End: 2021-04-16

## 2021-03-17 RX ORDER — PANTOPRAZOLE SODIUM 20 MG/1
40 TABLET, DELAYED RELEASE ORAL DAILY
Refills: 0 | Status: DISCONTINUED | OUTPATIENT
Start: 2021-03-17 | End: 2021-03-18

## 2021-03-17 RX ORDER — SODIUM CHLORIDE 9 MG/ML
1000 INJECTION, SOLUTION INTRAVENOUS
Refills: 0 | Status: DISCONTINUED | OUTPATIENT
Start: 2021-03-17 | End: 2021-03-18

## 2021-03-17 RX ORDER — ALPRAZOLAM 0.25 MG
0.5 TABLET ORAL DAILY
Refills: 0 | Status: DISCONTINUED | OUTPATIENT
Start: 2021-03-17 | End: 2021-03-23

## 2021-03-17 RX ADMIN — MIDODRINE HYDROCHLORIDE 10 MILLIGRAM(S): 2.5 TABLET ORAL at 17:10

## 2021-03-17 RX ADMIN — METHOCARBAMOL 500 MILLIGRAM(S): 500 TABLET, FILM COATED ORAL at 13:38

## 2021-03-17 RX ADMIN — SODIUM CHLORIDE 100 MILLILITER(S): 9 INJECTION, SOLUTION INTRAVENOUS at 23:07

## 2021-03-17 RX ADMIN — PANTOPRAZOLE SODIUM 40 MILLIGRAM(S): 20 TABLET, DELAYED RELEASE ORAL at 05:54

## 2021-03-17 RX ADMIN — SODIUM ZIRCONIUM CYCLOSILICATE 10 GRAM(S): 10 POWDER, FOR SUSPENSION ORAL at 13:38

## 2021-03-17 RX ADMIN — Medication 650 MILLIGRAM(S): at 21:39

## 2021-03-17 RX ADMIN — BRIMONIDINE TARTRATE 1 DROP(S): 2 SOLUTION/ DROPS OPHTHALMIC at 05:54

## 2021-03-17 RX ADMIN — Medication 4 MILLIGRAM(S): at 23:07

## 2021-03-17 RX ADMIN — SODIUM CHLORIDE 150 MILLILITER(S): 9 INJECTION, SOLUTION INTRAVENOUS at 10:07

## 2021-03-17 RX ADMIN — MIDODRINE HYDROCHLORIDE 10 MILLIGRAM(S): 2.5 TABLET ORAL at 11:38

## 2021-03-17 RX ADMIN — BICALUTAMIDE 50 MILLIGRAM(S): 50 TABLET, FILM COATED ORAL at 11:36

## 2021-03-17 RX ADMIN — HEPARIN SODIUM 5000 UNIT(S): 5000 INJECTION INTRAVENOUS; SUBCUTANEOUS at 13:39

## 2021-03-17 RX ADMIN — Medication 4 MILLIGRAM(S): at 11:35

## 2021-03-17 RX ADMIN — Medication 4 MILLIGRAM(S): at 17:10

## 2021-03-17 RX ADMIN — Medication 650 MILLIGRAM(S): at 05:54

## 2021-03-17 RX ADMIN — Medication 4 MILLIGRAM(S): at 05:53

## 2021-03-17 RX ADMIN — HEPARIN SODIUM 5000 UNIT(S): 5000 INJECTION INTRAVENOUS; SUBCUTANEOUS at 21:38

## 2021-03-17 RX ADMIN — METHOCARBAMOL 500 MILLIGRAM(S): 500 TABLET, FILM COATED ORAL at 21:39

## 2021-03-17 RX ADMIN — LIDOCAINE 2 PATCH: 4 CREAM TOPICAL at 23:06

## 2021-03-17 RX ADMIN — LIDOCAINE 2 PATCH: 4 CREAM TOPICAL at 19:00

## 2021-03-17 RX ADMIN — LIDOCAINE 2 PATCH: 4 CREAM TOPICAL at 11:36

## 2021-03-17 RX ADMIN — SODIUM ZIRCONIUM CYCLOSILICATE 10 GRAM(S): 10 POWDER, FOR SUSPENSION ORAL at 21:40

## 2021-03-17 RX ADMIN — METHOCARBAMOL 500 MILLIGRAM(S): 500 TABLET, FILM COATED ORAL at 05:53

## 2021-03-17 RX ADMIN — Medication 0.5 MILLIGRAM(S): at 06:49

## 2021-03-17 RX ADMIN — Medication 650 MILLIGRAM(S): at 13:38

## 2021-03-17 RX ADMIN — SODIUM CHLORIDE 100 MILLILITER(S): 9 INJECTION, SOLUTION INTRAVENOUS at 11:12

## 2021-03-17 RX ADMIN — LATANOPROST 1 DROP(S): 0.05 SOLUTION/ DROPS OPHTHALMIC; TOPICAL at 21:39

## 2021-03-17 RX ADMIN — SODIUM ZIRCONIUM CYCLOSILICATE 10 GRAM(S): 10 POWDER, FOR SUSPENSION ORAL at 05:53

## 2021-03-17 RX ADMIN — CHLORHEXIDINE GLUCONATE 1 APPLICATION(S): 213 SOLUTION TOPICAL at 11:38

## 2021-03-17 RX ADMIN — PANTOPRAZOLE SODIUM 40 MILLIGRAM(S): 20 TABLET, DELAYED RELEASE ORAL at 11:37

## 2021-03-17 NOTE — PROGRESS NOTE ADULT - SUBJECTIVE AND OBJECTIVE BOX
Patient is a 80y old  Male who presents with a chief complaint of 80 YEAR OLD MALE C/O MULTIPLE MEDICAL COMPLAINTS . (17 Mar 2021 14:04)        Pt Appears Chronically ill in ICU      ROS:     All ROS are negative except HPI         PHYSICAL EXAM    ICU Vital Signs Last 24 Hrs  T(C): 36.4 (17 Mar 2021 00:00), Max: 36.6 (16 Mar 2021 16:00)  T(F): 97.6 (17 Mar 2021 00:00), Max: 97.9 (16 Mar 2021 16:00)  HR: 68 (17 Mar 2021 11:00) (60 - 98)  BP: 111/48 (17 Mar 2021 11:00) (70/36 - 145/60)  BP(mean): 68 (17 Mar 2021 11:00) (46 - 105)  ABP: --  ABP(mean): --  RR: 17 (17 Mar 2021 11:00) (11 - 28)  SpO2: 99% (17 Mar 2021 11:00) (94% - 100%)      CONSTITUTIONAL:   Appears Chronically ill in ICU    ENT:   Airway patent,   Mouth with normal mucosa.   No thrush    EYES:   Pupils equal,   Round and reactive to light.    CARDIAC:   Normal rate,   Regular rhythm.    No edema      Vascular:  Normal systolic impulse  No Carotid bruits    RESPIRATORY:   No wheezing  Bilateral BS  Normal chest expansion  Not tachypneic,  No use of accessory muscles    GASTROINTESTINAL:  Abdomen soft,   Non-tender,   No guarding,   + BS    MUSCULOSKELETAL:   Range of motion is not limited,  No clubbing, cyanosis    NEUROLOGICAL:   Alert and oriented   No motor  deficits.    SKIN:   Skin normal color for race,   Warm and dry and intact.   No evidence of rash.    PSYCHIATRIC:   Normal mood and affect.   No apparent risk to self or others.    HEMATOLOGICAL:  No cervical  lymphadenopathy.  no inguinal lymphadenopathy      03-16-21 @ 07:01  -  03-17-21 @ 07:00  --------------------------------------------------------  IN:    dextrose 5% + sodium chloride 0.45% w/ Additives: 2725 mL    IV PiggyBack: 50 mL    Norepinephrine: 63.6 mL    Sodium Chloride 0.9% Bolus: 3000 mL  Total IN: 5838.6 mL    OUT:    Indwelling Catheter - Urethral (mL): 3840 mL  Total OUT: 3840 mL    Total NET: 1998.6 mL      03-17-21 @ 07:01  -  03-17-21 @ 14:37  --------------------------------------------------------  IN:    dextrose 5% + sodium chloride 0.45% w/ Additives: 100 mL    dextrose 5% + sodium chloride 0.45% w/ Additives: 600 mL  Total IN: 700 mL    OUT:  Total OUT: 0 mL    Total NET: 700 mL          LABS:                            6.9    12.60 )-----------( 178      ( 17 Mar 2021 04:43 )             21.5                                               03-17    135  |  106  |  47<H>  ----------------------------<  179<H>  4.0   |  19  |  0.8    Ca    8.8      17 Mar 2021 04:43  Phos  2.7     03-16  Mg     1.8     03-17    TPro  4.0<L>  /  Alb  2.4<L>  /  TBili  0.3  /  DBili  x   /  AST  73<H>  /  ALT  65<H>  /  AlkPhos  81  03-17      PT/INR - ( 16 Mar 2021 15:58 )   PT: 10.90 sec;   INR: 0.95 ratio         PTT - ( 16 Mar 2021 15:58 )  PTT:22.8 sec                                                                                     LIVER FUNCTIONS - ( 17 Mar 2021 04:43 )  Alb: 2.4 g/dL / Pro: 4.0 g/dL / ALK PHOS: 81 U/L / ALT: 65 U/L / AST: 73 U/L / GGT: x                                                                                                                                       MEDICATIONS  (STANDING):  bicalutamide 50 milliGRAM(s) Oral daily  chlorhexidine 4% Liquid 1 Application(s) Topical daily  dexAMETHasone  Injectable 4 milliGRAM(s) IV Push every 6 hours  dextrose 5% + sodium chloride 0.45% 1000 milliLiter(s) (100 mL/Hr) IV Continuous <Continuous>  heparin   Injectable 5000 Unit(s) SubCutaneous every 8 hours  latanoprost 0.005% Ophthalmic Solution 1 Drop(s) Both EYES at bedtime  lidocaine   Patch 2 Patch Transdermal daily  methocarbamol 500 milliGRAM(s) Oral three times a day  midodrine. 10 milliGRAM(s) Oral three times a day  pantoprazole  Injectable 40 milliGRAM(s) IV Push daily  sodium bicarbonate 650 milliGRAM(s) Oral three times a day  sodium zirconium cyclosilicate 10 Gram(s) Oral three times a day    MEDICATIONS  (PRN):  ALPRAZolam 0.5 milliGRAM(s) Oral daily PRN anxiety  aluminum hydroxide/magnesium hydroxide/simethicone Suspension 30 milliLiter(s) Oral every 6 hours PRN Dyspepsia  brimonidine 0.2% Ophthalmic Solution 1 Drop(s) Both EYES every 8 hours PRN dry eyes  HYDROmorphone  Injectable 0.5 milliGRAM(s) IV Push every 4 hours PRN Severe Pain (7 - 10)      New X-rays reviewed:                                                                                  ECHO    CXR interpreted by me:

## 2021-03-17 NOTE — PROGRESS NOTE ADULT - SUBJECTIVE AND OBJECTIVE BOX
Gastroenterology progress note:     Patient is a 80y old  Male who presents with a chief complaint of 80 YEAR OLD MALE C/O MULTIPLE MEDICAL COMPLAINTS . (17 Mar 2021 07:12)       Admitted on: 03-08-21    We are following the patient for 1 episode of coffee ground vomiting     Interval History: had 1 non bloody non melanotic BM yesterday, no vomiting. had chest pain radiating to left arm. off levophed. tolerating clear liquid diet.    Patient's medical problems are stable   Prior records reviewed (Y/N): Y  History obtained from someone other than patient (Y/N): N      PAST MEDICAL & SURGICAL HISTORY:  Inguinal hernia    CHF (congestive heart failure)    S/P CABG x 3        MEDICATIONS  (STANDING):  bicalutamide 50 milliGRAM(s) Oral daily  chlorhexidine 4% Liquid 1 Application(s) Topical daily  dexAMETHasone  Injectable 4 milliGRAM(s) IV Push every 6 hours  dextrose 5% + sodium chloride 0.45% 1000 milliLiter(s) (100 mL/Hr) IV Continuous <Continuous>  heparin   Injectable 5000 Unit(s) SubCutaneous every 8 hours  latanoprost 0.005% Ophthalmic Solution 1 Drop(s) Both EYES at bedtime  lidocaine   Patch 2 Patch Transdermal daily  methocarbamol 500 milliGRAM(s) Oral three times a day  midodrine. 10 milliGRAM(s) Oral three times a day  pantoprazole  Injectable 40 milliGRAM(s) IV Push daily  sodium bicarbonate 650 milliGRAM(s) Oral three times a day  sodium zirconium cyclosilicate 10 Gram(s) Oral three times a day    MEDICATIONS  (PRN):  ALPRAZolam 0.5 milliGRAM(s) Oral daily PRN anxiety  aluminum hydroxide/magnesium hydroxide/simethicone Suspension 30 milliLiter(s) Oral every 6 hours PRN Dyspepsia  brimonidine 0.2% Ophthalmic Solution 1 Drop(s) Both EYES every 8 hours PRN dry eyes  HYDROmorphone  Injectable 0.5 milliGRAM(s) IV Push every 4 hours PRN Severe Pain (7 - 10)      Allergies  No Known Allergies      Review of Systems:   General: no fever  HEENT: no hemoptysis  Cardiovascular:  +Chest Pain, No Palpitations  Respiratory:  No Cough, No Dyspnea  Gastrointestinal:  As described in HPI  Skin: no new rash    Physical Examination:  T(C): 36.4 (03-17-21 @ 00:00), Max: 36.6 (03-16-21 @ 16:00)  HR: 88 (03-17-21 @ 10:00) (60 - 98)  BP: 107/45 (03-17-21 @ 10:00) (70/36 - 145/60)  RR: 24 (03-17-21 @ 10:00) (11 - 28)  SpO2: 100% (03-17-21 @ 10:00) (94% - 100%)    Constitutional: No acute distress.  Head: normocephalic  Neck: no palpable thyroid  Eyes: EOMI  Respiratory:  No signs of respiratory distress. Lung sounds are clear bilaterally.  Cardiovascular:  S1 S2, Regular rate and rhythm.  Abdominal: Abdomen is soft, symmetric, and non-tender without distention.    Skin: No Jaundice.        Data: (reviewed by attending)                        6.9    12.60 )-----------( 178      ( 17 Mar 2021 04:43 )             21.5     Hgb trend:  6.9  03-17-21 @ 04:43  7.0  03-16-21 @ 21:35  6.8  03-16-21 @ 15:58  7.3  03-16-21 @ 11:19  9.4  03-15-21 @ 05:18  8.5  03-14-21 @ 20:41  7.9  03-14-21 @ 17:22  9.7  03-14-21 @ 11:45        03-17    135  |  106  |  47<H>  ----------------------------<  179<H>  4.0   |  19  |  0.8    Ca    8.8      17 Mar 2021 04:43  Phos  2.7     03-16  Mg     1.8     03-17    TPro  4.0<L>  /  Alb  2.4<L>  /  TBili  0.3  /  DBili  x   /  AST  73<H>  /  ALT  65<H>  /  AlkPhos  81  03-17    Liver panel trend:  TBili 0.3   /   AST 73   /   ALT 65   /   AlkP 81   /   Tptn 4.0   /   Alb 2.4    /   DBili --      03-17  TBili 0.3   /   AST 23   /   ALT 30   /   AlkP 80   /   Tptn 4.7   /   Alb 2.7    /   DBili <0.2      03-16  TBili 0.3   /   AST 19   /   ALT 41   /   AlkP 87   /   Tptn 5.1   /   Alb 3.0    /   DBili <0.2      03-15  TBili 0.2   /   AST 66   /   ALT 81   /   AlkP 118   /   Tptn 5.2   /   Alb 2.9    /   DBili --      03-13  TBili 0.3   /   AST 17   /   ALT 17   /   AlkP 127   /   Tptn 5.6   /   Alb 3.1    /   DBili --      03-12  TBili 0.3   /   AST 16   /   ALT 10   /   AlkP 129   /   Tptn 5.8   /   Alb 3.2    /   DBili --      03-11  TBili 0.3   /   AST 27   /   ALT 10   /   AlkP 154   /   Tptn 6.5   /   Alb 3.6    /   DBili --      03-10  TBili 0.6   /   AST 21   /   ALT 8   /   AlkP 213   /   Tptn 7.5   /   Alb 4.1    /   DBili --      03-08      PT/INR - ( 16 Mar 2021 15:58 )   PT: 10.90 sec;   INR: 0.95 ratio         PTT - ( 16 Mar 2021 15:58 )  PTT:22.8 sec       Radiology: (reviewed by attending)  CT Abdomen and Pelvis No Cont:   EXAM:  CT ABDOMEN AND PELVIS            PROCEDURE DATE:  03/16/2021            INTERPRETATION:  CLINICAL STATEMENT: Acute drop in hemoglobin.    TECHNIQUE: Contiguous axial CT images were obtained from the lower chest to the pubic symphysis following administration of 100cc Optiray 320 intravenous contrast.  Oral contrast was not administered.  Reformatted images in the coronal and sagittal planes were acquired.    CT chest performed same day. Please see separate report.    COMPARISON CT: T abdomen and pelvis 3/8/2021.    OTHER STUDIES USED FOR CORRELATION: None.      FINDINGS:    Evaluation of intra-abdominal structures limited due to lack of intravenous contrast. Additionally the study is degraded by beam hardening artifact secondary to patient arm positioning.    LOWER CHEST: See separately dictated CT chest report for thoracic findings.    HEPATOBILIARY: Unremarkable.    SPLEEN: Calcified punctate granulomas.    PANCREAS: Unremarkable.    ADRENAL GLANDS: Unremarkable.    KIDNEYS: Trace bilateral hydronephrosis decreased from prior.    ABDOMINOPELVIC NODES: Unremarkable.    PELVIC ORGANS: Bladder contracted around Santos catheter. Intraluminal gas likely related to instrumentation.    PERITONEUM/MESENTERY/BOWEL:Partially imaged moderate to large right inguinal hernia containing nondilated bowel. No evidence of bowel obstruction free air or fluid collection.    BONES/SOFT TISSUES: Dominant destructive sacral lesion with relationship to surrounding structures more fully depictedon MR of 3/11/2021. Additional pelvic lesions and lesions of the lumbar spine also as detailed in the lumbar spine report. Again seen there is a sacral soft tissue mass measuring 7.4 x 5.2 x 6.2 cm which appears to be originating from the level of S2-S3. Body wall edema. Small fat-containing ventral hernia.    OTHER: Vascular calcification.      IMPRESSION:  1.  No evidence of retroperitoneal hematoma.  2.   Dominant destructive sacral lesion with relationship to surrounding structures more fullydepicted on MR of 3/11/2021. Additional pelvic lesions and lesions of the lumbar spine also as detailed in the lumbar spine report.  3.  Improving bilateral hydronephrosis.  4.  See separately dictated CT chest report for thoracic findings.            MIRELLA MONTELONGO M.D., RESIDENT RADIOLOGIST  This document has been electronically signed.  SENDY FARRAR MD; Attending Radiologist  This document has been electronically signed. Mar 17 2021  9:31AM (03-16-21 @ 18:30)

## 2021-03-17 NOTE — PROGRESS NOTE ADULT - ASSESSMENT
1. Shock: most likely hypovolemic in nature from severe dehydration DENISE and possible GI bleeding, currently off levophed gtt will  Monitor for now. Allow for lower BP as pt is chronically low and is asymptomatic. tx Midodrine    2. DENISE: secondary to dehydration in setting of obstructive uropathy, tx IVF's monitor for now, continue lemus.    3. Anemia: possible GI bleed from PUD tx PPI daily, transfuse for hgb less then 7, monitor for now. CT abd neg for Retroperitoneal hematoma appears less likley to be active bleeding most likley dilusional    4. B/L Hydronephrosis from Obstructive Uropathy: continue Lemus monitor I/O's now has post obstructive dieresis continue IVF's.    5. Metastatic Prostate CA with Multiple bones mets: continue decadron, f/u hem/ rad onc recs, currently on casodex, f/u Palliative care recs.    6. 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    7. Systolic CHF EF 35%: continue supportive care, monitor for now. Most likely ischemic in nature    8. AFIB: continue supportive care, monitor for now.    9. HTN: continue supportive care, monitor for now.    10. CAD s/p CABG: continue supportive care, monitor for now.    11. DVT px: tx hep subcut lower ext US neg for DVT.    12. NUT: tx oral diet, tx IVF's.

## 2021-03-17 NOTE — CONSULT NOTE ADULT - SUBJECTIVE AND OBJECTIVE BOX
K02.9 Temperature: 98.1   Patient is a 80y old  Male who presents with a chief complaint of 80 YEAR OLD MALE C/O MULTIPLE MEDICAL COMPLAINTS . (17 Mar 2021 11:37)    HPI:   81 yo male hx of CAD s/p CABG s/p 20+ years ago/ CHF/ right sided large inguinal hernia BIBA from hotel room 2/2 unable to ambulate with right thigh weakness and numbness. reported it started from knee to hip. denies fall and injury. He was just sitting and the symptoms started. reported off/on back pain in the past. denies fever/chill/recent illness/coughing/chest pain/abd pain/n/v/d and urinary sxs.   	Also has right sided facial swelling for about 1 year after 3 teeth extraction. reports swelling worsens after eating. swelling was better in the past and started swelling again in the past few months. didn't follow up with his dentist 2/2 COVID.   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 he used to be able to ambulate without assistant and he commutes with his car. (08 Mar 2021 05:03)      PAST MEDICAL & SURGICAL HISTORY:  Inguinal hernia    CHF (congestive heart failure)    S/P CABG x 3      MEDICATIONS  (STANDING):  bicalutamide 50 milliGRAM(s) Oral daily  chlorhexidine 4% Liquid 1 Application(s) Topical daily  dexAMETHasone  Injectable 4 milliGRAM(s) IV Push every 6 hours  dextrose 5% + sodium chloride 0.45% 1000 milliLiter(s) (100 mL/Hr) IV Continuous <Continuous>  heparin   Injectable 5000 Unit(s) SubCutaneous every 8 hours  latanoprost 0.005% Ophthalmic Solution 1 Drop(s) Both EYES at bedtime  lidocaine   Patch 2 Patch Transdermal daily  methocarbamol 500 milliGRAM(s) Oral three times a day  midodrine. 10 milliGRAM(s) Oral three times a day  pantoprazole  Injectable 40 milliGRAM(s) IV Push daily  sodium bicarbonate 650 milliGRAM(s) Oral three times a day  sodium zirconium cyclosilicate 10 Gram(s) Oral three times a day    MEDICATIONS  (PRN):  ALPRAZolam 0.5 milliGRAM(s) Oral daily PRN anxiety  aluminum hydroxide/magnesium hydroxide/simethicone Suspension 30 milliLiter(s) Oral every 6 hours PRN Dyspepsia  brimonidine 0.2% Ophthalmic Solution 1 Drop(s) Both EYES every 8 hours PRN dry eyes  HYDROmorphone  Injectable 0.5 milliGRAM(s) IV Push every 4 hours PRN Severe Pain (7 - 10)      Allergies: No Known Allergies    *Last Dental Visit: over a year ago    Vital Signs Last 24 Hrs  T(C): 36.4 (17 Mar 2021 00:00), Max: 36.6 (16 Mar 2021 16:00)  T(F): 97.6 (17 Mar 2021 00:00), Max: 97.9 (16 Mar 2021 16:00)  HR: 68 (17 Mar 2021 11:00) (60 - 98)  BP: 111/48 (17 Mar 2021 11:00) (70/36 - 145/60)  BP(mean): 68 (17 Mar 2021 11:00) (46 - 105)  RR: 17 (17 Mar 2021 11:00) (11 - 28)  SpO2: 99% (17 Mar 2021 11:00) (94% - 100%)    LABS:                        6.9    12.60 )-----------( 178      ( 17 Mar 2021 04:43 )             21.5     03-17    135  |  106  |  47<H>  ----------------------------<  179<H>  4.0   |  19  |  0.8    Ca    8.8      17 Mar 2021 04:43  Phos  2.7     03-16  Mg     1.8     03-17    TPro  4.0<L>  /  Alb  2.4<L>  /  TBili  0.3  /  DBili  x   /  AST  73<H>  /  ALT  65<H>  /  AlkPhos  81  03-17    Platelet Count - Automated: 178 K/uL [130 - 400] (03-17 @ 04:43)  WBC Count: 12.60 K/uL <H> [4.80 - 10.80] (03-17 @ 04:43)  WBC Count: 12.00 K/uL <H> [4.80 - 10.80] (03-16 @ 21:35)  Platelet Count - Automated: 136 K/uL [130 - 400] (03-16 @ 21:35)  Platelet Count - Automated: 154 K/uL [130 - 400] (03-16 @ 15:58)  WBC Count: 12.45 K/uL <H> [4.80 - 10.80] (03-16 @ 15:58)  INR: 0.95 ratio [0.65 - 1.30] (03-16 @ 15:58)  WBC Count: 13.74 K/uL <H> [4.80 - 10.80] (03-16 @ 11:19)  Platelet Count - Automated: 198 K/uL [130 - 400] (03-16 @ 11:19)  Platelet Count - Automated: 268 K/uL [130 - 400] (03-15 @ 05:18)  WBC Count: 26.06 K/uL <H> [4.80 - 10.80] (03-15 @ 05:18)  WBC Count: 24.81 K/uL <H> [4.80 - 10.80] (03-14 @ 20:41)  Platelet Count - Automated: 308 K/uL [130 - 400] (03-14 @ 20:41)  WBC Count: 20.37 K/uL <H> [4.80 - 10.80] (03-14 @ 17:22)  Platelet Count - Automated: 249 K/uL [130 - 400] (03-14 @ 17:22)      PT/INR - ( 16 Mar 2021 15:58 )   PT: 10.90 sec;   INR: 0.95 ratio         PTT - ( 16 Mar 2021 15:58 )  PTT:22.8 sec  EOE:                (  + ) trismus, patient able to open 2 finger breadths             ( +  ) asymmetry, lower right                 (+) swelling, lower right              ( +  ) palpation, lower right     IOE:  permanent dentition:  <<multiple carious teeth>> OR <<multiple missing teeth>>           hard/soft palate:  (  - ) palatal torus, <<No pathology noted>>           tongue/FOM <<No pathology noted>>  Large intraoral swelling in lower right area. Tender to palpation  Patient reports exacerbation of swelling after intraoral exam completed by doctor during inpatient stay.     *DENTAL RADIOGRAPHS: 14 intraoral radiographs taken     RADIOLOGY & ADDITIONAL STUDIES: N/A    *ASSESSMENT: Pending evaluation by oral surgeon.     *PLAN: Pending evaluation by oral surgeon.       Resident Name, pager #  Germania Moore, [phone] K02.9 Temperature: 98.1   Patient is a 80y old  Male who presents with a chief complaint of 80 YEAR OLD MALE C/O MULTIPLE MEDICAL COMPLAINTS . (17 Mar 2021 11:37)    HPI:   81 yo male hx of CAD s/p CABG s/p 20+ years ago/ CHF/ right sided large inguinal hernia BIBA from hotel room 2/2 unable to ambulate with right thigh weakness and numbness. reported it started from knee to hip. denies fall and injury. He was just sitting and the symptoms started. reported off/on back pain in the past. denies fever/chill/recent illness/coughing/chest pain/abd pain/n/v/d and urinary sxs.   	Also has right sided facial swelling for about 1 year after 3 teeth extraction. reports swelling worsens after eating. swelling was better in the past and started swelling again in the past few months. didn't follow up with his dentist 2/2 COVID.   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 he used to be able to ambulate without assistant and he commutes with his car. (08 Mar 2021 05:03)      PAST MEDICAL & SURGICAL HISTORY:  Inguinal hernia    CHF (congestive heart failure)    S/P CABG x 3      MEDICATIONS  (STANDING):  bicalutamide 50 milliGRAM(s) Oral daily  chlorhexidine 4% Liquid 1 Application(s) Topical daily  dexAMETHasone  Injectable 4 milliGRAM(s) IV Push every 6 hours  dextrose 5% + sodium chloride 0.45% 1000 milliLiter(s) (100 mL/Hr) IV Continuous <Continuous>  heparin   Injectable 5000 Unit(s) SubCutaneous every 8 hours  latanoprost 0.005% Ophthalmic Solution 1 Drop(s) Both EYES at bedtime  lidocaine   Patch 2 Patch Transdermal daily  methocarbamol 500 milliGRAM(s) Oral three times a day  midodrine. 10 milliGRAM(s) Oral three times a day  pantoprazole  Injectable 40 milliGRAM(s) IV Push daily  sodium bicarbonate 650 milliGRAM(s) Oral three times a day  sodium zirconium cyclosilicate 10 Gram(s) Oral three times a day    MEDICATIONS  (PRN):  ALPRAZolam 0.5 milliGRAM(s) Oral daily PRN anxiety  aluminum hydroxide/magnesium hydroxide/simethicone Suspension 30 milliLiter(s) Oral every 6 hours PRN Dyspepsia  brimonidine 0.2% Ophthalmic Solution 1 Drop(s) Both EYES every 8 hours PRN dry eyes  HYDROmorphone  Injectable 0.5 milliGRAM(s) IV Push every 4 hours PRN Severe Pain (7 - 10)      Allergies: No Known Allergies    *Last Dental Visit: over a year ago    Vital Signs Last 24 Hrs  T(C): 36.4 (17 Mar 2021 00:00), Max: 36.6 (16 Mar 2021 16:00)  T(F): 97.6 (17 Mar 2021 00:00), Max: 97.9 (16 Mar 2021 16:00)  HR: 68 (17 Mar 2021 11:00) (60 - 98)  BP: 111/48 (17 Mar 2021 11:00) (70/36 - 145/60)  BP(mean): 68 (17 Mar 2021 11:00) (46 - 105)  RR: 17 (17 Mar 2021 11:00) (11 - 28)  SpO2: 99% (17 Mar 2021 11:00) (94% - 100%)    LABS:                        6.9    12.60 )-----------( 178      ( 17 Mar 2021 04:43 )             21.5     03-17    135  |  106  |  47<H>  ----------------------------<  179<H>  4.0   |  19  |  0.8    Ca    8.8      17 Mar 2021 04:43  Phos  2.7     03-16  Mg     1.8     03-17    TPro  4.0<L>  /  Alb  2.4<L>  /  TBili  0.3  /  DBili  x   /  AST  73<H>  /  ALT  65<H>  /  AlkPhos  81  03-17    Platelet Count - Automated: 178 K/uL [130 - 400] (03-17 @ 04:43)  WBC Count: 12.60 K/uL <H> [4.80 - 10.80] (03-17 @ 04:43)  WBC Count: 12.00 K/uL <H> [4.80 - 10.80] (03-16 @ 21:35)  Platelet Count - Automated: 136 K/uL [130 - 400] (03-16 @ 21:35)  Platelet Count - Automated: 154 K/uL [130 - 400] (03-16 @ 15:58)  WBC Count: 12.45 K/uL <H> [4.80 - 10.80] (03-16 @ 15:58)  INR: 0.95 ratio [0.65 - 1.30] (03-16 @ 15:58)  WBC Count: 13.74 K/uL <H> [4.80 - 10.80] (03-16 @ 11:19)  Platelet Count - Automated: 198 K/uL [130 - 400] (03-16 @ 11:19)  Platelet Count - Automated: 268 K/uL [130 - 400] (03-15 @ 05:18)  WBC Count: 26.06 K/uL <H> [4.80 - 10.80] (03-15 @ 05:18)  WBC Count: 24.81 K/uL <H> [4.80 - 10.80] (03-14 @ 20:41)  Platelet Count - Automated: 308 K/uL [130 - 400] (03-14 @ 20:41)  WBC Count: 20.37 K/uL <H> [4.80 - 10.80] (03-14 @ 17:22)  Platelet Count - Automated: 249 K/uL [130 - 400] (03-14 @ 17:22)      PT/INR - ( 16 Mar 2021 15:58 )   PT: 10.90 sec;   INR: 0.95 ratio         PTT - ( 16 Mar 2021 15:58 )  PTT:22.8 sec  EOE:                (  + ) trismus, patient able to open 2 finger breadths             ( +  ) asymmetry, lower right                 (+) swelling, lower right              ( +  ) palpation, lower right     IOE:  permanent dentition:  <<multiple carious teeth>> OR <<multiple missing teeth>>           hard/soft palate:  (  - ) palatal torus, <<No pathology noted>>           tongue/FOM <<No pathology noted>>  Large intraoral mass in lower right area. Tender to palpation      *DENTAL RADIOGRAPHS: 14 intraoral radiographs taken     RADIOLOGY & ADDITIONAL STUDIES: N/A    *ASSESSMENT: Pending evaluation by oral surgeon.     *PLAN: Pending evaluation by oral surgeon.       Resident Name, pager #  Germania Moore, [phone] K02.9 Temperature: 98.1   Patient is a 80y old  Male who presents with a chief complaint of 80 YEAR OLD MALE C/O MULTIPLE MEDICAL COMPLAINTS . (17 Mar 2021 11:37)    HPI:   81 yo male hx of CAD s/p CABG s/p 20+ years ago/ CHF/ right sided large inguinal hernia BIBA from hotel room 2/2 unable to ambulate with right thigh weakness and numbness. reported it started from knee to hip. denies fall and injury. He was just sitting and the symptoms started. reported off/on back pain in the past. denies fever/chill/recent illness/coughing/chest pain/abd pain/n/v/d and urinary sxs.   	Also has right sided facial swelling for about 1 year after 3 teeth extraction. reports swelling worsens after eating. swelling was better in the past and started swelling again in the past few months. didn't follow up with his dentist 2/2 COVID.   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 he used to be able to ambulate without assistant and he commutes with his car. (08 Mar 2021 05:03)      PAST MEDICAL & SURGICAL HISTORY:  Inguinal hernia    CHF (congestive heart failure)    S/P CABG x 3      MEDICATIONS  (STANDING):  bicalutamide 50 milliGRAM(s) Oral daily  chlorhexidine 4% Liquid 1 Application(s) Topical daily  dexAMETHasone  Injectable 4 milliGRAM(s) IV Push every 6 hours  dextrose 5% + sodium chloride 0.45% 1000 milliLiter(s) (100 mL/Hr) IV Continuous <Continuous>  heparin   Injectable 5000 Unit(s) SubCutaneous every 8 hours  latanoprost 0.005% Ophthalmic Solution 1 Drop(s) Both EYES at bedtime  lidocaine   Patch 2 Patch Transdermal daily  methocarbamol 500 milliGRAM(s) Oral three times a day  midodrine. 10 milliGRAM(s) Oral three times a day  pantoprazole  Injectable 40 milliGRAM(s) IV Push daily  sodium bicarbonate 650 milliGRAM(s) Oral three times a day  sodium zirconium cyclosilicate 10 Gram(s) Oral three times a day    MEDICATIONS  (PRN):  ALPRAZolam 0.5 milliGRAM(s) Oral daily PRN anxiety  aluminum hydroxide/magnesium hydroxide/simethicone Suspension 30 milliLiter(s) Oral every 6 hours PRN Dyspepsia  brimonidine 0.2% Ophthalmic Solution 1 Drop(s) Both EYES every 8 hours PRN dry eyes  HYDROmorphone  Injectable 0.5 milliGRAM(s) IV Push every 4 hours PRN Severe Pain (7 - 10)      Allergies: No Known Allergies    *Last Dental Visit: over a year ago    Vital Signs Last 24 Hrs  T(C): 36.4 (17 Mar 2021 00:00), Max: 36.6 (16 Mar 2021 16:00)  T(F): 97.6 (17 Mar 2021 00:00), Max: 97.9 (16 Mar 2021 16:00)  HR: 68 (17 Mar 2021 11:00) (60 - 98)  BP: 111/48 (17 Mar 2021 11:00) (70/36 - 145/60)  BP(mean): 68 (17 Mar 2021 11:00) (46 - 105)  RR: 17 (17 Mar 2021 11:00) (11 - 28)  SpO2: 99% (17 Mar 2021 11:00) (94% - 100%)    LABS:                        6.9    12.60 )-----------( 178      ( 17 Mar 2021 04:43 )             21.5     03-17    135  |  106  |  47<H>  ----------------------------<  179<H>  4.0   |  19  |  0.8    Ca    8.8      17 Mar 2021 04:43  Phos  2.7     03-16  Mg     1.8     03-17    TPro  4.0<L>  /  Alb  2.4<L>  /  TBili  0.3  /  DBili  x   /  AST  73<H>  /  ALT  65<H>  /  AlkPhos  81  03-17    Platelet Count - Automated: 178 K/uL [130 - 400] (03-17 @ 04:43)  WBC Count: 12.60 K/uL <H> [4.80 - 10.80] (03-17 @ 04:43)  WBC Count: 12.00 K/uL <H> [4.80 - 10.80] (03-16 @ 21:35)  Platelet Count - Automated: 136 K/uL [130 - 400] (03-16 @ 21:35)  Platelet Count - Automated: 154 K/uL [130 - 400] (03-16 @ 15:58)  WBC Count: 12.45 K/uL <H> [4.80 - 10.80] (03-16 @ 15:58)  INR: 0.95 ratio [0.65 - 1.30] (03-16 @ 15:58)  WBC Count: 13.74 K/uL <H> [4.80 - 10.80] (03-16 @ 11:19)  Platelet Count - Automated: 198 K/uL [130 - 400] (03-16 @ 11:19)  Platelet Count - Automated: 268 K/uL [130 - 400] (03-15 @ 05:18)  WBC Count: 26.06 K/uL <H> [4.80 - 10.80] (03-15 @ 05:18)  WBC Count: 24.81 K/uL <H> [4.80 - 10.80] (03-14 @ 20:41)  Platelet Count - Automated: 308 K/uL [130 - 400] (03-14 @ 20:41)  WBC Count: 20.37 K/uL <H> [4.80 - 10.80] (03-14 @ 17:22)  Platelet Count - Automated: 249 K/uL [130 - 400] (03-14 @ 17:22)      PT/INR - ( 16 Mar 2021 15:58 )   PT: 10.90 sec;   INR: 0.95 ratio         PTT - ( 16 Mar 2021 15:58 )  PTT:22.8 sec  EOE:                (  + ) trismus, patient able to open 2 finger breadths             ( +  ) asymmetry, lower right                 (+) swelling, lower right due to mass             ( +  ) palpation, lower right     IOE:  permanent dentition:  <<multiple carious teeth>> OR <<multiple missing teeth>>           hard/soft palate:  (  - ) palatal torus, <<No pathology noted>>           tongue/FOM <<No pathology noted>>  Large intraoral mass in lower right area. Tender to palpation      *DENTAL RADIOGRAPHS: 14 intraoral radiographs taken     RADIOLOGY & ADDITIONAL STUDIES: N/A    *ASSESSMENT: Pending evaluation by oral surgeon.     *PLAN: Pending evaluation by oral surgeon.       Resident Name, pager #  Germania Moore, [phone]

## 2021-03-17 NOTE — PROGRESS NOTE ADULT - SUBJECTIVE AND OBJECTIVE BOX
Hospital Day:  9d    Subjective:    Patient is a 80y old  Male who presents with a chief complaint of 80 YEAR OLD MALE C/O MULTIPLE MEDICAL COMPLAINTS . (16 Mar 2021 14:57)      Admitted to medicine for a primary diagnosis of     Past Medical Hx:   Inguinal hernia    CHF (congestive heart failure)      Past Sx:  S/P CABG x 3      Allergies:  No Known Allergies    Current Meds:   Standng Meds:  bicalutamide 50 milliGRAM(s) Oral daily  chlorhexidine 4% Liquid 1 Application(s) Topical daily  dexAMETHasone  Injectable 4 milliGRAM(s) IV Push every 6 hours  dextrose 5% + sodium chloride 0.45% 1000 milliLiter(s) (150 mL/Hr) IV Continuous <Continuous>  latanoprost 0.005% Ophthalmic Solution 1 Drop(s) Both EYES at bedtime  lidocaine   Patch 2 Patch Transdermal daily  methocarbamol 500 milliGRAM(s) Oral three times a day  norepinephrine Infusion 0.05 MICROgram(s)/kG/Min (6.28 mL/Hr) IV Continuous <Continuous>  pantoprazole  Injectable 40 milliGRAM(s) IV Push two times a day  sodium bicarbonate 650 milliGRAM(s) Oral three times a day  sodium zirconium cyclosilicate 10 Gram(s) Oral three times a day    PRN Meds:  aluminum hydroxide/magnesium hydroxide/simethicone Suspension 30 milliLiter(s) Oral every 6 hours PRN Dyspepsia  brimonidine 0.2% Ophthalmic Solution 1 Drop(s) Both EYES every 8 hours PRN dry eyes  HYDROmorphone  Injectable 0.5 milliGRAM(s) IV Push every 4 hours PRN Severe Pain (7 - 10)    HOME MEDICATIONS:  Eliquis 5 mg oral tablet: 1 tab(s) orally 2 times a day  Lasix 20 mg oral tablet: 10  orally once a day, As Needed  lisinopril 10 mg oral tablet: 10  orally 2 times a day  metoprolol succinate 50 mg oral tablet, extended release: 1 tab(s) orally once a day  spironolactone 25 mg oral tablet: 1 tab(s) orally 2 times a day      Vital Signs:   T(F): 97.6 (03-17-21 @ 00:00), Max: 97.9 (03-16-21 @ 16:00)  HR: 78 (03-17-21 @ 07:00) (60 - 98)  BP: 113/50 (03-17-21 @ 06:00) (70/36 - 145/60)  RR: 24 (03-17-21 @ 07:00) (15 - 28)  SpO2: 100% (03-17-21 @ 07:00) (98% - 100%)      03-16-21 @ 07:01  -  03-17-21 @ 07:00  --------------------------------------------------------  IN: 5838.6 mL / OUT: 3840 mL / NET: 1998.6 mL        Physical Exam:   GENERAL: NAD  HEENT: NCAT  CHEST/LUNG: CTAB  HEART: Regular rate and rhythm; s1 s2 appreciated, No murmurs, rubs, or gallops  ABDOMEN: Soft, Nontender, Nondistended; Bowel sounds present  EXTREMITIES: No LE edema b/l  SKIN: no rashes, no new lesions  NERVOUS SYSTEM:  Alert & Oriented X3  LINES/CATHETERS:        Labs:                         6.9    12.60 )-----------( 178      ( 17 Mar 2021 04:43 )             21.5     Neutophil% 84.7, Lymphocyte% 3.9, Monocyte% 6.3, Bands% 4.9 03-17-21 @ 04:43    17 Mar 2021 04:43    135    |  106    |  47     ----------------------------<  179    4.0     |  19     |  0.8      Ca    8.8        17 Mar 2021 04:43  Phos  2.7       16 Mar 2021 04:18  Mg     1.8       17 Mar 2021 04:43    TPro  4.0    /  Alb  2.4    /  TBili  0.3    /  DBili  x      /  AST  73     /  ALT  65     /  AlkPhos  81     17 Mar 2021 04:43       pTT    22.8             ----< 0.95 INR  (03-16-21 @ 15:58)    10.90        PT        Serum Pro-Brain Natriuretic Peptide: 2564 pg/mL (03-08-21 @ 01:05)                   Hospital Day:  9d    Subjective:    Patient is a 80y old  Male who presents with a chief complaint of 80 YEAR OLD MALE C/O MULTIPLE MEDICAL COMPLAINTS. No overnight events. C/o chest pain this am, reproducible.       Admitted to medicine for a primary diagnosis of hypovolemic shock.    Past Medical Hx:   Inguinal hernia    CHF (congestive heart failure)      Past Sx:  S/P CABG x 3      Allergies:  No Known Allergies    Current Meds:   Standng Meds:  bicalutamide 50 milliGRAM(s) Oral daily  chlorhexidine 4% Liquid 1 Application(s) Topical daily  dexAMETHasone  Injectable 4 milliGRAM(s) IV Push every 6 hours  dextrose 5% + sodium chloride 0.45% 1000 milliLiter(s) (150 mL/Hr) IV Continuous <Continuous>  latanoprost 0.005% Ophthalmic Solution 1 Drop(s) Both EYES at bedtime  lidocaine   Patch 2 Patch Transdermal daily  methocarbamol 500 milliGRAM(s) Oral three times a day  norepinephrine Infusion 0.05 MICROgram(s)/kG/Min (6.28 mL/Hr) IV Continuous <Continuous>  pantoprazole  Injectable 40 milliGRAM(s) IV Push two times a day  sodium bicarbonate 650 milliGRAM(s) Oral three times a day  sodium zirconium cyclosilicate 10 Gram(s) Oral three times a day    PRN Meds:  aluminum hydroxide/magnesium hydroxide/simethicone Suspension 30 milliLiter(s) Oral every 6 hours PRN Dyspepsia  brimonidine 0.2% Ophthalmic Solution 1 Drop(s) Both EYES every 8 hours PRN dry eyes  HYDROmorphone  Injectable 0.5 milliGRAM(s) IV Push every 4 hours PRN Severe Pain (7 - 10)    HOME MEDICATIONS:  Eliquis 5 mg oral tablet: 1 tab(s) orally 2 times a day  Lasix 20 mg oral tablet: 10  orally once a day, As Needed  lisinopril 10 mg oral tablet: 10  orally 2 times a day  metoprolol succinate 50 mg oral tablet, extended release: 1 tab(s) orally once a day  spironolactone 25 mg oral tablet: 1 tab(s) orally 2 times a day      Vital Signs:   T(F): 97.6 (03-17-21 @ 00:00), Max: 97.9 (03-16-21 @ 16:00)  HR: 78 (03-17-21 @ 07:00) (60 - 98)  BP: 113/50 (03-17-21 @ 06:00) (70/36 - 145/60)  RR: 24 (03-17-21 @ 07:00) (15 - 28)  SpO2: 100% (03-17-21 @ 07:00) (98% - 100%)      03-16-21 @ 07:01  -  03-17-21 @ 07:00  --------------------------------------------------------  IN: 5838.6 mL / OUT: 3840 mL / NET: 1998.6 mL        Physical Exam:   GENERAL: NAD  HEENT: right cheek and jaw enlargement  CHEST/LUNG: CTAB  HEART: Regular rate and rhythm; s1 s2 appreciated, No murmurs, rubs, or gallops  ABDOMEN: Soft, Nontender, Nondistended; Bowel sounds present  EXTREMITIES: No LE edema b/l  SKIN: no rashes, no new lesions  NERVOUS SYSTEM:  Alert & Oriented X3  LINES/CATHETERS: lemus, peripheral iv         Labs:                         6.9    12.60 )-----------( 178      ( 17 Mar 2021 04:43 )             21.5     Neutophil% 84.7, Lymphocyte% 3.9, Monocyte% 6.3, Bands% 4.9 03-17-21 @ 04:43    17 Mar 2021 04:43    135    |  106    |  47     ----------------------------<  179    4.0     |  19     |  0.8      Ca    8.8        17 Mar 2021 04:43  Phos  2.7       16 Mar 2021 04:18  Mg     1.8       17 Mar 2021 04:43    TPro  4.0    /  Alb  2.4    /  TBili  0.3    /  DBili  x      /  AST  73     /  ALT  65     /  AlkPhos  81     17 Mar 2021 04:43       pTT    22.8             ----< 0.95 INR  (03-16-21 @ 15:58)    10.90        PT        Serum Pro-Brain Natriuretic Peptide: 2564 pg/mL (03-08-21 @ 01:05)

## 2021-03-17 NOTE — CHART NOTE - NSCHARTNOTEFT_GEN_A_CORE
Case was discussed between Dr. Méndez and Dr. Hernández re: mandibular bx   Will plan on obtaining mandibular bx once patient is stable and downgraded to the floor, will follow.  D/w heme/onc fellow.

## 2021-03-17 NOTE — CONSULT NOTE ADULT - ASSESSMENT
IMP:  - right mandibular mass  - spine and lung mets  - h/o CHF  - UGI bleed, anemia  - hyperkalemia    pt now receiving soft diet. He does c/o inability to chew due to discomfort. He discussed flavor and texture preferences.  Suspect weight loss, but pt unsure and we have no prior weights.  Will order Ensure, pudding, high protein jello. (he dislikes yogurt)  Follow calorie counts  f/u K and phos - note phos is now low  Fe studies? B12. check 25oh D  will f/u with ENT

## 2021-03-17 NOTE — PROGRESS NOTE ADULT - ASSESSMENT
81 yo M hx of CAD s/p CABG s/p 20+ years ago/ CHF/ right sided large inguinal hernia BIBA from hotel room 2/2 unable to ambulate with right thigh weakness and numbness.    #Hypovolemic shock   - Likely multifactorial, hypovolemia and possible UGIB given hb drop  - hgb 6.9 <- 7.3, diluted specimen   - S/p a total of 5L boluses  - c/w maintenance fluids  - Levophed weaned off, MAP 70  - F/u CT a/p noncont to r/o bleed   - LE duplex neg for DVT     #DENISE, resolving  - Likely pre-renal  - S/p 3L LR bolus yesterday. 2L LR bolus today  - Cr 0.8. BUN 47 <- 79  - Started IVF D5 + 1/2NS with 75meq bicarb @150cc/hr     #Acute on chronic anemia  - concern for possible UGIB   - hgb as above  - Hemolysis w/u pending haptoglobin  - BUN 47 <- 79  - GI c/s appreciated: NPO for possible scope today Pt refused to go yesterday  - Hold Lovenox for now  - C/w PPI BID   - F/u CT A/P noncont    # B/l hydro  - Likely due to acute urinary retention   - CT scan showed on admission moderate to severe bilateral hydroureteronephrosis to the level of a markedly distended urinary bladder. Findings presumably secondary to urinary bladder outlet obstruction or urinary retention. Enlarged prostate gland.   - C/w Santos catheter     # Multiple metastatic osseous lesions with newly diagnosed sacral and mandibular mass with LE weakness concerning for spinal cord compression due to malignancy   - CT scan abd/pelvis on admission: Findings compatible with metastatic disease as demonstrated by multiple bony lesions, the largest soft tissue mass centered at S2-S3 measuring up to 7.4 cm with associated bony destruction and obliteration of the exiting neural foramina. Additional smaller lesions at the right sacral ala, L2 and L3.  - CT Neck Soft Tissue No Cont: Large right mandibular soft tissue density mass measuring 6.5 x 7.1 x 9.2 cm likely arising from the right mandible extending from the right mandibular condyle to the body of the mandible. There is an associated extensive bony erosion of the right mandible within the mass and the posterior wall of the right maxilla. Findings are highly suspicious for malignancy.  - s/p IR biopsy of sacral mass 3/10, showing metastatic prostate ca, PSA significantly elevated   - Neurosx c/s appreciated: no intervention at this time, no cord compression  - Heme/Onc c/s appreciated: c/w Casodex; rad onc intervention when stable,, Dental evaluation to start bisphosphonates  - Palliative consult appreciated: Dilaudid for pain control    - C/w dexamethasone 4mg q6h  - F/u MR head and MR C/T spine    # Mandibular mass   - Suspicion for SCC  - ENT c/s appreciated: patient was scoped, bedside mandibular biopsy deferred for now; will need f/u     # CHFrEF  - Clinically dry   - Echo EF 35%, mod mitral calcification, enlarged left atria    # Afib  - HR 60s  - on eliquis 5 mg bid at home  - Was on Lovenox here, currently holding for denise    # HTN  - hold aldactone, lasix, acei and metoprolol     # CAD   - Stable     # Metabolic acidosis   - C/w sodium bicarbonate 650mg TID     #Hyperkalemia, resolved  - K 4.0  - c/w Lokelma 10mg  PO TID  - Low K when diet restarts     # Mild hypercalcemia, likely due to bone mets - resolved     DVT Prophylaxis: SCDs   GI Prophylaxis: Protonix  Diet: soft, low K   Activity: IAT  FULL CODE   81 yo M hx of CAD s/p CABG s/p 20+ years ago/ CHF/ right sided large inguinal hernia BIBA from hotel room 2/2 unable to ambulate with right thigh weakness and numbness.    #Hypovolemic shock   - Likely multifactorial, hypovolemia and possible UGIB given hb drop  - hgb 6.9 <- 7.3, diluted specimen  - Will transfused 1 unit today  - S/p a total of 5L boluses  - c/w D5 + 1/2HS + 75mEq bicarb @ 100cc/hr  - Off Levo  - F/u CT a/p noncont to r/o bleed   - LE duplex neg for DVT     #DENISE, resolving  - Likely pre-renal  - S/p a total of 5L boluses   - Cr 0.8. BUN 47 <- 79  - C/w IVF D5 + 1/2NS with 75meq bicarb @100cc/hr     #Acute on chronic anemia  - concern for possible UGIB   - hgb as above  - Hemolysis w/u pending haptoglobin  - BUN 47 <- 79  - GI c/s appreciated: no intervention at this time as no plans to start a/c  - Hold Lovenox for now  - C/w PPI QD  - CT A/P no retroperitoneal bleed    # B/l hydro  - Likely due to acute urinary retention   - CT scan showed on admission moderate to severe bilateral hydroureteronephrosis to the level of a markedly distended urinary bladder. Findings presumably secondary to urinary bladder outlet obstruction or urinary retention. Enlarged prostate gland.   - C/w Santos catheter     # Multiple metastatic osseous lesions with newly diagnosed sacral and mandibular mass with LE weakness concerning for spinal cord compression due to malignancy   - CT scan abd/pelvis on admission: Findings compatible with metastatic disease as demonstrated by multiple bony lesions, the largest soft tissue mass centered at S2-S3 measuring up to 7.4 cm with associated bony destruction and obliteration of the exiting neural foramina. Additional smaller lesions at the right sacral ala, L2 and L3.  - CT Neck Soft Tissue No Cont: Large right mandibular soft tissue density mass measuring 6.5 x 7.1 x 9.2 cm likely arising from the right mandible extending from the right mandibular condyle to the body of the mandible. There is an associated extensive bony erosion of the right mandible within the mass and the posterior wall of the right maxilla. Findings are highly suspicious for malignancy.  - s/p IR biopsy of sacral mass 3/10, showing metastatic prostate ca, PSA significantly elevated   - Neurosx c/s appreciated: no intervention at this time, no cord compression  - Heme/Onc c/s appreciated: c/w Casodex; rad onc intervention when stable,, Dental evaluation to start bisphosphonates  - Palliative consult appreciated: Dilaudid and Robaxin for pain control    - C/w dexamethasone 4mg q6h  - F/u MR head and MR C/T spine (did not go yesterday due to hypotension)    # Mandibular mass   - Suspicion for SCC  - ENT c/s appreciated: patient was scoped, bedside mandibular biopsy deferred for now; will need f/u     # Chest pain  - likely musculoskeletal, reproducible  - F/u EKG  - Trops     # CHFrEF  - Clinically dry   - Echo EF 35%, mod mitral calcification, enlarged left atria    # Afib  - HR 60s  - on eliquis 5 mg bid at home  - Was on Lovenox here    # HTN  - Now hypotensive  - hold aldactone, lasix, acei and metoprolol     # CAD   - Stable     # Metabolic acidosis   - C/w sodium bicarbonate 650mg TID     #Hyperkalemia, resolved  - K 4.0  - c/w Lokelma 10mg  PO TID  - Low K when diet restarts     # Mild hypercalcemia, likely due to bone mets - resolved     # anxiety  - Xanax 0.25mg Q12H PRN      DVT Prophylaxis: Hep q8h  GI Prophylaxis: Protonix QD  Diet: soft, low K   Activity: IAT  FULL CODE

## 2021-03-17 NOTE — PROGRESS NOTE ADULT - ASSESSMENT
79 yo male hx of CAD s/p CABG s/p 20+ years ago, HFrEF (EF 40% in 2011, follows with dr Moore), afib on eliquis right sided large inguinal hernia BIBA from hotel room 2/2 unable to ambulate with right thigh weakness and numbness, was found to have stage IV prostate cancer. started on Casodex.  Also was found to have right Jaw mass scoped by dr Hernández ENT.   on 3/14 pt's BP 84/38 MAP 53, HR 76. Pt asymptomatic. Pt had drop of Hb from 11.3 to 9.0. . BP stabilized after 2L NS bolus. Gastric lavage with coffee colored aspirate?, JEAN MARIE negative. Started protonix drip / NPO. Further drop in Hb to 7.9 with borderline BP, repeat 8.4. Started on low dose levo. Also received 1 unit PRBCs. Pt BP remains borderline, upgraded to CCU for closer monitoring.    *Possible Upper GI bleed and drop in Hb  -off levophed, no signs of active GI bleeding  -Heart failure EF 35%  -hx of afib on eliquis / lovenox last dose last dose 3/14 Am  -on steroids   -Hb drop from 13 to 6.9 without evidence of GI bleed, CT no retroperitoneal hematoma    Plan  -c/w PPI BID can switch to oral  -discussed with CCU seniors and intensivist: no plan to resume anticoagulation, the drop is Hb most likely 2/2 hydration per CCU team  -no need for endoscopic procedure no signs of GI bleeding  -advance diet as tolerated  -will sign off  -call as needed, [phone] during weekdays till 5pm and call GI service after 5pm and on weekends [phone]     81 yo male hx of CAD s/p CABG s/p 20+ years ago, HFrEF (EF 40% in 2011, follows with dr Moore), afib on eliquis right sided large inguinal hernia BIBA from hotel room 2/2 unable to ambulate with right thigh weakness and numbness, was found to have stage IV prostate cancer. started on Casodex.  Also was found to have right Jaw mass scoped by dr Hernández ENT.   on 3/14 pt's BP 84/38 MAP 53, HR 76. Pt asymptomatic. Pt had drop of Hb from 11.3 to 9.0. . BP stabilized after 2L NS bolus. Gastric lavage with coffee colored aspirate?, JEAN MARIE negative. Started protonix drip / NPO. Further drop in Hb to 7.9 with borderline BP, repeat 8.4. Started on low dose levo. Also received 1 unit PRBCs. Pt BP remains borderline, upgraded to CCU for closer monitoring.    *Possible Upper GI bleed and drop in Hb  -off levophed, no signs of active GI bleeding  -Heart failure EF 35%  -hx of afib on eliquis / lovenox last dose last dose 3/14 Am  -on steroids   -Hb drop from 13 to 6.9 without evidence of GI bleed, CT no retroperitoneal hematoma    Plan  -c/w PPI BID can switch to oral  -discussed with CCU seniors and intensivist: no plan to resume anticoagulation, the drop is Hb most likely 2/2 hydration per CCU team  -no need for endoscopic procedure no signs of GI bleeding  -advance diet as tolerated  -patient was advised to get EGD as OP to clarify for possible GI bleeding  -will sign off  -call as needed, [phone] during weekdays till 5pm and call GI service after 5pm and on weekends [phone]  -Follow up with our [facility] located at [address] [place] [address]. Phone Number: [phone]    81 yo male hx of CAD s/p CABG s/p 20+ years ago, HFrEF (EF 40% in 2011, follows with dr Moore), afib on eliquis right sided large inguinal hernia BIBA from hotel room 2/2 unable to ambulate with right thigh weakness and numbness, was found to have stage IV prostate cancer. started on Casodex.  Also was found to have right Jaw mass scoped by dr Hernández ENT.   on 3/14 pt's BP 84/38 MAP 53, HR 76. Pt asymptomatic. Pt had drop of Hb from 11.3 to 9.0. . BP stabilized after 2L NS bolus. Gastric lavage with coffee colored aspirate?, JEAN MARIE negative. Started protonix drip / NPO. Further drop in Hb to 7.9 with borderline BP, repeat 8.4. Started on low dose levo. Also received 1 unit PRBCs. Pt BP remains borderline, upgraded to CCU for closer monitoring.    *Possible Upper GI bleed and drop in Hb  -off levophed, no signs of active GI bleeding  -Heart failure EF 35%  -hx of afib on eliquis / lovenox last dose last dose 3/14 Am  -on steroids   -Hb drop from 13 to 6.9 without evidence of GI bleed, CT no retroperitoneal hematoma    Plan  -c/w PPI BID can switch to oral  -discussed with CCU seniors and intensivist: no plan to resume anticoagulation, the drop is Hb most likely 2/2 hydration per CCU team  -no need for endoscopic procedure no signs of GI bleeding  -advance diet as tolerated  -patient was advised to get EGD as OP to clarify the reason for his coffee ground emesis  -will sign off  -call as needed, [phone] during weekdays till 5pm and call GI service after 5pm and on weekends [phone]  -Follow up with our [facility] located at [address] [place] [address]. Phone Number: [phone]

## 2021-03-17 NOTE — CONSULT NOTE ADULT - SUBJECTIVE AND OBJECTIVE BOX
79 yo male hx of CAD s/p CABG s/p 20+ years ago/ CHF with EF 35%/ CABG x3/ right sided large inguinal hernia BIBA from hotel room 2/2 unable to ambulate with right thigh weakness and numbness. reported it started from knee to hip. denies fall and injury. He was just sitting and the symptoms started. reported off/on back pain in the past. denies fever/chill/recent illness/coughing/chest pain/abd pain/n/v/d and urinary sxs.   Also has right sided facial swelling for about 1 year after 3 teeth extraction. reports swelling worsens after eating. swelling was better in the past and started swelling again in the past few months. didn't follow up with his dentist 2/2 COVID.   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 he used to be able to ambulate without assistant and he commutes with his car. (08 Mar 2021 05:03)  ENT consulted for large mandibular mass seen on CT scan. Pt reports smoking hx in his teens - cigarettes/cigars/pipe - lasting two years, more of a social smoker. 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 Pt states that he has had to have teeth pulled on three separate occasions over the past year due to loose teeth, and that during the second extraction he developed right sided facial swelling which has persisted until now. States that he has had pain upon chewing for the past year, has been chewing on the left side of his mouth due to feeling extreme pain when chewing on the right side. He also needs all his food to be chopped up/pureed due to pain experienced during mastication. Denies any choking with solids/liquids, SOB, or difficulty breathing.       < from: CT Neck Soft Tissue No Cont (03.08.21 @ 02:39) >  IMPRESSION:  Large right mandibular soft tissue density mass measuring 6.5 x 7.1 x 9.2 cm likely arising from the right mandible extending from the right mandibular condyle to the body of the mandible. There is an associated extensive bony erosion of the right mandible within the mass and the posterior wall of the right maxilla. Findings are highly suspicious for malignancy.    pt frequently NPO for procedures and tests since admission 3/8.  IR biopsy of R facial mass on 3/10. pt transferred to CCU 3/13 or 14 due to drop in Hb, an episode of coffee ground emesis - and kept NPO again, then clears until today.  Vital Signs Last 24 Hrs  T(C): 36.4 (17 Mar 2021 00:00), Max: 36.6 (16 Mar 2021 16:00)  T(F): 97.6 (17 Mar 2021 00:00), Max: 97.9 (16 Mar 2021 16:00)  HR: 68 (17 Mar 2021 11:00) (60 - 98)  BP: 111/48 (17 Mar 2021 11:00) (70/36 - 145/60)  BP(mean): 68 (17 Mar 2021 11:00) (46 - 105)  RR: 17 (17 Mar 2021 11:00) (11 - 28)  SpO2: 99% (17 Mar 2021 11:00) (94% - 100%)  Drug Dosing Weight  Height (cm): 180.3 (15 Mar 2021 00:51)  Weight (kg): 73.8 (15 Mar 2021 00:51)  BMI (kg/m2): 22.7 (15 Mar 2021 00:51)  BSA (m2): 1.93 (15 Mar 2021 00:51)  A&O, verbal, mucous memb moist, anicteric, conj pale  skin turgor good  abd soft, ND, NT  + right facial swelling  no LE edema, + loss fo quad and calves  + sacral skin breakdown    MEDICATIONS  (STANDING):  bicalutamide 50 milliGRAM(s) Oral daily  chlorhexidine 4% Liquid 1 Application(s) Topical daily  dexAMETHasone  Injectable 4 milliGRAM(s) IV Push every 6 hours  dextrose 5% + sodium chloride 0.45% 1000 milliLiter(s) (100 mL/Hr) IV Continuous <Continuous>  heparin   Injectable 5000 Unit(s) SubCutaneous every 8 hours  latanoprost 0.005% Ophthalmic Solution 1 Drop(s) Both EYES at bedtime  lidocaine   Patch 2 Patch Transdermal daily  methocarbamol 500 milliGRAM(s) Oral three times a day  midodrine. 10 milliGRAM(s) Oral three times a day  pantoprazole  Injectable 40 milliGRAM(s) IV Push daily  sodium bicarbonate 650 milliGRAM(s) Oral three times a day  sodium zirconium cyclosilicate 10 Gram(s) Oral three times a day                        6.9    12.60 )-----------( 178      ( 17 Mar 2021 04:43 )             21.5   03-17    135  |  106  |  47<H>  ----------------------------<  179<H>  4.0   |  19  |  0.8    Ca    8.8      17 Mar 2021 04:43  Phos  2.7     03-16  Mg     1.8     03-17    TPro  4.0<L>  /  Alb  2.4<L>  /  TBili  0.3  /  DBili  x   /  AST  73<H>  /  ALT  65<H>  /  AlkPhos  81  03-17      < from: CT Abdomen and Pelvis No Cont (03.16.21 @ 18:30) >    IMPRESSION:  1.  No evidence of retroperitoneal hematoma.  2.   Dominant destructive sacral lesion with relationship to surrounding structures more fullydepicted on MR of 3/11/2021. Additional pelvic lesions and lesions of the lumbar spine also as detailed in the lumbar spine report.  3.  Improving bilateral hydronephrosis.    < end of copied text >  < from: MR Lumbar Spine No Cont (03.11.21 @ 21:02) >  IMPRESSION:    1.  Findings consistent with extensive diffuse osseous metastatic disease.    2.  The largest lesion is within the sacrum (S2-S4) measuring 8.4 cm, impinging and obliterating the neural foramen.    3.  There is also right S1 mass invading the right L5-S1 neural foramen, and a mildly expansile L2 mass without significant spinal stenosis.    < end of copied text >

## 2021-03-17 NOTE — CONSULT NOTE ADULT - SUBJECTIVE AND OBJECTIVE BOX
Patient is a 80y old  Male who presents with a chief complaint of 80 YEAR OLD MALE C/O MULTIPLE MEDICAL COMPLAINTS . (17 Mar 2021 14:37).    Clearance is needed for beginning course of bisphosphonate(s).    Attempted to transport patient back down to dental clinic for OMFS evaluation. Patient denies transportation, states his mouth was irritated from the dental exam this morning, states that it is too much exertion to leave his bed again.     Patients states that none of his teeth hurt him at this time, but his only concern is the right facial swelling.     Patient states he does not want any of his teeth extracted.     Attempted to convince patient that he should come for evaluation, patient still denied.  Patient was told oral surgeon is only in clinic Wednesday afternoons, patient states that he would be willing to try again on another day.

## 2021-03-18 LAB
ALBUMIN SERPL ELPH-MCNC: 2.5 G/DL — LOW (ref 3.5–5.2)
ALP SERPL-CCNC: 99 U/L — SIGNIFICANT CHANGE UP (ref 30–115)
ALT FLD-CCNC: 56 U/L — HIGH (ref 0–41)
ANION GAP SERPL CALC-SCNC: 10 MMOL/L — SIGNIFICANT CHANGE UP (ref 7–14)
AST SERPL-CCNC: 30 U/L — SIGNIFICANT CHANGE UP (ref 0–41)
BASOPHILS # BLD AUTO: 0.02 K/UL — SIGNIFICANT CHANGE UP (ref 0–0.2)
BASOPHILS NFR BLD AUTO: 0.2 % — SIGNIFICANT CHANGE UP (ref 0–1)
BILIRUB SERPL-MCNC: 0.2 MG/DL — SIGNIFICANT CHANGE UP (ref 0.2–1.2)
BUN SERPL-MCNC: 35 MG/DL — HIGH (ref 10–20)
CALCIUM SERPL-MCNC: 8.7 MG/DL — SIGNIFICANT CHANGE UP (ref 8.5–10.1)
CHLORIDE SERPL-SCNC: 104 MMOL/L — SIGNIFICANT CHANGE UP (ref 98–110)
CO2 SERPL-SCNC: 24 MMOL/L — SIGNIFICANT CHANGE UP (ref 17–32)
CREAT SERPL-MCNC: 0.8 MG/DL — SIGNIFICANT CHANGE UP (ref 0.7–1.5)
EOSINOPHIL # BLD AUTO: 0 K/UL — SIGNIFICANT CHANGE UP (ref 0–0.7)
EOSINOPHIL NFR BLD AUTO: 0 % — SIGNIFICANT CHANGE UP (ref 0–8)
GLUCOSE SERPL-MCNC: 140 MG/DL — HIGH (ref 70–99)
HCT VFR BLD CALC: 24.1 % — LOW (ref 42–52)
HGB BLD-MCNC: 8.1 G/DL — LOW (ref 14–18)
IMM GRANULOCYTES NFR BLD AUTO: 5.3 % — HIGH (ref 0.1–0.3)
LYMPHOCYTES # BLD AUTO: 0.8 K/UL — LOW (ref 1.2–3.4)
LYMPHOCYTES # BLD AUTO: 6.5 % — LOW (ref 20.5–51.1)
MAGNESIUM SERPL-MCNC: 1.9 MG/DL — SIGNIFICANT CHANGE UP (ref 1.8–2.4)
MCHC RBC-ENTMCNC: 28.8 PG — SIGNIFICANT CHANGE UP (ref 27–31)
MCHC RBC-ENTMCNC: 33.6 G/DL — SIGNIFICANT CHANGE UP (ref 32–37)
MCV RBC AUTO: 85.8 FL — SIGNIFICANT CHANGE UP (ref 80–94)
MONOCYTES # BLD AUTO: 1.03 K/UL — HIGH (ref 0.1–0.6)
MONOCYTES NFR BLD AUTO: 8.4 % — SIGNIFICANT CHANGE UP (ref 1.7–9.3)
NEUTROPHILS # BLD AUTO: 9.72 K/UL — HIGH (ref 1.4–6.5)
NEUTROPHILS NFR BLD AUTO: 79.6 % — HIGH (ref 42.2–75.2)
NRBC # BLD: 0 /100 WBCS — SIGNIFICANT CHANGE UP (ref 0–0)
PLATELET # BLD AUTO: 190 K/UL — SIGNIFICANT CHANGE UP (ref 130–400)
POTASSIUM SERPL-MCNC: 3.4 MMOL/L — LOW (ref 3.5–5)
POTASSIUM SERPL-SCNC: 3.4 MMOL/L — LOW (ref 3.5–5)
PROT SERPL-MCNC: 4.2 G/DL — LOW (ref 6–8)
RBC # BLD: 2.81 M/UL — LOW (ref 4.7–6.1)
RBC # FLD: 16 % — HIGH (ref 11.5–14.5)
SODIUM SERPL-SCNC: 138 MMOL/L — SIGNIFICANT CHANGE UP (ref 135–146)
TROPONIN T SERPL-MCNC: 0.01 NG/ML — SIGNIFICANT CHANGE UP
WBC # BLD: 12.22 K/UL — HIGH (ref 4.8–10.8)
WBC # FLD AUTO: 12.22 K/UL — HIGH (ref 4.8–10.8)

## 2021-03-18 PROCEDURE — 99233 SBSQ HOSP IP/OBS HIGH 50: CPT

## 2021-03-18 PROCEDURE — 70551 MRI BRAIN STEM W/O DYE: CPT | Mod: 26

## 2021-03-18 PROCEDURE — 99232 SBSQ HOSP IP/OBS MODERATE 35: CPT

## 2021-03-18 PROCEDURE — 72141 MRI NECK SPINE W/O DYE: CPT | Mod: 26

## 2021-03-18 PROCEDURE — 72146 MRI CHEST SPINE W/O DYE: CPT | Mod: 26

## 2021-03-18 RX ORDER — SODIUM CHLORIDE 9 MG/ML
1000 INJECTION, SOLUTION INTRAVENOUS
Refills: 0 | Status: DISCONTINUED | OUTPATIENT
Start: 2021-03-18 | End: 2021-03-20

## 2021-03-18 RX ORDER — COLLAGENASE CLOSTRIDIUM HIST. 250 UNIT/G
1 OINTMENT (GRAM) TOPICAL DAILY
Refills: 0 | Status: DISCONTINUED | OUTPATIENT
Start: 2021-03-18 | End: 2021-04-16

## 2021-03-18 RX ORDER — HYDROMORPHONE HYDROCHLORIDE 2 MG/ML
0.25 INJECTION INTRAMUSCULAR; INTRAVENOUS; SUBCUTANEOUS EVERY 4 HOURS
Refills: 0 | Status: DISCONTINUED | OUTPATIENT
Start: 2021-03-18 | End: 2021-03-18

## 2021-03-18 RX ORDER — POTASSIUM CHLORIDE 20 MEQ
40 PACKET (EA) ORAL ONCE
Refills: 0 | Status: COMPLETED | OUTPATIENT
Start: 2021-03-18 | End: 2021-03-18

## 2021-03-18 RX ORDER — POTASSIUM CHLORIDE 20 MEQ
20 PACKET (EA) ORAL ONCE
Refills: 0 | Status: COMPLETED | OUTPATIENT
Start: 2021-03-18 | End: 2021-03-18

## 2021-03-18 RX ORDER — PANTOPRAZOLE SODIUM 20 MG/1
40 TABLET, DELAYED RELEASE ORAL
Refills: 0 | Status: DISCONTINUED | OUTPATIENT
Start: 2021-03-18 | End: 2021-04-16

## 2021-03-18 RX ADMIN — HYDROMORPHONE HYDROCHLORIDE 0.5 MILLIGRAM(S): 2 INJECTION INTRAMUSCULAR; INTRAVENOUS; SUBCUTANEOUS at 10:30

## 2021-03-18 RX ADMIN — SODIUM CHLORIDE 100 MILLILITER(S): 9 INJECTION, SOLUTION INTRAVENOUS at 09:23

## 2021-03-18 RX ADMIN — LIDOCAINE 2 PATCH: 4 CREAM TOPICAL at 12:14

## 2021-03-18 RX ADMIN — BICALUTAMIDE 50 MILLIGRAM(S): 50 TABLET, FILM COATED ORAL at 12:13

## 2021-03-18 RX ADMIN — Medication 4 MILLIGRAM(S): at 12:12

## 2021-03-18 RX ADMIN — HEPARIN SODIUM 5000 UNIT(S): 5000 INJECTION INTRAVENOUS; SUBCUTANEOUS at 06:21

## 2021-03-18 RX ADMIN — PANTOPRAZOLE SODIUM 40 MILLIGRAM(S): 20 TABLET, DELAYED RELEASE ORAL at 14:06

## 2021-03-18 RX ADMIN — MIDODRINE HYDROCHLORIDE 10 MILLIGRAM(S): 2.5 TABLET ORAL at 12:12

## 2021-03-18 RX ADMIN — METHOCARBAMOL 500 MILLIGRAM(S): 500 TABLET, FILM COATED ORAL at 14:07

## 2021-03-18 RX ADMIN — Medication 1 APPLICATION(S): at 14:07

## 2021-03-18 RX ADMIN — MIDODRINE HYDROCHLORIDE 10 MILLIGRAM(S): 2.5 TABLET ORAL at 17:17

## 2021-03-18 RX ADMIN — MIDODRINE HYDROCHLORIDE 10 MILLIGRAM(S): 2.5 TABLET ORAL at 06:21

## 2021-03-18 RX ADMIN — LIDOCAINE 2 PATCH: 4 CREAM TOPICAL at 19:37

## 2021-03-18 RX ADMIN — Medication 650 MILLIGRAM(S): at 06:21

## 2021-03-18 RX ADMIN — Medication 50 MILLIEQUIVALENT(S): at 09:23

## 2021-03-18 RX ADMIN — Medication 650 MILLIGRAM(S): at 14:06

## 2021-03-18 RX ADMIN — HYDROMORPHONE HYDROCHLORIDE 0.5 MILLIGRAM(S): 2 INJECTION INTRAMUSCULAR; INTRAVENOUS; SUBCUTANEOUS at 09:20

## 2021-03-18 RX ADMIN — METHOCARBAMOL 500 MILLIGRAM(S): 500 TABLET, FILM COATED ORAL at 06:21

## 2021-03-18 RX ADMIN — Medication 40 MILLIEQUIVALENT(S): at 12:13

## 2021-03-18 RX ADMIN — Medication 4 MILLIGRAM(S): at 06:21

## 2021-03-18 RX ADMIN — Medication 4 MILLIGRAM(S): at 17:17

## 2021-03-18 RX ADMIN — HEPARIN SODIUM 5000 UNIT(S): 5000 INJECTION INTRAVENOUS; SUBCUTANEOUS at 14:06

## 2021-03-18 NOTE — PROGRESS NOTE ADULT - ASSESSMENT
1. Shock: most likely hypovolemic in nature from severe dehydration DENISE and possible GI bleeding, currently off levophed gtt will  Monitor for now. Allow for lower BP as pt is chronically low and is asymptomatic. tx Midodrine    2. DENISE: secondary to dehydration in setting of obstructive uropathy, tx IVF's monitor for now, continue lemus.    3. Anemia: possible GI bleed from PUD tx PPI daily, transfuse for hgb less then 7, monitor for now. CT abd neg for Retroperitoneal hematoma appears less likley to be active bleeding most likley dilusional    4. B/L Hydronephrosis from Obstructive Uropathy: continue Lemus monitor I/O's now has post obstructive dieresis continue IVF's.    5. Metastatic Prostate CA with Multiple bones mets: continue decadron, f/u hem/ rad onc recs, currently on casodex, f/u Palliative care recs.    6. 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    7. Systolic CHF EF 35%: continue supportive care, monitor for now. Most likely ischemic in nature    8. AFIB: continue supportive care, monitor for now.    9. HTN: continue supportive care, monitor for now.    10. CAD s/p CABG: continue supportive care, monitor for now.    11. DVT px: tx hep subcut lower ext US neg for DVT.    12. NUT: tx oral diet, tx IVF's.    Awaiting transfer to floor

## 2021-03-18 NOTE — PROGRESS NOTE ADULT - ASSESSMENT
81 yo male hx of CAD s/p CABG s/p 20+ years ago/ CHF/ right sided large inguinal hernia BIBA from hotel room 2/2 unable to ambulate with right thigh weakness and numbness, now found to have metastatic disease with a mandibular mass and large sacral mass    Stage IV Prostate cancer with diffuse osseous involvement:  -   - Sacral biopsy favors prostate ( neoplastic cells are positive for AE1/AE3, PSA and CK7)  - Started on casodex 50mg 3/14  - Will likely start leupron in 2 weeks  - Will also likely start chemotherapy (taxotere) when stable  - Rad Onc evaluation when stable  - will c/w  dexamethasone, f/u NSx or rad onc consult for taper  - Nutrition f/u  - Completed MR jaw and lumbar  - F/u palliative care evaluation for goals of care and symptom management  - TLS labs wnl  - CT chest showing RUL [id number] paravertebral lesion with mass effect, MR T-spine completed, results pending, f/u NSx  - CT AP no RP bleed, no visceral mets  - MR head performed, results pending  - Dental eval 3/17, patient needs teeth extracted for bisphosphonates, patient refused extraction yesterday, said he would do it next week    Anemia: Hypotension resolved with fluids, no active bleeding  - Per GI, outpatient scope  - Hapto high and retic low, not consistent with hemolysis    DENISE: Resolved    Mild hypercalcemia, likely due to bone mets       79 yo male hx of CAD s/p CABG s/p 20+ years ago/ CHF/ right sided large inguinal hernia BIBA from hotel room 2/2 unable to ambulate with right thigh weakness and numbness, now found to have metastatic disease with a mandibular mass and large sacral mass    Stage IV Prostate cancer with diffuse osseous involvement:  -   - Sacral biopsy favors prostate ( neoplastic cells are positive for AE1/AE3, PSA and CK7)  - Started on casodex 50mg 3/14  - Will likely start leupron in 2 weeks  - Will also likely start chemotherapy (taxotere) when stable  - TLS labs wnl  - CT chest showing RUL [id number] paravertebral lesion with mass effect, MR T-spine completed, results pending, f/u NSx  - CT AP no RP bleed, no visceral mets  - MR head performed, results pending  - MR jaw and lumbar, results above  - Dental eval 3/17, patient needs teeth extracted for bisphosphonates, patient refused extraction yesterday, said he would do it next week  - Recall rad onc for palliative RT  - will c/w  dexamethasone, f/u NSx or rad onc for taper    Anemia: Hypotension resolved with fluids, no active bleeding  - Per GI, outpatient scope  - Hapto high and retic low, not consistent with hemolysis    DENISE: Resolved    Mild hypercalcemia, likely due to bone mets

## 2021-03-18 NOTE — PROGRESS NOTE ADULT - ASSESSMENT
80yMale being evaluated for pain management in the setting of newly diagnosed metastatic prostate cancer to the R mandible and sacrum. He was initially admitted for weakness, transferred to ICU for hypotension. Being followed for cancer tx with heme-onc. He is nearly ready to be transferred to medical unit and stable. His pain seems to be multifactorial, both from tumor invasion of the jaw, muscle tension in the back, as well as anxiety and existential distress. He has a complicated social situation, reportedly homeless for years, living in a hotel and being helped by his fellow [employer] members.     Xanax helps with sleep and anxiety.   Robaxin was started 2 days ago, may be helping his muscular pains, unclear at this time, patient is unsure.   Will try a lower dose of Dilaudid which the patient is agreeable to, as PRN dose. Encouraged him to try this new dose tonight to see how he tolerates it.       MEDD (morphine equivalent daily dose): 10 mg MEDD/24 H      See Recs below.  * Plan discussed with primary resident.     Please call [phone] with questions or concerns 24/7.   We will continue to follow.

## 2021-03-18 NOTE — ADVANCED PRACTICE NURSE CONSULT - ASSESSMENT
Received patient in CCU, laying supine in bed, turned to left side (pillow under right side), HOB elevated 30 degrees. Pt awake, A&Ox3, made aware of purpose of WOCN visit, agreeable to consult. Patient reports wound present "about week & half".  With assistance from radha Haile, turned patient to left side for skin assessment. Foam Allevyn dressing to sacrococcygeal in place, dressing removed for skin assessment. Skin assessment as below.     Type of wound: Deep tissue pressure injury (DTPI); evolving with likely friction component; POA -initially documented on admission as Stage 2 pressure injury; per wound photo in chart on admission (3/8)- wound presenting as DTPI   Location: coccyx-b/l buttock   Wound measurements: multiple areas in close proximity to each other & measured together at 8cm x 8cm x 0.2c,; true depth indeterminable at this time   Tunneling/Undermining: none  Wound bed: 75% opening purple-maroon colored tissue to periphery; 25% yellow-tan slough tissue to center   Wound edges: attached, irregular   Periwound: blanchable erythema & macerated   Wound exudate: scant amount of serosanguinous drainage present on removed dressing   Wound odor: none  Induration, warmth: none  Wound pain: pt w/ slight tenderness to area     Type of wound: Deep tissue pressure injury (DTPI)   Location: right heel   Wound measurements: 1cm x 2cm x 0cm, true depth indeterminable   Tunneling/Undermining:  Wound bed:  Wound edges:  Periwound:  Wound exudate:  Wound odor:  Induration, erythema, warmth:   Wound pain:    Patient bedbound, very limited mobility in bed, pt patient report-BLEs numb, + lemus, incontinent of stool. Ordered for - diet, inadequate intake as per reported Thai score.   79 yo male hx of CAD s/p CABG s/p 20+ years ago/ CHF/ right sided large inguinal hernia BIBA from hotel room 2/2 unable to ambulate with right thigh weakness and numbness, reported it started from knee to hip, denies fall and injury. He was just sitting and the  symptoms started, reported off/on back pain in the past. denies fever/chill/recent illness/coughing/chest pain/abd pain/n/v/d and urinary sxs.   Also has right sided facial swelling for about 1 year after 3 teeth extraction. reports swelling worsens after eating. swelling was better in the past and started swelling again in the past few months. didn't follow up with his dentist 2/2 COVID.   Patient found w/ new sacral mass found on CT, new onset LE weakness, also has large right mandibular mass and further bony lesions in spin all suggestive of metastatic disease.   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    Received patient in CCU, laying supine in bed, turned to left side (pillow under right side), HOB elevated 30 degrees. Pt awake, A&Ox3, made aware of purpose of WOCN visit, agreeable to consult. Patient reports wound present "about week & half".  With assistance from primary RN Lazara, turned patient to left side for skin assessment. Foam Allevyn dressing to sacrococcygeal in place, dressing removed for skin assessment. Skin assessment as below.     Type of wound: Deep tissue pressure injury (DTPI); evolving with likely friction component; POA -initially documented on admission as Stage 2 pressure injury; per wound photo in chart on admission (3/8)- wound presenting as DTPI   Location: coccyx-b/l buttock   Wound measurements: multiple areas in close proximity to each other & measured together at 8cm x 8cm x 0.2c,; true depth indeterminable at this time   Tunneling/Undermining: none  Wound bed: 75% opening purple-maroon colored tissue to periphery; 25% yellow-tan slough tissue to center   Wound edges: attached, irregular   Periwound: blanchable erythema & macerated   Wound exudate: scant amount of serosanguinous drainage present on removed dressing   Wound odor: none  Induration, warmth: none  Wound pain: pt w/ slight tenderness to area     Type of wound: Deep tissue pressure injury (DTPI)   Location: right heel   Wound measurements: 1cm x 2cm x 0cm, true depth indeterminable   Tunneling/Undermining: none  Wound bed: dark purple tissue in transverse linear pattern   Wound edges: attached, flush, irregular   Periwound: blanchable erythema  Wound exudate: none  Wound odor: none  Induration, warmth: none   Wound pain: denies     Patient bedbound, very limited mobility in bed, pt patient report-BLEs numb, + lemus, incontinent of stool. Ordered for soft diet, probably inadequate intake as per reported Thai score.

## 2021-03-18 NOTE — PROGRESS NOTE ADULT - SUBJECTIVE AND OBJECTIVE BOX
Patient is a 80y old  Male who presents with a chief complaint of 80 YEAR OLD MALE C/O MULTIPLE MEDICAL COMPLAINTS . (18 Mar 2021 08:58)        Over Night Events:        ROS:     All ROS are negative except HPI         PHYSICAL EXAM    ICU Vital Signs Last 24 Hrs  T(C): 36.4 (18 Mar 2021 12:09), Max: 36.8 (17 Mar 2021 20:00)  T(F): 97.5 (18 Mar 2021 12:09), Max: 98.2 (17 Mar 2021 20:00)  HR: 74 (18 Mar 2021 12:09) (64 - 90)  BP: 105/46 (18 Mar 2021 12:09) (92/39 - 122/55)  BP(mean): 74 (18 Mar 2021 12:09) (59 - 100)  ABP: --  ABP(mean): --  RR: 17 (18 Mar 2021 12:09) (10 - 25)  SpO2: 99% (18 Mar 2021 12:09) (97% - 100%)      CONSTITUTIONAL:  Pt appears chronically ill in ICU    ENT:   Airway patent,   Mouth with normal mucosa.   No thrush    EYES:   Pupils equal,   Round and reactive to light.    CARDIAC:   Normal rate,   Regular rhythm.    No edema      Vascular:  Normal systolic impulse  No Carotid bruits    RESPIRATORY:   No wheezing  Bilateral BS  Normal chest expansion  Not tachypneic,  No use of accessory muscles    GASTROINTESTINAL:  Abdomen soft,   Non-tender,   No guarding,   + BS    MUSCULOSKELETAL:   Range of motion is not limited,  No clubbing, cyanosis    NEUROLOGICAL:   Alert and oriented   No motor  deficits.    SKIN:   Skin normal color for race,   Warm and dry and intact.   No evidence of rash.    PSYCHIATRIC:   Normal mood and affect.   No apparent risk to self or others.    HEMATOLOGICAL:  No cervical  lymphadenopathy.  no inguinal lymphadenopathy      03-17-21 @ 07:01  -  03-18-21 @ 07:00  --------------------------------------------------------  IN:    dextrose 5% + sodium chloride 0.45% w/ Additives: 600 mL    dextrose 5% + sodium chloride 0.45% w/ Additives: 1800 mL    Oral Fluid: 360 mL    PRBCs (Packed Red Blood Cells): 308 mL  Total IN: 3068 mL    OUT:    Indwelling Catheter - Urethral (mL): 2740 mL  Total OUT: 2740 mL    Total NET: 328 mL      03-18-21 @ 07:01  -  03-18-21 @ 13:41  --------------------------------------------------------  IN:    dextrose 5% + sodium chloride 0.45% w/ Additives: 300 mL    dextrose 5% + sodium chloride 0.45% w/ Additives: 240 mL    IV PiggyBack: 100 mL    Oral Fluid: 480 mL  Total IN: 1120 mL    OUT:    Indwelling Catheter - Urethral (mL): 320 mL  Total OUT: 320 mL    Total NET: 800 mL          LABS:                            8.1    12.22 )-----------( 190      ( 18 Mar 2021 04:21 )             24.1                                               03-18    138  |  104  |  35<H>  ----------------------------<  140<H>  3.4<L>   |  24  |  0.8    Ca    8.7      18 Mar 2021 04:21  Mg     1.9     03-18    TPro  4.2<L>  /  Alb  2.5<L>  /  TBili  0.2  /  DBili  x   /  AST  30  /  ALT  56<H>  /  AlkPhos  99  03-18      PT/INR - ( 16 Mar 2021 15:58 )   PT: 10.90 sec;   INR: 0.95 ratio         PTT - ( 16 Mar 2021 15:58 )  PTT:22.8 sec                                           CARDIAC MARKERS ( 18 Mar 2021 04:21 )  x     / 0.01 ng/mL / x     / x     / x      CARDIAC MARKERS ( 17 Mar 2021 18:13 )  x     / 0.01 ng/mL / x     / x     / x                                                LIVER FUNCTIONS - ( 18 Mar 2021 04:21 )  Alb: 2.5 g/dL / Pro: 4.2 g/dL / ALK PHOS: 99 U/L / ALT: 56 U/L / AST: 30 U/L / GGT: x                                                                                                                                       MEDICATIONS  (STANDING):  bicalutamide 50 milliGRAM(s) Oral daily  chlorhexidine 4% Liquid 1 Application(s) Topical daily  collagenase Ointment 1 Application(s) Topical daily  dexAMETHasone  Injectable 4 milliGRAM(s) IV Push every 6 hours  dextrose 5% + sodium chloride 0.45% 1000 milliLiter(s) (80 mL/Hr) IV Continuous <Continuous>  heparin   Injectable 5000 Unit(s) SubCutaneous every 8 hours  latanoprost 0.005% Ophthalmic Solution 1 Drop(s) Both EYES at bedtime  lidocaine   Patch 2 Patch Transdermal daily  methocarbamol 500 milliGRAM(s) Oral three times a day  midodrine. 10 milliGRAM(s) Oral three times a day  pantoprazole    Tablet 40 milliGRAM(s) Oral before breakfast  sodium bicarbonate 650 milliGRAM(s) Oral three times a day    MEDICATIONS  (PRN):  ALPRAZolam 0.5 milliGRAM(s) Oral daily PRN anxiety  aluminum hydroxide/magnesium hydroxide/simethicone Suspension 30 milliLiter(s) Oral every 6 hours PRN Dyspepsia  brimonidine 0.2% Ophthalmic Solution 1 Drop(s) Both EYES every 8 hours PRN dry eyes  HYDROmorphone  Injectable 0.5 milliGRAM(s) IV Push every 4 hours PRN Severe Pain (7 - 10)      New X-rays reviewed:                                                                                  ECHO    CXR interpreted by me:

## 2021-03-18 NOTE — PROGRESS NOTE ADULT - PROBLEM SELECTOR PLAN 4
Decrease Dilaudid to 0.25 mg IVP Q 4 H PRN for mild, moderate, severe pain (1-10)   Continue Robaxin 500 mg TID

## 2021-03-18 NOTE — PROGRESS NOTE ADULT - SUBJECTIVE AND OBJECTIVE BOX
MANUELITO HEDRICK             MRN-[id number]    CC: Weakness    HPI:   79 yo male hx of CAD s/p CABG s/p 20+ years ago/ CHF/ right sided large inguinal hernia BIBA from hotel room 2/2 unable to ambulate with right thigh weakness and numbness. reported it started from knee to hip. denies fall and injury. He was just sitting and the symptoms started. reported off/on back pain in the past. denies fever/chill/recent illness/coughing/chest pain/abd pain/n/v/d and urinary sxs.   	Also has right sided facial swelling for about 1 year after 3 teeth extraction. reports swelling worsens after eating. swelling was better in the past and started swelling again in the past few months. didn't follow up with his dentist 2/2 COVID.   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 he used to be able to ambulate without assistant and he commutes with his car. (08 Mar 2021 05:03)      SUBJECTIVE:    ROS:  + pain in the R jaw, improved for hours with the IV Dilaudid, but it makes him feel very sleepy and loopy. it does reduce the pain to a tolerable level for 3-4 hours, however. He would be interested in trying a lower dose. He does still have back and shoulder pain, but thinks it may be somewhat improved from days ago when he began the Robaxin. He feels he is tolerating this medication well.   + anxiety. This is improved by his Xanax PRN.   The weakness in his legs he feels is improving, he can move both of his feet.   Denies all other ROS (sob, nausea/vomiting)     PEx:  Vital Signs Last 24 Hrs  T(C): 36.4 (18 Mar 2021 12:09), Max: 36.8 (17 Mar 2021 20:00)  T(F): 97.5 (18 Mar 2021 12:09), Max: 98.2 (17 Mar 2021 20:00)  HR: 78 (18 Mar 2021 13:09) (64 - 90)  BP: 109/43 (18 Mar 2021 13:09) (92/39 - 122/55)  BP(mean): 67 (18 Mar 2021 13:09) (59 - 100)  RR: 16 (18 Mar 2021 13:09) (16 - 25)  SpO2: 99% (18 Mar 2021 13:09) (97% - 100%)    General: awake. well nourished, lying in bed, NAD, conversant  HEENT:  NCAT PERRL EOMI Non icteric, + large swelling/deformity of the R manidble/cheek   Neck: Supple no masses  CVS: RR, no edema  Resp: Unlabored Non tachypneic No increased WOB, on RA  GI:  Soft NT ND  :  Voiding   Musc: No C/C  Neuro: Follows commands, No focal deficits, moving BL toes and arms   Psych: Calm, a & o x 3, + anxiety   Skin: Non jaundiced, no rashes      OPIATE NAÏVE (Y/N): YES    MEDICATIONS: REVIEWED  MEDICATIONS  (STANDING):  bicalutamide 50 milliGRAM(s) Oral daily  chlorhexidine 4% Liquid 1 Application(s) Topical daily  collagenase Ointment 1 Application(s) Topical daily  dexAMETHasone  Injectable 4 milliGRAM(s) IV Push every 6 hours  dextrose 5% + sodium chloride 0.45% 1000 milliLiter(s) (80 mL/Hr) IV Continuous <Continuous>  heparin   Injectable 5000 Unit(s) SubCutaneous every 8 hours  latanoprost 0.005% Ophthalmic Solution 1 Drop(s) Both EYES at bedtime  lidocaine   Patch 2 Patch Transdermal daily  methocarbamol 500 milliGRAM(s) Oral three times a day  midodrine. 10 milliGRAM(s) Oral three times a day  pantoprazole    Tablet 40 milliGRAM(s) Oral before breakfast  sodium bicarbonate 650 milliGRAM(s) Oral three times a day    MEDICATIONS  (PRN):  ALPRAZolam 0.5 milliGRAM(s) Oral daily PRN anxiety  aluminum hydroxide/magnesium hydroxide/simethicone Suspension 30 milliLiter(s) Oral every 6 hours PRN Dyspepsia  brimonidine 0.2% Ophthalmic Solution 1 Drop(s) Both EYES every 8 hours PRN dry eyes  HYDROmorphone  Injectable 0.5 milliGRAM(s) IV Push every 4 hours PRN Severe Pain (7 - 10)      LABS: REVIEWED  CBC:                        8.1    12.22 )-----------( 190      ( 18 Mar 2021 04:21 )             24.1     CMP:    03-18    138  |  104  |  35<H>  ----------------------------<  140<H>  3.4<L>   |  24  |  0.8    Ca    8.7      18 Mar 2021 04:21  Mg     1.9     03-18    TPro  4.2<L>  /  Alb  2.5<L>  /  TBili  0.2  /  DBili  x   /  AST  30  /  ALT  56<H>  /  AlkPhos  99  03-18  Albumin, Serum: 2.5 g/dL (03-18-21 @ 04:21)      IMAGING: REVIEWED    ADVANCED DIRECTIVES:            FULL CODE         DECISION MAKER: Patient himself. Working on getting a HCP, but patient reports he has "noone" to help with medical decisions.     PSYCHOSOCIAL-SPIRITUAL ASSESSMENT:  -  services referred to patient (see separate assessment with Saira Davila)     GOALS OF CARE DISCUSSION  - ongoing medical management/cancer directed care  - Full Code  - Needs HCP    CURRENT DISPO PLAN:     WILL REMAIN IN HOSPITAL    REFERRALS

## 2021-03-18 NOTE — CHART NOTE - NSCHARTNOTEFT_GEN_A_CORE
79 yo M PMHx of CAD s/p CABG s/p 20+ years ago/ CHF/ right sided large inguinal hernia BIBA from hotel room 2/2 unable to ambulate with right thigh weakness and numbness. reported it started from knee to hip. denies fall and injury. He was just sitting and the symptoms started. reported off/on back pain in the past. denies fever/chill/recent illness/coughing/chest pain/abd pain/n/v/d and urinary sxs. Also has right sided facial swelling for about 1 year after 3 teeth extraction. reports swelling worsens after eating. swelling was better in the past and started swelling again in the past few months. didn't follow up with his dentist 2/2 COVID. 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 he used to be able to ambulate without assistant and he commutes with his car. Originally admitted to [facility], where he was found to have a large sacral and mandibular mass.     Spoke with Medical Attending regarding consult placed in gentleman with large sacral mass and new onset LE weakness, also has large right mandibular mass and further bony lesions in spin all suggestive of metastatic disease. Unknown primary. Recc transfer to [facility] for MRI +/- spine survery, MRI brain/Jaw +/-. Pt will require ENT consult as well for large facial mass. This was discussed with hospitalist wh was in agreement. 81 yo M PMHx of CAD s/p CABG s/p 20+ years ago/ CHF/ right sided large inguinal hernia BIBA from hotel room 2/2 unable to ambulate with right thigh weakness and numbness. reported it started from knee to hip. denies fall and injury. He was just sitting and the symptoms started. reported off/on back pain in the past. denies fever/chill/recent illness/coughing/chest pain/abd pain/n/v/d and urinary sxs. Also has right sided facial swelling for about 1 year after 3 teeth extraction. reports swelling worsens after eating. swelling was better in the past and started swelling again in the past few months. didn't follow up with his dentist 2/2 COVID. 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 he used to be able to ambulate without assistant and he commutes with his car.     Originally admitted to [facility], where he was found to have a large sacral and mandibular mass. Neurosx was c/s for CT findings suspicious for cord compression due to metastatic disease, unknown primary. Neurosx did not rec intervention and he was transferred to [place] for MRI survey.     Admitted to med floors, where he underwent IR bx of sacral mass with findings suggestive of met prostate CA with tx per Heme/Onc team. Hospital stay was c/b hypotension with BP 84/38 MAP 53, with Hb from 11.3 to 9.0. AC, asymptomatic. BP stabilized after 2L NS bolus. Gastric lavage with coffee colored aspirate, JEAN MARIE negative. GI notified, was planned for EGD. Started protonix drip. Further drop in Hb to 7.9 with borderline BP. Started on low dose levo. Also received 1 unit PRBCs. Pt BP remained borderline, upgraded to Vent ICU but no critical care nurses available in vent ICU. Pt upgraded to CCU for closer monitoring.    CCU course was uncomplicated 79 yo M PMHx of CAD s/p CABG s/p 20+ years ago/ CHF/ right sided large inguinal hernia BIBA from hotel room 2/2 unable to ambulate with right thigh weakness and numbness. reported it started from knee to hip. denies fall and injury. He was just sitting and the symptoms started. reported off/on back pain in the past. denies fever/chill/recent illness/coughing/chest pain/abd pain/n/v/d and urinary sxs. Also has right sided facial swelling for about 1 year after 3 teeth extraction. reports swelling worsens after eating. swelling was better in the past and started swelling again in the past few months. didn't follow up with his dentist 2/2 COVID. 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 he used to be able to ambulate without assistant and he commutes with his car.     Originally admitted to [facility], where he was found to have a large sacral and mandibular mass. Neurosx was c/s for CT findings suspicious for cord compression due to metastatic disease, unknown primary. Neurosx did not rec intervention and he was transferred to [place] for MRI survey.     Admitted to med floors, where he underwent IR bx of sacral mass with findings suggestive of met prostate CA with tx per Heme/Onc team. Hospital stay was c/b hypotension with BP 84/38 MAP 53, with Hb from 11.3 to 9.0. AC, asymptomatic. BP stabilized after 2L NS bolus. Gastric lavage with coffee colored aspirate, JEAN MARIE negative. GI notified, was planned for EGD. Started protonix drip. Further drop in Hb to 7.9 with borderline BP. Started on low dose levo. Also received 1 unit PRBCs. Pt BP remained borderline, upgraded to Vent ICU but no critical care nurses available in vent ICU. Pt upgraded to CCU for closer monitoring.    CCU course was uncomplicated, GI bleed resolved, he received an additional unit prbc. Hypotension improved with a total of 5L boluses and Levophed was c/d, Midodrine started. Pt was ordered for MRI brain, C/T spine but did not go as BP was unstable at the time. ALso seen by dentistry but refused dental extractions.     Plan:    #Hypovolemic shock   - Likely multifactorial, hypovolemia and possible UGIB given hb drop  - hgb 8.1 <- 6.9. s/p 1 unit  - S/p a total of 5L boluses  - c/w D5 + 1/2HS + 75mEq bicarb @ 80cc/hr  - Off Levo. C/w Midodrine 10mg PO Q8H  - CT a/p noncont neg for bleed    - LE duplex neg for DVT     #DENISE, resolving  - Likely pre-renal  - S/p a total of 5L boluses   - Cr 0.8. BUN 47 <- 79  - C/w IVF D5 + 1/2NS with 75meq bicarb @80cc/hr     #Acute on chronic anemia  - concern for possible UGIB   - hgb as above  - Hemolysis w/o not c/w hemolysis  - GI c/s appreciated: no intervention at this time as no plans to start a/c  - C/w PPI PO QD  - CT A/P no retroperitoneal bleed    # B/l hydro  - Likely due to acute urinary retention   - CT scan showed on admission moderate to severe bilateral hydroureteronephrosis to the level of a markedly distended urinary bladder. Findings presumably secondary to urinary bladder outlet obstruction or urinary retention. Enlarged prostate gland.   - C/w Santos catheter     # Multiple metastatic osseous lesions with newly diagnosed sacral and mandibular mass with LE weakness concerning for spinal cord compression due to malignancy   - CT scan A/P on admission: Findings compatible with metastatic disease as demonstrated by multiple bony lesions, the largest soft tissue mass centered at S2-S3 measuring up to 7.4 cm with associated bony destruction and obliteration of the exiting neural foramina. Additional smaller lesions at the right sacral ala, L2 and L3.  - CT Neck Soft Tissue No Cont: Large right mandibular soft tissue density mass measuring 6.5 x 7.1 x 9.2 cm likely arising from the right mandible extending from the right mandibular condyle to the body of the mandible. There is an associated extensive bony erosion of the right mandible within the mass and the posterior wall of the right maxilla. Findings are highly suspicious for malignancy.  - Repeat CT A/P noncont for GI bleed: Right upper lobe paravertebral mass measuring approx 5.8 x 4.1 x 4.5 cm with destructive osseous involvement and extension the spine causing mass effect on the spinal cord. Smaller apical paravertebral nodule with adjacent osseous involvement. It is unclear whether these lesions originated in the bone extending to the lung or vice versa.  - S/p IR biopsy of sacral mass 3/10, showing metastatic prostate ca, PSA significantly elevated   - Neurosx c/s appreciated: no intervention at this time, no cord compression  - Heme/Onc c/s appreciated: c/w Casodex; rad onc intervention when stable, steroids  - Dental c/s appreciated to start bisphosphonates. Pt refusing extractions  - Palliative consult appreciated: Dilaudid and Robaxin for pain control    - C/w dexamethasone 4mg q6h  - F/u MR head and MR C/T spine (did not go x2 due to hypotension, will reorder)    # Mandibular mass   - Suspicion for SCC  - ENT c/s appreciated: patient was scoped, bedside mandibular biopsy deferred for now; will need f/u based on heme/onc recs     # Chest pain  - likely musculoskeletal, reproducible  - Tylenol   - EKG NSR  - Trops (-)    # CHFrEF  - Euvolemic   - Echo EF 35%, mod mitral calcification, enlarged left atria  - Breathing RA    # Afib  - HR 60s  - on eliquis 5 mg bid at home  - Was on Lovenox here but d/c due to denise and possible GI bleed     # HTN  - Now hypotensive, 108/96  - hold aldactone, lasix, acei and metoprolol     # CAD   - Stable     # Metabolic acidosis   - C/w sodium bicarbonate 650mg TID     #Hypokalemia  - K 3.3. Treated    # Mild hypercalcemia, likely due to bone mets - resolved     # anxiety  - Xanax 0.25mg Q12H PRN    DVT Prophylaxis: Hep q8h  GI Prophylaxis: Protonix QD  Diet: soft (can advance as tolerated)  Activity: IAT  FULL CODE

## 2021-03-18 NOTE — PROGRESS NOTE ADULT - SUBJECTIVE AND OBJECTIVE BOX
24H events:    He is off pressors, doing well  States his legs feel a little stronger  Went for MR thoracic and MR head, results pending  CT CAP completed, results below    PAST MEDICAL & SURGICAL HISTORY  Inguinal hernia    CHF (congestive heart failure)    S/P CABG x 3      SOCIAL HISTORY:  Negative for smoking/alcohol/drug use.     ALLERGIES:  No Known Allergies    MEDICATIONS:  STANDING MEDICATIONS  bicalutamide 50 milliGRAM(s) Oral daily  chlorhexidine 4% Liquid 1 Application(s) Topical daily  dexAMETHasone  Injectable 4 milliGRAM(s) IV Push every 6 hours  dextrose 5% + sodium chloride 0.45% 1000 milliLiter(s) IV Continuous <Continuous>  heparin   Injectable 5000 Unit(s) SubCutaneous every 8 hours  latanoprost 0.005% Ophthalmic Solution 1 Drop(s) Both EYES at bedtime  lidocaine   Patch 2 Patch Transdermal daily  methocarbamol 500 milliGRAM(s) Oral three times a day  midodrine. 10 milliGRAM(s) Oral three times a day  pantoprazole  Injectable 40 milliGRAM(s) IV Push daily  potassium chloride  20 mEq/100 mL IVPB 20 milliEquivalent(s) IV Intermittent once  sodium bicarbonate 650 milliGRAM(s) Oral three times a day    PRN MEDICATIONS  ALPRAZolam 0.5 milliGRAM(s) Oral daily PRN  aluminum hydroxide/magnesium hydroxide/simethicone Suspension 30 milliLiter(s) Oral every 6 hours PRN  brimonidine 0.2% Ophthalmic Solution 1 Drop(s) Both EYES every 8 hours PRN  HYDROmorphone  Injectable 0.5 milliGRAM(s) IV Push every 4 hours PRN    VITALS:   T(F): 97.8  HR: 76  BP: 122/55  RR: 17  SpO2: 99%    LABS:                        8.1    12.22 )-----------( 190      ( 18 Mar 2021 04:21 )             24.1     03-18    138  |  104  |  35<H>  ----------------------------<  140<H>  3.4<L>   |  24  |  0.8    Ca    8.7      18 Mar 2021 04:21  Mg     1.9     03-18    TPro  4.2<L>  /  Alb  2.5<L>  /  TBili  0.2  /  DBili  x   /  AST  30  /  ALT  56<H>  /  AlkPhos  99  03-18    PT/INR - ( 16 Mar 2021 15:58 )   PT: 10.90 sec;   INR: 0.95 ratio         PTT - ( 16 Mar 2021 15:58 )  PTT:22.8 sec      Troponin T, Serum: 0.01 ng/mL (03-18-21 @ 04:21)  Troponin T, Serum: 0.01 ng/mL (03-17-21 @ 18:13)      CARDIAC MARKERS ( 18 Mar 2021 04:21 )  x     / 0.01 ng/mL / x     / x     / x      CARDIAC MARKERS ( 17 Mar 2021 18:13 )  x     / 0.01 ng/mL / x     / x     / x          RADIOLOGY:  < from: CT Chest No Cont (03.16.21 @ 18:30) >  IMPRESSION:    Right upper lobe paravertebral mass measuring approximately 5.8 x 4.1 x 4.5 cm with destructive osseous involvement as detailed above and extension the spine causing mass effect on the spinal cord. Smaller apical paravertebral nodule with adjacent osseous involvement. It is unclear whether these lesions originated in the bone extending to the lung or vice versa.    Additional osseous lesions as above likely metastatic.    Bilateral small pleural effusions and adjacent consolidative changes/atelectasis.    < end of copied text >  < from: CT Abdomen and Pelvis No Cont (03.16.21 @ 18:30) >  IMPRESSION:  1.  No evidence of retroperitoneal hematoma.  2.   Dominant destructive sacral lesion with relationship to surrounding structures more fullydepicted on MR of 3/11/2021. Additional pelvic lesions and lesions of the lumbar spine also as detailed in the lumbar spine report.  3.  Improving bilateral hydronephrosis.  4.  See separately dictated CT chest report for thoracic findings    < end of copied text >      PHYSICAL EXAM:  GEN: No acute distress  HENT: NCAT, EOMI  LYMPH: No appreciable adenopathy  LUNGS: No respiratory distress, clear to auscultation bilaterally   HEART: regular rate and rhythm  ABD: Soft, non-tender, non-distended  SKIN: No rash  EXT: No edema  NEURO: AAOX3, moving all extremities     24H events:    He is off pressors, doing well  States his legs feel a little stronger  Went for MR thoracic and MR head, results pending  CT CAP completed, results below    PAST MEDICAL & SURGICAL HISTORY  Inguinal hernia    CHF (congestive heart failure)    S/P CABG x 3      SOCIAL HISTORY:  Negative for smoking/alcohol/drug use.     ALLERGIES:  No Known Allergies    MEDICATIONS:  STANDING MEDICATIONS  bicalutamide 50 milliGRAM(s) Oral daily  chlorhexidine 4% Liquid 1 Application(s) Topical daily  dexAMETHasone  Injectable 4 milliGRAM(s) IV Push every 6 hours  dextrose 5% + sodium chloride 0.45% 1000 milliLiter(s) IV Continuous <Continuous>  heparin   Injectable 5000 Unit(s) SubCutaneous every 8 hours  latanoprost 0.005% Ophthalmic Solution 1 Drop(s) Both EYES at bedtime  lidocaine   Patch 2 Patch Transdermal daily  methocarbamol 500 milliGRAM(s) Oral three times a day  midodrine. 10 milliGRAM(s) Oral three times a day  pantoprazole  Injectable 40 milliGRAM(s) IV Push daily  potassium chloride  20 mEq/100 mL IVPB 20 milliEquivalent(s) IV Intermittent once  sodium bicarbonate 650 milliGRAM(s) Oral three times a day    PRN MEDICATIONS  ALPRAZolam 0.5 milliGRAM(s) Oral daily PRN  aluminum hydroxide/magnesium hydroxide/simethicone Suspension 30 milliLiter(s) Oral every 6 hours PRN  brimonidine 0.2% Ophthalmic Solution 1 Drop(s) Both EYES every 8 hours PRN  HYDROmorphone  Injectable 0.5 milliGRAM(s) IV Push every 4 hours PRN    VITALS:   T(F): 97.8  HR: 76  BP: 122/55  RR: 17  SpO2: 99%    LABS:                        8.1    12.22 )-----------( 190      ( 18 Mar 2021 04:21 )             24.1     03-18    138  |  104  |  35<H>  ----------------------------<  140<H>  3.4<L>   |  24  |  0.8    Ca    8.7      18 Mar 2021 04:21  Mg     1.9     03-18    TPro  4.2<L>  /  Alb  2.5<L>  /  TBili  0.2  /  DBili  x   /  AST  30  /  ALT  56<H>  /  AlkPhos  99  03-18    PT/INR - ( 16 Mar 2021 15:58 )   PT: 10.90 sec;   INR: 0.95 ratio         PTT - ( 16 Mar 2021 15:58 )  PTT:22.8 sec      Troponin T, Serum: 0.01 ng/mL (03-18-21 @ 04:21)  Troponin T, Serum: 0.01 ng/mL (03-17-21 @ 18:13)      CARDIAC MARKERS ( 18 Mar 2021 04:21 )  x     / 0.01 ng/mL / x     / x     / x      CARDIAC MARKERS ( 17 Mar 2021 18:13 )  x     / 0.01 ng/mL / x     / x     / x          RADIOLOGY:  < from: CT Chest No Cont (03.16.21 @ 18:30) >  IMPRESSION:    Right upper lobe paravertebral mass measuring approximately 5.8 x 4.1 x 4.5 cm with destructive osseous involvement as detailed above and extension the spine causing mass effect on the spinal cord. Smaller apical paravertebral nodule with adjacent osseous involvement. It is unclear whether these lesions originated in the bone extending to the lung or vice versa.    Additional osseous lesions as above likely metastatic.    Bilateral small pleural effusions and adjacent consolidative changes/atelectasis.    < end of copied text >  < from: CT Abdomen and Pelvis No Cont (03.16.21 @ 18:30) >  IMPRESSION:  1.  No evidence of retroperitoneal hematoma.  2.   Dominant destructive sacral lesion with relationship to surrounding structures more fullydepicted on MR of 3/11/2021. Additional pelvic lesions and lesions of the lumbar spine also as detailed in the lumbar spine report.  3.  Improving bilateral hydronephrosis.  4.  See separately dictated CT chest report for thoracic findings    < end of copied text >    < from: MR Orbit, Face, and/or Neck No Cont (03.11.21 @ 21:40) >  1.  Numerous foci of bone marrow signal abnormality consistent with osseous metastatic disease. Notable lesions include:    2.  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    3.  Metastatic lesions of the T2 and T3 vertebral bodies on the right with a associated large rightparaspinal soft tissue mass invading the right second rib and right T2-3 neural foramen. Small right lateral epidural component noted without cord compression.    4.  Left occipital bone mass measuring 4 cm with overlying subgaleal component and underlying small epidural component.    < end of copied text >  < from: MR Lumbar Spine No Cont (03.11.21 @ 21:02) >  IMPRESSION:    1.  Findings consistent with extensive diffuse osseous metastatic disease.    2.  The largest lesion is within the sacrum (S2-S4) measuring 8.4 cm, impinging and obliterating the neural foramen.    3.  There is also right S1 mass invading the right L5-S1 neural foramen, and a mildly expansile L2 mass without significant spinal stenosis.    < end of copied text >    PHYSICAL EXAM:  GEN: No acute distress  HENT: NCAT, EOMI  LYMPH: No appreciable adenopathy  LUNGS: No respiratory distress, clear to auscultation bilaterally   HEART: regular rate and rhythm  ABD: Soft, non-tender, non-distended  SKIN: No rash  EXT: No edema  NEURO: AAOX3, moving all extremities

## 2021-03-19 ENCOUNTER — RESULT REVIEW (OUTPATIENT)
Age: 81
End: 2021-03-19

## 2021-03-19 LAB
ALBUMIN SERPL ELPH-MCNC: 2.5 G/DL — LOW (ref 3.5–5.2)
ALP SERPL-CCNC: 74 U/L — SIGNIFICANT CHANGE UP (ref 30–115)
ALT FLD-CCNC: 42 U/L — HIGH (ref 0–41)
ANION GAP SERPL CALC-SCNC: 8 MMOL/L — SIGNIFICANT CHANGE UP (ref 7–14)
AST SERPL-CCNC: 20 U/L — SIGNIFICANT CHANGE UP (ref 0–41)
BASOPHILS # BLD AUTO: 0.03 K/UL — SIGNIFICANT CHANGE UP (ref 0–0.2)
BASOPHILS NFR BLD AUTO: 0.2 % — SIGNIFICANT CHANGE UP (ref 0–1)
BILIRUB SERPL-MCNC: 0.3 MG/DL — SIGNIFICANT CHANGE UP (ref 0.2–1.2)
BUN SERPL-MCNC: 33 MG/DL — HIGH (ref 10–20)
CALCIUM SERPL-MCNC: 8.6 MG/DL — SIGNIFICANT CHANGE UP (ref 8.5–10.1)
CHLORIDE SERPL-SCNC: 104 MMOL/L — SIGNIFICANT CHANGE UP (ref 98–110)
CO2 SERPL-SCNC: 26 MMOL/L — SIGNIFICANT CHANGE UP (ref 17–32)
CREAT SERPL-MCNC: 0.7 MG/DL — SIGNIFICANT CHANGE UP (ref 0.7–1.5)
EOSINOPHIL # BLD AUTO: 0 K/UL — SIGNIFICANT CHANGE UP (ref 0–0.7)
EOSINOPHIL NFR BLD AUTO: 0 % — SIGNIFICANT CHANGE UP (ref 0–8)
GLUCOSE SERPL-MCNC: 104 MG/DL — HIGH (ref 70–99)
HCT VFR BLD CALC: 23.9 % — LOW (ref 42–52)
HGB BLD-MCNC: 7.9 G/DL — LOW (ref 14–18)
IMM GRANULOCYTES NFR BLD AUTO: 4.4 % — HIGH (ref 0.1–0.3)
LYMPHOCYTES # BLD AUTO: 0.92 K/UL — LOW (ref 1.2–3.4)
LYMPHOCYTES # BLD AUTO: 6.5 % — LOW (ref 20.5–51.1)
MAGNESIUM SERPL-MCNC: 2 MG/DL — SIGNIFICANT CHANGE UP (ref 1.8–2.4)
MCHC RBC-ENTMCNC: 28.5 PG — SIGNIFICANT CHANGE UP (ref 27–31)
MCHC RBC-ENTMCNC: 33.1 G/DL — SIGNIFICANT CHANGE UP (ref 32–37)
MCV RBC AUTO: 86.3 FL — SIGNIFICANT CHANGE UP (ref 80–94)
MONOCYTES # BLD AUTO: 0.94 K/UL — HIGH (ref 0.1–0.6)
MONOCYTES NFR BLD AUTO: 6.7 % — SIGNIFICANT CHANGE UP (ref 1.7–9.3)
NEUTROPHILS # BLD AUTO: 11.55 K/UL — HIGH (ref 1.4–6.5)
NEUTROPHILS NFR BLD AUTO: 82.2 % — HIGH (ref 42.2–75.2)
NRBC # BLD: 0 /100 WBCS — SIGNIFICANT CHANGE UP (ref 0–0)
PLATELET # BLD AUTO: 196 K/UL — SIGNIFICANT CHANGE UP (ref 130–400)
POTASSIUM SERPL-MCNC: 4.4 MMOL/L — SIGNIFICANT CHANGE UP (ref 3.5–5)
POTASSIUM SERPL-SCNC: 4.4 MMOL/L — SIGNIFICANT CHANGE UP (ref 3.5–5)
PROT SERPL-MCNC: 4.1 G/DL — LOW (ref 6–8)
RBC # BLD: 2.77 M/UL — LOW (ref 4.7–6.1)
RBC # FLD: 16.6 % — HIGH (ref 11.5–14.5)
SODIUM SERPL-SCNC: 138 MMOL/L — SIGNIFICANT CHANGE UP (ref 135–146)
WBC # BLD: 14.06 K/UL — HIGH (ref 4.8–10.8)
WBC # FLD AUTO: 14.06 K/UL — HIGH (ref 4.8–10.8)

## 2021-03-19 PROCEDURE — 99233 SBSQ HOSP IP/OBS HIGH 50: CPT

## 2021-03-19 PROCEDURE — 99232 SBSQ HOSP IP/OBS MODERATE 35: CPT

## 2021-03-19 PROCEDURE — 88341 IMHCHEM/IMCYTCHM EA ADD ANTB: CPT | Mod: 26

## 2021-03-19 PROCEDURE — 88342 IMHCHEM/IMCYTCHM 1ST ANTB: CPT | Mod: 26

## 2021-03-19 PROCEDURE — 88305 TISSUE EXAM BY PATHOLOGIST: CPT | Mod: 26

## 2021-03-19 RX ADMIN — LIDOCAINE 2 PATCH: 4 CREAM TOPICAL at 05:06

## 2021-03-19 RX ADMIN — MIDODRINE HYDROCHLORIDE 10 MILLIGRAM(S): 2.5 TABLET ORAL at 22:21

## 2021-03-19 RX ADMIN — Medication 650 MILLIGRAM(S): at 22:21

## 2021-03-19 RX ADMIN — MIDODRINE HYDROCHLORIDE 10 MILLIGRAM(S): 2.5 TABLET ORAL at 05:05

## 2021-03-19 RX ADMIN — MIDODRINE HYDROCHLORIDE 10 MILLIGRAM(S): 2.5 TABLET ORAL at 11:13

## 2021-03-19 RX ADMIN — Medication 1 APPLICATION(S): at 14:06

## 2021-03-19 RX ADMIN — Medication 650 MILLIGRAM(S): at 11:12

## 2021-03-19 RX ADMIN — Medication 4 MILLIGRAM(S): at 17:14

## 2021-03-19 RX ADMIN — Medication 4 MILLIGRAM(S): at 05:05

## 2021-03-19 RX ADMIN — HEPARIN SODIUM 5000 UNIT(S): 5000 INJECTION INTRAVENOUS; SUBCUTANEOUS at 05:06

## 2021-03-19 RX ADMIN — CHLORHEXIDINE GLUCONATE 1 APPLICATION(S): 213 SOLUTION TOPICAL at 11:07

## 2021-03-19 RX ADMIN — PANTOPRAZOLE SODIUM 40 MILLIGRAM(S): 20 TABLET, DELAYED RELEASE ORAL at 05:06

## 2021-03-19 RX ADMIN — LATANOPROST 1 DROP(S): 0.05 SOLUTION/ DROPS OPHTHALMIC; TOPICAL at 00:00

## 2021-03-19 RX ADMIN — Medication 650 MILLIGRAM(S): at 05:05

## 2021-03-19 RX ADMIN — LIDOCAINE 1 PATCH: 4 CREAM TOPICAL at 11:13

## 2021-03-19 RX ADMIN — METHOCARBAMOL 500 MILLIGRAM(S): 500 TABLET, FILM COATED ORAL at 05:05

## 2021-03-19 RX ADMIN — METHOCARBAMOL 500 MILLIGRAM(S): 500 TABLET, FILM COATED ORAL at 14:06

## 2021-03-19 RX ADMIN — Medication 4 MILLIGRAM(S): at 00:00

## 2021-03-19 RX ADMIN — LATANOPROST 1 DROP(S): 0.05 SOLUTION/ DROPS OPHTHALMIC; TOPICAL at 22:23

## 2021-03-19 RX ADMIN — HEPARIN SODIUM 5000 UNIT(S): 5000 INJECTION INTRAVENOUS; SUBCUTANEOUS at 22:22

## 2021-03-19 RX ADMIN — METHOCARBAMOL 500 MILLIGRAM(S): 500 TABLET, FILM COATED ORAL at 22:22

## 2021-03-19 RX ADMIN — Medication 4 MILLIGRAM(S): at 11:13

## 2021-03-19 RX ADMIN — HEPARIN SODIUM 5000 UNIT(S): 5000 INJECTION INTRAVENOUS; SUBCUTANEOUS at 11:13

## 2021-03-19 NOTE — PROGRESS NOTE ADULT - ASSESSMENT
#Hypovolemic shock   - Likely multifactorial, hypovolemia and possible UGIB given hb drop  - hgb 8.1 <- 6.9. s/p 1 unit  - S/p a total of 5L boluses  - c/w D5 + 1/2HS + 75mEq bicarb @ 80cc/hr  - Off Levo. C/w Midodrine 10mg PO Q8H  - CT a/p noncont neg for bleed    - LE duplex neg for DVT     #DENISE, resolving  - Likely pre-renal  - S/p a total of 5L boluses   - Cr 0.8. BUN 47 <- 79  - C/w IVF D5 + 1/2NS with 75meq bicarb @80cc/hr     #Acute on chronic anemia  - concern for possible UGIB   - hgb as above  - Hemolysis w/o not c/w hemolysis  - GI c/s appreciated: no intervention at this time as no plans to start a/c  - C/w PPI PO QD  - CT A/P no retroperitoneal bleed    # B/l hydro  - Likely due to acute urinary retention   - CT scan showed on admission moderate to severe bilateral hydroureteronephrosis to the level of a markedly distended urinary bladder. Findings presumably secondary to urinary bladder outlet obstruction or urinary retention. Enlarged prostate gland.   - C/w Santos catheter     # Multiple metastatic osseous lesions with newly diagnosed sacral and mandibular mass with LE weakness concerning for spinal cord compression due to malignancy   - CT scan A/P on admission: Findings compatible with metastatic disease as demonstrated by multiple bony lesions, the largest soft tissue mass centered at S2-S3 measuring up to 7.4 cm with associated bony destruction and obliteration of the exiting neural foramina. Additional smaller lesions at the right sacral ala, L2 and L3.  - CT Neck Soft Tissue No Cont: Large right mandibular soft tissue density mass measuring 6.5 x 7.1 x 9.2 cm likely arising from the right mandible extending from the right mandibular condyle to the body of the mandible. There is an associated extensive bony erosion of the right mandible within the mass and the posterior wall of the right maxilla. Findings are highly suspicious for malignancy.  - Repeat CT A/P noncont for GI bleed: Right upper lobe paravertebral mass measuring approx 5.8 x 4.1 x 4.5 cm with destructive osseous involvement and extension the spine causing mass effect on the spinal cord. Smaller apical paravertebral nodule with adjacent osseous involvement. It is unclear whether these lesions originated in the bone extending to the lung or vice versa.  - S/p IR biopsy of sacral mass 3/10, showing metastatic prostate ca, PSA significantly elevated   - Neurosx c/s appreciated: no intervention at this time, no cord compression  - Heme/Onc c/s appreciated: c/w Casodex; rad onc intervention when stable, steroids  - Dental c/s appreciated to start bisphosphonates. Pt refusing extractions  - Palliative consult appreciated: Dilaudid and Robaxin for pain control    - C/w dexamethasone 4mg q6h  - F/u MR head and MR C/T spine (did not go x2 due to hypotension, will reorder)    # Mandibular mass   - Suspicion for SCC  - ENT c/s appreciated: patient was scoped, bedside mandibular biopsy deferred for now; will need f/u based on heme/onc recs     # Chest pain  - likely musculoskeletal, reproducible  - Tylenol   - EKG NSR  - Trops (-)    # CHFrEF  - Euvolemic   - Echo EF 35%, mod mitral calcification, enlarged left atria  - Breathing RA    # Afib  - HR 60s  - on eliquis 5 mg bid at home  - Was on Lovenox here but d/c due to denise and possible GI bleed     # HTN  - Now hypotensive, 108/96  - hold aldactone, lasix, acei and metoprolol     # CAD   - Stable     # Metabolic acidosis   - C/w sodium bicarbonate 650mg TID     #Hypokalemia  - K 3.3. Treated    # Mild hypercalcemia, likely due to bone mets - resolved     # anxiety  - Xanax 0.25mg Q12H PRN    DVT Prophylaxis: Hep q8h  GI Prophylaxis: Protonix QD  Diet: soft (can advance as tolerated)  Activity: IAT  FULL CODE.     79 yo male hx of CAD s/p CABG s/p 20+ years ago/ CHF/ right sided large inguinal hernia BIBA from hotel room 2/2 unable to ambulate with right thigh weakness and numbness, now found to have metastatic disease with a mandibular mass and large sacral mass.     #Hypovolemic shock   - Likely multifactorial, hypovolemia and possible UGIB given hb drop  - hgb 8.1 <- 6.9. s/p 1 unit  - S/p a total of 5L boluses  - c/w D5 + 1/2HS + 75mEq bicarb @ 80cc/hr  - Off Levo. C/w Midodrine 10mg PO Q8H  - CT a/p noncont neg for bleed    - LE duplex neg for DVT     #DENISE, resolving  - Likely pre-renal  - S/p a total of 5L boluses   - Cr 0.8. BUN 47 <- 79  - C/w IVF D5 + 1/2NS with 75meq bicarb @80cc/hr     #Acute on chronic anemia  - concern for possible UGIB   - hgb as above  - Hemolysis w/o not c/w hemolysis  - GI c/s appreciated: no intervention at this time as no plans to start a/c  - C/w PPI PO QD  - CT A/P no retroperitoneal bleed    # B/l hydro  - Likely due to acute urinary retention   - CT scan showed on admission moderate to severe bilateral hydroureteronephrosis to the level of a markedly distended urinary bladder. Findings presumably secondary to urinary bladder outlet obstruction or urinary retention. Enlarged prostate gland.   - C/w Santos catheter     # Multiple metastatic osseous lesions with newly diagnosed sacral and mandibular mass with LE weakness concerning for spinal cord compression due to malignancy   - CT scan A/P on admission: Findings compatible with metastatic disease as demonstrated by multiple bony lesions, the largest soft tissue mass centered at S2-S3 measuring up to 7.4 cm with associated bony destruction and obliteration of the exiting neural foramina. Additional smaller lesions at the right sacral ala, L2 and L3.  - CT Neck Soft Tissue No Cont: Large right mandibular soft tissue density mass measuring 6.5 x 7.1 x 9.2 cm likely arising from the right mandible extending from the right mandibular condyle to the body of the mandible. There is an associated extensive bony erosion of the right mandible within the mass and the posterior wall of the right maxilla. Findings are highly suspicious for malignancy.  - Repeat CT A/P noncont for GI bleed: Right upper lobe paravertebral mass measuring approx 5.8 x 4.1 x 4.5 cm with destructive osseous involvement and extension the spine causing mass effect on the spinal cord. Smaller apical paravertebral nodule with adjacent osseous involvement. It is unclear whether these lesions originated in the bone extending to the lung or vice versa.  - S/p IR biopsy of sacral mass 3/10, showing metastatic prostate ca, PSA significantly elevated   - Neurosx c/s appreciated: no intervention at this time, no cord compression  - Heme/Onc c/s appreciated: c/w Casodex; rad onc intervention when stable, steroids  - Dental c/s appreciated to start bisphosphonates. Pt refusing extractions  - Palliative consult appreciated: Dilaudid and Robaxin for pain control    - C/w dexamethasone 4mg q6h  - F/u MR head and MR C/T spine (did not go x2 due to hypotension, will reorder)    # Mandibular mass   - Suspicion for SCC  - ENT c/s appreciated: patient was scoped, bedside mandibular biopsy deferred for now; will need f/u based on heme/onc recs     # Chest pain  - likely musculoskeletal, reproducible  - Tylenol   - EKG NSR  - Trops (-)    # CHFrEF  - Euvolemic   - Echo EF 35%, mod mitral calcification, enlarged left atria  - Breathing RA    # Afib  - HR 60s  - on eliquis 5 mg bid at home  - Was on Lovenox here but d/c due to denise and possible GI bleed     # HTN  - Now hypotensive, 108/96  - hold aldactone, lasix, acei and metoprolol     # CAD   - Stable     # Metabolic acidosis   - C/w sodium bicarbonate 650mg TID     #Hypokalemia  - K 3.3. Treated    # Mild hypercalcemia, likely due to bone mets - resolved     # anxiety  - Xanax 0.25mg Q12H PRN    DVT Prophylaxis: Hep q8h  GI Prophylaxis: Protonix QD  Diet: soft (can advance as tolerated)  Activity: IAT  FULL CODE.     81 yo male hx of CAD s/p CABG s/p 20+ years ago/ CHF/ right sided large inguinal hernia BIBA from hotel room 2/2 unable to ambulate with right thigh weakness and numbness, now found to have metastatic disease with a mandibular mass and large sacral mass.     #Hypovolemic shock (resolved)  - Likely multifactorial, hypovolemia and possible UGIB given hb drop  - s/p 2 units so fat  - hgb 6.9-->8.1-->7.9  - S/p a total of 5L boluses  - c/w D5 + 1/2HS + 75mEq bicarb @ 80cc/hr  - Off Levo. C/w Midodrine 10mg PO Q8H (/64)  - CT a/p noncont neg for bleed    - LE duplex neg for DVT     #DENISE, resolving  - Likely pre-renal  - S/p a total of 5L boluses   - Cr 0.8. BUN 47 <- 79  - C/w IVF D5 + 1/2NS with 75meq bicarb @80cc/hr     #Acute on chronic anemia  - concern for possible UGIB   - hgb as above  - Hemolysis w/o not c/w hemolysis  - GI c/s appreciated: no intervention at this time as no plans to start a/c  - C/w PPI PO QD  - CT A/P no retroperitoneal bleed    # B/l hydro  - Likely due to acute urinary retention   - CT scan showed on admission moderate to severe bilateral hydroureteronephrosis to the level of a markedly distended urinary bladder. Findings presumably secondary to urinary bladder outlet obstruction or urinary retention. Enlarged prostate gland.   - C/w Santos catheter     # Multiple metastatic osseous lesions with newly diagnosed sacral and mandibular mass with LE weakness concerning for spinal cord compression due to malignancy   - CT scan A/P on admission: Findings compatible with metastatic disease as demonstrated by multiple bony lesions, the largest soft tissue mass centered at S2-S3 measuring up to 7.4 cm with associated bony destruction and obliteration of the exiting neural foramina. Additional smaller lesions at the right sacral ala, L2 and L3.  - CT Neck Soft Tissue No Cont: Large right mandibular soft tissue density mass measuring 6.5 x 7.1 x 9.2 cm likely arising from the right mandible extending from the right mandibular condyle to the body of the mandible. There is an associated extensive bony erosion of the right mandible within the mass and the posterior wall of the right maxilla. Findings are highly suspicious for malignancy.  - Repeat CT A/P noncont for GI bleed: Right upper lobe paravertebral mass measuring approx 5.8 x 4.1 x 4.5 cm with destructive osseous involvement and extension the spine causing mass effect on the spinal cord. Smaller apical paravertebral nodule with adjacent osseous involvement. It is unclear whether these lesions originated in the bone extending to the lung or vice versa.  - S/p IR biopsy of sacral mass 3/10, showing metastatic prostate ca, PSA significantly elevated   - Neurosx c/s appreciated: no intervention at this time, no cord compression  - Heme/Onc c/s appreciated: c/w Casodex; rad onc intervention when stable, steroids  - Dental c/s appreciated to start bisphosphonates. Pt refusing extractions  - Palliative consult appreciated: Dilaudid and Robaxin for pain control    - C/w dexamethasone 4mg q6h  - F/u MR head and MR C/T spine (did not go x2 due to hypotension, will reorder)    # Mandibular mass   - Suspicion for SCC  - ENT c/s appreciated: patient was scoped, bedside mandibular biopsy deferred for now; will need f/u based on heme/onc recs     # Chest pain  - likely musculoskeletal, reproducible  - Tylenol   - EKG NSR  - Trops (-)    # CHFrEF  - Euvolemic   - Echo EF 35%, mod mitral calcification, enlarged left atria  - Breathing RA    # Afib  - HR 60s  - on eliquis 5 mg bid at home  - Was on Lovenox here but d/c due to denise and possible GI bleed     # HTN  - Now hypotensive, 108/96  - hold aldactone, lasix, acei and metoprolol     # CAD   - Stable     # Metabolic acidosis   - C/w sodium bicarbonate 650mg TID     #Hypokalemia  - K 3.3. Treated    # Mild hypercalcemia, likely due to bone mets - resolved     # anxiety  - Xanax 0.25mg Q12H PRN    DVT Prophylaxis: Hep q8h  GI Prophylaxis: Protonix QD  Diet: soft (can advance as tolerated)  Activity: IAT  FULL CODE.    Dispo: PT eval, possible dc to nursing home, need help while ambulating (pt is homeless)

## 2021-03-19 NOTE — CHART NOTE - NSCHARTNOTEFT_GEN_A_CORE
Palliative Sign Off Note:  Met with patient at bedside, pt is alert and oriented x 4, able to make his own decisions.  Pt wishes to remain FULL CODE, declined naming someone for HCP.  Palliative consulted for pain related to CA dx, medications written for pain but patient has been hesitant to take pain medications.  Plan is to continue with chemotherapy and radiation, rehab.  Palliative will sign off for now.  Please reconsult PRN. [phone]

## 2021-03-19 NOTE — PROCEDURE NOTE - GENERAL PROCEDURE DETAILS
Incisional biopsy performed with 11 blade. Biopsy specimen placed in formalin. Surgical path label printed. Wound closed with 4-0 vicryl sutures.

## 2021-03-19 NOTE — PROGRESS NOTE ADULT - ASSESSMENT
81 yo male hx of CAD s/p CABG s/p 20+ years ago/ CHF/ right sided large inguinal hernia BIBA from hotel room 2/2 unable to ambulate with right thigh weakness and numbness, now found to have metastatic disease with a mandibular mass and large sacral mass    Stage IV Prostate cancer with diffuse osseous involvement:  -   - Sacral biopsy favors prostate ( neoplastic cells are positive for AE1/AE3, PSA and CK7)  - Started on casodex 50mg 3/14  - Will likely start leupron in 2 weeks  - Will also likely start chemotherapy (taxotere) when stable  - TLS labs wnl  - CT chest showing RUL [id number] paravertebral lesion with mass effect, MR T-spine completed, results pending, f/u NSx  - CT AP no RP bleed, no visceral mets  - MR jaw and lumbar, results above  - Dental eval 3/17, patient needs teeth extracted for bisphosphonates, patient refused extraction, said he would do it next week  - Recall rad onc for palliative RT  - will c/w  dexamethasone, f/u NSx or rad onc for taper  - MR C/T and head performed, results pending    Anemia: Hypotension resolved with fluids, no active bleeding  - Per GI, outpatient scope  - Hapto high and retic low, not consistent with hemolysis    DENISE: Resolved    Mild hypercalcemia, likely due to bone mets

## 2021-03-19 NOTE — PROGRESS NOTE ADULT - ATTENDING COMMENTS
s/p biopsy of right mandibular mass.  bleeding at biopsy site seen. cauterized with AgNO3 sticks.    f/u results.

## 2021-03-19 NOTE — PROGRESS NOTE ADULT - ATTENDING COMMENTS
seen/examined with hemonc fellow  note reviewed  plan discused    MRI C/T/L spine reviewed  MRI head reviewed  RADONC was recalled  continue steroids    s/p jaw mass bx 3.19.21  continue casodex; 1 st dose of GNRH agonist after 2-3 weeks of casodex

## 2021-03-19 NOTE — PROGRESS NOTE ADULT - SUBJECTIVE AND OBJECTIVE BOX
MANUELITO HEDRICK 80y Male  MRN#: [id number]   Hospital Day: 11d    SUBJECTIVE  Patient is a 80y old Male who presents with a chief complaint of 80 YEAR OLD MALE C/O MULTIPLE MEDICAL COMPLAINTS . (19 Mar 2021 08:00)  Currently admitted to medicine with the primary diagnosis of Ambulatory dysfunction      INTERVAL HPI AND OVERNIGHT EVENTS:  Patient was examined and seen at bedside. This morning he is resting comfortably in bed and reports no issues or overnight events.    REVIEW OF SYMPTOMS:  CONSTITUTIONAL: No weakness, fevers or chills; No headaches  EYES: No visual changes, eye pain, or discharge  ENT: No vertigo; No ear pain or change in hearing; No sore throat or difficulty swallowing  NECK: No pain or stiffness  RESPIRATORY: No cough, wheezing, or hemoptysis; No shortness of breath  CARDIOVASCULAR: No chest pain or palpitations  GASTROINTESTINAL: No abdominal or epigastric pain; No nausea, vomiting, or hematemesis; No diarrhea or constipation; No melena or hematochezia  GENITOURINARY: No dysuria, frequency or hematuria  MUSCULOSKELETAL: No joint pain, no muscle pain, no weakness  NEUROLOGICAL: No numbness or weakness  SKIN: No itching or rashes    OBJECTIVE  PAST MEDICAL & SURGICAL HISTORY  Inguinal hernia    CHF (congestive heart failure)    S/P CABG x 3      ALLERGIES:  No Known Allergies    MEDICATIONS:  STANDING MEDICATIONS  bicalutamide 50 milliGRAM(s) Oral daily  chlorhexidine 4% Liquid 1 Application(s) Topical daily  collagenase Ointment 1 Application(s) Topical daily  dexAMETHasone  Injectable 4 milliGRAM(s) IV Push every 6 hours  dextrose 5% + sodium chloride 0.45% 1000 milliLiter(s) IV Continuous <Continuous>  heparin   Injectable 5000 Unit(s) SubCutaneous every 8 hours  latanoprost 0.005% Ophthalmic Solution 1 Drop(s) Both EYES at bedtime  lidocaine   Patch 2 Patch Transdermal daily  methocarbamol 500 milliGRAM(s) Oral three times a day  midodrine. 10 milliGRAM(s) Oral three times a day  pantoprazole    Tablet 40 milliGRAM(s) Oral before breakfast  sodium bicarbonate 650 milliGRAM(s) Oral three times a day    PRN MEDICATIONS  ALPRAZolam 0.5 milliGRAM(s) Oral daily PRN  aluminum hydroxide/magnesium hydroxide/simethicone Suspension 30 milliLiter(s) Oral every 6 hours PRN  brimonidine 0.2% Ophthalmic Solution 1 Drop(s) Both EYES every 8 hours PRN  HYDROmorphone  Injectable 0.25 milliGRAM(s) IV Push every 4 hours PRN      VITAL SIGNS: Last 24 Hours  T(C): 36.6 (19 Mar 2021 07:50), Max: 36.8 (18 Mar 2021 16:09)  T(F): 97.8 (19 Mar 2021 07:50), Max: 98.2 (18 Mar 2021 16:09)  HR: 73 (19 Mar 2021 07:50) (62 - 88)  BP: 123/59 (19 Mar 2021 07:50) (94/50 - 140/65)  BP(mean): 82 (18 Mar 2021 19:00) (67 - 89)  RR: 18 (19 Mar 2021 07:50) (15 - 24)  SpO2: 99% (18 Mar 2021 19:00) (97% - 99%)    LABS:                        7.9    14.06 )-----------( 196      ( 19 Mar 2021 06:22 )             23.9     03-19    138  |  104  |  33<H>  ----------------------------<  104<H>  4.4   |  26  |  0.7    Ca    8.6      19 Mar 2021 06:22  Mg     2.0     03-19    TPro  4.1<L>  /  Alb  2.5<L>  /  TBili  0.3  /  DBili  x   /  AST  20  /  ALT  42<H>  /  AlkPhos  74  03-19              CARDIAC MARKERS ( 18 Mar 2021 04:21 )  x     / 0.01 ng/mL / x     / x     / x      CARDIAC MARKERS ( 17 Mar 2021 18:13 )  x     / 0.01 ng/mL / x     / x     / x          RADIOLOGY:      PHYSICAL EXAM:  CONSTITUTIONAL: No acute distress, well-developed, well-groomed, AAOx3  HEAD: Atraumatic, normocephalic  EYES: EOM intact, PERRLA, conjunctiva and sclera clear  ENT: Supple, no masses, no thyromegaly, no bruits, no JVD; moist mucous membranes  PULMONARY: Clear to auscultation bilaterally; no wheezes, rales, or rhonchi  CARDIOVASCULAR: Regular rate and rhythm; no murmurs, rubs, or gallops  GASTROINTESTINAL: Soft, non-tender, non-distended; bowel sounds present  MUSCULOSKELETAL: 2+ peripheral pulses; no clubbing, no cyanosis, no edema  NEUROLOGY: non-focal  SKIN: No rashes or lesions; warm and dry       MANUELITO HEDRICK 80y Male  MRN#: [id number]   Hospital Day: 11d    SUBJECTIVE  Patient is a 80y old Male who presents with a chief complaint of 80 YEAR OLD MALE C/O MULTIPLE MEDICAL COMPLAINTS . (19 Mar 2021 08:00)  Currently admitted to medicine with the primary diagnosis of Ambulatory dysfunction      INTERVAL HPI AND OVERNIGHT EVENTS:  Patient was examined and seen at bedside. This morning he is resting comfortably in bed and reports no issues or overnight events.    REVIEW OF SYMPTOMS:  As per interval HPI    OBJECTIVE  PAST MEDICAL & SURGICAL HISTORY  Inguinal hernia    CHF (congestive heart failure)    S/P CABG x 3      ALLERGIES:  No Known Allergies    MEDICATIONS:  STANDING MEDICATIONS  bicalutamide 50 milliGRAM(s) Oral daily  chlorhexidine 4% Liquid 1 Application(s) Topical daily  collagenase Ointment 1 Application(s) Topical daily  dexAMETHasone  Injectable 4 milliGRAM(s) IV Push every 6 hours  dextrose 5% + sodium chloride 0.45% 1000 milliLiter(s) IV Continuous <Continuous>  heparin   Injectable 5000 Unit(s) SubCutaneous every 8 hours  latanoprost 0.005% Ophthalmic Solution 1 Drop(s) Both EYES at bedtime  lidocaine   Patch 2 Patch Transdermal daily  methocarbamol 500 milliGRAM(s) Oral three times a day  midodrine. 10 milliGRAM(s) Oral three times a day  pantoprazole    Tablet 40 milliGRAM(s) Oral before breakfast  sodium bicarbonate 650 milliGRAM(s) Oral three times a day    PRN MEDICATIONS  ALPRAZolam 0.5 milliGRAM(s) Oral daily PRN  aluminum hydroxide/magnesium hydroxide/simethicone Suspension 30 milliLiter(s) Oral every 6 hours PRN  brimonidine 0.2% Ophthalmic Solution 1 Drop(s) Both EYES every 8 hours PRN  HYDROmorphone  Injectable 0.25 milliGRAM(s) IV Push every 4 hours PRN      VITAL SIGNS: Last 24 Hours  T(C): 36.6 (19 Mar 2021 07:50), Max: 36.8 (18 Mar 2021 16:09)  T(F): 97.8 (19 Mar 2021 07:50), Max: 98.2 (18 Mar 2021 16:09)  HR: 73 (19 Mar 2021 07:50) (62 - 88)  BP: 123/59 (19 Mar 2021 07:50) (94/50 - 140/65)  BP(mean): 82 (18 Mar 2021 19:00) (67 - 89)  RR: 18 (19 Mar 2021 07:50) (15 - 24)  SpO2: 99% (18 Mar 2021 19:00) (97% - 99%)    LABS:                        7.9    14.06 )-----------( 196      ( 19 Mar 2021 06:22 )             23.9     03-19    138  |  104  |  33<H>  ----------------------------<  104<H>  4.4   |  26  |  0.7    Ca    8.6      19 Mar 2021 06:22  Mg     2.0     03-19    TPro  4.1<L>  /  Alb  2.5<L>  /  TBili  0.3  /  DBili  x   /  AST  20  /  ALT  42<H>  /  AlkPhos  74  03-19              CARDIAC MARKERS ( 18 Mar 2021 04:21 )  x     / 0.01 ng/mL / x     / x     / x      CARDIAC MARKERS ( 17 Mar 2021 18:13 )  x     / 0.01 ng/mL / x     / x     / x          RADIOLOGY:      PHYSICAL EXAM:  CONSTITUTIONAL: No acute distress, well-developed, well-groomed, AAOx3  HEAD: Atraumatic, normocephalic, swelling noted on the right side of the face   EYES: EOM intact, PERRLA, conjunctiva and sclera clear  ENT: Supple, no masses, no thyromegaly, no bruits, no JVD; moist mucous membranes  PULMONARY: Clear to auscultation bilaterally; no wheezes, rales, or rhonchi  CARDIOVASCULAR: Regular rate and rhythm; no murmurs, rubs, or gallops  GASTROINTESTINAL: Soft, non-tender, non-distended; bowel sounds present  MUSCULOSKELETAL: 2+ peripheral pulses; no clubbing, no cyanosis, no edema  NEUROLOGY: right lower extremity strength 2/5  SKIN: No rashes or lesions; warm and dry

## 2021-03-19 NOTE — PROGRESS NOTE ADULT - ATTENDING COMMENTS
Patient seen and examined independently. Agree with resident note.  # UGI bleed-- nO EGD done--only coffee ground emesis-- s/p 1 unit of PRBC.  #Metastatic prostate cancer--seen by oncology-- on casodex. 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 repeat EKG  # Chr systolic CHF-- euvolemic--off diuretics.  Homeless currently

## 2021-03-19 NOTE — PROGRESS NOTE ADULT - SUBJECTIVE AND OBJECTIVE BOX
ENT DAILY PROGRESS NOTE    Pt is a 80y Male with large right sided mandibular/oral cavity mass - seen and examined at bedside. Pt seen earlier today for bedside biopsy with [facility] resident. Called by RN that patient still with some oozing from site, no other complaints at this time. Denies any SOB/diff breathing.      REVIEW OF SYSTEMS   [x] A ten-point review of systems was otherwise negative except as noted.    Allergies    No Known Allergies    Intolerances    MEDICATIONS:  ALPRAZolam 0.5 milliGRAM(s) Oral daily PRN  aluminum hydroxide/magnesium hydroxide/simethicone Suspension 30 milliLiter(s) Oral every 6 hours PRN  bicalutamide 50 milliGRAM(s) Oral daily  brimonidine 0.2% Ophthalmic Solution 1 Drop(s) Both EYES every 8 hours PRN  chlorhexidine 4% Liquid 1 Application(s) Topical daily  collagenase Ointment 1 Application(s) Topical daily  dexAMETHasone  Injectable 4 milliGRAM(s) IV Push every 6 hours  dextrose 5% + sodium chloride 0.45% 1000 milliLiter(s) IV Continuous <Continuous>  heparin   Injectable 5000 Unit(s) SubCutaneous every 8 hours  HYDROmorphone  Injectable 0.25 milliGRAM(s) IV Push every 4 hours PRN  latanoprost 0.005% Ophthalmic Solution 1 Drop(s) Both EYES at bedtime  lidocaine   Patch 2 Patch Transdermal daily  methocarbamol 500 milliGRAM(s) Oral three times a day  midodrine. 10 milliGRAM(s) Oral three times a day  pantoprazole    Tablet 40 milliGRAM(s) Oral before breakfast  sodium bicarbonate 650 milliGRAM(s) Oral three times a day      Vital Signs Last 24 Hrs  T(C): 36.2 (19 Mar 2021 16:20), Max: 36.6 (19 Mar 2021 00:00)  T(F): 97.1 (19 Mar 2021 16:20), Max: 97.9 (19 Mar 2021 00:00)  HR: 97 (19 Mar 2021 16:20) (73 - 97)  BP: 141/67 (19 Mar 2021 16:20) (123/59 - 141/67)  RR: 19 (19 Mar 2021 16:20) (18 - 19)      03-18 @ 07:01  -  03-19 @ 07:00  --------------------------------------------------------  IN:    dextrose 5% + sodium chloride 0.45% w/ Additives: 300 mL    dextrose 5% + sodium chloride 0.45% w/ Additives: 800 mL    IV PiggyBack: 100 mL    Oral Fluid: 480 mL  Total IN: 1680 mL    OUT:    Indwelling Catheter - Urethral (mL): 745 mL  Total OUT: 745 mL    Total NET: 935 mL      03-19 @ 07:01  -  03-19 @ 22:13  --------------------------------------------------------  IN:    Oral Fluid: 260 mL  Total IN: 260 mL    OUT:    Indwelling Catheter - Urethral (mL): 450 mL  Total OUT: 450 mL    Total NET: -190 mL    PHYSICAL EXAM:    GEN: Well-developed, well-nourished. NAD, awake and alert. No drooling or pooling of secretions. No stridor or stertor. Good vocal quality, no hoarseness.   SKIN: Good color, non diaphoretic  HEENT: NC/AT; poor dentition. Oral mucosa pink and moist. + large alveolar ridge/oral cavity mass noted. + sutures intact, mild ooze from site. area cauterized with silver nitrate stick. No erythema or edema noted to buccal mucosa, tongue, FOM, uvula or posterior oropharynx. Uvula midline  NECK:  Trachea midline. Neck supple, no TTP to B/L lateral neck, no cervical LAD.  RESP: No dyspnea, non-labored breathing. No use of accessory muscles.  CARDIO: +S1/S2  ABDO: Soft, NT.  EXT: CRUZ x 4    LABS:  CBC-                        7.9    14.06 )-----------( 196      ( 19 Mar 2021 06:22 )             23.9     BMP/CMP-  19 Mar 2021 06:22    138    |  104    |  33     ----------------------------<  104    4.4     |  26     |  0.7      Ca    8.6        19 Mar 2021 06:22  Mg     2.0       19 Mar 2021 06:22    TPro  4.1    /  Alb  2.5    /  TBili  0.3    /  DBili  x      /  AST  20     /  ALT  42     /  AlkPhos  74     19 Mar 2021 06:22    Coagulation Studies-    Endocrine Panel-  Calcium, Total Serum: 8.6 mg/dL

## 2021-03-19 NOTE — PROGRESS NOTE ADULT - ASSESSMENT
Pt is a 80y Male with large right sided mandibular/oral cavity mass - s/p bedside biopsy, mild oozing post procedure.     ·	cauterized with silver nitrate   ·	cont to hold gauze pressure prn  ·	ok for PO, chew on opposite side  ·	f/u path results  ·	w/d with attng

## 2021-03-19 NOTE — PROGRESS NOTE ADULT - SUBJECTIVE AND OBJECTIVE BOX
24H events:    MR C/T spine and head performed last night  Downgraded from critical care  VSS    PAST MEDICAL & SURGICAL HISTORY  Inguinal hernia    CHF (congestive heart failure)    S/P CABG x 3      SOCIAL HISTORY:  Negative for smoking/alcohol/drug use.     ALLERGIES:  No Known Allergies    MEDICATIONS:  STANDING MEDICATIONS  bicalutamide 50 milliGRAM(s) Oral daily  chlorhexidine 4% Liquid 1 Application(s) Topical daily  collagenase Ointment 1 Application(s) Topical daily  dexAMETHasone  Injectable 4 milliGRAM(s) IV Push every 6 hours  dextrose 5% + sodium chloride 0.45% 1000 milliLiter(s) IV Continuous <Continuous>  heparin   Injectable 5000 Unit(s) SubCutaneous every 8 hours  latanoprost 0.005% Ophthalmic Solution 1 Drop(s) Both EYES at bedtime  lidocaine   Patch 2 Patch Transdermal daily  methocarbamol 500 milliGRAM(s) Oral three times a day  midodrine. 10 milliGRAM(s) Oral three times a day  pantoprazole    Tablet 40 milliGRAM(s) Oral before breakfast  sodium bicarbonate 650 milliGRAM(s) Oral three times a day    PRN MEDICATIONS  ALPRAZolam 0.5 milliGRAM(s) Oral daily PRN  aluminum hydroxide/magnesium hydroxide/simethicone Suspension 30 milliLiter(s) Oral every 6 hours PRN  brimonidine 0.2% Ophthalmic Solution 1 Drop(s) Both EYES every 8 hours PRN  HYDROmorphone  Injectable 0.25 milliGRAM(s) IV Push every 4 hours PRN    VITALS:   T(F): 97.9  HR: 83  BP: 140/65  RR: 19  SpO2: 99%    LABS:                        7.9    14.06 )-----------( 196      ( 19 Mar 2021 06:22 )             23.9     03-18    138  |  104  |  35<H>  ----------------------------<  140<H>  3.4<L>   |  24  |  0.8    Ca    8.7      18 Mar 2021 04:21  Mg     1.9     03-18    TPro  4.2<L>  /  Alb  2.5<L>  /  TBili  0.2  /  DBili  x   /  AST  30  /  ALT  56<H>  /  AlkPhos  99  03-18              CARDIAC MARKERS ( 18 Mar 2021 04:21 )  x     / 0.01 ng/mL / x     / x     / x      CARDIAC MARKERS ( 17 Mar 2021 18:13 )  x     / 0.01 ng/mL / x     / x     / x          RADIOLOGY:  MR pending      PHYSICAL EXAM:  GEN: No acute distress  HENT: NCAT, EOMI  LYMPH: No appreciable adenopathy  LUNGS: No respiratory distress  HEART: regular rate and rhythm  ABD: Soft, non-tender, non-distended  SKIN: No rash  NEURO: AAOX3, strength improving

## 2021-03-20 LAB
ALBUMIN SERPL ELPH-MCNC: 2.7 G/DL — LOW (ref 3.5–5.2)
ALP SERPL-CCNC: 74 U/L — SIGNIFICANT CHANGE UP (ref 30–115)
ALT FLD-CCNC: 37 U/L — SIGNIFICANT CHANGE UP (ref 0–41)
ANION GAP SERPL CALC-SCNC: 6 MMOL/L — LOW (ref 7–14)
AST SERPL-CCNC: 18 U/L — SIGNIFICANT CHANGE UP (ref 0–41)
BASOPHILS # BLD AUTO: 0.03 K/UL — SIGNIFICANT CHANGE UP (ref 0–0.2)
BASOPHILS NFR BLD AUTO: 0.2 % — SIGNIFICANT CHANGE UP (ref 0–1)
BILIRUB SERPL-MCNC: 0.4 MG/DL — SIGNIFICANT CHANGE UP (ref 0.2–1.2)
BLD GP AB SCN SERPL QL: SIGNIFICANT CHANGE UP
BUN SERPL-MCNC: 32 MG/DL — HIGH (ref 10–20)
CALCIUM SERPL-MCNC: 8.9 MG/DL — SIGNIFICANT CHANGE UP (ref 8.5–10.1)
CHLORIDE SERPL-SCNC: 102 MMOL/L — SIGNIFICANT CHANGE UP (ref 98–110)
CO2 SERPL-SCNC: 27 MMOL/L — SIGNIFICANT CHANGE UP (ref 17–32)
CREAT SERPL-MCNC: 0.6 MG/DL — LOW (ref 0.7–1.5)
EOSINOPHIL # BLD AUTO: 0 K/UL — SIGNIFICANT CHANGE UP (ref 0–0.7)
EOSINOPHIL NFR BLD AUTO: 0 % — SIGNIFICANT CHANGE UP (ref 0–8)
GLUCOSE SERPL-MCNC: 99 MG/DL — SIGNIFICANT CHANGE UP (ref 70–99)
HCT VFR BLD CALC: 26.1 % — LOW (ref 42–52)
HGB BLD-MCNC: 8.6 G/DL — LOW (ref 14–18)
IMM GRANULOCYTES NFR BLD AUTO: 3.2 % — HIGH (ref 0.1–0.3)
LYMPHOCYTES # BLD AUTO: 1.15 K/UL — LOW (ref 1.2–3.4)
LYMPHOCYTES # BLD AUTO: 6.5 % — LOW (ref 20.5–51.1)
MAGNESIUM SERPL-MCNC: 2 MG/DL — SIGNIFICANT CHANGE UP (ref 1.8–2.4)
MCHC RBC-ENTMCNC: 29 PG — SIGNIFICANT CHANGE UP (ref 27–31)
MCHC RBC-ENTMCNC: 33 G/DL — SIGNIFICANT CHANGE UP (ref 32–37)
MCV RBC AUTO: 87.9 FL — SIGNIFICANT CHANGE UP (ref 80–94)
MONOCYTES # BLD AUTO: 1.14 K/UL — HIGH (ref 0.1–0.6)
MONOCYTES NFR BLD AUTO: 6.5 % — SIGNIFICANT CHANGE UP (ref 1.7–9.3)
NEUTROPHILS # BLD AUTO: 14.75 K/UL — HIGH (ref 1.4–6.5)
NEUTROPHILS NFR BLD AUTO: 83.6 % — HIGH (ref 42.2–75.2)
NRBC # BLD: 0 /100 WBCS — SIGNIFICANT CHANGE UP (ref 0–0)
PLATELET # BLD AUTO: 216 K/UL — SIGNIFICANT CHANGE UP (ref 130–400)
POTASSIUM SERPL-MCNC: 4.4 MMOL/L — SIGNIFICANT CHANGE UP (ref 3.5–5)
POTASSIUM SERPL-SCNC: 4.4 MMOL/L — SIGNIFICANT CHANGE UP (ref 3.5–5)
PROT SERPL-MCNC: 4.4 G/DL — LOW (ref 6–8)
RBC # BLD: 2.97 M/UL — LOW (ref 4.7–6.1)
RBC # FLD: 17.2 % — HIGH (ref 11.5–14.5)
SODIUM SERPL-SCNC: 135 MMOL/L — SIGNIFICANT CHANGE UP (ref 135–146)
WBC # BLD: 17.63 K/UL — HIGH (ref 4.8–10.8)
WBC # FLD AUTO: 17.63 K/UL — HIGH (ref 4.8–10.8)

## 2021-03-20 PROCEDURE — 99233 SBSQ HOSP IP/OBS HIGH 50: CPT

## 2021-03-20 RX ORDER — DEXAMETHASONE 0.5 MG/5ML
4 ELIXIR ORAL EVERY 8 HOURS
Refills: 0 | Status: DISCONTINUED | OUTPATIENT
Start: 2021-03-20 | End: 2021-03-31

## 2021-03-20 RX ADMIN — HEPARIN SODIUM 5000 UNIT(S): 5000 INJECTION INTRAVENOUS; SUBCUTANEOUS at 06:34

## 2021-03-20 RX ADMIN — HEPARIN SODIUM 5000 UNIT(S): 5000 INJECTION INTRAVENOUS; SUBCUTANEOUS at 21:06

## 2021-03-20 RX ADMIN — METHOCARBAMOL 500 MILLIGRAM(S): 500 TABLET, FILM COATED ORAL at 06:35

## 2021-03-20 RX ADMIN — Medication 650 MILLIGRAM(S): at 11:34

## 2021-03-20 RX ADMIN — LIDOCAINE 2 PATCH: 4 CREAM TOPICAL at 11:33

## 2021-03-20 RX ADMIN — Medication 4 MILLIGRAM(S): at 00:00

## 2021-03-20 RX ADMIN — MIDODRINE HYDROCHLORIDE 10 MILLIGRAM(S): 2.5 TABLET ORAL at 06:35

## 2021-03-20 RX ADMIN — LATANOPROST 1 DROP(S): 0.05 SOLUTION/ DROPS OPHTHALMIC; TOPICAL at 21:07

## 2021-03-20 RX ADMIN — CHLORHEXIDINE GLUCONATE 1 APPLICATION(S): 213 SOLUTION TOPICAL at 11:32

## 2021-03-20 RX ADMIN — PANTOPRAZOLE SODIUM 40 MILLIGRAM(S): 20 TABLET, DELAYED RELEASE ORAL at 06:35

## 2021-03-20 RX ADMIN — Medication 650 MILLIGRAM(S): at 21:06

## 2021-03-20 RX ADMIN — MIDODRINE HYDROCHLORIDE 10 MILLIGRAM(S): 2.5 TABLET ORAL at 17:22

## 2021-03-20 RX ADMIN — LIDOCAINE 2 PATCH: 4 CREAM TOPICAL at 23:00

## 2021-03-20 RX ADMIN — MIDODRINE HYDROCHLORIDE 10 MILLIGRAM(S): 2.5 TABLET ORAL at 11:34

## 2021-03-20 RX ADMIN — LIDOCAINE 2 PATCH: 4 CREAM TOPICAL at 02:52

## 2021-03-20 RX ADMIN — METHOCARBAMOL 500 MILLIGRAM(S): 500 TABLET, FILM COATED ORAL at 11:34

## 2021-03-20 RX ADMIN — Medication 650 MILLIGRAM(S): at 06:34

## 2021-03-20 RX ADMIN — Medication 4 MILLIGRAM(S): at 21:06

## 2021-03-20 RX ADMIN — Medication 4 MILLIGRAM(S): at 14:38

## 2021-03-20 RX ADMIN — Medication 4 MILLIGRAM(S): at 06:34

## 2021-03-20 RX ADMIN — HEPARIN SODIUM 5000 UNIT(S): 5000 INJECTION INTRAVENOUS; SUBCUTANEOUS at 11:34

## 2021-03-20 RX ADMIN — LIDOCAINE 2 PATCH: 4 CREAM TOPICAL at 20:00

## 2021-03-20 RX ADMIN — METHOCARBAMOL 500 MILLIGRAM(S): 500 TABLET, FILM COATED ORAL at 21:07

## 2021-03-20 RX ADMIN — LIDOCAINE 2 PATCH: 4 CREAM TOPICAL at 00:00

## 2021-03-20 NOTE — PROGRESS NOTE ADULT - ASSESSMENT
79 yo male hx of CAD s/p CABG s/p 20+ years ago/ CHF/ right sided large inguinal hernia BIBA from hotel room 2/2 unable to ambulate with right thigh weakness and numbness, now found to have metastatic disease with a mandibular mass and large sacral mass.     #Hypovolemic shock (resolved)  - Likely multifactorial, hypovolemia and possible UGIB given hb drop  - s/p 2 units so fat  - hgb 6.9-->8.1-->7.9  - S/p a total of 5L boluses  - c/w D5 + 1/2HS + 75mEq bicarb @ 80cc/hr  - Off Levo. C/w Midodrine 10mg PO Q8H (/64)  - CT a/p noncont neg for bleed    - LE duplex neg for DVT     #DENISE, resolving  - Likely pre-renal  - S/p a total of 5L boluses   - Cr 0.8. BUN 47 <- 79  - C/w IVF D5 + 1/2NS with 75meq bicarb @80cc/hr     #Acute on chronic anemia  - concern for possible UGIB   - hgb as above  - Hemolysis w/o not c/w hemolysis  - GI c/s appreciated: no intervention at this time as no plans to start a/c  - C/w PPI PO QD  - CT A/P no retroperitoneal bleed    # B/l hydro  - Likely due to acute urinary retention   - CT scan showed on admission moderate to severe bilateral hydroureteronephrosis to the level of a markedly distended urinary bladder. Findings presumably secondary to urinary bladder outlet obstruction or urinary retention. Enlarged prostate gland.   - C/w Santos catheter     # Multiple metastatic osseous lesions with newly diagnosed sacral and mandibular mass with LE weakness concerning for spinal cord compression due to malignancy   - CT scan A/P on admission: Findings compatible with metastatic disease as demonstrated by multiple bony lesions, the largest soft tissue mass centered at S2-S3 measuring up to 7.4 cm with associated bony destruction and obliteration of the exiting neural foramina. Additional smaller lesions at the right sacral ala, L2 and L3.  - CT Neck Soft Tissue No Cont: Large right mandibular soft tissue density mass measuring 6.5 x 7.1 x 9.2 cm likely arising from the right mandible extending from the right mandibular condyle to the body of the mandible. There is an associated extensive bony erosion of the right mandible within the mass and the posterior wall of the right maxilla. Findings are highly suspicious for malignancy.  - Repeat CT A/P noncont for GI bleed: Right upper lobe paravertebral mass measuring approx 5.8 x 4.1 x 4.5 cm with destructive osseous involvement and extension the spine causing mass effect on the spinal cord. Smaller apical paravertebral nodule with adjacent osseous involvement. It is unclear whether these lesions originated in the bone extending to the lung or vice versa.  - S/p IR biopsy of sacral mass 3/10, showing metastatic prostate ca, PSA significantly elevated   - Neurosx c/s appreciated: no intervention at this time, no cord compression  - Heme/Onc c/s appreciated: c/w Casodex; rad onc intervention when stable, steroids  - Dental c/s appreciated to start bisphosphonates. Pt refusing extractions  - Palliative consult appreciated: Dilaudid and Robaxin for pain control    - Taper steroid: now on dexamethasone 4mg q8 PO  - F/u MR head and MR C/T spine (did not go x2 due to hypotension, will reorder)    # Mandibular mass   - Suspicion for SCC  -ENT following: s/p bedside biopsy, mild oozing post procedure.      - cauterized with silver nitrate      - cont to hold gauze pressure prn     - ok for PO, chew on opposite side     - f/u path results    # Chest pain  - likely musculoskeletal, reproducible  - Tylenol   - EKG NSR  - Trops (-)    # CHFrEF  - Euvolemic   - Echo EF 35%, mod mitral calcification, enlarged left atria  - Breathing RA    # Afib  - HR 60s  - on eliquis 5 mg bid at home  - Was on Lovenox here but d/c due to denise and possible GI bleed     # HTN  - Now hypotensive, 108/96  - hold aldactone, lasix, acei and metoprolol     # CAD   - Stable     # Metabolic acidosis   - C/w sodium bicarbonate 650mg TID     #Hypokalemia  - K 3.3. Treated    # Mild hypercalcemia, likely due to bone mets - resolved     # anxiety  - Xanax 0.25mg Q12H PRN    DVT Prophylaxis: Hep q8h  GI Prophylaxis: Protonix QD  Diet: soft (can advance as tolerated)  Activity: IAT  FULL CODE.      Dispo: PT eval, possible dc to nursing home, need help while ambulating (pt is homeless)

## 2021-03-20 NOTE — PROGRESS NOTE ADULT - SUBJECTIVE AND OBJECTIVE BOX
ENT DAILY PROGRESS NOTE    Pt is a 80y Male with metastatic prostate cancer - right mandibular lesion on CT, s/p bedside biopsy on 3/19 - pt seen and examined at bedside. Pt doing well, no complaints at this time. Pt denies any bleeding from the oral cavity site.       REVIEW OF SYSTEMS   [x] A ten-point review of systems was otherwise negative except as noted.    Allergies    No Known Allergies    Intolerances      MEDICATIONS:  ALPRAZolam 0.5 milliGRAM(s) Oral daily PRN  aluminum hydroxide/magnesium hydroxide/simethicone Suspension 30 milliLiter(s) Oral every 6 hours PRN  bicalutamide 50 milliGRAM(s) Oral daily  brimonidine 0.2% Ophthalmic Solution 1 Drop(s) Both EYES every 8 hours PRN  chlorhexidine 4% Liquid 1 Application(s) Topical daily  collagenase Ointment 1 Application(s) Topical daily  dexAMETHasone     Tablet 4 milliGRAM(s) Oral every 8 hours  dextrose 5% + sodium chloride 0.45% 1000 milliLiter(s) IV Continuous <Continuous>  heparin   Injectable 5000 Unit(s) SubCutaneous every 8 hours  HYDROmorphone  Injectable 0.25 milliGRAM(s) IV Push every 4 hours PRN  latanoprost 0.005% Ophthalmic Solution 1 Drop(s) Both EYES at bedtime  lidocaine   Patch 2 Patch Transdermal daily  methocarbamol 500 milliGRAM(s) Oral three times a day  midodrine. 10 milliGRAM(s) Oral three times a day  pantoprazole    Tablet 40 milliGRAM(s) Oral before breakfast  sodium bicarbonate 650 milliGRAM(s) Oral three times a day      Vital Signs Last 24 Hrs  T(C): 36.2 (20 Mar 2021 07:24), Max: 36.2 (19 Mar 2021 16:20)  T(F): 97.1 (20 Mar 2021 07:24), Max: 97.1 (19 Mar 2021 16:20)  HR: 57 (20 Mar 2021 07:24) (57 - 97)  BP: 138/62 (20 Mar 2021 07:24) (128/61 - 141/67)  RR: 18 (20 Mar 2021 07:24) (18 - 19)      03-19 @ 07:01  -  03-20 @ 07:00  --------------------------------------------------------  IN:    Oral Fluid: 260 mL  Total IN: 260 mL    OUT:    Indwelling Catheter - Urethral (mL): 450 mL    Voided (mL): 1650 mL  Total OUT: 2100 mL    Total NET: -1840 mL      03-20 @ 07:01  -  03-20 @ 16:11  --------------------------------------------------------  IN:    Oral Fluid: 520 mL  Total IN: 520 mL    OUT:  Total OUT: 0 mL    Total NET: 520 mL      PHYSICAL EXAM:    GEN: Well-developed, well-nourished. NAD, awake and alert. No drooling or pooling of secretions. No stridor or stertor. Good vocal quality, no hoarseness.   SKIN: Good color, non diaphoretic  HEENT: NC/AT; Oral mucosa pink and moist. + large alveolar ridge lesion, + site of biopsy noted, sutures intact. no bleeding noted. No erythema or edema noted to buccal mucosa, tongue, FOM, uvula or posterior oropharynx. Uvula midline  NECK:  Trachea midline. Neck supple, no TTP to B/L lateral neck, no cervical LAD.  RESP: No dyspnea, non-labored breathing. No use of accessory muscles.  CARDIO: +S1/S2  ABDO: Soft, NT.  EXT: CRUZ x 4    LABS:  CBC-                        8.6    17.63 )-----------( 216      ( 20 Mar 2021 06:00 )             26.1     BMP/CMP-  20 Mar 2021 06:00    135    |  102    |  32     ----------------------------<  99     4.4     |  27     |  0.6      Ca    8.9        20 Mar 2021 06:00  Mg     2.0       20 Mar 2021 06:00    TPro  4.4    /  Alb  2.7    /  TBili  0.4    /  DBili  x      /  AST  18     /  ALT  37     /  AlkPhos  74     20 Mar 2021 06:00    Coagulation Studies-    Endocrine Panel-  Calcium, Total Serum: 8.9 mg/dL (03-20 @ 06:00)              RADIOLOGY & ADDITIONAL STUDIES:

## 2021-03-20 NOTE — PROGRESS NOTE ADULT - ASSESSMENT
Pt is a 80y Male with metastatic prostate cancer - right mandibular lesion on CT, s/p bedside biopsy on 3/19, hemostasis at site of biopsy.    ·	f/u path results  ·	cont soft diet prn  ·	w/d with attng

## 2021-03-20 NOTE — PROGRESS NOTE ADULT - ATTENDING COMMENTS
Patient seen and examined independently. Agree with resident note.  # UGI bleed-- nO EGD done--only coffee ground emesis-- s/p 1 unit of PRBC.  #Metastatic prostate cancer--seen by oncology-- on casodex. 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 ENT did biopsy of jaw yesterday  # A fib--not on eliquis at home-- currently off it. repeat EKG  # Chr systolic CHF-- euvolemic--off diuretics.  Homeless currently

## 2021-03-20 NOTE — PROGRESS NOTE ADULT - SUBJECTIVE AND OBJECTIVE BOX
MANUELITO HEDRICK 80y Male  MRN#: [id number]   Hospital Day: 12d    SUBJECTIVE  Patient is a 80y old Male who presents with a chief complaint of 80 YEAR OLD MALE C/O MULTIPLE MEDICAL COMPLAINTS . (19 Mar 2021 20:12)  Currently admitted to medicine with the primary diagnosis of Ambulatory dysfunction      INTERVAL HPI AND OVERNIGHT EVENTS:  Patient was examined and seen at bedside. This morning he is resting comfortably in bed and reports no issues or overnight events.    REVIEW OF SYMPTOMS:  CONSTITUTIONAL: No weakness, fevers or chills; No headaches  EYES: No visual changes, eye pain, or discharge  ENT: No vertigo; No ear pain or change in hearing; No sore throat or difficulty swallowing  NECK: No pain or stiffness  RESPIRATORY: No cough, wheezing, or hemoptysis; No shortness of breath  CARDIOVASCULAR: No chest pain or palpitations  GASTROINTESTINAL: No abdominal or epigastric pain; No nausea, vomiting, or hematemesis; No diarrhea or constipation; No melena or hematochezia  GENITOURINARY: No dysuria, frequency or hematuria  MUSCULOSKELETAL: No joint pain, no muscle pain, no weakness  NEUROLOGICAL: No numbness or weakness  SKIN: No itching or rashes    OBJECTIVE  PAST MEDICAL & SURGICAL HISTORY  Inguinal hernia    CHF (congestive heart failure)    S/P CABG x 3      ALLERGIES:  No Known Allergies    MEDICATIONS:  STANDING MEDICATIONS  bicalutamide 50 milliGRAM(s) Oral daily  chlorhexidine 4% Liquid 1 Application(s) Topical daily  collagenase Ointment 1 Application(s) Topical daily  dexAMETHasone     Tablet 4 milliGRAM(s) Oral every 8 hours  dextrose 5% + sodium chloride 0.45% 1000 milliLiter(s) IV Continuous <Continuous>  heparin   Injectable 5000 Unit(s) SubCutaneous every 8 hours  latanoprost 0.005% Ophthalmic Solution 1 Drop(s) Both EYES at bedtime  lidocaine   Patch 2 Patch Transdermal daily  methocarbamol 500 milliGRAM(s) Oral three times a day  midodrine. 10 milliGRAM(s) Oral three times a day  pantoprazole    Tablet 40 milliGRAM(s) Oral before breakfast  sodium bicarbonate 650 milliGRAM(s) Oral three times a day    PRN MEDICATIONS  ALPRAZolam 0.5 milliGRAM(s) Oral daily PRN  aluminum hydroxide/magnesium hydroxide/simethicone Suspension 30 milliLiter(s) Oral every 6 hours PRN  brimonidine 0.2% Ophthalmic Solution 1 Drop(s) Both EYES every 8 hours PRN  HYDROmorphone  Injectable 0.25 milliGRAM(s) IV Push every 4 hours PRN      VITAL SIGNS: Last 24 Hours  T(C): 36.2 (20 Mar 2021 07:24), Max: 36.2 (19 Mar 2021 16:20)  T(F): 97.1 (20 Mar 2021 07:24), Max: 97.1 (19 Mar 2021 16:20)  HR: 57 (20 Mar 2021 07:24) (57 - 97)  BP: 138/62 (20 Mar 2021 07:24) (128/61 - 141/67)  BP(mean): --  RR: 18 (20 Mar 2021 07:24) (18 - 19)  SpO2: --    LABS:                        8.6    17.63 )-----------( 216      ( 20 Mar 2021 06:00 )             26.1     03-20    135  |  102  |  32<H>  ----------------------------<  99  4.4   |  27  |  0.6<L>    Ca    8.9      20 Mar 2021 06:00  Mg     2.0     03-20    TPro  4.4<L>  /  Alb  2.7<L>  /  TBili  0.4  /  DBili  x   /  AST  18  /  ALT  37  /  AlkPhos  74  03-20                  RADIOLOGY:      PHYSICAL EXAM:  CONSTITUTIONAL: No acute distress, well-developed, well-groomed, AAOx3  HEAD: Atraumatic, normocephalic  EYES: EOM intact, PERRLA, conjunctiva and sclera clear  ENT: Supple, no masses, no thyromegaly, no bruits, no JVD; moist mucous membranes  PULMONARY: Clear to auscultation bilaterally; no wheezes, rales, or rhonchi  CARDIOVASCULAR: Regular rate and rhythm; no murmurs, rubs, or gallops  GASTROINTESTINAL: Soft, non-tender, non-distended; bowel sounds present  MUSCULOSKELETAL: 2+ peripheral pulses; no clubbing, no cyanosis, no edema  NEUROLOGY: non-focal  SKIN: No rashes or lesions; warm and dry

## 2021-03-21 LAB
ALBUMIN SERPL ELPH-MCNC: 2.6 G/DL — LOW (ref 3.5–5.2)
ALP SERPL-CCNC: 81 U/L — SIGNIFICANT CHANGE UP (ref 30–115)
ALT FLD-CCNC: 30 U/L — SIGNIFICANT CHANGE UP (ref 0–41)
ANION GAP SERPL CALC-SCNC: 8 MMOL/L — SIGNIFICANT CHANGE UP (ref 7–14)
AST SERPL-CCNC: 15 U/L — SIGNIFICANT CHANGE UP (ref 0–41)
BASOPHILS # BLD AUTO: 0.02 K/UL — SIGNIFICANT CHANGE UP (ref 0–0.2)
BASOPHILS NFR BLD AUTO: 0.1 % — SIGNIFICANT CHANGE UP (ref 0–1)
BILIRUB SERPL-MCNC: 0.5 MG/DL — SIGNIFICANT CHANGE UP (ref 0.2–1.2)
BUN SERPL-MCNC: 30 MG/DL — HIGH (ref 10–20)
CALCIUM SERPL-MCNC: 8.8 MG/DL — SIGNIFICANT CHANGE UP (ref 8.5–10.1)
CHLORIDE SERPL-SCNC: 101 MMOL/L — SIGNIFICANT CHANGE UP (ref 98–110)
CO2 SERPL-SCNC: 25 MMOL/L — SIGNIFICANT CHANGE UP (ref 17–32)
CREAT SERPL-MCNC: 0.7 MG/DL — SIGNIFICANT CHANGE UP (ref 0.7–1.5)
EOSINOPHIL # BLD AUTO: 0 K/UL — SIGNIFICANT CHANGE UP (ref 0–0.7)
EOSINOPHIL NFR BLD AUTO: 0 % — SIGNIFICANT CHANGE UP (ref 0–8)
GLUCOSE SERPL-MCNC: 123 MG/DL — HIGH (ref 70–99)
HCT VFR BLD CALC: 25.4 % — LOW (ref 42–52)
HGB BLD-MCNC: 8.2 G/DL — LOW (ref 14–18)
IMM GRANULOCYTES NFR BLD AUTO: 1.6 % — HIGH (ref 0.1–0.3)
LYMPHOCYTES # BLD AUTO: 0.48 K/UL — LOW (ref 1.2–3.4)
LYMPHOCYTES # BLD AUTO: 2.9 % — LOW (ref 20.5–51.1)
MAGNESIUM SERPL-MCNC: 2 MG/DL — SIGNIFICANT CHANGE UP (ref 1.8–2.4)
MCHC RBC-ENTMCNC: 28.6 PG — SIGNIFICANT CHANGE UP (ref 27–31)
MCHC RBC-ENTMCNC: 32.3 G/DL — SIGNIFICANT CHANGE UP (ref 32–37)
MCV RBC AUTO: 88.5 FL — SIGNIFICANT CHANGE UP (ref 80–94)
MONOCYTES # BLD AUTO: 0.53 K/UL — SIGNIFICANT CHANGE UP (ref 0.1–0.6)
MONOCYTES NFR BLD AUTO: 3.2 % — SIGNIFICANT CHANGE UP (ref 1.7–9.3)
NEUTROPHILS # BLD AUTO: 15.31 K/UL — HIGH (ref 1.4–6.5)
NEUTROPHILS NFR BLD AUTO: 92.2 % — HIGH (ref 42.2–75.2)
NRBC # BLD: 0 /100 WBCS — SIGNIFICANT CHANGE UP (ref 0–0)
PLATELET # BLD AUTO: 222 K/UL — SIGNIFICANT CHANGE UP (ref 130–400)
POTASSIUM SERPL-MCNC: 4.8 MMOL/L — SIGNIFICANT CHANGE UP (ref 3.5–5)
POTASSIUM SERPL-SCNC: 4.8 MMOL/L — SIGNIFICANT CHANGE UP (ref 3.5–5)
PROT SERPL-MCNC: 4.4 G/DL — LOW (ref 6–8)
RBC # BLD: 2.87 M/UL — LOW (ref 4.7–6.1)
RBC # FLD: 17.4 % — HIGH (ref 11.5–14.5)
SODIUM SERPL-SCNC: 134 MMOL/L — LOW (ref 135–146)
WBC # BLD: 16.6 K/UL — HIGH (ref 4.8–10.8)
WBC # FLD AUTO: 16.6 K/UL — HIGH (ref 4.8–10.8)

## 2021-03-21 PROCEDURE — 99233 SBSQ HOSP IP/OBS HIGH 50: CPT

## 2021-03-21 RX ORDER — LANOLIN ALCOHOL/MO/W.PET/CERES
5 CREAM (GRAM) TOPICAL AT BEDTIME
Refills: 0 | Status: COMPLETED | OUTPATIENT
Start: 2021-03-21 | End: 2021-03-21

## 2021-03-21 RX ORDER — ACETAMINOPHEN 500 MG
650 TABLET ORAL EVERY 6 HOURS
Refills: 0 | Status: DISCONTINUED | OUTPATIENT
Start: 2021-03-21 | End: 2021-04-15

## 2021-03-21 RX ADMIN — MIDODRINE HYDROCHLORIDE 10 MILLIGRAM(S): 2.5 TABLET ORAL at 17:06

## 2021-03-21 RX ADMIN — HEPARIN SODIUM 5000 UNIT(S): 5000 INJECTION INTRAVENOUS; SUBCUTANEOUS at 05:02

## 2021-03-21 RX ADMIN — Medication 1 APPLICATION(S): at 11:59

## 2021-03-21 RX ADMIN — MIDODRINE HYDROCHLORIDE 10 MILLIGRAM(S): 2.5 TABLET ORAL at 05:03

## 2021-03-21 RX ADMIN — Medication 650 MILLIGRAM(S): at 21:12

## 2021-03-21 RX ADMIN — Medication 650 MILLIGRAM(S): at 14:28

## 2021-03-21 RX ADMIN — LIDOCAINE 2 PATCH: 4 CREAM TOPICAL at 21:15

## 2021-03-21 RX ADMIN — HEPARIN SODIUM 5000 UNIT(S): 5000 INJECTION INTRAVENOUS; SUBCUTANEOUS at 14:28

## 2021-03-21 RX ADMIN — Medication 4 MILLIGRAM(S): at 05:03

## 2021-03-21 RX ADMIN — HEPARIN SODIUM 5000 UNIT(S): 5000 INJECTION INTRAVENOUS; SUBCUTANEOUS at 21:13

## 2021-03-21 RX ADMIN — LIDOCAINE 2 PATCH: 4 CREAM TOPICAL at 23:20

## 2021-03-21 RX ADMIN — METHOCARBAMOL 500 MILLIGRAM(S): 500 TABLET, FILM COATED ORAL at 21:13

## 2021-03-21 RX ADMIN — Medication 5 MILLIGRAM(S): at 00:57

## 2021-03-21 RX ADMIN — PANTOPRAZOLE SODIUM 40 MILLIGRAM(S): 20 TABLET, DELAYED RELEASE ORAL at 06:39

## 2021-03-21 RX ADMIN — Medication 4 MILLIGRAM(S): at 21:12

## 2021-03-21 RX ADMIN — METHOCARBAMOL 500 MILLIGRAM(S): 500 TABLET, FILM COATED ORAL at 14:28

## 2021-03-21 RX ADMIN — LATANOPROST 1 DROP(S): 0.05 SOLUTION/ DROPS OPHTHALMIC; TOPICAL at 21:15

## 2021-03-21 RX ADMIN — BICALUTAMIDE 50 MILLIGRAM(S): 50 TABLET, FILM COATED ORAL at 11:12

## 2021-03-21 RX ADMIN — LIDOCAINE 2 PATCH: 4 CREAM TOPICAL at 11:12

## 2021-03-21 RX ADMIN — Medication 4 MILLIGRAM(S): at 14:28

## 2021-03-21 RX ADMIN — METHOCARBAMOL 500 MILLIGRAM(S): 500 TABLET, FILM COATED ORAL at 05:03

## 2021-03-21 RX ADMIN — Medication 650 MILLIGRAM(S): at 05:02

## 2021-03-21 RX ADMIN — MIDODRINE HYDROCHLORIDE 10 MILLIGRAM(S): 2.5 TABLET ORAL at 11:11

## 2021-03-21 NOTE — PROGRESS NOTE ADULT - SUBJECTIVE AND OBJECTIVE BOX
SUBJECTIVE:    Patient is a 80y old Male who presents with a chief complaint of 80 YEAR OLD MALE C/O MULTIPLE MEDICAL COMPLAINTS . (20 Mar 2021 10:30)    Currently admitted to medicine with the primary diagnosis of Ambulatory dysfunction       Today is hospital day 13d.     PAST MEDICAL & SURGICAL HISTORY  Inguinal hernia    CHF (congestive heart failure)    S/P CABG x 3      ALLERGIES:  No Known Allergies    MEDICATIONS:  STANDING MEDICATIONS  bicalutamide 50 milliGRAM(s) Oral daily  chlorhexidine 4% Liquid 1 Application(s) Topical daily  collagenase Ointment 1 Application(s) Topical daily  dexAMETHasone     Tablet 4 milliGRAM(s) Oral every 8 hours  heparin   Injectable 5000 Unit(s) SubCutaneous every 8 hours  latanoprost 0.005% Ophthalmic Solution 1 Drop(s) Both EYES at bedtime  lidocaine   Patch 2 Patch Transdermal daily  methocarbamol 500 milliGRAM(s) Oral three times a day  midodrine. 10 milliGRAM(s) Oral three times a day  pantoprazole    Tablet 40 milliGRAM(s) Oral before breakfast  sodium bicarbonate 650 milliGRAM(s) Oral three times a day    PRN MEDICATIONS  acetaminophen   Tablet .. 650 milliGRAM(s) Oral every 6 hours PRN  ALPRAZolam 0.5 milliGRAM(s) Oral daily PRN  aluminum hydroxide/magnesium hydroxide/simethicone Suspension 30 milliLiter(s) Oral every 6 hours PRN  brimonidine 0.2% Ophthalmic Solution 1 Drop(s) Both EYES every 8 hours PRN  HYDROmorphone  Injectable 0.25 milliGRAM(s) IV Push every 4 hours PRN    VITALS:   T(F): 98.7  HR: 70  BP: 112/56  RR: 18  SpO2: --    LABS:                        8.2    16.60 )-----------( 222      ( 21 Mar 2021 06:57 )             25.4     03-21    134<L>  |  101  |  30<H>  ----------------------------<  123<H>  4.8   |  25  |  0.7    Ca    8.8      21 Mar 2021 06:57  Mg     2.0     03-21    TPro  4.4<L>  /  Alb  2.6<L>  /  TBili  0.5  /  DBili  x   /  AST  15  /  ALT  30  /  AlkPhos  81  03-21                  RADIOLOGY:    PHYSICAL EXAM:  GEN: No acute distress  LUNGS: Clear to auscultation bilaterally   HEART: S1/S2 present. RRR.   ABD/ GI: Soft, non-tender, non-distended. Bowel sounds present  EXT: NC/NC/NE/2+PP/CRUZ  NEURO: AAOX3

## 2021-03-21 NOTE — PROGRESS NOTE ADULT - ASSESSMENT
79 yo male hx of CAD s/p CABG s/p 20+ years ago/ CHF/ right sided large inguinal hernia BIBA from hotel room 2/2 unable to ambulate with right thigh weakness and numbness, now found to have metastatic disease with a mandibular mass and large sacral mass.       #Metastatic prostate cancer--seen by oncology-- on casodex. will start leupron in 2 weeks-- as per oncology last note 3/19  will get palliative RT-- on dexamethasone  has indwelling catheter.  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 ENT did biopsy of jaw 3/19  # UGI bleed-- nO EGD done--only coffee ground emesis-- s/p 1 unit of PRBC  # A fib--not on eliquis at home-- currently off it. will still hold it--if he can get tooth extracted tomorrow by dental  # Chr systolic CHF-- euvolemic--off diuretics.  Homeless currently .

## 2021-03-22 LAB
ALBUMIN SERPL ELPH-MCNC: 3 G/DL — LOW (ref 3.5–5.2)
ALP SERPL-CCNC: 91 U/L — SIGNIFICANT CHANGE UP (ref 30–115)
ALT FLD-CCNC: 33 U/L — SIGNIFICANT CHANGE UP (ref 0–41)
ANION GAP SERPL CALC-SCNC: 12 MMOL/L — SIGNIFICANT CHANGE UP (ref 7–14)
AST SERPL-CCNC: 21 U/L — SIGNIFICANT CHANGE UP (ref 0–41)
BASOPHILS # BLD AUTO: 0.02 K/UL — SIGNIFICANT CHANGE UP (ref 0–0.2)
BASOPHILS NFR BLD AUTO: 0.1 % — SIGNIFICANT CHANGE UP (ref 0–1)
BILIRUB SERPL-MCNC: 0.6 MG/DL — SIGNIFICANT CHANGE UP (ref 0.2–1.2)
BUN SERPL-MCNC: 28 MG/DL — HIGH (ref 10–20)
CALCIUM SERPL-MCNC: 9.1 MG/DL — SIGNIFICANT CHANGE UP (ref 8.5–10.1)
CHLORIDE SERPL-SCNC: 99 MMOL/L — SIGNIFICANT CHANGE UP (ref 98–110)
CO2 SERPL-SCNC: 23 MMOL/L — SIGNIFICANT CHANGE UP (ref 17–32)
CREAT SERPL-MCNC: 0.7 MG/DL — SIGNIFICANT CHANGE UP (ref 0.7–1.5)
EOSINOPHIL # BLD AUTO: 0 K/UL — SIGNIFICANT CHANGE UP (ref 0–0.7)
EOSINOPHIL NFR BLD AUTO: 0 % — SIGNIFICANT CHANGE UP (ref 0–8)
GLUCOSE SERPL-MCNC: 98 MG/DL — SIGNIFICANT CHANGE UP (ref 70–99)
HCT VFR BLD CALC: 31.1 % — LOW (ref 42–52)
HGB BLD-MCNC: 10 G/DL — LOW (ref 14–18)
IMM GRANULOCYTES NFR BLD AUTO: 1.2 % — HIGH (ref 0.1–0.3)
INR BLD: 0.96 RATIO — SIGNIFICANT CHANGE UP (ref 0.65–1.3)
LYMPHOCYTES # BLD AUTO: 0.71 K/UL — LOW (ref 1.2–3.4)
LYMPHOCYTES # BLD AUTO: 3.8 % — LOW (ref 20.5–51.1)
MAGNESIUM SERPL-MCNC: 2.1 MG/DL — SIGNIFICANT CHANGE UP (ref 1.8–2.4)
MCHC RBC-ENTMCNC: 28.7 PG — SIGNIFICANT CHANGE UP (ref 27–31)
MCHC RBC-ENTMCNC: 32.2 G/DL — SIGNIFICANT CHANGE UP (ref 32–37)
MCV RBC AUTO: 89.4 FL — SIGNIFICANT CHANGE UP (ref 80–94)
MONOCYTES # BLD AUTO: 0.81 K/UL — HIGH (ref 0.1–0.6)
MONOCYTES NFR BLD AUTO: 4.3 % — SIGNIFICANT CHANGE UP (ref 1.7–9.3)
NEUTROPHILS # BLD AUTO: 16.99 K/UL — HIGH (ref 1.4–6.5)
NEUTROPHILS NFR BLD AUTO: 90.6 % — HIGH (ref 42.2–75.2)
NRBC # BLD: 0 /100 WBCS — SIGNIFICANT CHANGE UP (ref 0–0)
PLATELET # BLD AUTO: 278 K/UL — SIGNIFICANT CHANGE UP (ref 130–400)
POTASSIUM SERPL-MCNC: 5.3 MMOL/L — HIGH (ref 3.5–5)
POTASSIUM SERPL-SCNC: 5.3 MMOL/L — HIGH (ref 3.5–5)
PROT SERPL-MCNC: 5.3 G/DL — LOW (ref 6–8)
PROTHROM AB SERPL-ACNC: 11 SEC — SIGNIFICANT CHANGE UP (ref 9.95–12.87)
RBC # BLD: 3.48 M/UL — LOW (ref 4.7–6.1)
RBC # FLD: 17.6 % — HIGH (ref 11.5–14.5)
SODIUM SERPL-SCNC: 134 MMOL/L — LOW (ref 135–146)
WBC # BLD: 18.76 K/UL — HIGH (ref 4.8–10.8)
WBC # FLD AUTO: 18.76 K/UL — HIGH (ref 4.8–10.8)

## 2021-03-22 PROCEDURE — 99233 SBSQ HOSP IP/OBS HIGH 50: CPT

## 2021-03-22 PROCEDURE — 77290 THER RAD SIMULAJ FIELD CPLX: CPT | Mod: 26

## 2021-03-22 PROCEDURE — 77334 RADIATION TREATMENT AID(S): CPT | Mod: 26

## 2021-03-22 PROCEDURE — 99232 SBSQ HOSP IP/OBS MODERATE 35: CPT

## 2021-03-22 RX ORDER — ENOXAPARIN SODIUM 100 MG/ML
70 INJECTION SUBCUTANEOUS
Refills: 0 | Status: DISCONTINUED | OUTPATIENT
Start: 2021-03-22 | End: 2021-04-16

## 2021-03-22 RX ORDER — ENOXAPARIN SODIUM 100 MG/ML
70 INJECTION SUBCUTANEOUS
Refills: 0 | Status: DISCONTINUED | OUTPATIENT
Start: 2021-03-22 | End: 2021-03-22

## 2021-03-22 RX ADMIN — Medication 1 APPLICATION(S): at 21:48

## 2021-03-22 RX ADMIN — Medication 4 MILLIGRAM(S): at 21:48

## 2021-03-22 RX ADMIN — MIDODRINE HYDROCHLORIDE 10 MILLIGRAM(S): 2.5 TABLET ORAL at 05:17

## 2021-03-22 RX ADMIN — MIDODRINE HYDROCHLORIDE 10 MILLIGRAM(S): 2.5 TABLET ORAL at 11:25

## 2021-03-22 RX ADMIN — LIDOCAINE 2 PATCH: 4 CREAM TOPICAL at 17:41

## 2021-03-22 RX ADMIN — Medication 650 MILLIGRAM(S): at 21:48

## 2021-03-22 RX ADMIN — PANTOPRAZOLE SODIUM 40 MILLIGRAM(S): 20 TABLET, DELAYED RELEASE ORAL at 06:01

## 2021-03-22 RX ADMIN — LIDOCAINE 2 PATCH: 4 CREAM TOPICAL at 23:01

## 2021-03-22 RX ADMIN — MIDODRINE HYDROCHLORIDE 10 MILLIGRAM(S): 2.5 TABLET ORAL at 17:41

## 2021-03-22 RX ADMIN — Medication 650 MILLIGRAM(S): at 05:17

## 2021-03-22 RX ADMIN — METHOCARBAMOL 500 MILLIGRAM(S): 500 TABLET, FILM COATED ORAL at 21:48

## 2021-03-22 RX ADMIN — Medication 4 MILLIGRAM(S): at 05:17

## 2021-03-22 RX ADMIN — METHOCARBAMOL 500 MILLIGRAM(S): 500 TABLET, FILM COATED ORAL at 05:17

## 2021-03-22 RX ADMIN — HEPARIN SODIUM 5000 UNIT(S): 5000 INJECTION INTRAVENOUS; SUBCUTANEOUS at 05:17

## 2021-03-22 RX ADMIN — LIDOCAINE 2 PATCH: 4 CREAM TOPICAL at 11:25

## 2021-03-22 RX ADMIN — LATANOPROST 1 DROP(S): 0.05 SOLUTION/ DROPS OPHTHALMIC; TOPICAL at 21:48

## 2021-03-22 NOTE — PROGRESS NOTE ADULT - SUBJECTIVE AND OBJECTIVE BOX
24H events:  No acute events  Awaiting biopsy result from jaw    PAST MEDICAL & SURGICAL HISTORY  Inguinal hernia    CHF (congestive heart failure)    S/P CABG x 3      SOCIAL HISTORY:  Negative for smoking/alcohol/drug use.     ALLERGIES:  No Known Allergies    MEDICATIONS:  STANDING MEDICATIONS  bicalutamide 50 milliGRAM(s) Oral daily  chlorhexidine 4% Liquid 1 Application(s) Topical daily  collagenase Ointment 1 Application(s) Topical daily  dexAMETHasone     Tablet 4 milliGRAM(s) Oral every 8 hours  enoxaparin Injectable 70 milliGRAM(s) SubCutaneous two times a day  latanoprost 0.005% Ophthalmic Solution 1 Drop(s) Both EYES at bedtime  lidocaine   Patch 2 Patch Transdermal daily  methocarbamol 500 milliGRAM(s) Oral three times a day  midodrine. 10 milliGRAM(s) Oral three times a day  pantoprazole    Tablet 40 milliGRAM(s) Oral before breakfast  sodium bicarbonate 650 milliGRAM(s) Oral three times a day    PRN MEDICATIONS  acetaminophen   Tablet .. 650 milliGRAM(s) Oral every 6 hours PRN  ALPRAZolam 0.5 milliGRAM(s) Oral daily PRN  aluminum hydroxide/magnesium hydroxide/simethicone Suspension 30 milliLiter(s) Oral every 6 hours PRN  brimonidine 0.2% Ophthalmic Solution 1 Drop(s) Both EYES every 8 hours PRN  HYDROmorphone  Injectable 0.25 milliGRAM(s) IV Push every 4 hours PRN    VITALS:   T(F): 97.2  HR: 70  BP: 141/65  RR: 18  SpO2: --    LABS:                        10.0   18.76 )-----------( 278      ( 22 Mar 2021 06:16 )             31.1     03-22    134<L>  |  99  |  28<H>  ----------------------------<  98  5.3<H>   |  23  |  0.7    Ca    9.1      22 Mar 2021 06:16  Mg     2.1     03-22    TPro  5.3<L>  /  Alb  3.0<L>  /  TBili  0.6  /  DBili  x   /  AST  21  /  ALT  33  /  AlkPhos  91  03-22    PT/INR - ( 22 Mar 2021 06:16 )   PT: 11.00 sec;   INR: 0.96 ratio          RADIOLOGY:  < from: MR Head No Cont (03.18.21 @ 22:41) >    IMPRESSION:    Allowing for limitation from absence of intravenous contrast, there are multiple calvarial lesions compatible with osseous metastasis. The largest lesion in the left occipital calvarium demonstrates mildly expansile subgaleal soft tissue component.    Redemonstrated partially imaged right mandibular mass, better evaluated on the previously performed MRI of the neck dated 3/11/2021.    < end of copied text >      < from: MR Cervical Spine No Cont (03.18.21 @ 22:38) >  IMPRESSION:    Numerous foci of bone marrow signal abnormality compatible with metastatic disease, including lesions of the right T1/T2 and T5/T6 vertebral bodies with large right paraspinal soft tissue masses. These masses extend into the right neural foramen with obliteration at C7-T1, T1-T2, and T5-T6. There is additional extension of tumor into the ventral epidural space at T5-6 which results in moderate spinal canal stenosis.    Moderate-sized bilateral pleural effusions.    Degenerative changes of the spine as noted above.      PHYSICAL EXAM:  GEN: No acute distress  HENT: NCAT, EOMI, right facial swelling  LYMPH: No appreciable adenopathy  LUNGS: No respiratory distress, clear to auscultation bilaterally   HEART: regular rate and rhythm  ABD: Soft, non-tender, non-distended  SKIN: No rash  NEURO: AAOX3, moving extremities

## 2021-03-22 NOTE — PROGRESS NOTE ADULT - ASSESSMENT
79 yo male hx of CAD s/p CABG s/p 20+ years ago/ CHF/ right sided large inguinal hernia BIBA from hotel room 2/2 unable to ambulate with right thigh weakness and numbness, now found to have metastatic disease with a mandibular mass and large sacral mass    Stage IV Prostate cancer with diffuse osseous involvement:  -   - Sacral biopsy favors prostate ( neoplastic cells are positive for AE1/AE3, PSA and CK7)  - Started on casodex 50mg 3/14  - Will likely start leupron 2 weeks following start of casodex  - Will also likely start chemotherapy (taxotere) when stable  - TLS labs wnl  - CT chest showing RUL [id number] paravertebral lesion with mass effect, MR T-spine completed, results pending, f/u NSx  - CT AP no RP bleed, no visceral mets  - MR CTL, brain and  jaw completed, mets throughout cervical and t-spine  - dexamethasone, f/u NSx or rad onc for taper  - Dental eval 3/17, patient needs teeth extracted for bisphosphonates, patient refused extraction, said he would do it next week, please recall dentist/oral surgery  - Recall rad onc for palliative RT    Normocytic anemia: Improved   - Per GI, outpatient scope  - Hapto high and retic low, not consistent with hemolysis  - TSAT 67%    DENISE: Resolved    Mild hypercalcemia, likely due to bone mets       79 yo male hx of CAD s/p CABG s/p 20+ years ago/ CHF/ right sided large inguinal hernia BIBA from hotel room 2/2 unable to ambulate with right thigh weakness and numbness, now found to have metastatic disease with a mandibular mass and large sacral mass    Stage IV Prostate cancer with diffuse osseous involvement:  -   - Sacral biopsy favors prostate ( neoplastic cells are positive for AE1/AE3, PSA and CK7)  - Started on casodex 50mg 3/14  - Will likely start leupron 2 weeks following start of casodex: not earlier than 3/ 29/21   - Will also likely start chemotherapy (taxotere) when stable  - TLS labs wnl  - CT chest showing RUL [id number] paravertebral lesion with mass effect, MR T-spine completed, results pending, f/u NSx  - CT AP no RP bleed, no visceral mets  - MR CTL, brain and  jaw completed, mets throughout cervical and t-spine  - dexamethasone, f/u NSx or rad onc for taper  - Dental eval 3/17, patient needs teeth extracted for bisphosphonates, patient refused extraction, said he would do it next week, please recall dentist/oral surgery  - Recall rad onc for palliative RT    Normocytic anemia: Improved   - Per GI, outpatient scope  - Hapto high and retic low, not consistent with hemolysis  - TSAT 67%    DENISE: Resolved    Mild hypercalcemia, likely due to bone mets

## 2021-03-22 NOTE — PROGRESS NOTE ADULT - NSHPATTENDINGPLANDISCUSS_GEN_ALL_CORE
dR Cleary
Dr Cleary
Dr Sousa
care team
Dr Sousa
Dr Sousa
care team
resident, RN, CM
resident, RN, CM
Dr Sousa
resident, RN, CM
Dr Sousa
dr. Sousa
Dr Sousa
primary team, patient

## 2021-03-22 NOTE — PROGRESS NOTE ADULT - ATTENDING COMMENTS
Patient seen and examined independently. Agree with resident note.  #Metastatic prostate cancer--seen by oncology-- on casodex. will start leupron in 2 weeks-- as per oncology last note 3/19  will get palliative RT-- on dexamethasone  has indwelling catheter.    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 ENT did biopsy of jaw 3/19  # UGI bleed-- nO EGD done--only coffee ground emesis-- s/p 1 unit of PRBC  # A fib--not on eliquis at home-- currently off it. -- given lovenox--if he can get tooth extracted tomorrow by dental  # Chr systolic CHF-- euvolemic--off diuretics.  Homeless currently .

## 2021-03-22 NOTE — PROGRESS NOTE ADULT - ASSESSMENT
81 yo male hx of CAD s/p CABG s/p 20+ years ago/ CHF/ right sided large inguinal hernia BIBA from hotel room 2/2 unable to ambulate with right thigh weakness and numbness, now found to have metastatic disease with a mandibular mass and large sacral mass.     #Hypovolemic shock (resolved)  - Likely multifactorial, hypovolemia and possible UGIB given hb drop  - s/p 2 units so fat  - hgb 6.9-->8.1-->7.9  - S/p a total of 5L boluses  - c/w D5 + 1/2HS + 75mEq bicarb @ 80cc/hr  - Off Levo. C/w Midodrine 10mg PO Q8H (/64)  - CT a/p noncont neg for bleed    - LE duplex neg for DVT     #DENISE, resolving  - Likely pre-renal  - S/p a total of 5L boluses   - Cr 0.8. BUN 47 <- 79  - C/w IVF D5 + 1/2NS with 75meq bicarb @80cc/hr     #Acute on chronic anemia  - concern for possible UGIB   - hgb as above  - Hemolysis w/o not c/w hemolysis  - GI c/s appreciated: no intervention at this time as no plans to start a/c  - C/w PPI PO QD  - CT A/P no retroperitoneal bleed    # B/l hydro  - Likely due to acute urinary retention   - CT scan showed on admission moderate to severe bilateral hydroureteronephrosis to the level of a markedly distended urinary bladder. Findings presumably secondary to urinary bladder outlet obstruction or urinary retention. Enlarged prostate gland.   - C/w Santos catheter     # Multiple metastatic osseous lesions with newly diagnosed sacral and mandibular mass with LE weakness concerning for spinal cord compression due to malignancy   - CT scan A/P on admission: Findings compatible with metastatic disease as demonstrated by multiple bony lesions, the largest soft tissue mass centered at S2-S3 measuring up to 7.4 cm with associated bony destruction and obliteration of the exiting neural foramina. Additional smaller lesions at the right sacral ala, L2 and L3.  - CT Neck Soft Tissue No Cont: Large right mandibular soft tissue density mass measuring 6.5 x 7.1 x 9.2 cm likely arising from the right mandible extending from the right mandibular condyle to the body of the mandible. There is an associated extensive bony erosion of the right mandible within the mass and the posterior wall of the right maxilla. Findings are highly suspicious for malignancy.  - Repeat CT A/P noncont for GI bleed: Right upper lobe paravertebral mass measuring approx 5.8 x 4.1 x 4.5 cm with destructive osseous involvement and extension the spine causing mass effect on the spinal cord. Smaller apical paravertebral nodule with adjacent osseous involvement. It is unclear whether these lesions originated in the bone extending to the lung or vice versa.  - S/p IR biopsy of sacral mass 3/10, showing metastatic prostate ca, PSA significantly elevated   - Neurosx c/s appreciated: no intervention at this time, no cord compression  - Heme/Onc c/s appreciated: c/w Casodex; rad onc intervention when stable, steroids  - Dental c/s appreciated to start bisphosphonates. Pt refusing extractions  - Palliative consult appreciated: Dilaudid and Robaxin for pain control    - Taper steroid: now on dexamethasone 4mg q8 PO  - F/u MR head and MR C/T spine (did not go x2 due to hypotension, will reorder)    # Mandibular mass   - Suspicion for SCC  -ENT following: s/p bedside biopsy, mild oozing post procedure.      - cauterized with silver nitrate      - cont to hold gauze pressure prn     - ok for PO, chew on opposite side     - f/u path results    # Chest pain  - likely musculoskeletal, reproducible  - Tylenol   - EKG NSR  - Trops (-)    # CHFrEF  - Euvolemic   - Echo EF 35%, mod mitral calcification, enlarged left atria  - Breathing RA    # Afib  - HR 60s  - on eliquis 5 mg bid at home  - Was on Lovenox here but d/c due to denise and possible GI bleed     # HTN  - Now hypotensive, 108/96  - hold aldactone, lasix, acei and metoprolol     # CAD   - Stable     # Metabolic acidosis   - C/w sodium bicarbonate 650mg TID     #Hypokalemia  - K 3.3. Treated    # Mild hypercalcemia, likely due to bone mets - resolved     # anxiety  - Xanax 0.25mg Q12H PRN    DVT Prophylaxis: Hep q8h  GI Prophylaxis: Protonix QD  Diet: soft (can advance as tolerated)  Activity: IAT  FULL CODE.      Dispo: PT eval, possible dc to nursing home, need help while ambulating (pt is homeless)   81 yo male hx of CAD s/p CABG s/p 20+ years ago/ CHF/ right sided large inguinal hernia BIBA from hotel room 2/2 unable to ambulate with right thigh weakness and numbness, now found to have metastatic disease with a mandibular mass and large sacral mass.       # Multiple metastatic osseous lesions with newly diagnosed sacral and mandibular mass with LE weakness concerning for spinal cord compression due to malignancy   - CT scan A/P on admission: Findings compatible with metastatic disease as demonstrated by multiple bony lesions, the largest soft tissue mass centered at S2-S3 measuring up to 7.4 cm with associated bony destruction and obliteration of the exiting neural foramina. Additional smaller lesions at the right sacral ala, L2 and L3.  - CT Neck Soft Tissue No Cont: Large right mandibular soft tissue density mass measuring 6.5 x 7.1 x 9.2 cm likely arising from the right mandible extending from the right mandibular condyle to the body of the mandible. There is an associated extensive bony erosion of the right mandible within the mass and the posterior wall of the right maxilla. Findings are highly suspicious for malignancy.  - Repeat CT A/P noncont for GI bleed: Right upper lobe paravertebral mass measuring approx 5.8 x 4.1 x 4.5 cm with destructive osseous involvement and extension the spine causing mass effect on the spinal cord. Smaller apical paravertebral nodule with adjacent osseous involvement. It is unclear whether these lesions originated in the bone extending to the lung or vice versa.  - S/p IR biopsy of sacral mass 3/10, showing metastatic prostate ca, PSA significantly elevated   - Neurosx c/s appreciated: no intervention at this time, no cord compression  - Heme/Onc c/s appreciated: c/w Casodex; rad onc intervention when stable, steroids  - Dental c/s appreciated to start bisphosphonates. (Pt refused extractions initially)-->possible extraction on Wednesday   - Palliative consult appreciated: Dilaudid and Robaxin for pain control    - Taper steroid: now on dexamethasone 4mg q8 PO  - F/u MR head and MR C/T spine (did not go x2 due to hypotension, will reorder)    # Mandibular mass   - SCC  -ENT following: s/p bedside biopsy, mild oozing post procedure.      - cauterized with silver nitrate      - cont to hold gauze pressure prn     - ok for PO, chew on opposite side     - f/u path results    #Hypovolemic shock (resolved)  - Likely multifactorial, hypovolemia and possible UGIB given hb drop  - s/p 2 units so fat  - hgb 6.9-->8.1-->7.9  - S/p a total of 5L boluses  - c/w D5 + 1/2HS + 75mEq bicarb @ 80cc/hr  - Off Levo. C/w Midodrine 10mg PO Q8H (/64)  - CT a/p noncont neg for bleed    - LE duplex neg for DVT     #DENISE, resolving  - Likely pre-renal  - S/p a total of 5L boluses   - Cr 0.8. BUN 47 <- 79  - C/w IVF D5 + 1/2NS with 75meq bicarb @80cc/hr     #Acute on chronic anemia  - concern for possible UGIB   - hgb as above  - Hemolysis w/o not c/w hemolysis  - GI c/s appreciated: no intervention at this time as no plans to start a/c  - C/w PPI PO QD  - CT A/P no retroperitoneal bleed  - restarted Lovenox   - trend Hg    # B/l hydro  - Likely due to acute urinary retention   - CT scan showed on admission moderate to severe bilateral hydroureteronephrosis to the level of a markedly distended urinary bladder. Findings presumably secondary to urinary bladder outlet obstruction or urinary retention. Enlarged prostate gland.   - C/w Santos catheter     # Chest pain  - likely musculoskeletal, reproducible  - Tylenol   - EKG NSR  - Trops (-)    # CHFrEF  - Euvolemic   - Echo EF 35%, mod mitral calcification, enlarged left atria  - Breathing RA    # Afib  - HR 60s  - on eliquis 5 mg bid at home  - restarted Lovenox      # HTN  - Now hypotensive, 108/96  - hold aldactone, lasix, acei and metoprolol     # CAD   - Stable     # Metabolic acidosis   - C/w sodium bicarbonate 650mg TID     #Hypokalemia  - K 3.3. Treated    # Mild hypercalcemia, likely due to bone mets - resolved     # anxiety  - Xanax 0.25mg Q12H PRN    DVT Prophylaxis: Hep q8h  GI Prophylaxis: Protonix QD  Diet: soft (can advance as tolerated)  Activity: IAT  FULL CODE.      Dispo: PT eval, possible dc to nursing home, need help while ambulating (pt is homeless)

## 2021-03-22 NOTE — PROVIDER CONTACT NOTE (OTHER) - SITUATION
pt has orders for Santly to sacrum, pt has no orders for wound care in computer. wound care RN María saw pt on 3/18 and placed reccs. for wound care can you please review her note and place orders.

## 2021-03-22 NOTE — PROGRESS NOTE ADULT - SUBJECTIVE AND OBJECTIVE BOX
MANUELITO HEDRICK 80y Male  MRN#: [id number]   Hospital Day: 14d    SUBJECTIVE  Patient is a 80y old Male who presents with a chief complaint of 80 YEAR OLD MALE C/O MULTIPLE MEDICAL COMPLAINTS . (21 Mar 2021 15:39)  Currently admitted to medicine with the primary diagnosis of Ambulatory dysfunction      INTERVAL HPI AND OVERNIGHT EVENTS:  Patient was examined and seen at bedside. This morning he is resting comfortably in bed and reports no issues or overnight events.    REVIEW OF SYMPTOMS:  CONSTITUTIONAL: No weakness, fevers or chills; No headaches  EYES: No visual changes, eye pain, or discharge  ENT: No vertigo; No ear pain or change in hearing; No sore throat or difficulty swallowing  NECK: No pain or stiffness  RESPIRATORY: No cough, wheezing, or hemoptysis; No shortness of breath  CARDIOVASCULAR: No chest pain or palpitations  GASTROINTESTINAL: No abdominal or epigastric pain; No nausea, vomiting, or hematemesis; No diarrhea or constipation; No melena or hematochezia  GENITOURINARY: No dysuria, frequency or hematuria  MUSCULOSKELETAL: No joint pain, no muscle pain, no weakness  NEUROLOGICAL: No numbness or weakness  SKIN: No itching or rashes    OBJECTIVE  PAST MEDICAL & SURGICAL HISTORY  Inguinal hernia    CHF (congestive heart failure)    S/P CABG x 3      ALLERGIES:  No Known Allergies    MEDICATIONS:  STANDING MEDICATIONS  bicalutamide 50 milliGRAM(s) Oral daily  chlorhexidine 4% Liquid 1 Application(s) Topical daily  collagenase Ointment 1 Application(s) Topical daily  dexAMETHasone     Tablet 4 milliGRAM(s) Oral every 8 hours  heparin   Injectable 5000 Unit(s) SubCutaneous every 8 hours  latanoprost 0.005% Ophthalmic Solution 1 Drop(s) Both EYES at bedtime  lidocaine   Patch 2 Patch Transdermal daily  methocarbamol 500 milliGRAM(s) Oral three times a day  midodrine. 10 milliGRAM(s) Oral three times a day  pantoprazole    Tablet 40 milliGRAM(s) Oral before breakfast  sodium bicarbonate 650 milliGRAM(s) Oral three times a day    PRN MEDICATIONS  acetaminophen   Tablet .. 650 milliGRAM(s) Oral every 6 hours PRN  ALPRAZolam 0.5 milliGRAM(s) Oral daily PRN  aluminum hydroxide/magnesium hydroxide/simethicone Suspension 30 milliLiter(s) Oral every 6 hours PRN  brimonidine 0.2% Ophthalmic Solution 1 Drop(s) Both EYES every 8 hours PRN  HYDROmorphone  Injectable 0.25 milliGRAM(s) IV Push every 4 hours PRN      VITAL SIGNS: Last 24 Hours  T(C): 36.2 (22 Mar 2021 08:08), Max: 37.1 (21 Mar 2021 10:22)  T(F): 97.2 (22 Mar 2021 08:08), Max: 98.7 (21 Mar 2021 10:22)  HR: 70 (22 Mar 2021 08:08) (65 - 72)  BP: 141/65 (22 Mar 2021 08:08) (112/56 - 141/65)  BP(mean): --  RR: 18 (22 Mar 2021 08:08) (16 - 18)  SpO2: --    LABS:                        10.0   18.76 )-----------( 278      ( 22 Mar 2021 06:16 )             31.1     03-22    134<L>  |  99  |  28<H>  ----------------------------<  98  5.3<H>   |  23  |  0.7    Ca    9.1      22 Mar 2021 06:16  Mg     2.1     03-22    TPro  5.3<L>  /  Alb  3.0<L>  /  TBili  0.6  /  DBili  x   /  AST  21  /  ALT  33  /  AlkPhos  91  03-22    PT/INR - ( 22 Mar 2021 06:16 )   PT: 11.00 sec;   INR: 0.96 ratio                       RADIOLOGY:      PHYSICAL EXAM:  CONSTITUTIONAL: No acute distress, well-developed, well-groomed, AAOx3  HEAD: Atraumatic, normocephalic  EYES: EOM intact, PERRLA, conjunctiva and sclera clear  ENT: Supple, no masses, no thyromegaly, no bruits, no JVD; moist mucous membranes  PULMONARY: Clear to auscultation bilaterally; no wheezes, rales, or rhonchi  CARDIOVASCULAR: Regular rate and rhythm; no murmurs, rubs, or gallops  GASTROINTESTINAL: Soft, non-tender, non-distended; bowel sounds present  MUSCULOSKELETAL: 2+ peripheral pulses; no clubbing, no cyanosis, no edema  NEUROLOGY: non-focal  SKIN: No rashes or lesions; warm and dry     MANUELITO HEDRICK 80y Male  MRN#: [id number]   Hospital Day: 14d    SUBJECTIVE  Patient is a 80y old Male who presents with a chief complaint of 80 YEAR OLD MALE C/O MULTIPLE MEDICAL COMPLAINTS . (21 Mar 2021 15:39)  Currently admitted to medicine with the primary diagnosis of Ambulatory dysfunction      INTERVAL HPI AND OVERNIGHT EVENTS:  Patient was examined and seen at bedside. This morning he is resting comfortably in bed and reports no issues or overnight events.    REVIEW OF SYMPTOMS:  As per interval HPI    OBJECTIVE  PAST MEDICAL & SURGICAL HISTORY  Inguinal hernia    CHF (congestive heart failure)    S/P CABG x 3      ALLERGIES:  No Known Allergies    MEDICATIONS:  STANDING MEDICATIONS  bicalutamide 50 milliGRAM(s) Oral daily  chlorhexidine 4% Liquid 1 Application(s) Topical daily  collagenase Ointment 1 Application(s) Topical daily  dexAMETHasone     Tablet 4 milliGRAM(s) Oral every 8 hours  heparin   Injectable 5000 Unit(s) SubCutaneous every 8 hours  latanoprost 0.005% Ophthalmic Solution 1 Drop(s) Both EYES at bedtime  lidocaine   Patch 2 Patch Transdermal daily  methocarbamol 500 milliGRAM(s) Oral three times a day  midodrine. 10 milliGRAM(s) Oral three times a day  pantoprazole    Tablet 40 milliGRAM(s) Oral before breakfast  sodium bicarbonate 650 milliGRAM(s) Oral three times a day    PRN MEDICATIONS  acetaminophen   Tablet .. 650 milliGRAM(s) Oral every 6 hours PRN  ALPRAZolam 0.5 milliGRAM(s) Oral daily PRN  aluminum hydroxide/magnesium hydroxide/simethicone Suspension 30 milliLiter(s) Oral every 6 hours PRN  brimonidine 0.2% Ophthalmic Solution 1 Drop(s) Both EYES every 8 hours PRN  HYDROmorphone  Injectable 0.25 milliGRAM(s) IV Push every 4 hours PRN      VITAL SIGNS: Last 24 Hours  T(C): 36.2 (22 Mar 2021 08:08), Max: 37.1 (21 Mar 2021 10:22)  T(F): 97.2 (22 Mar 2021 08:08), Max: 98.7 (21 Mar 2021 10:22)  HR: 70 (22 Mar 2021 08:08) (65 - 72)  BP: 141/65 (22 Mar 2021 08:08) (112/56 - 141/65)  BP(mean): --  RR: 18 (22 Mar 2021 08:08) (16 - 18)  SpO2: --    LABS:                        10.0   18.76 )-----------( 278      ( 22 Mar 2021 06:16 )             31.1     03-22    134<L>  |  99  |  28<H>  ----------------------------<  98  5.3<H>   |  23  |  0.7    Ca    9.1      22 Mar 2021 06:16  Mg     2.1     03-22    TPro  5.3<L>  /  Alb  3.0<L>  /  TBili  0.6  /  DBili  x   /  AST  21  /  ALT  33  /  AlkPhos  91  03-22    PT/INR - ( 22 Mar 2021 06:16 )   PT: 11.00 sec;   INR: 0.96 ratio                       RADIOLOGY:      PHYSICAL EXAM:  CONSTITUTIONAL: No acute distress, well-developed, well-groomed, AAOx3  HEAD: Atraumatic, normocephalic  EYES: EOM intact, PERRLA, conjunctiva and sclera clear  ENT: Supple, no masses, no thyromegaly, no bruits, no JVD; moist mucous membranes  PULMONARY: Clear to auscultation bilaterally; no wheezes, rales, or rhonchi  CARDIOVASCULAR: Regular rate and rhythm; no murmurs, rubs, or gallops  GASTROINTESTINAL: Soft, non-tender, non-distended; bowel sounds present  MUSCULOSKELETAL: 2+ peripheral pulses; no clubbing, no cyanosis, no edema  NEUROLOGY: non-focal  SKIN: No rashes or lesions; warm and dry

## 2021-03-22 NOTE — PROVIDER CONTACT NOTE (MEDICATION) - SITUATION
pt is refusing Lovenox, pt stated that he has a dental procedure tomorrow and does not want it. RN explained that there is an order to hold tomorrow's AM dose of Lovenox, pt still refused.

## 2021-03-22 NOTE — PROGRESS NOTE ADULT - ATTENDING COMMENTS
seen/examined w/hemonc fellow; note reviewed; plan discussed    discussed the discposition options; in my opinion, it would not be safe to go home; he will need STR , which he is afraid of since his wife  in NH. I explained that STR is not a NH and he needs a close f/u after bieng discharged    1. jaw mass biopsy pending: f/u official result  2. on casodex, started 3/14th; needs to be full 2 weeks before IM administration of GHRH agonist: plan for 3/29th   3. pending results of jaw bx, will discuss initiation of taxotere together with [facility]  4. RAD ONC eval   5. dental extractions pending  6.  and PSYCH eval  7. will need PT eval  PALLIATIVE f/u

## 2021-03-23 LAB
ALBUMIN SERPL ELPH-MCNC: 2.8 G/DL — LOW (ref 3.5–5.2)
ALP SERPL-CCNC: 77 U/L — SIGNIFICANT CHANGE UP (ref 30–115)
ALT FLD-CCNC: 24 U/L — SIGNIFICANT CHANGE UP (ref 0–41)
ANION GAP SERPL CALC-SCNC: 10 MMOL/L — SIGNIFICANT CHANGE UP (ref 7–14)
AST SERPL-CCNC: 17 U/L — SIGNIFICANT CHANGE UP (ref 0–41)
BASOPHILS # BLD AUTO: 0.01 K/UL — SIGNIFICANT CHANGE UP (ref 0–0.2)
BASOPHILS NFR BLD AUTO: 0.1 % — SIGNIFICANT CHANGE UP (ref 0–1)
BILIRUB SERPL-MCNC: 0.5 MG/DL — SIGNIFICANT CHANGE UP (ref 0.2–1.2)
BUN SERPL-MCNC: 27 MG/DL — HIGH (ref 10–20)
CALCIUM SERPL-MCNC: 8.5 MG/DL — SIGNIFICANT CHANGE UP (ref 8.5–10.1)
CHLORIDE SERPL-SCNC: 99 MMOL/L — SIGNIFICANT CHANGE UP (ref 98–110)
CO2 SERPL-SCNC: 22 MMOL/L — SIGNIFICANT CHANGE UP (ref 17–32)
CREAT SERPL-MCNC: 0.8 MG/DL — SIGNIFICANT CHANGE UP (ref 0.7–1.5)
EOSINOPHIL # BLD AUTO: 0 K/UL — SIGNIFICANT CHANGE UP (ref 0–0.7)
EOSINOPHIL NFR BLD AUTO: 0 % — SIGNIFICANT CHANGE UP (ref 0–8)
GLUCOSE SERPL-MCNC: 103 MG/DL — HIGH (ref 70–99)
HCT VFR BLD CALC: 27.5 % — LOW (ref 42–52)
HGB BLD-MCNC: 8.9 G/DL — LOW (ref 14–18)
IMM GRANULOCYTES NFR BLD AUTO: 0.8 % — HIGH (ref 0.1–0.3)
LYMPHOCYTES # BLD AUTO: 0.46 K/UL — LOW (ref 1.2–3.4)
LYMPHOCYTES # BLD AUTO: 2.4 % — LOW (ref 20.5–51.1)
MAGNESIUM SERPL-MCNC: 2.1 MG/DL — SIGNIFICANT CHANGE UP (ref 1.8–2.4)
MCHC RBC-ENTMCNC: 28.9 PG — SIGNIFICANT CHANGE UP (ref 27–31)
MCHC RBC-ENTMCNC: 32.4 G/DL — SIGNIFICANT CHANGE UP (ref 32–37)
MCV RBC AUTO: 89.3 FL — SIGNIFICANT CHANGE UP (ref 80–94)
MONOCYTES # BLD AUTO: 0.85 K/UL — HIGH (ref 0.1–0.6)
MONOCYTES NFR BLD AUTO: 4.5 % — SIGNIFICANT CHANGE UP (ref 1.7–9.3)
NEUTROPHILS # BLD AUTO: 17.53 K/UL — HIGH (ref 1.4–6.5)
NEUTROPHILS NFR BLD AUTO: 92.2 % — HIGH (ref 42.2–75.2)
NRBC # BLD: 0 /100 WBCS — SIGNIFICANT CHANGE UP (ref 0–0)
PLATELET # BLD AUTO: 255 K/UL — SIGNIFICANT CHANGE UP (ref 130–400)
POTASSIUM SERPL-MCNC: 5.3 MMOL/L — HIGH (ref 3.5–5)
POTASSIUM SERPL-SCNC: 5.3 MMOL/L — HIGH (ref 3.5–5)
PROT SERPL-MCNC: 4.4 G/DL — LOW (ref 6–8)
RBC # BLD: 3.08 M/UL — LOW (ref 4.7–6.1)
RBC # FLD: 17.4 % — HIGH (ref 11.5–14.5)
SODIUM SERPL-SCNC: 131 MMOL/L — LOW (ref 135–146)
WBC # BLD: 19.01 K/UL — HIGH (ref 4.8–10.8)
WBC # FLD AUTO: 19.01 K/UL — HIGH (ref 4.8–10.8)

## 2021-03-23 PROCEDURE — 77334 RADIATION TREATMENT AID(S): CPT | Mod: 26

## 2021-03-23 PROCEDURE — 77300 RADIATION THERAPY DOSE PLAN: CPT | Mod: 26

## 2021-03-23 PROCEDURE — 99233 SBSQ HOSP IP/OBS HIGH 50: CPT

## 2021-03-23 PROCEDURE — 99232 SBSQ HOSP IP/OBS MODERATE 35: CPT

## 2021-03-23 RX ADMIN — METHOCARBAMOL 500 MILLIGRAM(S): 500 TABLET, FILM COATED ORAL at 21:59

## 2021-03-23 RX ADMIN — Medication 4 MILLIGRAM(S): at 13:06

## 2021-03-23 RX ADMIN — MIDODRINE HYDROCHLORIDE 10 MILLIGRAM(S): 2.5 TABLET ORAL at 05:04

## 2021-03-23 RX ADMIN — PANTOPRAZOLE SODIUM 40 MILLIGRAM(S): 20 TABLET, DELAYED RELEASE ORAL at 05:04

## 2021-03-23 RX ADMIN — MIDODRINE HYDROCHLORIDE 10 MILLIGRAM(S): 2.5 TABLET ORAL at 12:23

## 2021-03-23 RX ADMIN — Medication 650 MILLIGRAM(S): at 05:04

## 2021-03-23 RX ADMIN — LIDOCAINE 2 PATCH: 4 CREAM TOPICAL at 12:22

## 2021-03-23 RX ADMIN — Medication 4 MILLIGRAM(S): at 22:00

## 2021-03-23 RX ADMIN — METHOCARBAMOL 500 MILLIGRAM(S): 500 TABLET, FILM COATED ORAL at 05:04

## 2021-03-23 RX ADMIN — ENOXAPARIN SODIUM 70 MILLIGRAM(S): 100 INJECTION SUBCUTANEOUS at 17:10

## 2021-03-23 RX ADMIN — LATANOPROST 1 DROP(S): 0.05 SOLUTION/ DROPS OPHTHALMIC; TOPICAL at 22:01

## 2021-03-23 RX ADMIN — METHOCARBAMOL 500 MILLIGRAM(S): 500 TABLET, FILM COATED ORAL at 13:06

## 2021-03-23 RX ADMIN — Medication 4 MILLIGRAM(S): at 05:04

## 2021-03-23 RX ADMIN — Medication 650 MILLIGRAM(S): at 13:06

## 2021-03-23 RX ADMIN — MIDODRINE HYDROCHLORIDE 10 MILLIGRAM(S): 2.5 TABLET ORAL at 17:10

## 2021-03-23 RX ADMIN — Medication 1 APPLICATION(S): at 12:21

## 2021-03-23 RX ADMIN — Medication 650 MILLIGRAM(S): at 22:00

## 2021-03-23 RX ADMIN — Medication 0.5 MILLIGRAM(S): at 13:06

## 2021-03-23 RX ADMIN — BICALUTAMIDE 50 MILLIGRAM(S): 50 TABLET, FILM COATED ORAL at 12:22

## 2021-03-23 NOTE — PROGRESS NOTE ADULT - ASSESSMENT
79 yo male hx of CAD s/p CABG s/p 20+ years ago/ CHF/ right sided large inguinal hernia BIBA from hotel room 2/2 unable to ambulate with right thigh weakness and numbness, now found to have metastatic disease with a mandibular mass and large sacral mass.       # Multiple metastatic osseous lesions with newly diagnosed sacral and mandibular mass with LE weakness concerning for spinal cord compression due to malignancy   - CT scan A/P on admission: Findings compatible with metastatic disease as demonstrated by multiple bony lesions, the largest soft tissue mass centered at S2-S3 measuring up to 7.4 cm with associated bony destruction and obliteration of the exiting neural foramina. Additional smaller lesions at the right sacral ala, L2 and L3.  - CT Neck Soft Tissue No Cont: Large right mandibular soft tissue density mass measuring 6.5 x 7.1 x 9.2 cm likely arising from the right mandible extending from the right mandibular condyle to the body of the mandible. There is an associated extensive bony erosion of the right mandible within the mass and the posterior wall of the right maxilla. Findings are highly suspicious for malignancy.  - Repeat CT A/P noncont for GI bleed: Right upper lobe paravertebral mass measuring approx 5.8 x 4.1 x 4.5 cm with destructive osseous involvement and extension the spine causing mass effect on the spinal cord. Smaller apical paravertebral nodule with adjacent osseous involvement. It is unclear whether these lesions originated in the bone extending to the lung or vice versa.  - S/p IR biopsy of sacral mass 3/10, showing metastatic prostate ca, PSA significantly elevated   - Neurosx c/s appreciated: no intervention at this time, no cord compression  - Heme/Onc c/s appreciated: c/w Casodex; rad onc intervention when stable, steroids  - Dental c/s appreciated to start bisphosphonates. (Pt refused extractions initially)-->possible extraction on Wednesday   - Palliative consult appreciated: Dilaudid and Robaxin for pain control    - Taper steroid: now on dexamethasone 4mg q8 PO  - F/u MR head and MR C/T spine (did not go x2 due to hypotension, will reorder)    # Mandibular mass   - SCC  -ENT following: s/p bedside biopsy, mild oozing post procedure.      - cauterized with silver nitrate      - cont to hold gauze pressure prn     - ok for PO, chew on opposite side     - f/u path results    #Hypovolemic shock (resolved)  - Likely multifactorial, hypovolemia and possible UGIB given hb drop  - s/p 2 units so fat  - hgb 6.9-->8.1-->7.9  - S/p a total of 5L boluses  - c/w D5 + 1/2HS + 75mEq bicarb @ 80cc/hr  - Off Levo. C/w Midodrine 10mg PO Q8H (/64)  - CT a/p noncont neg for bleed    - LE duplex neg for DVT     #DENISE, resolving  - Likely pre-renal  - S/p a total of 5L boluses   - Cr 0.8. BUN 47 <- 79  - C/w IVF D5 + 1/2NS with 75meq bicarb @80cc/hr     #Acute on chronic anemia  - concern for possible UGIB   - hgb as above  - Hemolysis w/o not c/w hemolysis  - GI c/s appreciated: no intervention at this time as no plans to start a/c  - C/w PPI PO QD  - CT A/P no retroperitoneal bleed  - restarted Lovenox   - trend Hg    # B/l hydro  - Likely due to acute urinary retention   - CT scan showed on admission moderate to severe bilateral hydroureteronephrosis to the level of a markedly distended urinary bladder. Findings presumably secondary to urinary bladder outlet obstruction or urinary retention. Enlarged prostate gland.   - C/w Santos catheter     # Chest pain  - likely musculoskeletal, reproducible  - Tylenol   - EKG NSR  - Trops (-)    # CHFrEF  - Euvolemic   - Echo EF 35%, mod mitral calcification, enlarged left atria  - Breathing RA    # Afib  - HR 60s  - on eliquis 5 mg bid at home  - restarted Lovenox      # HTN  - Now hypotensive, 108/96  - hold aldactone, lasix, acei and metoprolol     # CAD   - Stable     # Metabolic acidosis   - C/w sodium bicarbonate 650mg TID     #Hypokalemia  - K 3.3. Treated    # Mild hypercalcemia, likely due to bone mets - resolved     # anxiety  - Xanax 0.25mg Q12H PRN    DVT Prophylaxis: Hep q8h  GI Prophylaxis: Protonix QD  Diet: soft (can advance as tolerated)  Activity: IAT  FULL CODE.      Dispo: PT eval, possible dc to nursing home, need help while ambulating (pt is homeless) 79 yo male hx of CAD s/p CABG s/p 20+ years ago/ CHF/ right sided large inguinal hernia BIBA from hotel room 2/2 unable to ambulate with right thigh weakness and numbness, now found to have metastatic disease with a mandibular mass and large sacral mass.       # Multiple metastatic osseous lesions with newly diagnosed sacral and mandibular mass with LE weakness concerning for spinal cord compression due to malignancy   - CT scan A/P on admission: Findings compatible with metastatic disease as demonstrated by multiple bony lesions, the largest soft tissue mass centered at S2-S3 measuring up to 7.4 cm with associated bony destruction and obliteration of the exiting neural foramina. Additional smaller lesions at the right sacral ala, L2 and L3.  - CT Neck Soft Tissue No Cont: Large right mandibular soft tissue density mass measuring 6.5 x 7.1 x 9.2 cm likely arising from the right mandible extending from the right mandibular condyle to the body of the mandible. There is an associated extensive bony erosion of the right mandible within the mass and the posterior wall of the right maxilla. Findings are highly suspicious for malignancy.  - Repeat CT A/P noncont for GI bleed: Right upper lobe paravertebral mass measuring approx 5.8 x 4.1 x 4.5 cm with destructive osseous involvement and extension the spine causing mass effect on the spinal cord. Smaller apical paravertebral nodule with adjacent osseous involvement. It is unclear whether these lesions originated in the bone extending to the lung or vice versa.  - S/p IR biopsy of sacral mass 3/10, showing metastatic prostate ca, PSA significantly elevated   - Neurosx c/s appreciated: no intervention at this time, no cord compression  - Heme/Onc c/s appreciated: c/w Casodex; rad onc intervention when stable, steroids  - Dental c/s appreciated to start bisphosphonates. (Pt refused extractions initially)-->possible extraction on Wednesday   - Palliative consult appreciated: Dilaudid and Robaxin for pain control    - Taper steroid: now on dexamethasone 4mg q8 PO  - F/u MR head and MR C/T spine (did not go x2 due to hypotension, will reorder)  - pt refused dental extraction and rad onc today    # Mandibular mass   - SCC  -ENT following: s/p bedside biopsy, mild oozing post procedure.      - cauterized with silver nitrate      - cont to hold gauze pressure prn     - ok for PO, chew on opposite side     - f/u path results    #Hypovolemic shock (resolved)  - Likely multifactorial, hypovolemia and possible UGIB given hb drop  - s/p 2 units so fat  - hgb 6.9-->8.1-->7.9  - S/p a total of 5L boluses  - c/w D5 + 1/2HS + 75mEq bicarb @ 80cc/hr  - Off Levo. C/w Midodrine 10mg PO Q8H (/64)  - CT a/p noncont neg for bleed    - LE duplex neg for DVT     #DENISE, resolving  - Likely pre-renal  - S/p a total of 5L boluses   - Cr 0.8. BUN 47 <- 79  - C/w IVF D5 + 1/2NS with 75meq bicarb @80cc/hr     #Acute on chronic anemia  - concern for possible UGIB   - hgb as above  - Hemolysis w/o not c/w hemolysis  - GI c/s appreciated: no intervention at this time as no plans to start a/c  - C/w PPI PO QD  - CT A/P no retroperitoneal bleed  - restarted Lovenox   - trend Hg    # B/l hydro  - Likely due to acute urinary retention   - CT scan showed on admission moderate to severe bilateral hydroureteronephrosis to the level of a markedly distended urinary bladder. Findings presumably secondary to urinary bladder outlet obstruction or urinary retention. Enlarged prostate gland.   - C/w Santos catheter     # Chest pain  - likely musculoskeletal, reproducible  - Tylenol   - EKG NSR  - Trops (-)    # CHFrEF  - Euvolemic   - Echo EF 35%, mod mitral calcification, enlarged left atria  - Breathing RA    # Afib  - HR 60s  - on eliquis 5 mg bid at home  - restarted Lovenox      # HTN  - Now hypotensive, 108/96  - hold aldactone, lasix, acei and metoprolol     # CAD   - Stable     # Metabolic acidosis   - C/w sodium bicarbonate 650mg TID     #Hypokalemia  - K 3.3. Treated    # Mild hypercalcemia, likely due to bone mets - resolved     # anxiety  - Xanax 0.25mg Q12H PRN    DVT Prophylaxis: Hep q8h  GI Prophylaxis: Protonix QD  Diet: soft (can advance as tolerated)  Activity: IAT  FULL CODE.      Dispo: PT eval, possible dc to nursing home, need help while ambulating (pt is homeless)

## 2021-03-23 NOTE — PROGRESS NOTE ADULT - SUBJECTIVE AND OBJECTIVE BOX
24H events:    Patient planned to start radiation yesterday  He has an appointment for 1pm today with dental  No acute events  Biopsy result pending from jaw    PAST MEDICAL & SURGICAL HISTORY  Inguinal hernia    CHF (congestive heart failure)    S/P CABG x 3      SOCIAL HISTORY:  Negative for smoking/alcohol/drug use.     ALLERGIES:  No Known Allergies    MEDICATIONS:  STANDING MEDICATIONS  bicalutamide 50 milliGRAM(s) Oral daily  chlorhexidine 4% Liquid 1 Application(s) Topical daily  collagenase Ointment 1 Application(s) Topical daily  dexAMETHasone     Tablet 4 milliGRAM(s) Oral every 8 hours  enoxaparin Injectable 70 milliGRAM(s) SubCutaneous two times a day  latanoprost 0.005% Ophthalmic Solution 1 Drop(s) Both EYES at bedtime  lidocaine   Patch 2 Patch Transdermal daily  methocarbamol 500 milliGRAM(s) Oral three times a day  midodrine. 10 milliGRAM(s) Oral three times a day  pantoprazole    Tablet 40 milliGRAM(s) Oral before breakfast  sodium bicarbonate 650 milliGRAM(s) Oral three times a day    PRN MEDICATIONS  acetaminophen   Tablet .. 650 milliGRAM(s) Oral every 6 hours PRN  ALPRAZolam 0.5 milliGRAM(s) Oral daily PRN  aluminum hydroxide/magnesium hydroxide/simethicone Suspension 30 milliLiter(s) Oral every 6 hours PRN  brimonidine 0.2% Ophthalmic Solution 1 Drop(s) Both EYES every 8 hours PRN  HYDROmorphone  Injectable 0.25 milliGRAM(s) IV Push every 4 hours PRN    VITALS:   T(F): 98.3  HR: 68  BP: 114/54  RR: 18  SpO2: --    LABS:                        8.9    19.01 )-----------( 255      ( 23 Mar 2021 06:23 )             27.5     03-22    134<L>  |  99  |  28<H>  ----------------------------<  98  5.3<H>   |  23  |  0.7    Ca    9.1      22 Mar 2021 06:16  Mg     2.1     03-22    TPro  5.3<L>  /  Alb  3.0<L>  /  TBili  0.6  /  DBili  x   /  AST  21  /  ALT  33  /  AlkPhos  91  03-22    PT/INR - ( 22 Mar 2021 06:16 )   PT: 11.00 sec;   INR: 0.96 ratio                       RADIOLOGY:    PHYSICAL EXAM:  GEN: No acute distress  HENT: NCAT, EOMI, right facial swelling  LYMPH: No appreciable adenopathy  LUNGS: No respiratory distress, clear to auscultation bilaterally   HEART: regular rate and rhythm  ABD: Soft, non-tender, non-distended  SKIN: No rash  EXT: No edema  NEURO: AAOX3, LE weakness improving

## 2021-03-23 NOTE — PROGRESS NOTE ADULT - SUBJECTIVE AND OBJECTIVE BOX
MANUELITO HEDRICK 80y Male  MRN#: [id number]   Hospital Day: 15d    SUBJECTIVE  Patient is a 80y old Male who presents with a chief complaint of 80 YEAR OLD MALE C/O MULTIPLE MEDICAL COMPLAINTS . (23 Mar 2021 07:48)  Currently admitted to medicine with the primary diagnosis of Ambulatory dysfunction      INTERVAL HPI AND OVERNIGHT EVENTS:  Patient was examined and seen at bedside. This morning he is resting comfortably in bed and reports no issues or overnight events.    REVIEW OF SYMPTOMS:  CONSTITUTIONAL: No weakness, fevers or chills; No headaches  EYES: No visual changes, eye pain, or discharge  ENT: No vertigo; No ear pain or change in hearing; No sore throat or difficulty swallowing  NECK: No pain or stiffness  RESPIRATORY: No cough, wheezing, or hemoptysis; No shortness of breath  CARDIOVASCULAR: No chest pain or palpitations  GASTROINTESTINAL: No abdominal or epigastric pain; No nausea, vomiting, or hematemesis; No diarrhea or constipation; No melena or hematochezia  GENITOURINARY: No dysuria, frequency or hematuria  MUSCULOSKELETAL: No joint pain, no muscle pain, no weakness  NEUROLOGICAL: No numbness or weakness  SKIN: No itching or rashes    OBJECTIVE  PAST MEDICAL & SURGICAL HISTORY  Inguinal hernia    CHF (congestive heart failure)    S/P CABG x 3      ALLERGIES:  No Known Allergies    MEDICATIONS:  STANDING MEDICATIONS  bicalutamide 50 milliGRAM(s) Oral daily  chlorhexidine 4% Liquid 1 Application(s) Topical daily  collagenase Ointment 1 Application(s) Topical daily  dexAMETHasone     Tablet 4 milliGRAM(s) Oral every 8 hours  enoxaparin Injectable 70 milliGRAM(s) SubCutaneous two times a day  latanoprost 0.005% Ophthalmic Solution 1 Drop(s) Both EYES at bedtime  lidocaine   Patch 2 Patch Transdermal daily  methocarbamol 500 milliGRAM(s) Oral three times a day  midodrine. 10 milliGRAM(s) Oral three times a day  pantoprazole    Tablet 40 milliGRAM(s) Oral before breakfast  sodium bicarbonate 650 milliGRAM(s) Oral three times a day    PRN MEDICATIONS  acetaminophen   Tablet .. 650 milliGRAM(s) Oral every 6 hours PRN  ALPRAZolam 0.5 milliGRAM(s) Oral daily PRN  aluminum hydroxide/magnesium hydroxide/simethicone Suspension 30 milliLiter(s) Oral every 6 hours PRN  brimonidine 0.2% Ophthalmic Solution 1 Drop(s) Both EYES every 8 hours PRN  HYDROmorphone  Injectable 0.25 milliGRAM(s) IV Push every 4 hours PRN      VITAL SIGNS: Last 24 Hours  T(C): 36.6 (23 Mar 2021 07:11), Max: 36.8 (22 Mar 2021 23:40)  T(F): 97.8 (23 Mar 2021 07:11), Max: 98.3 (22 Mar 2021 23:40)  HR: 67 (23 Mar 2021 07:11) (67 - 68)  BP: 119/58 (23 Mar 2021 07:11) (114/54 - 119/58)  BP(mean): --  RR: 18 (23 Mar 2021 07:11) (18 - 18)  SpO2: --    LABS:                        8.9    19.01 )-----------( 255      ( 23 Mar 2021 06:23 )             27.5     03-23    131<L>  |  99  |  27<H>  ----------------------------<  103<H>  5.3<H>   |  22  |  0.8    Ca    8.5      23 Mar 2021 06:23  Mg     2.1     03-23    TPro  4.4<L>  /  Alb  2.8<L>  /  TBili  0.5  /  DBili  x   /  AST  17  /  ALT  24  /  AlkPhos  77  03-23    PT/INR - ( 22 Mar 2021 06:16 )   PT: 11.00 sec;   INR: 0.96 ratio                       RADIOLOGY:      PHYSICAL EXAM:  CONSTITUTIONAL: No acute distress, well-developed, well-groomed, AAOx3  HEAD: Atraumatic, normocephalic  EYES: EOM intact, PERRLA, conjunctiva and sclera clear  ENT: Supple, no masses, no thyromegaly, no bruits, no JVD; moist mucous membranes  PULMONARY: Clear to auscultation bilaterally; no wheezes, rales, or rhonchi  CARDIOVASCULAR: Regular rate and rhythm; no murmurs, rubs, or gallops  GASTROINTESTINAL: Soft, non-tender, non-distended; bowel sounds present  MUSCULOSKELETAL: 2+ peripheral pulses; no clubbing, no cyanosis, no edema  NEUROLOGY: non-focal  SKIN: No rashes or lesions; warm and dry

## 2021-03-23 NOTE — PROGRESS NOTE ADULT - ASSESSMENT
79 yo male hx of CAD s/p CABG s/p 20+ years ago/ CHF/ right sided large inguinal hernia BIBA from hotel room 2/2 unable to ambulate with right thigh weakness and numbness, now found to have metastatic disease with a mandibular mass and large sacral mass    Stage IV Prostate cancer with diffuse osseous involvement:  -   - Sacral biopsy favors prostate ( neoplastic cells are positive for AE1/AE3, PSA and CK7)  - Started on casodex 50mg 3/14  - Will likely start leupron 2 weeks following start of casodex: not earlier than 3/ 29/21   - Will also likely start chemotherapy (taxotere) when stable  - TLS labs wnl  - CT chest showing RUL [id number] paravertebral lesion with mass effect, MR T-spine completed, results pending, f/u NSx  - CT AP no RP bleed, no visceral mets  - MR CTL, brain and  jaw completed, mets throughout cervical and t-spine  - dexamethasone, f/u NSx or rad onc for taper  - He has appointment at 1pm today with dental; he was also seen on 3/17, planning on teeth extraction for bisphosphonates  - F/u rad onc for RT    Normocytic anemia: Improved   - Per GI, outpatient scope  - Hapto high and retic low, not consistent with hemolysis  - TSAT 67%    DENISE: Resolved    Mild hypercalcemia, likely due to bone mets

## 2021-03-23 NOTE — PROGRESS NOTE ADULT - ATTENDING COMMENTS
80 yr old male presenting with metastatic prostate cancer and tumor of jaw.  #Metastatic prostate cancer--seen by oncology-- on casodex. will start leupron in 2 weeks-- as per oncology last note 3/19  will get palliative RT-- on dexamethasone-- patient refused radiation treatment today-- please hold dilaudid in AM as he refuses procedures  has indwelling catheter.    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 ENT did biopsy of jaw 3/19-- metastaic adenocarcinoma.  # UGI bleed-- nO EGD done--only coffee ground emesis-- s/p 1 unit of PRBC  # A fib--not on eliquis at home-- currently off it. -- --if he can get tooth extracted tomorrow by dental-- currently refusing lovenox.-- tendency to bleed.   recent upper Gi Bleed.  # Chr systolic CHF-- euvolemic--off diuretics.  Homeless currently .      Patient needs dental extraction AM and dilaudid needs to beon hold  AM as he refuses procedures.

## 2021-03-24 LAB
ALBUMIN SERPL ELPH-MCNC: 2.9 G/DL — LOW (ref 3.5–5.2)
ALP SERPL-CCNC: 81 U/L — SIGNIFICANT CHANGE UP (ref 30–115)
ALT FLD-CCNC: 23 U/L — SIGNIFICANT CHANGE UP (ref 0–41)
ANION GAP SERPL CALC-SCNC: 10 MMOL/L — SIGNIFICANT CHANGE UP (ref 7–14)
AST SERPL-CCNC: 18 U/L — SIGNIFICANT CHANGE UP (ref 0–41)
BASOPHILS # BLD AUTO: 0 K/UL — SIGNIFICANT CHANGE UP (ref 0–0.2)
BASOPHILS NFR BLD AUTO: 0 % — SIGNIFICANT CHANGE UP (ref 0–1)
BILIRUB SERPL-MCNC: 0.6 MG/DL — SIGNIFICANT CHANGE UP (ref 0.2–1.2)
BUN SERPL-MCNC: 29 MG/DL — HIGH (ref 10–20)
CALCIUM SERPL-MCNC: 8.6 MG/DL — SIGNIFICANT CHANGE UP (ref 8.5–10.1)
CHLORIDE SERPL-SCNC: 99 MMOL/L — SIGNIFICANT CHANGE UP (ref 98–110)
CO2 SERPL-SCNC: 21 MMOL/L — SIGNIFICANT CHANGE UP (ref 17–32)
CREAT SERPL-MCNC: 0.8 MG/DL — SIGNIFICANT CHANGE UP (ref 0.7–1.5)
EOSINOPHIL # BLD AUTO: 0 K/UL — SIGNIFICANT CHANGE UP (ref 0–0.7)
EOSINOPHIL NFR BLD AUTO: 0 % — SIGNIFICANT CHANGE UP (ref 0–8)
GLUCOSE SERPL-MCNC: 115 MG/DL — HIGH (ref 70–99)
HCT VFR BLD CALC: 28.2 % — LOW (ref 42–52)
HGB BLD-MCNC: 9.3 G/DL — LOW (ref 14–18)
IMM GRANULOCYTES NFR BLD AUTO: 0.6 % — HIGH (ref 0.1–0.3)
LYMPHOCYTES # BLD AUTO: 0.3 K/UL — LOW (ref 1.2–3.4)
LYMPHOCYTES # BLD AUTO: 2 % — LOW (ref 20.5–51.1)
MAGNESIUM SERPL-MCNC: 2 MG/DL — SIGNIFICANT CHANGE UP (ref 1.8–2.4)
MCHC RBC-ENTMCNC: 29 PG — SIGNIFICANT CHANGE UP (ref 27–31)
MCHC RBC-ENTMCNC: 33 G/DL — SIGNIFICANT CHANGE UP (ref 32–37)
MCV RBC AUTO: 87.9 FL — SIGNIFICANT CHANGE UP (ref 80–94)
MONOCYTES # BLD AUTO: 0.52 K/UL — SIGNIFICANT CHANGE UP (ref 0.1–0.6)
MONOCYTES NFR BLD AUTO: 3.5 % — SIGNIFICANT CHANGE UP (ref 1.7–9.3)
NEUTROPHILS # BLD AUTO: 14.15 K/UL — HIGH (ref 1.4–6.5)
NEUTROPHILS NFR BLD AUTO: 93.9 % — HIGH (ref 42.2–75.2)
NRBC # BLD: 0 /100 WBCS — SIGNIFICANT CHANGE UP (ref 0–0)
PLATELET # BLD AUTO: 227 K/UL — SIGNIFICANT CHANGE UP (ref 130–400)
POTASSIUM SERPL-MCNC: 5.3 MMOL/L — HIGH (ref 3.5–5)
POTASSIUM SERPL-SCNC: 5.3 MMOL/L — HIGH (ref 3.5–5)
PROT SERPL-MCNC: 4.8 G/DL — LOW (ref 6–8)
RBC # BLD: 3.21 M/UL — LOW (ref 4.7–6.1)
RBC # FLD: 17.2 % — HIGH (ref 11.5–14.5)
SODIUM SERPL-SCNC: 130 MMOL/L — LOW (ref 135–146)
WBC # BLD: 15.06 K/UL — HIGH (ref 4.8–10.8)
WBC # FLD AUTO: 15.06 K/UL — HIGH (ref 4.8–10.8)

## 2021-03-24 PROCEDURE — 99233 SBSQ HOSP IP/OBS HIGH 50: CPT

## 2021-03-24 PROCEDURE — 99232 SBSQ HOSP IP/OBS MODERATE 35: CPT

## 2021-03-24 RX ADMIN — MIDODRINE HYDROCHLORIDE 10 MILLIGRAM(S): 2.5 TABLET ORAL at 05:39

## 2021-03-24 RX ADMIN — Medication 650 MILLIGRAM(S): at 21:48

## 2021-03-24 RX ADMIN — BICALUTAMIDE 50 MILLIGRAM(S): 50 TABLET, FILM COATED ORAL at 11:39

## 2021-03-24 RX ADMIN — Medication 1 APPLICATION(S): at 11:39

## 2021-03-24 RX ADMIN — Medication 4 MILLIGRAM(S): at 05:40

## 2021-03-24 RX ADMIN — MIDODRINE HYDROCHLORIDE 10 MILLIGRAM(S): 2.5 TABLET ORAL at 11:38

## 2021-03-24 RX ADMIN — LATANOPROST 1 DROP(S): 0.05 SOLUTION/ DROPS OPHTHALMIC; TOPICAL at 21:52

## 2021-03-24 RX ADMIN — LIDOCAINE 2 PATCH: 4 CREAM TOPICAL at 11:38

## 2021-03-24 RX ADMIN — ENOXAPARIN SODIUM 70 MILLIGRAM(S): 100 INJECTION SUBCUTANEOUS at 18:01

## 2021-03-24 RX ADMIN — Medication 650 MILLIGRAM(S): at 05:39

## 2021-03-24 RX ADMIN — Medication 4 MILLIGRAM(S): at 15:18

## 2021-03-24 RX ADMIN — METHOCARBAMOL 500 MILLIGRAM(S): 500 TABLET, FILM COATED ORAL at 05:39

## 2021-03-24 RX ADMIN — Medication 650 MILLIGRAM(S): at 15:19

## 2021-03-24 RX ADMIN — Medication 4 MILLIGRAM(S): at 21:50

## 2021-03-24 RX ADMIN — LIDOCAINE 2 PATCH: 4 CREAM TOPICAL at 01:12

## 2021-03-24 RX ADMIN — MIDODRINE HYDROCHLORIDE 10 MILLIGRAM(S): 2.5 TABLET ORAL at 18:00

## 2021-03-24 RX ADMIN — METHOCARBAMOL 500 MILLIGRAM(S): 500 TABLET, FILM COATED ORAL at 15:19

## 2021-03-24 RX ADMIN — METHOCARBAMOL 500 MILLIGRAM(S): 500 TABLET, FILM COATED ORAL at 21:48

## 2021-03-24 RX ADMIN — PANTOPRAZOLE SODIUM 40 MILLIGRAM(S): 20 TABLET, DELAYED RELEASE ORAL at 05:39

## 2021-03-24 RX ADMIN — ENOXAPARIN SODIUM 70 MILLIGRAM(S): 100 INJECTION SUBCUTANEOUS at 05:38

## 2021-03-24 RX ADMIN — LIDOCAINE 2 PATCH: 4 CREAM TOPICAL at 01:00

## 2021-03-24 NOTE — PROGRESS NOTE ADULT - SUBJECTIVE AND OBJECTIVE BOX
Patient is a 80y old  Male who presents with a chief complaint of 80 YEAR OLD MALE C/O MULTIPLE MEDICAL COMPLAINTS . (24 Mar 2021 09:14)      Subjective: Mr. Mckee feels okay today but is anxious. He did have a panic attack yesterday but it responded to Xanax. He was unable to go for dental re-eval because of that. We received the biopsy results from his right oral mass        Vital Signs Last 24 Hrs  T(C): 35.8 (24 Mar 2021 07:45), Max: 36.1 (23 Mar 2021 12:00)  T(F): 96.5 (24 Mar 2021 07:45), Max: 97 (23 Mar 2021 12:00)  HR: 63 (24 Mar 2021 07:45) (62 - 76)  BP: 129/58 (24 Mar 2021 07:45) (112/65 - 129/60)  BP(mean): --  RR: 18 (24 Mar 2021 07:45) (18 - 18)  SpO2: --    PHYSICAL EXAM  GEN: No acute distress  HENT: NCAT, EOMI, right facial swelling  LYMPH: No appreciable adenopathy  LUNGS: No respiratory distress, clear to auscultation bilaterally   HEART: regular rate and rhythm  ABD: Soft, non-tender, non-distended  SKIN: No rash  EXT: No edema  NEURO: AAOX3, LE weakness improving    MEDICATIONS  (STANDING):  bicalutamide 50 milliGRAM(s) Oral daily  chlorhexidine 4% Liquid 1 Application(s) Topical daily  collagenase Ointment 1 Application(s) Topical daily  dexAMETHasone     Tablet 4 milliGRAM(s) Oral every 8 hours  enoxaparin Injectable 70 milliGRAM(s) SubCutaneous two times a day  latanoprost 0.005% Ophthalmic Solution 1 Drop(s) Both EYES at bedtime  lidocaine   Patch 2 Patch Transdermal daily  methocarbamol 500 milliGRAM(s) Oral three times a day  midodrine. 10 milliGRAM(s) Oral three times a day  pantoprazole    Tablet 40 milliGRAM(s) Oral before breakfast  sodium bicarbonate 650 milliGRAM(s) Oral three times a day    MEDICATIONS  (PRN):  acetaminophen   Tablet .. 650 milliGRAM(s) Oral every 6 hours PRN Moderate Pain (4 - 6)  ALPRAZolam 0.5 milliGRAM(s) Oral daily PRN anxiety  aluminum hydroxide/magnesium hydroxide/simethicone Suspension 30 milliLiter(s) Oral every 6 hours PRN Dyspepsia  brimonidine 0.2% Ophthalmic Solution 1 Drop(s) Both EYES every 8 hours PRN dry eyes  HYDROmorphone  Injectable 0.25 milliGRAM(s) IV Push every 4 hours PRN Severe Pain (7 - 10)      LABS:                          9.3    15.06 )-----------( 227      ( 24 Mar 2021 05:04 )             28.2         Mean Cell Volume : 87.9 fL  Mean Cell Hemoglobin : 29.0 pg  Mean Cell Hemoglobin Concentration : 33.0 g/dL  Auto Neutrophil # : 14.15 K/uL  Auto Lymphocyte # : 0.30 K/uL  Auto Monocyte # : 0.52 K/uL  Auto Eosinophil # : 0.00 K/uL  Auto Basophil # : 0.00 K/uL  Auto Neutrophil % : 93.9 %  Auto Lymphocyte % : 2.0 %  Auto Monocyte % : 3.5 %  Auto Eosinophil % : 0.0 %  Auto Basophil % : 0.0 %      Serial CBC's  03-24 @ 05:04  Hct-28.2 / Hgb-9.3 / Plat-227 / RBC-3.21 / WBC-15.06  Serial CBC's  03-23 @ 06:23  Hct-27.5 / Hgb-8.9 / Plat-255 / RBC-3.08 / WBC-19.01  Serial CBC's  03-22 @ 06:16  Hct-31.1 / Hgb-10.0 / Plat-278 / RBC-3.48 / WBC-18.76  Serial CBC's  03-21 @ 06:57  Hct-25.4 / Hgb-8.2 / Plat-222 / RBC-2.87 / WBC-16.60      03-24    130<L>  |  99  |  29<H>  ----------------------------<  115<H>  5.3<H>   |  21  |  0.8    Ca    8.6      24 Mar 2021 05:04  Mg     2.0     03-24    TPro  4.8<L>  /  Alb  2.9<L>  /  TBili  0.6  /  DBili  x   /  AST  18  /  ALT  23  /  AlkPhos  81  03-24        BLOOD SMEAR INTERPRETATION:       RADIOLOGY & ADDITIONAL STUDIES:     Patient is a 80y old  Male who presents with a chief complaint of 80 YEAR OLD MALE C/O MULTIPLE MEDICAL COMPLAINTS . (24 Mar 2021 09:14)      Subjective: Mr. Mckee feels okay today but is anxious. He did have a panic attack yesterday but it responded to Xanax. He was unable to go for dental re-eval because of that. We received the biopsy results from his right oral mass        Vital Signs Last 24 Hrs  T(C): 35.8 (24 Mar 2021 07:45), Max: 36.1 (23 Mar 2021 12:00)  T(F): 96.5 (24 Mar 2021 07:45), Max: 97 (23 Mar 2021 12:00)  HR: 63 (24 Mar 2021 07:45) (62 - 76)  BP: 129/58 (24 Mar 2021 07:45) (112/65 - 129/60)  BP(mean): --  RR: 18 (24 Mar 2021 07:45) (18 - 18)  SpO2: --    PHYSICAL EXAM  GEN: No acute distress  HENT: NCAT, EOMI, right facial swelling  LYMPH: No appreciable adenopathy  LUNGS: No respiratory distress, clear to auscultation bilaterally   HEART: regular rate and rhythm  ABD: Soft, non-tender, non-distended  SKIN: No rash  EXT: No edema  NEURO: AAOX3, LE weakness improving    MEDICATIONS  (STANDING):  bicalutamide 50 milliGRAM(s) Oral daily  chlorhexidine 4% Liquid 1 Application(s) Topical daily  collagenase Ointment 1 Application(s) Topical daily  dexAMETHasone     Tablet 4 milliGRAM(s) Oral every 8 hours  enoxaparin Injectable 70 milliGRAM(s) SubCutaneous two times a day  latanoprost 0.005% Ophthalmic Solution 1 Drop(s) Both EYES at bedtime  lidocaine   Patch 2 Patch Transdermal daily  methocarbamol 500 milliGRAM(s) Oral three times a day  midodrine. 10 milliGRAM(s) Oral three times a day  pantoprazole    Tablet 40 milliGRAM(s) Oral before breakfast  sodium bicarbonate 650 milliGRAM(s) Oral three times a day    MEDICATIONS  (PRN):  acetaminophen   Tablet .. 650 milliGRAM(s) Oral every 6 hours PRN Moderate Pain (4 - 6)  ALPRAZolam 0.5 milliGRAM(s) Oral daily PRN anxiety  aluminum hydroxide/magnesium hydroxide/simethicone Suspension 30 milliLiter(s) Oral every 6 hours PRN Dyspepsia  brimonidine 0.2% Ophthalmic Solution 1 Drop(s) Both EYES every 8 hours PRN dry eyes  HYDROmorphone  Injectable 0.25 milliGRAM(s) IV Push every 4 hours PRN Severe Pain (7 - 10)      LABS:                          9.3    15.06 )-----------( 227      ( 24 Mar 2021 05:04 )             28.2         Mean Cell Volume : 87.9 fL  Mean Cell Hemoglobin : 29.0 pg  Mean Cell Hemoglobin Concentration : 33.0 g/dL  Auto Neutrophil # : 14.15 K/uL  Auto Lymphocyte # : 0.30 K/uL  Auto Monocyte # : 0.52 K/uL  Auto Eosinophil # : 0.00 K/uL  Auto Basophil # : 0.00 K/uL  Auto Neutrophil % : 93.9 %  Auto Lymphocyte % : 2.0 %  Auto Monocyte % : 3.5 %  Auto Eosinophil % : 0.0 %  Auto Basophil % : 0.0 %      Serial CBC's  03-24 @ 05:04  Hct-28.2 / Hgb-9.3 / Plat-227 / RBC-3.21 / WBC-15.06  Serial CBC's  03-23 @ 06:23  Hct-27.5 / Hgb-8.9 / Plat-255 / RBC-3.08 / WBC-19.01  Serial CBC's  03-22 @ 06:16  Hct-31.1 / Hgb-10.0 / Plat-278 / RBC-3.48 / WBC-18.76  Serial CBC's  03-21 @ 06:57  Hct-25.4 / Hgb-8.2 / Plat-222 / RBC-2.87 / WBC-16.60      03-24    130<L>  |  99  |  29<H>  ----------------------------<  115<H>  5.3<H>   |  21  |  0.8    Ca    8.6      24 Mar 2021 05:04  Mg     2.0     03-24    TPro  4.8<L>  /  Alb  2.9<L>  /  TBili  0.6  /  DBili  x   /  AST  18  /  ALT  23  /  AlkPhos  81  03-24        BLOOD SMEAR INTERPRETATION:       RADIOLOGY & ADDITIONAL STUDIES:  Surgical Pathology Report:   ACCESSION No:  [id number]    MANUELITO MCKEE                      1        Surgical Final Report          Final Diagnosis  Right oral cavity mass, biopsy:  -  Squamous mucosa with metastatic adenocarcinoma.    Comment:  H&E microscopic features are most consistent with  origin from prostatic primary site. Limited immunohistochemistry  will be performed.    Please see also prior FNA biopsy of sacral bone lesion, positive  for metastatic carcinoma from prostatic origin ([id number]).    This case was reviewed in intradepartmental QA conference and the  diagnosis represents a consensus opinion.    Verified by: Mckayla Lucas M.D.  (Electronic Signature)  Reported on: 03/23/21 17:23 EDT, [address] [place] [address]  Phone: [phone]   Fax: [phone]  _________________________________________________________________    Comment  Case reported to Tumor Registry.    Clinical History  Right oral cavity biopsy, excision  80 year old male with history of metastatic prostate cancer, new  right oral cavity lesion. Rule out metastatic lesion vs SCCA    Specimen(s) Submitted  Right oral cavity mass    Gross Description  The specimen is received in formalin, labeled "right oral cavity  mass" and consists of single whiteto yellow nodular soft tissue  fragment, 1.2 x 0.6 x 0.5 cm, covered by smooth whitish mucosa.  The base is inked black. Sectioning reveals white smooth cut  surface. The specimen is submitted entirely. (1 block)    Specimen was received and underwent gross examination at [facility], [address] [place] [address].    03/22/2021 13:29:00 EDT nk    Perioperative Diagnosis  Right oral cavity mass (03.19.21 @ 16:45)

## 2021-03-24 NOTE — PROGRESS NOTE ADULT - SUBJECTIVE AND OBJECTIVE BOX
Patient presents for follow up in dental department.   Patient reports he is not in any dental pain.  No dental intervention needed at this time.  Patient is cleared from dental.     Germania Moore, [id number] Patient presents for follow up in dental department.   Patient reports he is not in any dental pain.  No dental intervention needed at this time.  Patient is cleared from dental.     Consulted with Dr. Andry Lake, [facility]  Germania Moore, [id number]

## 2021-03-24 NOTE — PROGRESS NOTE ADULT - ASSESSMENT
81 yo male hx of CAD s/p CABG s/p 20+ years ago/ CHF/ right sided large inguinal hernia BIBA from hotel room 2/2 unable to ambulate with right thigh weakness and numbness, now found to have metastatic disease with a mandibular mass and large sacral mass.       # Multiple metastatic osseous lesions with newly diagnosed sacral and mandibular mass with LE weakness concerning for spinal cord compression due to malignancy   - CT scan A/P on admission: Findings compatible with metastatic disease as demonstrated by multiple bony lesions, the largest soft tissue mass centered at S2-S3 measuring up to 7.4 cm with associated bony destruction and obliteration of the exiting neural foramina. Additional smaller lesions at the right sacral ala, L2 and L3.  - CT Neck Soft Tissue No Cont: Large right mandibular soft tissue density mass measuring 6.5 x 7.1 x 9.2 cm likely arising from the right mandible extending from the right mandibular condyle to the body of the mandible. There is an associated extensive bony erosion of the right mandible within the mass and the posterior wall of the right maxilla. Findings are highly suspicious for malignancy.  - Repeat CT A/P noncont for GI bleed: Right upper lobe paravertebral mass measuring approx 5.8 x 4.1 x 4.5 cm with destructive osseous involvement and extension the spine causing mass effect on the spinal cord. Smaller apical paravertebral nodule with adjacent osseous involvement. It is unclear whether these lesions originated in the bone extending to the lung or vice versa.  - S/p IR biopsy of sacral mass 3/10, showing metastatic prostate ca, PSA significantly elevated   - Neurosx c/s appreciated: no intervention at this time, no cord compression  - Heme/Onc c/s appreciated: c/w Casodex; rad onc intervention when stable, steroids  - Dental c/s appreciated to start bisphosphonates. (Pt refused extractions initially)-->possible extraction on Wednesday   - Palliative consult appreciated: Dilaudid and Robaxin for pain control    - Taper steroid: now on dexamethasone 4mg q8 PO  - F/u MR head and MR C/T spine (did not go x2 due to hypotension, will reorder)  - pt refused dental extraction and rad onc today    # Mandibular mass   - SCC  -ENT following: s/p bedside biopsy, mild oozing post procedure.      - cauterized with silver nitrate      - cont to hold gauze pressure prn     - ok for PO, chew on opposite side     - f/u path results    #Hypovolemic shock (resolved)  - Likely multifactorial, hypovolemia and possible UGIB given hb drop  - s/p 2 units so fat  - hgb 6.9-->8.1-->7.9  - S/p a total of 5L boluses  - c/w D5 + 1/2HS + 75mEq bicarb @ 80cc/hr  - Off Levo. C/w Midodrine 10mg PO Q8H (/64)  - CT a/p noncont neg for bleed    - LE duplex neg for DVT     #DENISE, resolving  - Likely pre-renal  - S/p a total of 5L boluses   - Cr 0.8. BUN 47 <- 79  - C/w IVF D5 + 1/2NS with 75meq bicarb @80cc/hr     #Acute on chronic anemia  - concern for possible UGIB   - hgb as above  - Hemolysis w/o not c/w hemolysis  - GI c/s appreciated: no intervention at this time as no plans to start a/c  - C/w PPI PO QD  - CT A/P no retroperitoneal bleed  - restarted Lovenox   - trend Hg    # B/l hydro  - Likely due to acute urinary retention   - CT scan showed on admission moderate to severe bilateral hydroureteronephrosis to the level of a markedly distended urinary bladder. Findings presumably secondary to urinary bladder outlet obstruction or urinary retention. Enlarged prostate gland.   - C/w Santos catheter     # Chest pain  - likely musculoskeletal, reproducible  - Tylenol   - EKG NSR  - Trops (-)    # CHFrEF  - Euvolemic   - Echo EF 35%, mod mitral calcification, enlarged left atria  - Breathing RA    # Afib  - HR 60s  - on eliquis 5 mg bid at home  - restarted Lovenox      # HTN  - Now hypotensive, 108/96  - hold aldactone, lasix, acei and metoprolol     # CAD   - Stable     # Metabolic acidosis   - C/w sodium bicarbonate 650mg TID     #Hypokalemia  - K 3.3. Treated    # Mild hypercalcemia, likely due to bone mets - resolved     # anxiety  - Xanax 0.25mg Q12H PRN    DVT Prophylaxis: Hep q8h  GI Prophylaxis: Protonix QD  Diet: soft (can advance as tolerated)  Activity: IAT  FULL CODE.

## 2021-03-24 NOTE — PROGRESS NOTE ADULT - SUBJECTIVE AND OBJECTIVE BOX
Patient is a 80y old  Male who presents with a chief complaint of 80 YEAR OLD MALE C/O MULTIPLE MEDICAL COMPLAINTS . (24 Mar 2021 10:16)  pt seen and evaluated   on po diet    ICU Vital Signs Last 24 Hrs  T(C): 35.8 (24 Mar 2021 07:45), Max: 36.1 (23 Mar 2021 12:00)  T(F): 96.5 (24 Mar 2021 07:45), Max: 97 (23 Mar 2021 12:00)  HR: 63 (24 Mar 2021 07:45) (62 - 76)  BP: 129/58 (24 Mar 2021 07:45) (112/65 - 129/60)  RR: 18 (24 Mar 2021 07:45) (18 - 18)  Drug Dosing Weight  Height (cm): 180.3 (15 Mar 2021 00:51)  Weight (kg): 73.8 (15 Mar 2021 00:51)  BMI (kg/m2): 22.7 (15 Mar 2021 00:51)  BSA (m2): 1.93 (15 Mar 2021 00:51)    23 Mar 2021 07:01  -  24 Mar 2021 07:00  --------------------------------------------------------  IN:    Oral Fluid: 960 mL  Total IN: 960 mL    OUT:    Indwelling Catheter - Urethral (mL): 1300 mL  Total OUT: 1300 mL    Total NET: -340 mL     PHYSICAL EXAM:  Constitutional:  alert and awake , talkative  Gastrointestinal:  soft n/d  MEDICATIONS  (STANDING):  bicalutamide 50 milliGRAM(s) Oral daily  chlorhexidine 4% Liquid 1 Application(s) Topical daily  collagenase Ointment 1 Application(s) Topical daily  dexAMETHasone     Tablet 4 milliGRAM(s) Oral every 8 hours  enoxaparin Injectable 70 milliGRAM(s) SubCutaneous two times a day  latanoprost 0.005% Ophthalmic Solution 1 Drop(s) Both EYES at bedtime  lidocaine   Patch 2 Patch Transdermal daily  methocarbamol 500 milliGRAM(s) Oral three times a day  midodrine. 10 milliGRAM(s) Oral three times a day  pantoprazole    Tablet 40 milliGRAM(s) Oral before breakfast  sodium bicarbonate 650 milliGRAM(s) Oral three times a day      Diet, Soft:   Prosource Gelatein Plus     Qty per Day:  2  Supplement Feeding Modality:  Oral  Ensure Enlive Cans or Servings Per Day:  1       Frequency:  Two Times a day  Ensure Pudding Cans or Servings Per Day:  1       Frequency:  Two Times a day (03-18-21 @ 14:31)      LABS  03-24    130<L>  |  99  |  29<H>  ----------------------------<  115<H>  5.3<H>   |  21  |  0.8    Ca    8.6      24 Mar 2021 05:04  Mg     2.0     03-24    TPro  4.8<L>  /  Alb  2.9<L>  /  TBili  0.6  /  DBili  x   /  AST  18  /  ALT  23  /  AlkPhos  81  03-24                          9.3    15.06 )-----------( 227      ( 24 Mar 2021 05:04 )             28.2      Patient is a 80y old  Male who presents with a chief complaint of 80 YEAR OLD MALE C/O MULTIPLE MEDICAL COMPLAINTS . (24 Mar 2021 10:16)  pt seen and evaluated   on po diet    ICU Vital Signs Last 24 Hrs  T(C): 35.8 (24 Mar 2021 07:45), Max: 36.1 (23 Mar 2021 12:00)  T(F): 96.5 (24 Mar 2021 07:45), Max: 97 (23 Mar 2021 12:00)  HR: 63 (24 Mar 2021 07:45) (62 - 76)  BP: 129/58 (24 Mar 2021 07:45) (112/65 - 129/60)  RR: 18 (24 Mar 2021 07:45) (18 - 18)  Drug Dosing Weight  Height (cm): 180.3 (15 Mar 2021 00:51)  Weight (kg): 73.8 (15 Mar 2021 00:51)  BMI (kg/m2): 22.7 (15 Mar 2021 00:51)  BSA (m2): 1.93 (15 Mar 2021 00:51)    23 Mar 2021 07:01  -  24 Mar 2021 07:00  --------------------------------------------------------  IN:    Oral Fluid: 960 mL  Total IN: 960 mL    OUT:    Indwelling Catheter - Urethral (mL): 1300 mL  Total OUT: 1300 mL    Total NET: -340 mL     PHYSICAL EXAM:  Constitutional:  alert and awake , verbal  Gastrointestinal: abd soft n/d  MEDICATIONS  (STANDING):  bicalutamide 50 milliGRAM(s) Oral daily  chlorhexidine 4% Liquid 1 Application(s) Topical daily  collagenase Ointment 1 Application(s) Topical daily  dexAMETHasone     Tablet 4 milliGRAM(s) Oral every 8 hours  enoxaparin Injectable 70 milliGRAM(s) SubCutaneous two times a day  latanoprost 0.005% Ophthalmic Solution 1 Drop(s) Both EYES at bedtime  lidocaine   Patch 2 Patch Transdermal daily  methocarbamol 500 milliGRAM(s) Oral three times a day  midodrine. 10 milliGRAM(s) Oral three times a day  pantoprazole    Tablet 40 milliGRAM(s) Oral before breakfast  sodium bicarbonate 650 milliGRAM(s) Oral three times a day      Diet, Soft:   Prosource Gelatein Plus     Qty per Day:  2  Supplement Feeding Modality:  Oral  Ensure Enlive Cans or Servings Per Day:  1       Frequency:  Two Times a day  Ensure Pudding Cans or Servings Per Day:  1       Frequency:  Two Times a day (03-18-21 @ 14:31)      LABS  03-24    130<L>  |  99  |  29<H>  ----------------------------<  115<H>  5.3<H>   |  21  |  0.8    Ca    8.6      24 Mar 2021 05:04  Mg     2.0     03-24    TPro  4.8<L>  /  Alb  2.9<L>  /  TBili  0.6  /  DBili  x   /  AST  18  /  ALT  23  /  AlkPhos  81  03-24                          9.3    15.06 )-----------( 227      ( 24 Mar 2021 05:04 )             28.2

## 2021-03-24 NOTE — PROGRESS NOTE ADULT - ATTENDING COMMENTS
80 yr old male presenting with metastatic prostate cancer and tumor of jaw.    #Metastatic prostate cancer--seen by oncology-- on casodex. 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  - patient went to dental appointment today - no intervention     # UGI bleed-- nO EGD done--only coffee ground emesis-- s/p 1 unit of PRBC    # A fib--not on eliquis at home-- currently off it.     # Chr systolic CHF-- euvolemic--off diuretics.  Homeless currently .

## 2021-03-24 NOTE — PROGRESS NOTE ADULT - ASSESSMENT
81 yo male hx of CAD s/p CABG s/p 20+ years ago/ CHF/ right sided large inguinal hernia BIBA from hotel room 2/2 unable to ambulate with right thigh weakness and numbness, now found to have metastatic disease with a mandibular mass and large sacral mass    Stage IV Prostate cancer with diffuse osseous involvement:  -   - Sacral biopsy favors prostate ( neoplastic cells are positive for AE1/AE3, PSA and CK7)  - Started on casodex 50mg 3/14  - Will likely start leupron 2 weeks following start of casodex: not earlier than 3/ 29/21   - Will also likely start chemotherapy (taxotere) when stable  - TLS labs wnl  - CT chest showing RUL [id number] paravertebral lesion with mass effect, MR T-spine completed, results pending, f/u NSx  - CT AP no RP bleed, no visceral mets  - MR CTL, brain and  jaw completed, mets throughout cervical and t-spine  - dexamethasone, f/u NSx or rad onc for taper  - He has appointment at 1pm today with dental; he was also seen on 3/17, planning on teeth extraction for bisphosphonates  - F/u rad onc for RT    Normocytic anemia: Improved   - Per GI, outpatient scope  - Hapto high and retic low, not consistent with hemolysis  - TSAT 67%    DENISE: Resolved    Mild hypercalcemia, likely due to bone mets       Mr. Mckee is a 79 yo male hx of CAD s/p CABG s/p 20+ years ago/ CHF/ right sided large inguinal hernia BIBA from hotel room 2/2 unable to ambulate with right thigh weakness and numbness, now found to have metastatic disease with a mandibular mass and large sacral mass    Stage IV Prostate cancer with diffuse osseous involvement:  -   - Sacral biopsy favors prostate ( neoplastic cells are positive for AE1/AE3, PSA and CK7)  - Started on casodex 50mg 3/14  - Will likely start leupron 2 weeks following start of casodex: not earlier than 3/ 29/21   - Will also likely start chemotherapy (taxotere) when stable  - TLS labs wnl  - CT chest showing RUL [id number] paravertebral lesion with mass effect, MR T-spine completed, results pending, f/u NSx  - CT AP no RP bleed, no visceral mets  - MR CTL, brain and  jaw completed, mets throughout cervical and t-spine  - dexamethasone, f/u NSx or rad onc for taper  - Dental re-evaluation (he was also seen on 3/17); plan was for teeth extraction for bisphosphonates (was not done yesterday as patient had panic attack)   - F/u rad onc for RT. RT sessions to start on 3/24.   - Biopsy results are available for Oral cavity biopsy done on 3/19/21. Results show Squamous mucosa with metastatic adenocarcinoma, H&E microscopic features are most consistent with origin from prostatic primary site. Limited immunohistochemistry will be performed. Rad Onc and ENT follow up (called and requested).        Normocytic anemia: Improved   - Per GI, outpatient scope  - Hapto high and retic low, not consistent with hemolysis  - TSAT 67%    DENISE: Resolved    Mild hypercalcemia, likely due to bone mets

## 2021-03-24 NOTE — ADVANCED PRACTICE NURSE CONSULT - ASSESSMENT
79 yo male hx of CAD s/p CABG s/p 20+ years ago/ CHF/ right sided large inguinal hernia BIBA from hotel room 2/2 unable to ambulate with right thigh weakness and numbness, reported it started from knee to hip, denies fall and injury. He was just sitting and the  symptoms started, reported off/on back pain in the past. denies fever/chill/recent illness/coughing/chest pain/abd pain/n/v/d and urinary sxs.   Also has right sided facial swelling for about 1 year after 3 teeth extraction. reports swelling worsens after eating. swelling was better in the past and started swelling again in the past few months. didn't follow up with his dentist 2/2 COVID.   Patient found w/ new sacral mass found on CT, new onset LE weakness, also has large right mandibular mass and further bony lesions in spin all suggestive of metastatic disease.   Patient transferred from CCU to 4B, currently admitted to medicine with the primary diagnosis of Ambulatory dysfunction, on 4B, being managed for  Multiple metastatic osseous lesions with newly diagnosed sacral and mandibular mass with LE weakness concerning for spinal cord compression due to malignancy; Mandibular mass; Hypovolemic shock (resolved)- Likely multifactorial, hypovolemia and possible UGIB given hb drop; DENISE, resolving; Acute on chronic anemia;  B/l hydro; Chest pain;  CHFrEF; Afib; HTN; CAD; Metabolic acidosis; Hypokalemia; Mild hypercalcemia, likely due to bone mets - resolved; anxiety.     Received patient on 4B, laying supine in bed, turned to right side (pillow under left side & underneath BLEs elevating heels), HOB elevated 30 degrees. Pt awake, A&Ox3, with some recognition of WOCN from previous visit, made aware of purpose of this WOCN visit, agreeable to consult.  With minimal assistance from primary RN Yesi, patient turned completely to right side for skin assessment. Foam Allevyn dressing to sacrococcygeal in place, dressing removed for skin assessment. Skin assessment as below.     Type of wound: Stage 3 pressure injury, evolved from deep tissue pressure injury (DTPI)  Location: coccyx-b/l buttock   Wound measurements: multiple areas in close proximity to each other & measured together at 8cm x 10cm x 0.2cm  Tunneling/Undermining: none  Wound bed: 75% yellow subcutaneous tissue w/ pink epidermal budding, 25% yellow-tan slough tissue to center   Wound edges: attached, irregular   Periwound: blanchable erythema  Wound exudate: scant amount of serosanguinous drainage present on removed dressing   Wound odor: none  Induration, warmth: none  Wound pain: pt w/ slight tenderness to area     Type of wound: Deep tissue pressure injury (DTPI); healing   Location: left heel (documented in error as right heel in previous WOCN note)    Wound measurements: 0.5cm x 1cm x 0cm, true depth indeterminable   Tunneling/Undermining: none  Wound bed: dark purple tissue in transverse linear pattern   Wound edges: attached, flush, irregular   Periwound: intact   Wound exudate: none  Wound odor: none  Induration, erythema, warmth: none   Wound pain: denies     Patient mostly bedbound, able to turn/position in bed w/ assistance, + lemus, incontinent of stool. Ordered for soft diet, probably inadequate intake as per reported Thai score.   79 yo male hx of CAD s/p CABG s/p 20+ years ago/ CHF/ right sided large inguinal hernia BIBA from hotel room 2/2 unable to ambulate with right thigh weakness and numbness, reported it started from knee to hip, denies fall and injury. He was just sitting and the  symptoms started, reported off/on back pain in the past. denies fever/chill/recent illness/coughing/chest pain/abd pain/n/v/d and urinary sxs.   Also has right sided facial swelling for about 1 year after 3 teeth extraction. reports swelling worsens after eating. swelling was better in the past and started swelling again in the past few months. didn't follow up with his dentist 2/2 COVID.   Patient found w/ new sacral mass found on CT, new onset LE weakness, also has large right mandibular mass and further bony lesions in spin all suggestive of metastatic disease.   Patient transferred from CCU to 4B, currently admitted to medicine with the primary diagnosis of Ambulatory dysfunction, on 4B, being managed for  Multiple metastatic osseous lesions with newly diagnosed sacral and mandibular mass with LE weakness concerning for spinal cord compression due to malignancy; Mandibular mass; Hypovolemic shock (resolved)- Likely multifactorial, hypovolemia and possible UGIB given hb drop; DENISE, resolving; Acute on chronic anemia;  B/l hydro; Chest pain;  CHFrEF; Afib; HTN; CAD; Metabolic acidosis; Hypokalemia; Mild hypercalcemia, likely due to bone mets - resolved; anxiety.     Received patient on 4B, laying supine in bed, turned to right side (pillow under left side & underneath BLEs elevating heels), HOB elevated 30 degrees. Pt awake, A&Ox3, with some recognition of WOCN from previous visit, made aware of purpose of this WOCN visit, agreeable to consult.  With minimal assistance from primary RN Yesi, patient turned completely to right side for skin assessment. Foam Allevyn dressing to sacrococcygeal in place, dressing removed for skin assessment. Skin assessment as below.     Type of wound: Stage 3 pressure injury, evolved from deep tissue pressure injury (DTPI)  Location: coccyx-b/l buttock   Wound measurements: multiple areas in close proximity to each other & measured together at 8cm x 10cm x 0.2cm  Tunneling/Undermining: none  Wound bed: 75% yellow subcutaneous tissue w/ pink epidermal budding, 25% yellow-tan slough tissue to center   Wound edges: attached, irregular   Periwound: blanchable erythema  Wound exudate: scant amount of serosanguinous drainage present on removed dressing   Wound odor: none  Induration, warmth: none  Wound pain: pt w/ slight tenderness to area     Type of wound: Deep tissue pressure injury (DTPI); healing   Location: right heel   Wound measurements: 0.5cm x 1cm x 0cm, true depth indeterminable   Tunneling/Undermining: none  Wound bed: dark purple tissue in transverse linear pattern   Wound edges: attached, flush, irregular   Periwound: intact   Wound exudate: none  Wound odor: none  Induration, erythema, warmth: none   Wound pain: denies     Patient mostly bedbound, able to turn/position in bed w/ assistance, + lemus, incontinent of stool. Ordered for soft diet, probably inadequate intake as per reported Thai score.

## 2021-03-24 NOTE — PROGRESS NOTE ADULT - ASSESSMENT
ASSESSMENT/PLAN  - right mandibular mass  - spine and lung mets  - h/o CHF  - UGI bleed, anemia  - hyperkalemia/hyponatremia    continue  with  Ensure, pudding, high protein jello. (he dislikes yogurt)  Follow calorie counts  Fe studies? B12. check 25oh D  will f/u with ENT   ASSESSMENT/PLAN  - right mandibular mass  - spine and lung mets  - h/o CHF  - UGI bleed, anemia  - hyperkalemia/hyponatremia    continue  with  Ensure, pudding, high protein jello. (he dislikes yogurt)  Follow calorie counts to assess adequacy of oral intake, and so that ingestion of supplements is documented  Fe studies? B12. check 25oh D  will f/u with ENT

## 2021-03-24 NOTE — PROGRESS NOTE ADULT - SUBJECTIVE AND OBJECTIVE BOX
MANUELITO HEDRICK 80y Male  MRN#: [id number]   Hospital Day: 16d    SUBJECTIVE  Patient is a 80y old Male who presents with a chief complaint of 80 YEAR OLD MALE C/O MULTIPLE MEDICAL COMPLAINTS . (23 Mar 2021 08:58)  Currently admitted to medicine with the primary diagnosis of Ambulatory dysfunction      INTERVAL HPI AND OVERNIGHT EVENTS:  Patient was examined and seen at bedside. This morning he is resting comfortably in bed and reports no issues or overnight events.    REVIEW OF SYMPTOMS:  CONSTITUTIONAL: No weakness, fevers or chills; No headaches  EYES: No visual changes, eye pain, or discharge  ENT: No vertigo; No ear pain or change in hearing; No sore throat or difficulty swallowing  NECK: No pain or stiffness  RESPIRATORY: No cough, wheezing, or hemoptysis; No shortness of breath  CARDIOVASCULAR: No chest pain or palpitations  GASTROINTESTINAL: No abdominal or epigastric pain; No nausea, vomiting, or hematemesis; No diarrhea or constipation; No melena or hematochezia  GENITOURINARY: No dysuria, frequency or hematuria  MUSCULOSKELETAL: No joint pain, no muscle pain, no weakness  NEUROLOGICAL: No numbness or weakness  SKIN: No itching or rashes    OBJECTIVE  PAST MEDICAL & SURGICAL HISTORY  Inguinal hernia    CHF (congestive heart failure)    S/P CABG x 3      ALLERGIES:  No Known Allergies    MEDICATIONS:  STANDING MEDICATIONS  bicalutamide 50 milliGRAM(s) Oral daily  chlorhexidine 4% Liquid 1 Application(s) Topical daily  collagenase Ointment 1 Application(s) Topical daily  dexAMETHasone     Tablet 4 milliGRAM(s) Oral every 8 hours  enoxaparin Injectable 70 milliGRAM(s) SubCutaneous two times a day  latanoprost 0.005% Ophthalmic Solution 1 Drop(s) Both EYES at bedtime  lidocaine   Patch 2 Patch Transdermal daily  methocarbamol 500 milliGRAM(s) Oral three times a day  midodrine. 10 milliGRAM(s) Oral three times a day  pantoprazole    Tablet 40 milliGRAM(s) Oral before breakfast  sodium bicarbonate 650 milliGRAM(s) Oral three times a day    PRN MEDICATIONS  acetaminophen   Tablet .. 650 milliGRAM(s) Oral every 6 hours PRN  ALPRAZolam 0.5 milliGRAM(s) Oral daily PRN  aluminum hydroxide/magnesium hydroxide/simethicone Suspension 30 milliLiter(s) Oral every 6 hours PRN  brimonidine 0.2% Ophthalmic Solution 1 Drop(s) Both EYES every 8 hours PRN  HYDROmorphone  Injectable 0.25 milliGRAM(s) IV Push every 4 hours PRN      VITAL SIGNS: Last 24 Hours  T(C): 35.8 (24 Mar 2021 07:45), Max: 36.1 (23 Mar 2021 12:00)  T(F): 96.5 (24 Mar 2021 07:45), Max: 97 (23 Mar 2021 12:00)  HR: 63 (24 Mar 2021 07:45) (62 - 76)  BP: 129/58 (24 Mar 2021 07:45) (112/65 - 129/60)  BP(mean): --  RR: 18 (24 Mar 2021 07:45) (18 - 18)  SpO2: --    LABS:                        9.3    15.06 )-----------( 227      ( 24 Mar 2021 05:04 )             28.2     03-24    130<L>  |  99  |  29<H>  ----------------------------<  115<H>  5.3<H>   |  21  |  0.8    Ca    8.6      24 Mar 2021 05:04  Mg     2.0     03-24    TPro  4.8<L>  /  Alb  2.9<L>  /  TBili  0.6  /  DBili  x   /  AST  18  /  ALT  23  /  AlkPhos  81  03-24                  RADIOLOGY:      PHYSICAL EXAM:  CONSTITUTIONAL: No acute distress, well-developed, well-groomed, AAOx3  HEAD: Atraumatic, normocephalic  EYES: EOM intact, PERRLA, conjunctiva and sclera clear  ENT: Supple, no masses, no thyromegaly, no bruits, no JVD; moist mucous membranes  PULMONARY: Clear to auscultation bilaterally; no wheezes, rales, or rhonchi  CARDIOVASCULAR: Regular rate and rhythm; no murmurs, rubs, or gallops  GASTROINTESTINAL: Soft, non-tender, non-distended; bowel sounds present  MUSCULOSKELETAL: 2+ peripheral pulses; no clubbing, no cyanosis, no edema  NEUROLOGY: non-focal  SKIN: No rashes or lesions; warm and dry

## 2021-03-25 LAB
ALBUMIN SERPL ELPH-MCNC: 3 G/DL — LOW (ref 3.5–5.2)
ALP SERPL-CCNC: 85 U/L — SIGNIFICANT CHANGE UP (ref 30–115)
ALT FLD-CCNC: 23 U/L — SIGNIFICANT CHANGE UP (ref 0–41)
ANION GAP SERPL CALC-SCNC: 12 MMOL/L — SIGNIFICANT CHANGE UP (ref 7–14)
AST SERPL-CCNC: 18 U/L — SIGNIFICANT CHANGE UP (ref 0–41)
BASOPHILS # BLD AUTO: 0.01 K/UL — SIGNIFICANT CHANGE UP (ref 0–0.2)
BASOPHILS NFR BLD AUTO: 0.1 % — SIGNIFICANT CHANGE UP (ref 0–1)
BILIRUB SERPL-MCNC: 0.5 MG/DL — SIGNIFICANT CHANGE UP (ref 0.2–1.2)
BUN SERPL-MCNC: 27 MG/DL — HIGH (ref 10–20)
CALCIUM SERPL-MCNC: 8.7 MG/DL — SIGNIFICANT CHANGE UP (ref 8.5–10.1)
CHLORIDE SERPL-SCNC: 97 MMOL/L — LOW (ref 98–110)
CO2 SERPL-SCNC: 20 MMOL/L — SIGNIFICANT CHANGE UP (ref 17–32)
CREAT SERPL-MCNC: 0.7 MG/DL — SIGNIFICANT CHANGE UP (ref 0.7–1.5)
EOSINOPHIL # BLD AUTO: 0 K/UL — SIGNIFICANT CHANGE UP (ref 0–0.7)
EOSINOPHIL NFR BLD AUTO: 0 % — SIGNIFICANT CHANGE UP (ref 0–8)
GLUCOSE SERPL-MCNC: 103 MG/DL — HIGH (ref 70–99)
HCT VFR BLD CALC: 30.3 % — LOW (ref 42–52)
HCT VFR BLD CALC: 31.4 % — LOW (ref 42–52)
HGB BLD-MCNC: 10.1 G/DL — LOW (ref 14–18)
HGB BLD-MCNC: 9.8 G/DL — LOW (ref 14–18)
IMM GRANULOCYTES NFR BLD AUTO: 0.6 % — HIGH (ref 0.1–0.3)
LYMPHOCYTES # BLD AUTO: 0.27 K/UL — LOW (ref 1.2–3.4)
LYMPHOCYTES # BLD AUTO: 1.5 % — LOW (ref 20.5–51.1)
MAGNESIUM SERPL-MCNC: 2 MG/DL — SIGNIFICANT CHANGE UP (ref 1.8–2.4)
MCHC RBC-ENTMCNC: 28.7 PG — SIGNIFICANT CHANGE UP (ref 27–31)
MCHC RBC-ENTMCNC: 28.7 PG — SIGNIFICANT CHANGE UP (ref 27–31)
MCHC RBC-ENTMCNC: 32.2 G/DL — SIGNIFICANT CHANGE UP (ref 32–37)
MCHC RBC-ENTMCNC: 32.3 G/DL — SIGNIFICANT CHANGE UP (ref 32–37)
MCV RBC AUTO: 88.6 FL — SIGNIFICANT CHANGE UP (ref 80–94)
MCV RBC AUTO: 89.2 FL — SIGNIFICANT CHANGE UP (ref 80–94)
MONOCYTES # BLD AUTO: 0.51 K/UL — SIGNIFICANT CHANGE UP (ref 0.1–0.6)
MONOCYTES NFR BLD AUTO: 2.9 % — SIGNIFICANT CHANGE UP (ref 1.7–9.3)
NEUTROPHILS # BLD AUTO: 17 K/UL — HIGH (ref 1.4–6.5)
NEUTROPHILS NFR BLD AUTO: 94.9 % — HIGH (ref 42.2–75.2)
NRBC # BLD: 0 /100 WBCS — SIGNIFICANT CHANGE UP (ref 0–0)
NRBC # BLD: 0 /100 WBCS — SIGNIFICANT CHANGE UP (ref 0–0)
PLATELET # BLD AUTO: 226 K/UL — SIGNIFICANT CHANGE UP (ref 130–400)
PLATELET # BLD AUTO: 233 K/UL — SIGNIFICANT CHANGE UP (ref 130–400)
POTASSIUM SERPL-MCNC: 5.3 MMOL/L — HIGH (ref 3.5–5)
POTASSIUM SERPL-SCNC: 5.3 MMOL/L — HIGH (ref 3.5–5)
PROT SERPL-MCNC: 5 G/DL — LOW (ref 6–8)
RBC # BLD: 3.42 M/UL — LOW (ref 4.7–6.1)
RBC # BLD: 3.52 M/UL — LOW (ref 4.7–6.1)
RBC # FLD: 17.1 % — HIGH (ref 11.5–14.5)
RBC # FLD: 17.2 % — HIGH (ref 11.5–14.5)
SODIUM SERPL-SCNC: 129 MMOL/L — LOW (ref 135–146)
WBC # BLD: 17.89 K/UL — HIGH (ref 4.8–10.8)
WBC # BLD: 21.1 K/UL — HIGH (ref 4.8–10.8)
WBC # FLD AUTO: 17.89 K/UL — HIGH (ref 4.8–10.8)
WBC # FLD AUTO: 21.1 K/UL — HIGH (ref 4.8–10.8)

## 2021-03-25 PROCEDURE — 99232 SBSQ HOSP IP/OBS MODERATE 35: CPT

## 2021-03-25 PROCEDURE — 99233 SBSQ HOSP IP/OBS HIGH 50: CPT

## 2021-03-25 RX ADMIN — METHOCARBAMOL 500 MILLIGRAM(S): 500 TABLET, FILM COATED ORAL at 21:41

## 2021-03-25 RX ADMIN — BICALUTAMIDE 50 MILLIGRAM(S): 50 TABLET, FILM COATED ORAL at 12:21

## 2021-03-25 RX ADMIN — Medication 4 MILLIGRAM(S): at 05:41

## 2021-03-25 RX ADMIN — METHOCARBAMOL 500 MILLIGRAM(S): 500 TABLET, FILM COATED ORAL at 15:04

## 2021-03-25 RX ADMIN — MIDODRINE HYDROCHLORIDE 10 MILLIGRAM(S): 2.5 TABLET ORAL at 05:42

## 2021-03-25 RX ADMIN — LATANOPROST 1 DROP(S): 0.05 SOLUTION/ DROPS OPHTHALMIC; TOPICAL at 21:40

## 2021-03-25 RX ADMIN — Medication 650 MILLIGRAM(S): at 05:39

## 2021-03-25 RX ADMIN — Medication 4 MILLIGRAM(S): at 15:04

## 2021-03-25 RX ADMIN — Medication 4 MILLIGRAM(S): at 21:40

## 2021-03-25 RX ADMIN — METHOCARBAMOL 500 MILLIGRAM(S): 500 TABLET, FILM COATED ORAL at 05:39

## 2021-03-25 RX ADMIN — LIDOCAINE 2 PATCH: 4 CREAM TOPICAL at 12:20

## 2021-03-25 RX ADMIN — LIDOCAINE 2 PATCH: 4 CREAM TOPICAL at 20:56

## 2021-03-25 RX ADMIN — PANTOPRAZOLE SODIUM 40 MILLIGRAM(S): 20 TABLET, DELAYED RELEASE ORAL at 06:35

## 2021-03-25 RX ADMIN — Medication 650 MILLIGRAM(S): at 15:04

## 2021-03-25 RX ADMIN — CHLORHEXIDINE GLUCONATE 1 APPLICATION(S): 213 SOLUTION TOPICAL at 12:19

## 2021-03-25 RX ADMIN — Medication 650 MILLIGRAM(S): at 21:40

## 2021-03-25 NOTE — PROGRESS NOTE ADULT - SUBJECTIVE AND OBJECTIVE BOX
MANUELITO HEDRICK 80y Male  MRN#: [id number]   Hospital Day: 17d    SUBJECTIVE  Patient is a 80y old Male who presents with a chief complaint of 80 YEAR OLD MALE C/O MULTIPLE MEDICAL COMPLAINTS . (24 Mar 2021 14:04)  Currently admitted to medicine with the primary diagnosis of Ambulatory dysfunction      INTERVAL HPI AND OVERNIGHT EVENTS:  Patient was examined and seen at bedside. This morning he is resting comfortably in bed and reports no issues or overnight events.    REVIEW OF SYMPTOMS:  CONSTITUTIONAL: No weakness, fevers or chills; No headaches  EYES: No visual changes, eye pain, or discharge  ENT: No vertigo; No ear pain or change in hearing; No sore throat or difficulty swallowing  NECK: No pain or stiffness  RESPIRATORY: No cough, wheezing, or hemoptysis; No shortness of breath  CARDIOVASCULAR: No chest pain or palpitations  GASTROINTESTINAL: No abdominal or epigastric pain; No nausea, vomiting, or hematemesis; No diarrhea or constipation; No melena or hematochezia  GENITOURINARY: No dysuria, frequency or hematuria  MUSCULOSKELETAL: No joint pain, no muscle pain, no weakness  NEUROLOGICAL: No numbness or weakness  SKIN: No itching or rashes    OBJECTIVE  PAST MEDICAL & SURGICAL HISTORY  Inguinal hernia    CHF (congestive heart failure)    S/P CABG x 3      ALLERGIES:  No Known Allergies    MEDICATIONS:  STANDING MEDICATIONS  bicalutamide 50 milliGRAM(s) Oral daily  chlorhexidine 4% Liquid 1 Application(s) Topical daily  collagenase Ointment 1 Application(s) Topical daily  dexAMETHasone     Tablet 4 milliGRAM(s) Oral every 8 hours  enoxaparin Injectable 70 milliGRAM(s) SubCutaneous two times a day  latanoprost 0.005% Ophthalmic Solution 1 Drop(s) Both EYES at bedtime  lidocaine   Patch 2 Patch Transdermal daily  methocarbamol 500 milliGRAM(s) Oral three times a day  midodrine. 10 milliGRAM(s) Oral three times a day  pantoprazole    Tablet 40 milliGRAM(s) Oral before breakfast  sodium bicarbonate 650 milliGRAM(s) Oral three times a day    PRN MEDICATIONS  acetaminophen   Tablet .. 650 milliGRAM(s) Oral every 6 hours PRN  aluminum hydroxide/magnesium hydroxide/simethicone Suspension 30 milliLiter(s) Oral every 6 hours PRN  brimonidine 0.2% Ophthalmic Solution 1 Drop(s) Both EYES every 8 hours PRN  HYDROmorphone  Injectable 0.25 milliGRAM(s) IV Push every 4 hours PRN      VITAL SIGNS: Last 24 Hours  T(C): 36 (25 Mar 2021 08:14), Max: 36 (25 Mar 2021 08:14)  T(F): 96.8 (25 Mar 2021 08:14), Max: 96.8 (25 Mar 2021 08:14)  HR: 62 (25 Mar 2021 08:14) (62 - 65)  BP: 124/55 (25 Mar 2021 08:14) (122/56 - 124/55)  BP(mean): --  RR: 18 (25 Mar 2021 08:14) (18 - 18)  SpO2: --    LABS:                        9.8    17.89 )-----------( 226      ( 25 Mar 2021 05:59 )             30.3     03-25    129<L>  |  97<L>  |  27<H>  ----------------------------<  103<H>  5.3<H>   |  20  |  0.7    Ca    8.7      25 Mar 2021 05:59  Mg     2.0     03-25    TPro  5.0<L>  /  Alb  3.0<L>  /  TBili  0.5  /  DBili  x   /  AST  18  /  ALT  23  /  AlkPhos  85  03-25                  RADIOLOGY:      PHYSICAL EXAM:  CONSTITUTIONAL: No acute distress, well-developed, well-groomed, AAOx3  HEAD: Atraumatic, normocephalic  EYES: EOM intact, PERRLA, conjunctiva and sclera clear  ENT: Supple, no masses, no thyromegaly, no bruits, no JVD; moist mucous membranes  PULMONARY: Clear to auscultation bilaterally; no wheezes, rales, or rhonchi  CARDIOVASCULAR: Regular rate and rhythm; no murmurs, rubs, or gallops  GASTROINTESTINAL: Soft, non-tender, non-distended; bowel sounds present  MUSCULOSKELETAL: 2+ peripheral pulses; no clubbing, no cyanosis, no edema  NEUROLOGY: non-focal  SKIN: No rashes or lesions; warm and dry

## 2021-03-25 NOTE — PROGRESS NOTE ADULT - SUBJECTIVE AND OBJECTIVE BOX
24H events:    Jaw mass confirmed metastatic disease from prostate  Cleared by dental to start bisphosphonates or denosumab  Continuing with RT  Continuing with PT    PAST MEDICAL & SURGICAL HISTORY  Inguinal hernia    CHF (congestive heart failure)    S/P CABG x 3      SOCIAL HISTORY:  Negative for smoking/alcohol/drug use.     ALLERGIES:  No Known Allergies    MEDICATIONS:  STANDING MEDICATIONS  bicalutamide 50 milliGRAM(s) Oral daily  chlorhexidine 4% Liquid 1 Application(s) Topical daily  collagenase Ointment 1 Application(s) Topical daily  dexAMETHasone     Tablet 4 milliGRAM(s) Oral every 8 hours  enoxaparin Injectable 70 milliGRAM(s) SubCutaneous two times a day  latanoprost 0.005% Ophthalmic Solution 1 Drop(s) Both EYES at bedtime  lidocaine   Patch 2 Patch Transdermal daily  methocarbamol 500 milliGRAM(s) Oral three times a day  midodrine. 10 milliGRAM(s) Oral three times a day  pantoprazole    Tablet 40 milliGRAM(s) Oral before breakfast  sodium bicarbonate 650 milliGRAM(s) Oral three times a day    PRN MEDICATIONS  acetaminophen   Tablet .. 650 milliGRAM(s) Oral every 6 hours PRN  aluminum hydroxide/magnesium hydroxide/simethicone Suspension 30 milliLiter(s) Oral every 6 hours PRN  brimonidine 0.2% Ophthalmic Solution 1 Drop(s) Both EYES every 8 hours PRN  HYDROmorphone  Injectable 0.25 milliGRAM(s) IV Push every 4 hours PRN    VITALS:   T(F): 96.8  HR: 66  BP: 127/60  RR: 18  SpO2: --    LABS:                        9.8    17.89 )-----------( 226      ( 25 Mar 2021 05:59 )             30.3     03-25    129<L>  |  97<L>  |  27<H>  ----------------------------<  103<H>  5.3<H>   |  20  |  0.7    Ca    8.7      25 Mar 2021 05:59  Mg     2.0     03-25    TPro  5.0<L>  /  Alb  3.0<L>  /  TBili  0.5  /  DBili  x   /  AST  18  /  ALT  23  /  AlkPhos  85  03-25                  RADIOLOGY:  < from: MR Head No Cont (03.18.21 @ 22:41) >  IMPRESSION:    Allowing for limitation from absence of intravenous contrast, there are multiple calvarial lesions compatible with osseous metastasis. The largest lesion in the left occipital calvarium demonstrates mildly expansile subgaleal soft tissue component.    Redemonstrated partially imaged right mandibular mass, better evaluated on the previously performed MRI of the neck dated 3/11/2021.    < end of copied text >      PHYSICAL EXAM:  GEN: No acute distress  HENT: NCAT, EOMI  LYMPH: No appreciable adenopathy  LUNGS: No respiratory distress, clear to auscultation bilaterally   HEART: regular rate and rhythm  ABD: Soft, non-tender, non-distended  NEURO: AAOX3, LE weakness improving

## 2021-03-25 NOTE — CHART NOTE - NSCHARTNOTEFT_GEN_A_CORE
Notified by RN patient is having roma blood in urine.  Examined patient at bedside, hemodynamically stable and asymptomatic   Patient has hx of newly diagnosed prostate cancer and stay complicated by acute on chronic anemia  Urine blood tinged but no overt blood clots. Overall improvement compared to first episode  Stat CBC drawn, Hg stable, ok to continue with A/C  Continue to monitor

## 2021-03-25 NOTE — CHART NOTE - NSCHARTNOTEFT_GEN_A_CORE
Pt is a 80 y.o male with large intraoral mass, s/p bedside biopsy on 3/19. 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 No acute ENT intervention from an oncological surgical perspective. F/U heme/onc & rad/onc for metastatic prostate cancer. D/W Dr Hernández. Pt is a 80 y.o male with large intraoral mass, s/p bedside biopsy on 3/19. 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 No acute ENT intervention from an oncological surgical perspective. F/U heme/onc & rad/onc for metastatic prostate cancer. D/W Dr Hernández.    Discussed mandible biopsy results with patient, right mandibular mass consistent with metastatic prostate cancer. No surgical intervention indicated at this time, systemic therapy and palliative radiation per Medical Oncology and Radiation Oncology. Patient voices understanding.

## 2021-03-25 NOTE — PROGRESS NOTE ADULT - SUBJECTIVE AND OBJECTIVE BOX
Mr. Mckee is in the process of receiving radiation therapy while he remains as an in-house patient.  He is tolerating treatments well, thus far.  No complaints noted.    He has completed 2/5 planned XRT to the pelvis/sacrum and the T Spine.  Once these are completed , we will treat the jaw/mandible.

## 2021-03-25 NOTE — PROGRESS NOTE ADULT - ASSESSMENT
79 yo male hx of CAD s/p CABG s/p 20+ years ago/ CHF/ right sided large inguinal hernia BIBA from hotel room 2/2 unable to ambulate with right thigh weakness and numbness, now found to have metastatic disease with a mandibular mass and large sacral mass.       # Multiple metastatic osseous lesions with newly diagnosed sacral and mandibular mass with LE weakness concerning for spinal cord compression due to malignancy   - CT scan A/P on admission: Findings compatible with metastatic disease as demonstrated by multiple bony lesions, the largest soft tissue mass centered at S2-S3 measuring up to 7.4 cm with associated bony destruction and obliteration of the exiting neural foramina. Additional smaller lesions at the right sacral ala, L2 and L3.  - CT Neck Soft Tissue No Cont: Large right mandibular soft tissue density mass measuring 6.5 x 7.1 x 9.2 cm likely arising from the right mandible extending from the right mandibular condyle to the body of the mandible. There is an associated extensive bony erosion of the right mandible within the mass and the posterior wall of the right maxilla. Findings are highly suspicious for malignancy.  - Repeat CT A/P noncont for GI bleed: Right upper lobe paravertebral mass measuring approx 5.8 x 4.1 x 4.5 cm with destructive osseous involvement and extension the spine causing mass effect on the spinal cord. Smaller apical paravertebral nodule with adjacent osseous involvement. It is unclear whether these lesions originated in the bone extending to the lung or vice versa.  - S/p IR biopsy of sacral mass 3/10, showing metastatic prostate ca, PSA significantly elevated   - Neurosx c/s appreciated: no intervention at this time, no cord compression  - Heme/Onc c/s appreciated: c/w Casodex; rad onc intervention when stable, steroids  - Dental c/s appreciated to start bisphosphonates. (Pt refused extractions initially)-->possible extraction on Wednesday   - Palliative consult appreciated: Dilaudid and Robaxin for pain control    - Taper steroid: now on dexamethasone 4mg q8 PO  - F/u MR head and MR C/T spine (did not go x2 due to hypotension, will reorder)  - pt refused dental extraction and rad onc today    # Mandibular mass   - SCC  -ENT following: s/p bedside biopsy, mild oozing post procedure.      - cauterized with silver nitrate      - cont to hold gauze pressure prn     - ok for PO, chew on opposite side     - f/u path results    #Hypovolemic shock (resolved)  - Likely multifactorial, hypovolemia and possible UGIB given hb drop  - s/p 2 units so fat  - hgb 6.9-->8.1-->7.9  - S/p a total of 5L boluses  - c/w D5 + 1/2HS + 75mEq bicarb @ 80cc/hr  - Off Levo. C/w Midodrine 10mg PO Q8H (/64)  - CT a/p noncont neg for bleed    - LE duplex neg for DVT     #DENISE, resolving  - Likely pre-renal  - S/p a total of 5L boluses   - Cr 0.8. BUN 47 <- 79  - C/w IVF D5 + 1/2NS with 75meq bicarb @80cc/hr     #Acute on chronic anemia  - concern for possible UGIB   - hgb as above  - Hemolysis w/o not c/w hemolysis  - GI c/s appreciated: no intervention at this time as no plans to start a/c  - C/w PPI PO QD  - CT A/P no retroperitoneal bleed  - restarted Lovenox   - trend Hg    # B/l hydro  - Likely due to acute urinary retention   - CT scan showed on admission moderate to severe bilateral hydroureteronephrosis to the level of a markedly distended urinary bladder. Findings presumably secondary to urinary bladder outlet obstruction or urinary retention. Enlarged prostate gland.   - C/w Santos catheter     # Chest pain  - likely musculoskeletal, reproducible  - Tylenol   - EKG NSR  - Trops (-)    # CHFrEF  - Euvolemic   - Echo EF 35%, mod mitral calcification, enlarged left atria  - Breathing RA    # Afib  - HR 60s  - on eliquis 5 mg bid at home  - restarted Lovenox      # HTN  - Now hypotensive, 108/96  - hold aldactone, lasix, acei and metoprolol     # CAD   - Stable     # Metabolic acidosis   - C/w sodium bicarbonate 650mg TID     #Hypokalemia  - K 3.3. Treated    # Mild hypercalcemia, likely due to bone mets - resolved     # anxiety  - Xanax 0.25mg Q12H PRN    DVT Prophylaxis: Hep q8h  GI Prophylaxis: Protonix QD  Diet: soft (can advance as tolerated)  Activity: IAT  FULL CODE.     81 yo male hx of CAD s/p CABG s/p 20+ years ago/ CHF/ right sided large inguinal hernia BIBA from hotel room 2/2 unable to ambulate with right thigh weakness and numbness, now found to have metastatic disease with a mandibular mass and large sacral mass.       # Multiple metastatic osseous lesions with newly diagnosed sacral and mandibular mass with LE weakness concerning for spinal cord compression due to malignancy   - CT scan A/P on admission: Findings compatible with metastatic disease as demonstrated by multiple bony lesions, the largest soft tissue mass centered at S2-S3 measuring up to 7.4 cm with associated bony destruction and obliteration of the exiting neural foramina. Additional smaller lesions at the right sacral ala, L2 and L3.  - CT Neck Soft Tissue No Cont: Large right mandibular soft tissue density mass measuring 6.5 x 7.1 x 9.2 cm likely arising from the right mandible extending from the right mandibular condyle to the body of the mandible. There is an associated extensive bony erosion of the right mandible within the mass and the posterior wall of the right maxilla. Findings are highly suspicious for malignancy.  - Repeat CT A/P noncont for GI bleed: Right upper lobe paravertebral mass measuring approx 5.8 x 4.1 x 4.5 cm with destructive osseous involvement and extension the spine causing mass effect on the spinal cord. Smaller apical paravertebral nodule with adjacent osseous involvement. It is unclear whether these lesions originated in the bone extending to the lung or vice versa.  - S/p IR biopsy of sacral mass 3/10, showing metastatic prostate ca, PSA significantly elevated   - Neurosx c/s appreciated: no intervention at this time, no cord compression  - Heme/Onc c/s appreciated: c/w Casodex; rad onc intervention when stable, steroids  - Dental c/s appreciated to start bisphosphonates. (Pt refused extractions initially)-->possible extraction on Wednesday   - Palliative consult appreciated: Dilaudid and Robaxin for pain control    - Taper steroid: now on dexamethasone 4mg q8 PO  - F/u MR head and MR C/T spine (did not go x2 due to hypotension, will reorder)  - OMFS following pt: No dental intervention needed at this time. Patient is cleared from dental.   - s/p radiation onc   - going for RT today       #hematuria  - 1 episode overnight, blood tinged urine   - will monitor hg         # Mandibular mass   - SCC  -ENT following: s/p bedside biopsy, mild oozing post procedure.      - cauterized with silver nitrate      - cont to hold gauze pressure prn     - ok for PO, chew on opposite side     - f/u path results    #Hypovolemic shock (resolved)  - Likely multifactorial, hypovolemia and possible UGIB given hb drop  - s/p 2 units so fat  - hgb 6.9-->8.1-->7.9  - S/p a total of 5L boluses  - c/w D5 + 1/2HS + 75mEq bicarb @ 80cc/hr  - Off Levo. C/w Midodrine 10mg PO Q8H (/64)  - CT a/p noncont neg for bleed    - LE duplex neg for DVT     #DENISE, resolving  - Likely pre-renal  - S/p a total of 5L boluses   - Cr 0.8. BUN 47 <- 79  - C/w IVF D5 + 1/2NS with 75meq bicarb @80cc/hr     #Acute on chronic anemia  - concern for possible UGIB   - hgb as above  - Hemolysis w/o not c/w hemolysis  - GI c/s appreciated: no intervention at this time as no plans to start a/c  - C/w PPI PO QD  - CT A/P no retroperitoneal bleed  - restarted Lovenox   - trend Hg    # B/l hydro  - Likely due to acute urinary retention   - CT scan showed on admission moderate to severe bilateral hydroureteronephrosis to the level of a markedly distended urinary bladder. Findings presumably secondary to urinary bladder outlet obstruction or urinary retention. Enlarged prostate gland.   - C/w Santos catheter     # Chest pain  - likely musculoskeletal, reproducible  - Tylenol   - EKG NSR  - Trops (-)    # CHFrEF  - Euvolemic   - Echo EF 35%, mod mitral calcification, enlarged left atria  - Breathing RA    # Afib  - HR 60s  - on eliquis 5 mg bid at home  - restarted Lovenox      # HTN  - Now hypotensive, 108/96  - hold aldactone, lasix, acei and metoprolol     # CAD   - Stable     # Metabolic acidosis   - C/w sodium bicarbonate 650mg TID     #Hypokalemia  - K 3.3. Treated    # Mild hypercalcemia, likely due to bone mets - resolved     # anxiety  - Xanax 0.25mg Q12H PRN    DVT Prophylaxis: Hep q8h  GI Prophylaxis: Protonix QD  Diet: soft (can advance as tolerated)  Activity: IAT  FULL CODE.

## 2021-03-25 NOTE — PROGRESS NOTE ADULT - ASSESSMENT
Mr. Mckee is a 81 yo male hx of CAD s/p CABG s/p 20+ years ago/ CHF/ right sided large inguinal hernia BIBA from hotel room 2/2 unable to ambulate with right thigh weakness and numbness, now found to have metastatic disease with a mandibular mass and large sacral mass    Stage IV Prostate cancer with diffuse osseous involvement:  -   - Sacral biopsy favors prostate ( neoplastic cells are positive for AE1/AE3, PSA and CK7)  - TLS labs wnl  - CT chest showing RUL [id number] paravertebral lesion with mass effect, MR T-spine completed, results pending, f/u NSx  - CT AP no RP bleed, no visceral mets  - MR CTL, brain and  jaw completed, mets throughout cervical and t-spine  - dexamethasone, f/u NSx or rad onc for taper  - Spoke with dental, cleared to start either bisphosphonates or denosumab  - RT sessions started on 3/24, currently planned for 5 fractions to the 2 spine fields, and following this then he will get radiation to the jaw per RadOnc  - Jaw mass confirmed metastatic from primary  - Started on casodex 50mg 3/14  - Will likely start leupron 2 weeks following start of casodex: not earlier than 3/ 29/21   - Plan to start taxotere (20mg/m2 qweekly while inpatient) tomorrow, please transfer to 3B    Normocytic anemia: Improved   - Per GI, outpatient scope  - Hapto high and retic low, not consistent with hemolysis  - TSAT 67%    Leukocytosis: Likely due to decadron    Hyponatremia:  - check SOsm, urine studies    DENISE: Resolved    Mild hypercalcemia, likely due to bone mets       Mr. Mckee is a 81 yo male hx of CAD s/p CABG s/p 20+ years ago/ CHF/ right sided large inguinal hernia BIBA from hotel room 2/2 unable to ambulate with right thigh weakness and numbness, now found to have metastatic disease with a mandibular mass and large sacral mass    Stage IV Prostate cancer with diffuse osseous involvement:  -   - Sacral biopsy favors prostate ( neoplastic cells are positive for AE1/AE3, PSA and CK7)  - JAW mass biopsy : prostate adenocarcinoma  - TLS labs wnl  - CT chest showing RUL [id number] paravertebral lesion with mass effect, MR T-spine completed, results pending, f/u NSx  - CT AP no RP bleed, no visceral mets  - MR CTL, brain and  jaw completed, mets throughout cervical and t-spine  - dexamethasone, f/u NSx or rad onc for taper    PLAN  - started ADT with anti-testosterone (bicalutamide 50mg daily) on 3.14.21: plan to administer 1st IM  dose of LUPRON (GNRH agonist) on 3.29.21   - will add chemo: dose reduced taxotere. Standard is 75mg/m2 IV every 3 weeks: will administer 20mg/m2 WEEKLY beginning 3.26.21 (CONTINUE DEXAMETHASONE while on RT  -3/24/21:  started RT to sacrum and spine (completed 2/5 doses 3.25.21: once finished, plan to initiate RT 5 DOSES to jaw area for palliation  - Spoke with dental, cleared to start either bisphosphonates or denosumab: will plan for 3. 26.21        - Plan to start taxotere (20mg/m2 qweekly while inpatient) tomorrow, please transfer to 3B    Normocytic anemia: Improved   - Per GI, outpatient scope  - Hapto high and retic low, not consistent with hemolysis  - TSAT 67%    Leukocytosis: Likely due to decadron    Hyponatremia:  - check SOsm, urine studies    DENISE: Resolved    Mild hypercalcemia, likely due to bone mets  /PSYCH F/U  PT EVAL

## 2021-03-25 NOTE — PROVIDER CONTACT NOTE (OTHER) - ACTION/TREATMENT ORDERED:
Per, MD continue to monitor urine and assess for s/s of  hemodynamic instability.
provider to place wound care orders.

## 2021-03-25 NOTE — PROGRESS NOTE ADULT - ATTENDING COMMENTS
Karlene Mcgill MD  Hospitalist   Spectra: [phone]    Patient is a 80y old  Male who presents with a chief complaint of 80 YEAR OLD MALE C/O MULTIPLE MEDICAL COMPLAINTS . (25 Mar 2021 09:30)      INTERVAL HPI/OVERNIGHT EVENTS:     REVIEW OF SYSTEMS: negative  Vital Signs Last 24 Hrs  T(C): 36 (25 Mar 2021 08:14), Max: 36 (25 Mar 2021 08:14)  T(F): 96.8 (25 Mar 2021 08:14), Max: 96.8 (25 Mar 2021 08:14)  HR: 66 (25 Mar 2021 12:31) (62 - 66)  BP: 127/60 (25 Mar 2021 12:31) (122/56 - 127/60)  BP(mean): --  RR: 18 (25 Mar 2021 08:14) (18 - 18)  SpO2: --    PHYSICAL EXAM:   NAD; Normocephalic;   LUNGS - no wheezing  HEART: S1 S2+   ABDOMEN: Soft, Nontender, non distended  EXTREMITIES: no cyanosis; no edema  NERVOUS SYSTEM:  Awake and alert; no focal neuro deficits appreciated  right fascial swelling     LABS:                        9.8    17.89 )-----------( 226      ( 25 Mar 2021 05:59 )             30.3     03-25    129<L>  |  97<L>  |  27<H>  ----------------------------<  103<H>  5.3<H>   |  20  |  0.7    Ca    8.7      25 Mar 2021 05:59  Mg     2.0     03-25    TPro  5.0<L>  /  Alb  3.0<L>  /  TBili  0.5  /  DBili  x   /  AST  18  /  ALT  23  /  AlkPhos  85  03-25        A/P:-  Pt is seen and evaluated by bedside.     #Metastatic prostate cancer  - seen by oncology-- on casodex.   - plan to start on leupron in 2 weeks-- as per oncology last note 3/19  - on palliative radiation s/p 2nd session today     # SCC of jaw-  - ENT did biopsy of jaw 3/19-- metastaic adenocarcinoma.  - patient went to dental appointment 3/24 - no intervention   - initial plan was to start on bisphosphonate after   - follow up with hem/onc for further recs     # UGI bleed-- No egd done--only coffee ground emesis-- s/p 1 unit of PRBC    # A fib--not on eliquis at home-- currently off it.     # Chr systolic CHF-- euvolemic--off diuretics.    Dispo: Homeless currently. Patient will need to go to rehab/NH where he can continue chemo/radiation therapy

## 2021-03-26 LAB
ALBUMIN SERPL ELPH-MCNC: 2.9 G/DL — LOW (ref 3.5–5.2)
ALP SERPL-CCNC: 78 U/L — SIGNIFICANT CHANGE UP (ref 30–115)
ALT FLD-CCNC: 20 U/L — SIGNIFICANT CHANGE UP (ref 0–41)
ANION GAP SERPL CALC-SCNC: 10 MMOL/L — SIGNIFICANT CHANGE UP (ref 7–14)
AST SERPL-CCNC: 18 U/L — SIGNIFICANT CHANGE UP (ref 0–41)
BASOPHILS # BLD AUTO: 0 K/UL — SIGNIFICANT CHANGE UP (ref 0–0.2)
BASOPHILS NFR BLD AUTO: 0 % — SIGNIFICANT CHANGE UP (ref 0–1)
BILIRUB SERPL-MCNC: 0.6 MG/DL — SIGNIFICANT CHANGE UP (ref 0.2–1.2)
BUN SERPL-MCNC: 29 MG/DL — HIGH (ref 10–20)
CALCIUM SERPL-MCNC: 8.7 MG/DL — SIGNIFICANT CHANGE UP (ref 8.5–10.1)
CHLORIDE SERPL-SCNC: 98 MMOL/L — SIGNIFICANT CHANGE UP (ref 98–110)
CO2 SERPL-SCNC: 22 MMOL/L — SIGNIFICANT CHANGE UP (ref 17–32)
CREAT SERPL-MCNC: 0.7 MG/DL — SIGNIFICANT CHANGE UP (ref 0.7–1.5)
EOSINOPHIL # BLD AUTO: 0 K/UL — SIGNIFICANT CHANGE UP (ref 0–0.7)
EOSINOPHIL NFR BLD AUTO: 0 % — SIGNIFICANT CHANGE UP (ref 0–8)
GLUCOSE SERPL-MCNC: 110 MG/DL — HIGH (ref 70–99)
HCT VFR BLD CALC: 28.8 % — LOW (ref 42–52)
HGB BLD-MCNC: 9.2 G/DL — LOW (ref 14–18)
IMM GRANULOCYTES NFR BLD AUTO: 0.5 % — HIGH (ref 0.1–0.3)
LYMPHOCYTES # BLD AUTO: 0.14 K/UL — LOW (ref 1.2–3.4)
LYMPHOCYTES # BLD AUTO: 1.3 % — LOW (ref 20.5–51.1)
MAGNESIUM SERPL-MCNC: 1.9 MG/DL — SIGNIFICANT CHANGE UP (ref 1.8–2.4)
MCHC RBC-ENTMCNC: 28.6 PG — SIGNIFICANT CHANGE UP (ref 27–31)
MCHC RBC-ENTMCNC: 31.9 G/DL — LOW (ref 32–37)
MCV RBC AUTO: 89.4 FL — SIGNIFICANT CHANGE UP (ref 80–94)
MONOCYTES # BLD AUTO: 0.29 K/UL — SIGNIFICANT CHANGE UP (ref 0.1–0.6)
MONOCYTES NFR BLD AUTO: 2.6 % — SIGNIFICANT CHANGE UP (ref 1.7–9.3)
NEUTROPHILS # BLD AUTO: 10.68 K/UL — HIGH (ref 1.4–6.5)
NEUTROPHILS NFR BLD AUTO: 95.6 % — HIGH (ref 42.2–75.2)
NRBC # BLD: 0 /100 WBCS — SIGNIFICANT CHANGE UP (ref 0–0)
PLATELET # BLD AUTO: 213 K/UL — SIGNIFICANT CHANGE UP (ref 130–400)
POTASSIUM SERPL-MCNC: 5.2 MMOL/L — HIGH (ref 3.5–5)
POTASSIUM SERPL-SCNC: 5.2 MMOL/L — HIGH (ref 3.5–5)
PROT SERPL-MCNC: 4.8 G/DL — LOW (ref 6–8)
RBC # BLD: 3.22 M/UL — LOW (ref 4.7–6.1)
RBC # FLD: 17.2 % — HIGH (ref 11.5–14.5)
SODIUM SERPL-SCNC: 130 MMOL/L — LOW (ref 135–146)
WBC # BLD: 11.17 K/UL — HIGH (ref 4.8–10.8)
WBC # FLD AUTO: 11.17 K/UL — HIGH (ref 4.8–10.8)

## 2021-03-26 PROCEDURE — 99232 SBSQ HOSP IP/OBS MODERATE 35: CPT

## 2021-03-26 PROCEDURE — 99233 SBSQ HOSP IP/OBS HIGH 50: CPT

## 2021-03-26 RX ORDER — KETOROLAC TROMETHAMINE 30 MG/ML
15 SYRINGE (ML) INJECTION ONCE
Refills: 0 | Status: DISCONTINUED | OUTPATIENT
Start: 2021-03-26 | End: 2021-03-26

## 2021-03-26 RX ORDER — FAMOTIDINE 10 MG/ML
20 INJECTION INTRAVENOUS ONCE
Refills: 0 | Status: COMPLETED | OUTPATIENT
Start: 2021-03-26 | End: 2021-03-26

## 2021-03-26 RX ORDER — DOCETAXEL 20 MG/ML
40 INJECTION, SOLUTION, CONCENTRATE INTRAVENOUS ONCE
Refills: 0 | Status: COMPLETED | OUTPATIENT
Start: 2021-03-26 | End: 2021-03-26

## 2021-03-26 RX ORDER — ONDANSETRON 8 MG/1
16 TABLET, FILM COATED ORAL ONCE
Refills: 0 | Status: COMPLETED | OUTPATIENT
Start: 2021-03-26 | End: 2021-03-26

## 2021-03-26 RX ORDER — DIPHENHYDRAMINE HCL 50 MG
50 CAPSULE ORAL ONCE
Refills: 0 | Status: COMPLETED | OUTPATIENT
Start: 2021-03-26 | End: 2021-03-26

## 2021-03-26 RX ORDER — FOSAPREPITANT DIMEGLUMINE 150 MG/5ML
150 INJECTION, POWDER, LYOPHILIZED, FOR SOLUTION INTRAVENOUS ONCE
Refills: 0 | Status: COMPLETED | OUTPATIENT
Start: 2021-03-26 | End: 2021-03-26

## 2021-03-26 RX ORDER — DEXAMETHASONE 0.5 MG/5ML
4 ELIXIR ORAL ONCE
Refills: 0 | Status: COMPLETED | OUTPATIENT
Start: 2021-03-26 | End: 2021-03-26

## 2021-03-26 RX ADMIN — DOCETAXEL 126 MILLIGRAM(S): 20 INJECTION, SOLUTION, CONCENTRATE INTRAVENOUS at 18:37

## 2021-03-26 RX ADMIN — Medication 4 MILLIGRAM(S): at 21:22

## 2021-03-26 RX ADMIN — Medication 102 MILLIGRAM(S): at 17:35

## 2021-03-26 RX ADMIN — Medication 1 APPLICATION(S): at 11:13

## 2021-03-26 RX ADMIN — Medication 4 MILLIGRAM(S): at 05:41

## 2021-03-26 RX ADMIN — Medication 650 MILLIGRAM(S): at 21:21

## 2021-03-26 RX ADMIN — MIDODRINE HYDROCHLORIDE 10 MILLIGRAM(S): 2.5 TABLET ORAL at 17:13

## 2021-03-26 RX ADMIN — MIDODRINE HYDROCHLORIDE 10 MILLIGRAM(S): 2.5 TABLET ORAL at 05:41

## 2021-03-26 RX ADMIN — PANTOPRAZOLE SODIUM 40 MILLIGRAM(S): 20 TABLET, DELAYED RELEASE ORAL at 05:41

## 2021-03-26 RX ADMIN — LATANOPROST 1 DROP(S): 0.05 SOLUTION/ DROPS OPHTHALMIC; TOPICAL at 21:22

## 2021-03-26 RX ADMIN — BICALUTAMIDE 50 MILLIGRAM(S): 50 TABLET, FILM COATED ORAL at 11:14

## 2021-03-26 RX ADMIN — Medication 15 MILLIGRAM(S): at 13:30

## 2021-03-26 RX ADMIN — Medication 102 MILLIGRAM(S): at 18:09

## 2021-03-26 RX ADMIN — FOSAPREPITANT DIMEGLUMINE 300 MILLIGRAM(S): 150 INJECTION, POWDER, LYOPHILIZED, FOR SOLUTION INTRAVENOUS at 16:57

## 2021-03-26 RX ADMIN — METHOCARBAMOL 500 MILLIGRAM(S): 500 TABLET, FILM COATED ORAL at 13:54

## 2021-03-26 RX ADMIN — CHLORHEXIDINE GLUCONATE 1 APPLICATION(S): 213 SOLUTION TOPICAL at 11:13

## 2021-03-26 RX ADMIN — LIDOCAINE 2 PATCH: 4 CREAM TOPICAL at 21:22

## 2021-03-26 RX ADMIN — METHOCARBAMOL 500 MILLIGRAM(S): 500 TABLET, FILM COATED ORAL at 21:22

## 2021-03-26 RX ADMIN — FAMOTIDINE 104 MILLIGRAM(S): 10 INJECTION INTRAVENOUS at 17:35

## 2021-03-26 RX ADMIN — METHOCARBAMOL 500 MILLIGRAM(S): 500 TABLET, FILM COATED ORAL at 05:41

## 2021-03-26 RX ADMIN — Medication 15 MILLIGRAM(S): at 12:38

## 2021-03-26 RX ADMIN — ONDANSETRON 116 MILLIGRAM(S): 8 TABLET, FILM COATED ORAL at 16:57

## 2021-03-26 RX ADMIN — MIDODRINE HYDROCHLORIDE 10 MILLIGRAM(S): 2.5 TABLET ORAL at 11:13

## 2021-03-26 RX ADMIN — ENOXAPARIN SODIUM 70 MILLIGRAM(S): 100 INJECTION SUBCUTANEOUS at 17:13

## 2021-03-26 RX ADMIN — Medication 4 MILLIGRAM(S): at 13:55

## 2021-03-26 RX ADMIN — Medication 650 MILLIGRAM(S): at 05:41

## 2021-03-26 RX ADMIN — LIDOCAINE 2 PATCH: 4 CREAM TOPICAL at 00:00

## 2021-03-26 RX ADMIN — Medication 650 MILLIGRAM(S): at 13:54

## 2021-03-26 NOTE — PROGRESS NOTE ADULT - ASSESSMENT
81 yo male hx of CAD s/p CABG s/p 20+ years ago/ CHF/ right sided large inguinal hernia BIBA from hotel room 2/2 unable to ambulate with right thigh weakness and numbness, now found to have metastatic disease with a mandibular mass and large sacral mass.       # Multiple metastatic osseous lesions with newly diagnosed sacral and mandibular mass with LE weakness concerning for spinal cord compression due to malignancy   - CT scan A/P on admission: Findings compatible with metastatic disease as demonstrated by multiple bony lesions, the largest soft tissue mass centered at S2-S3 measuring up to 7.4 cm with associated bony destruction and obliteration of the exiting neural foramina. Additional smaller lesions at the right sacral ala, L2 and L3.  - CT Neck Soft Tissue No Cont: Large right mandibular soft tissue density mass measuring 6.5 x 7.1 x 9.2 cm likely arising from the right mandible extending from the right mandibular condyle to the body of the mandible. There is an associated extensive bony erosion of the right mandible within the mass and the posterior wall of the right maxilla. Findings are highly suspicious for malignancy.  - Repeat CT A/P noncont for GI bleed: Right upper lobe paravertebral mass measuring approx 5.8 x 4.1 x 4.5 cm with destructive osseous involvement and extension the spine causing mass effect on the spinal cord. Smaller apical paravertebral nodule with adjacent osseous involvement. It is unclear whether these lesions originated in the bone extending to the lung or vice versa.  - S/p IR biopsy of sacral mass 3/10, showing metastatic prostate ca, PSA significantly elevated   - Neurosx c/s appreciated: no intervention at this time, no cord compression  - Heme/Onc c/s appreciated: c/w Casodex; rad onc intervention when stable, steroids  - Dental c/s appreciated to start bisphosphonates. (Pt refused extractions initially)-->possible extraction on Wednesday   - Palliative consult appreciated: Dilaudid and Robaxin for pain control    - Taper steroid: now on dexamethasone 4mg q8 PO  - F/u MR head and MR C/T spine (did not go x2 due to hypotension, will reorder)  - OMFS following pt: No dental intervention needed at this time. Patient is cleared from dental.   - s/p radiation onc   - going for RT today       #hematuria  - 1 episode overnight, blood tinged urine   - will monitor hg         # Mandibular mass   - SCC  -ENT following: s/p bedside biopsy, mild oozing post procedure.      - cauterized with silver nitrate      - cont to hold gauze pressure prn     - ok for PO, chew on opposite side     - f/u path results    #Hypovolemic shock (resolved)  - Likely multifactorial, hypovolemia and possible UGIB given hb drop  - s/p 2 units so fat  - hgb 6.9-->8.1-->7.9  - S/p a total of 5L boluses  - c/w D5 + 1/2HS + 75mEq bicarb @ 80cc/hr  - Off Levo. C/w Midodrine 10mg PO Q8H (/64)  - CT a/p noncont neg for bleed    - LE duplex neg for DVT     #DENISE, resolving  - Likely pre-renal  - S/p a total of 5L boluses   - Cr 0.8. BUN 47 <- 79  - C/w IVF D5 + 1/2NS with 75meq bicarb @80cc/hr     #Acute on chronic anemia  - concern for possible UGIB   - hgb as above  - Hemolysis w/o not c/w hemolysis  - GI c/s appreciated: no intervention at this time as no plans to start a/c  - C/w PPI PO QD  - CT A/P no retroperitoneal bleed  - restarted Lovenox   - trend Hg    # B/l hydro  - Likely due to acute urinary retention   - CT scan showed on admission moderate to severe bilateral hydroureteronephrosis to the level of a markedly distended urinary bladder. Findings presumably secondary to urinary bladder outlet obstruction or urinary retention. Enlarged prostate gland.   - C/w Santos catheter     # Chest pain  - likely musculoskeletal, reproducible  - Tylenol   - EKG NSR  - Trops (-)    # CHFrEF  - Euvolemic   - Echo EF 35%, mod mitral calcification, enlarged left atria  - Breathing RA    # Afib  - HR 60s  - on eliquis 5 mg bid at home  - restarted Lovenox      # HTN  - Now hypotensive, 108/96  - hold aldactone, lasix, acei and metoprolol     # CAD   - Stable     # Metabolic acidosis   - C/w sodium bicarbonate 650mg TID     #Hypokalemia  - K 3.3. Treated    # Mild hypercalcemia, likely due to bone mets - resolved     # anxiety  - Xanax 0.25mg Q12H PRN    DVT Prophylaxis: Hep q8h  GI Prophylaxis: Protonix QD  Diet: soft (can advance as tolerated)  Activity: IAT  FULL CODE.     81 yo male hx of CAD s/p CABG s/p 20+ years ago, HFrEF, right sided large inguinal hernia BIBA from hotel room because he was unable to ambulate due to Rt thigh weakness and numbness. Pt found to have prostate cancer with mets to sacrum and mandible. Course complicated by hypovolemic shock 2/2 possible UGIB, required upgrade to stepdown unit.    # Prostate adenocarcinoma with bone mets to sacrum and mandible.  - s/p IR biopsy of sacral mass 3/10, showing metastatic prostate ca, PSA significantly elevated   - s/p mandible biopsy on 3/19 by ENT, showing metastatic prostate cancer  - started ADT with anti-testosterone (bicalutamide 50mg daily) on 3.14.21: plan to administer 1st IM  dose of LUPRON (GNRH agonist) on 3.29.21   - will add chemo: dose reduced taxotere. Standard is 75mg/m2 IV every 3 weeks: will administer 20mg/m2 WEEKLY beginning 3.26.21 (CONTINUE DEXAMETHASONE while on RT  - 3/24/21: started RT to sacrum and spine (completed 2/5 doses 3.25.21: once finished, plan to initiate RT 5 DOSES to jaw area for palliation  - as per dental, cleared to start either bisphosphonates or denosumab: plan for 3.26.21  -  Plan to start taxotere (20mg/m2 qweekly while inpatient) 3/26  - OMFS following pt: No dental intervention needed at this time. Patient is cleared from dental.     # Hematuria  - 2 episode overnight, blood tinged urine   - Hb stable     # Hypovolemic shock   - Resolved. Hb now stable  - Likely multifactorial, hypovolemia and possible UGIB given Hb drop  - s/p 2 units  - S/p a total of 5L boluses  - Off Levo. C/w Midodrine 10mg PO Q8H (/64)  - CT a/p noncont neg for bleed    - LE duplex neg for DVT     #DENISE, resolving  - Likely pre-renal  - S/p a total of 5L boluses   - Cr 0.8. BUN 47 <- 79  - C/w IVF D5 + 1/2NS with 75meq bicarb @80cc/hr     #Acute on chronic anemia  - concern for possible UGIB   - hgb as above  - Hemolysis w/o not c/w hemolysis  - GI c/s appreciated: no intervention at this time as no plans to start a/c  - C/w PPI PO QD  - CT A/P no retroperitoneal bleed  - restarted Lovenox   - trend Hg    # B/l hydro  - Likely due to acute urinary retention   - CT scan showed on admission moderate to severe bilateral hydroureteronephrosis to the level of a markedly distended urinary bladder. Findings presumably secondary to urinary bladder outlet obstruction or urinary retention. Enlarged prostate gland.   - C/w Santos catheter     # Chest pain  - likely musculoskeletal, reproducible  - Tylenol   - EKG NSR  - Trops (-)    # CHFrEF  - Euvolemic   - Echo EF 35%, mod mitral calcification, enlarged left atria  - Breathing RA    # Afib  - HR 60s  - on eliquis 5 mg bid at home  - restarted Lovenox      # HTN  - Now hypotensive, 108/96  - hold aldactone, lasix, acei and metoprolol     # CAD   - Stable     # Metabolic acidosis   - C/w sodium bicarbonate 650mg TID     #Hypokalemia  - K 3.3. Treated    # Mild hypercalcemia, likely due to bone mets - resolved     # anxiety  - Xanax 0.25mg Q12H PRN    DVT Prophylaxis: Hep q8h  GI Prophylaxis: Protonix QD  Diet: soft (can advance as tolerated)  Activity: IAT  FULL CODE.     79 yo male hx of CAD s/p CABG s/p 20+ years ago, HFrEF, right sided large inguinal hernia BIBA from hotel room because he was unable to ambulate due to Rt thigh weakness and numbness. Pt found to have prostate cancer with mets to sacrum and mandible. Course complicated by hypovolemic shock 2/2 possible UGIB, required upgrade to stepdown unit.    # Prostate adenocarcinoma with bone mets to sacrum and mandible.  - s/p IR biopsy of sacral mass 3/10, showing metastatic prostate ca, PSA significantly elevated   - s/p mandible biopsy on 3/19 by ENT, showing metastatic prostate cancer  - started ADT with anti-testosterone (bicalutamide 50mg daily) on 3.14.21: plan to administer 1st IM  dose of LUPRON (GNRH agonist) on 3.29.21   - will add chemo: dose reduced taxotere. Standard is 75mg/m2 IV every 3 weeks: will administer 20mg/m2 WEEKLY beginning 3.26.21 (CONTINUE DEXAMETHASONE while on RT  - 3/24/21: started RT to sacrum and spine (completed 2/5 doses 3.25.21: once finished, plan to initiate RT 5 DOSES to jaw area for palliation  - as per dental, cleared to start either bisphosphonates or denosumab: plan for 3.26.21  -  Plan to start taxotere (20mg/m2 qweekly while inpatient) 3/26  - OMFS following pt: No dental intervention needed at this time. Patient is cleared from dental.     # Hematuria  - 2 episode overnight, blood tinged urine   - Hb stable     # Hypovolemic shock   - Resolved. Hb now stable  - Likely multifactorial, hypovolemia and possible UGIB given Hb drop  - s/p 2 units  - S/p a total of 5L boluses  - Off Levo. C/w Midodrine 10mg PO Q8H   - CT a/p noncont neg for bleed      # DENISE  - Resolved  - Likely pre-renal  - S/p a total of 5L boluses      # Acute on chronic anemia  - concern for possible UGIB   - Hb stable  - Hemolysis w/o not c/w hemolysis  - GI c/s appreciated: no intervention at this time, cleared for AC  - C/w PPI PO QD  - CT A/P no retroperitoneal bleed    # B/l hydro  - Likely due to urinary retention 2/2 prostate cancer   - CT scan showed on admission moderate to severe bilateral hydroureteronephrosis to the level of a markedly distended urinary bladder. Findings presumably secondary to urinary bladder outlet obstruction or urinary retention. Enlarged prostate gland.   - c/w Santos catheter     # Chest pain  - likely musculoskeletal, reproducible  - Tylenol   - EKG NSR  - Trops (-)    # CHFrEF  - Euvolemic   - Echo EF 35%, mod mitral calcification, enlarged left atria    # Afib  - Rate controlled  - on eliquis 5 mg bid at home  - c/w lovenox BID    # HTN  - Controlled  - holding aldactone, lasix, ACE and metoprolol since hypovolemic shock.  - pt on midodrine for now    # CAD     # Anxiety  - Xanax 0.25mg Q12H PRN    # Misc  DVT ppx: lovenox  GI ppx: protonix  Diet: soft (can advance as tolerated)  Activity: IAT  Code status: Full  Dispo: Acute

## 2021-03-26 NOTE — PROGRESS NOTE ADULT - ATTENDING COMMENTS
Karlene Mcgill MD  Hospitalist   Spectra: [phone]    Patient is a 80y old  Male who presents with a chief complaint of 80 YEAR OLD MALE C/O MULTIPLE MEDICAL COMPLAINTS . (26 Mar 2021 08:01)      INTERVAL HPI/OVERNIGHT EVENTS: patient reports having some back pain after being moved this am in ed   no acute overnight events noted     REVIEW OF SYSTEMS: negative  Vital Signs Last 24 Hrs  T(C): 35.8 (26 Mar 2021 05:30), Max: 36 (25 Mar 2021 15:30)  T(F): 96.4 (26 Mar 2021 05:30), Max: 96.8 (25 Mar 2021 15:30)  HR: 68 (26 Mar 2021 05:30) (67 - 81)  BP: 102/51 (26 Mar 2021 05:30) (102/51 - 134/62)  BP(mean): --  RR: 18 (26 Mar 2021 05:30) (18 - 20)  SpO2: 97% (26 Mar 2021 08:03) (97% - 97%)    PHYSICAL EXAM:   NAD; Normocephalic;   LUNGS - no wheezing  HEART: S1 S2+   ABDOMEN: Soft, Nontender, non distended  EXTREMITIES: no cyanosis; no edema  NERVOUS SYSTEM:  Awake and alert; no focal neuro deficits appreciated    LABS:                        9.2    11.17 )-----------( 213      ( 26 Mar 2021 06:49 )             28.8     03-26    130<L>  |  98  |  29<H>  ----------------------------<  110<H>  5.2<H>   |  22  |  0.7    Ca    8.7      26 Mar 2021 06:49  Mg     1.9     03-26    TPro  4.8<L>  /  Alb  2.9<L>  /  TBili  0.6  /  DBili  x   /  AST  18  /  ALT  20  /  AlkPhos  78  03-26        CAPILLARY BLOOD GLUCOSE          A/P:-  Pt is seen and evaluated by bedside.     #Metastatic prostate cancer    - started ADT with anti-testosterone (bicalutamide 50mg daily) on 3.14.21  - plan to administer 1st IM  dose of LUPRON (GNRH agonist) on 3.29.21   - will add chemo: dose reduced taxotere. Standard is 75mg/m2 IV every 3 weeks: will administer 20mg/m2 WEEKLY beginning 3.26.21 (CONTINUE DEXAMETHASONE while on RT  -3/24/21:  started RT to sacrum and spine (completed 2/5 doses 3.25.21: once finished, plan to initiate RT 5 DOSES to jaw area for palliation    # SCC of jaw-  - ENT did biopsy of jaw 3/19-- metastaic adenocarcinoma.  - patient went to dental appointment 3/24 - no intervention   - initial plan was to start on bisphosphonate after   - follow up with hem/onc for further recs     # UGI bleed-- No egd done--only coffee ground emesis-- s/p 1 unit of PRBC    # A fib--not on eliquis at home-- currently off it.     # Chr systolic CHF-- euvolemic--off diuretics.    Dispo: Homeless currently. Patient will need to go to rehab/NH where he can continue chemo/radiation therapy .     Provider Handoff:  Pending:   Dispo:   Family discussion:   Plan of care was discussed with patient ; all questions and concerns were addressed and care was aligned with patient's wishes. Karlene Mcgill MD  Hospitalist   Spectra: [phone]    Patient is a 80y old  Male who presents with a chief complaint of 80 YEAR OLD MALE C/O MULTIPLE MEDICAL COMPLAINTS . (26 Mar 2021 08:01)      INTERVAL HPI/OVERNIGHT EVENTS: patient reports having some back pain after being moved this am in ed   no acute overnight events noted     REVIEW OF SYSTEMS: negative  Vital Signs Last 24 Hrs  T(C): 35.8 (26 Mar 2021 05:30), Max: 36 (25 Mar 2021 15:30)  T(F): 96.4 (26 Mar 2021 05:30), Max: 96.8 (25 Mar 2021 15:30)  HR: 68 (26 Mar 2021 05:30) (67 - 81)  BP: 102/51 (26 Mar 2021 05:30) (102/51 - 134/62)  BP(mean): --  RR: 18 (26 Mar 2021 05:30) (18 - 20)  SpO2: 97% (26 Mar 2021 08:03) (97% - 97%)    PHYSICAL EXAM:   NAD; Normocephalic;   LUNGS - no wheezing  HEART: S1 S2+   ABDOMEN: Soft, Nontender, non distended  EXTREMITIES: no cyanosis; no edema  NERVOUS SYSTEM:  Awake and alert; no focal neuro deficits appreciated    LABS:                        9.2    11.17 )-----------( 213      ( 26 Mar 2021 06:49 )             28.8     03-26    130<L>  |  98  |  29<H>  ----------------------------<  110<H>  5.2<H>   |  22  |  0.7    Ca    8.7      26 Mar 2021 06:49  Mg     1.9     03-26    TPro  4.8<L>  /  Alb  2.9<L>  /  TBili  0.6  /  DBili  x   /  AST  18  /  ALT  20  /  AlkPhos  78  03-26        CAPILLARY BLOOD GLUCOSE          A/P:-  Pt is seen and evaluated by bedside.     #Metastatic prostate cancer  - currently on chemo and radiation as per Hem/onc   - started ADT with anti-testosterone (bicalutamide 50mg daily) on 3.14.21  - plan to administer 1st IM  dose of LUPRON (GNRH agonist) on 3.29.21   - will add chemo: dose reduced taxotere. Standard is 75mg/m2 IV every 3 weeks: will administer 20mg/m2 WEEKLY beginning 3.26.21 (CONTINUE DEXAMETHASONE while on RT )  -3/24/21:  started RT to sacrum and spine (completed 2/5 doses 3.25.21: once finished, plan to initiate RT 5 DOSES to jaw area for palliation    # SCC of jaw-  - ENT did biopsy of jaw 3/19-- metastaic adenocarcinoma.  - patient went to dental appointment 3/24 - no intervention   - initial plan was to start on bisphosphonate after   - follow up with hem/onc for further recs     # UGI bleed-- No egd done--only coffee ground emesis-- s/p 1 unit of PRBC    # A fib--not on eliquis at home-- currently off it.     # Chr systolic CHF-- euvolemic--off diuretics.    Provider Handoff:  Pending: currently on chemo - as per radiation and hem/on  Dispo: Homeless currently. Patient will need to go to rehab/NH where he can continue chemo/radiation therapy .   SW consulted for locating family     Plan of care was discussed with patient ; all questions and concerns were addressed and care was aligned with patient's wishes.

## 2021-03-26 NOTE — PROGRESS NOTE ADULT - SUBJECTIVE AND OBJECTIVE BOX
24H events:    Patient doing well today  Discussed starting taxotere along with all of the risks and benefits again today, he agreed.  No acute events    PAST MEDICAL & SURGICAL HISTORY  Inguinal hernia    CHF (congestive heart failure)    S/P CABG x 3      SOCIAL HISTORY:  Negative for smoking/alcohol/drug use.     ALLERGIES:  No Known Allergies    MEDICATIONS:  STANDING MEDICATIONS  bicalutamide 50 milliGRAM(s) Oral daily  chlorhexidine 4% Liquid 1 Application(s) Topical daily  collagenase Ointment 1 Application(s) Topical daily  dexAMETHasone     Tablet 4 milliGRAM(s) Oral every 8 hours  dexAMETHasone  IVPB 4 milliGRAM(s) IV Intermittent once  diphenhydrAMINE  IVPB 50 milliGRAM(s) IV Intermittent once  DOCEtaxel IVPB (eMAR) 40 milliGRAM(s) IV Intermittent once  enoxaparin Injectable 70 milliGRAM(s) SubCutaneous two times a day  famotidine  IVPB 20 milliGRAM(s) IV Intermittent once  fosaprepitant IVPB 150 milliGRAM(s) IV Intermittent once  latanoprost 0.005% Ophthalmic Solution 1 Drop(s) Both EYES at bedtime  lidocaine   Patch 2 Patch Transdermal daily  methocarbamol 500 milliGRAM(s) Oral three times a day  midodrine. 10 milliGRAM(s) Oral three times a day  ondansetron  IVPB 16 milliGRAM(s) IV Intermittent once  pantoprazole    Tablet 40 milliGRAM(s) Oral before breakfast  sodium bicarbonate 650 milliGRAM(s) Oral three times a day    PRN MEDICATIONS  acetaminophen   Tablet .. 650 milliGRAM(s) Oral every 6 hours PRN  aluminum hydroxide/magnesium hydroxide/simethicone Suspension 30 milliLiter(s) Oral every 6 hours PRN  brimonidine 0.2% Ophthalmic Solution 1 Drop(s) Both EYES every 8 hours PRN    VITALS:   T(F): 96.4  HR: 64  BP: 113/51  RR: 18  SpO2: 97%    LABS:                        9.2    11.17 )-----------( 213      ( 26 Mar 2021 06:49 )             28.8     03-26    130<L>  |  98  |  29<H>  ----------------------------<  110<H>  5.2<H>   |  22  |  0.7    Ca    8.7      26 Mar 2021 06:49  Mg     1.9     03-26    TPro  4.8<L>  /  Alb  2.9<L>  /  TBili  0.6  /  DBili  x   /  AST  18  /  ALT  20  /  AlkPhos  78  03-26                  RADIOLOGY:      PHYSICAL EXAM:  GEN: No acute distress  HENT: NCAT, EOMI  LUNGS: No respiratory distress,   HEART: regular rate and rhythm  ABD: Soft, non-tender, non-distended  SKIN: No rash  NEURO: AAOX3

## 2021-03-26 NOTE — PROGRESS NOTE ADULT - ASSESSMENT
Mr. Mckee is a 79 yo male hx of CAD s/p CABG s/p 20+ years ago/ CHF/ right sided large inguinal hernia BIBA from hotel room 2/2 unable to ambulate with right thigh weakness and numbness, now found to have metastatic disease with a mandibular mass and large sacral mass    Stage IV Prostate cancer with diffuse osseous involvement:  -   - Sacral biopsy favors prostate ( neoplastic cells are positive for AE1/AE3, PSA and CK7)  - Jaw mass biopsy : prostate adenocarcinoma  - TLS labs wnl  - CT chest showing RUL [id number] paravertebral lesion with mass effect, MR T-spine completed, results pending, f/u NSx  - CT AP no RP bleed, no visceral mets  - MR CTL, brain and  jaw completed, mets throughout cervical and t-spine  - dexamethasone, f/u NSx or rad onc for taper    PLAN  - started ADT with anti-testosterone (bicalutamide 50mg daily) on 3.14.21: plan to administer 1st IM  dose of LUPRON (GNRH agonist) on 3.29.21   - will add chemo: dose reduced taxotere. Standard is 75mg/m2 IV every 3 weeks: will administer 20mg/m2 WEEKLY beginning 3.26.21 (CONTINUE DEXAMETHASONE while on RT)  -3/24/21:  started RT to sacrum and spine (completed 2/5 doses 3.25.21: once finished, plan to initiate RT 5 DOSES to jaw area for palliation  - Spoke with dental, cleared to start either bisphosphonates or denosumab: will plan for 3. 26.21    - Plan to start taxotere (20mg/m2 qweekly while inpatient) today, 3/26/2021    Normocytic anemia: Improved   - Per GI, outpatient scope  - Hapto high and retic low, not consistent with hemolysis  - TSAT 67%    Leukocytosis: Likely due to decadron    Hyponatremia:  - check SOsm, urine studies    DENISE: Resolved    Mild hypercalcemia, likely due to bone mets  /PSYCH F/U  PT EVAL       Mr. Mckee is a 79 yo male hx of CAD s/p CABG s/p 20+ years ago/ CHF/ right sided large inguinal hernia BIBA from hotel room 2/2 unable to ambulate with right thigh weakness and numbness, now found to have metastatic disease with a mandibular mass and large sacral mass    Stage IV Prostate cancer with diffuse osseous involvement:  -   - Sacral biopsy favors prostate ( neoplastic cells are positive for AE1/AE3, PSA and CK7)  - Jaw mass biopsy : prostate adenocarcinoma  - TLS labs wnl  - CT chest showing RUL [id number] paravertebral lesion with mass effect, MR T-spine completed, results pending, f/u NSx  - CT AP no RP bleed, no visceral mets  - MR CTL, brain and  jaw completed, mets throughout cervical and t-spine  - dexamethasone, f/u NSx or rad onc for taper    PLAN  - started ADT with anti-testosterone (bicalutamide 50mg daily) on 3.14.21: plan to administer 1st IM  dose of LUPRON (GNRH agonist) on 3.29.21   - will add chemo: dose reduced taxotere. Standard is 75mg/m2 IV every 3 weeks: 20mg/m2 WEEKLY beginning 3.26.21 (CONTINUE DEXAMETHASONE while on RT)  -3/24/21:  started RT to sacrum and spine (completed 3/5 doses 3.26.21: once finished, plan to initiate RT 5 DOSES to jaw area for palliation  - Spoke with dental, cleared to start either bisphosphonates or denosumab:  3. 26.21    -first dose of taxotere (20mg/m2 weekly , 3/26/2021    Normocytic anemia: Improved   - Per GI, outpatient scope  - Hapto high and retic low, not consistent with hemolysis  - TSAT 67%    Leukocytosis: Likely due to decadron    Hyponatremia:  - check SOsm, urine studies    DENISE: Resolved    Mild hypercalcemia, likely due to bone mets  /PSYCH F/U  PT EVAL

## 2021-03-26 NOTE — PROGRESS NOTE ADULT - SUBJECTIVE AND OBJECTIVE BOX
SUBJECTIVE:    Patient is a 80y old Male who presents with a chief complaint of 80 YEAR OLD MALE C/O MULTIPLE MEDICAL COMPLAINTS . (25 Mar 2021 17:29)    Currently admitted to medicine with the primary diagnosis of Ambulatory dysfunction       Today is hospital day 18d. This morning he is resting comfortably in bed and reports no new issues or overnight events.     PAST MEDICAL & SURGICAL HISTORY  Inguinal hernia    CHF (congestive heart failure)    S/P CABG x 3    ALLERGIES:  No Known Allergies    MEDICATIONS:  STANDING MEDICATIONS  bicalutamide 50 milliGRAM(s) Oral daily  chlorhexidine 4% Liquid 1 Application(s) Topical daily  collagenase Ointment 1 Application(s) Topical daily  dexAMETHasone     Tablet 4 milliGRAM(s) Oral every 8 hours  enoxaparin Injectable 70 milliGRAM(s) SubCutaneous two times a day  latanoprost 0.005% Ophthalmic Solution 1 Drop(s) Both EYES at bedtime  lidocaine   Patch 2 Patch Transdermal daily  methocarbamol 500 milliGRAM(s) Oral three times a day  midodrine. 10 milliGRAM(s) Oral three times a day  pantoprazole    Tablet 40 milliGRAM(s) Oral before breakfast  sodium bicarbonate 650 milliGRAM(s) Oral three times a day    PRN MEDICATIONS  acetaminophen   Tablet .. 650 milliGRAM(s) Oral every 6 hours PRN  aluminum hydroxide/magnesium hydroxide/simethicone Suspension 30 milliLiter(s) Oral every 6 hours PRN  brimonidine 0.2% Ophthalmic Solution 1 Drop(s) Both EYES every 8 hours PRN    VITALS:   T(F): 96.4  HR: 68  BP: 102/51  RR: 18  SpO2: --    LABS:                        9.8    17.89 )-----------( 226      ( 25 Mar 2021 05:59 )             30.3     03-25    129<L>  |  97<L>  |  27<H>  ----------------------------<  103<H>  5.3<H>   |  20  |  0.7    Ca    8.7      25 Mar 2021 05:59  Mg     2.0     03-25    TPro  5.0<L>  /  Alb  3.0<L>  /  TBili  0.5  /  DBili  x   /  AST  18  /  ALT  23  /  AlkPhos  85  03-25                  RADIOLOGY:    PHYSICAL EXAM:  GEN: No acute distress  LUNGS: Clear to auscultation bilaterally   HEART: Regular  ABD: Soft, non-tender, non-distended.  EXT: NC/NC/NE/2+PP/CRUZ/Skin Intact.   NEURO: AAOX3    Intravenous access:   NG tube:   Santos Catheter:   Indwelling Urethral Catheter:     Connect To:  Straight Drainage/[place]    Indication:  Urinary Retention / Obstruction (03-18-21 @ 10:53) (not performed) [Active]  Indwelling Urethral Catheter:     Connect To:  Straight Drainage/[place]    Indication:  Urologic Procedure,  Surgery (03-18-21 @ 04:15) (not performed) [Active]  Indwelling Urethral Catheter:     Connect To:  Straight Drainage/[place]    Indication:  Urologic Procedure,  Surgery (03-17-21 @ 11:13) (not performed) [Active]  Indwelling Urethral Catheter:     Connect To:  Straight Drainage/[place]    Indication:  Perioperative use for Selected Surgical Procedures (03-17-21 @ 04:35) (not performed) [Active]  Indwelling Urethral Catheter:     Connect To:  Straight Drainage/[place]    Indication:  Perioperative use for Selected Surgical Procedures (03-16-21 @ 09:41) (not performed) [Active]  Indwelling Urethral Catheter:     Connect To:  Straight Drainage/[place]    Indication:  Urinary Retention / Obstruction (03-16-21 @ 05:21) (not performed) [Active]       SUBJECTIVE:    Patient is a 80y old Male who presents with a chief complaint of 80 YEAR OLD MALE C/O MULTIPLE MEDICAL COMPLAINTS . (25 Mar 2021 17:29)    Currently admitted to medicine with the primary diagnosis of Ambulatory dysfunction       Today is hospital day 18d. No overnight events. This morning he is resting in bed, anxious about starting chemo. No new issues    PAST MEDICAL & SURGICAL HISTORY  Inguinal hernia    CHF (congestive heart failure)    S/P CABG x 3    ALLERGIES:  No Known Allergies    MEDICATIONS:  STANDING MEDICATIONS  bicalutamide 50 milliGRAM(s) Oral daily  chlorhexidine 4% Liquid 1 Application(s) Topical daily  collagenase Ointment 1 Application(s) Topical daily  dexAMETHasone     Tablet 4 milliGRAM(s) Oral every 8 hours  enoxaparin Injectable 70 milliGRAM(s) SubCutaneous two times a day  latanoprost 0.005% Ophthalmic Solution 1 Drop(s) Both EYES at bedtime  lidocaine   Patch 2 Patch Transdermal daily  methocarbamol 500 milliGRAM(s) Oral three times a day  midodrine. 10 milliGRAM(s) Oral three times a day  pantoprazole    Tablet 40 milliGRAM(s) Oral before breakfast  sodium bicarbonate 650 milliGRAM(s) Oral three times a day    PRN MEDICATIONS  acetaminophen   Tablet .. 650 milliGRAM(s) Oral every 6 hours PRN  aluminum hydroxide/magnesium hydroxide/simethicone Suspension 30 milliLiter(s) Oral every 6 hours PRN  brimonidine 0.2% Ophthalmic Solution 1 Drop(s) Both EYES every 8 hours PRN    VITALS:   T(F): 96.4  HR: 68  BP: 102/51  RR: 18  SpO2: --    LABS:                        9.8    17.89 )-----------( 226      ( 25 Mar 2021 05:59 )             30.3     03-25    129<L>  |  97<L>  |  27<H>  ----------------------------<  103<H>  5.3<H>   |  20  |  0.7    Ca    8.7      25 Mar 2021 05:59  Mg     2.0     03-25    TPro  5.0<L>  /  Alb  3.0<L>  /  TBili  0.5  /  DBili  x   /  AST  18  /  ALT  23  /  AlkPhos  85  03-25                  RADIOLOGY:  CT Neck Soft Tissue No Cont 3/8:  - Large right mandibular soft tissue density mass measuring 6.5 x 7.1 x 9.2 cm likely arising from the right mandible extending from the right mandibular condyle to the body of the mandible. There is an associated extensive bony erosion of the right mandible within the mass and the posterior wall of the right maxilla. Findings are highly suspicious for malignancy.    CT Abdomen and Pelvis No Cont 3/8:  - Findings compatible with metastatic disease as demonstrated by multiple bony lesions, the largest soft tissue mass centered at S2-S3 measuring up to 7.4 cm with associated bony destruction and obliteration of the exiting neural foramina. Additional smaller lesions at the right sacral ala, L2 and L3.  - Moderate to severe bilateral hydroureteronephrosis to the level of a markedly distended urinary bladder. Findings presumably secondary to urinary bladder outlet obstruction or urinary retention. Enlarged prostate gland.    CT Abdomen and Pelvis No Cont 3/16:  1.  No evidence of retroperitoneal hematoma.  2.   Dominant destructive sacral lesion with relationship to surrounding structures more fullydepicted on MR of 3/11/2021. Additional pelvic lesions and lesions of the lumbar spine also as detailed in the lumbar spine report.  3.  Improving bilateral hydronephrosis.    CT Chest No Cont 3/16:  - Right upper lobe paravertebral mass measuring approximately 5.8 x 4.1 x 4.5 cm with destructive osseous involvement as detailed above and extension the spine causing mass effect on the spinal cord. Smaller apical paravertebral nodule with adjacent osseous involvement. It is unclear whether these lesions originated in the bone extending to the lung or vice versa.  - Additional osseous lesions as above likely metastatic.  - Bilateral small pleural effusions and adjacent consolidative changes/atelectasis.    MR Lumbar Spine No Cont 3/11:  1.  Findings consistent with extensive diffuse osseous metastatic disease.  2.  The largest lesion is within the sacrum (S2-S4) measuring 8.4 cm, impinging and obliterating the neural foramen.  3.  There is also right S1 mass invading the right L5-S1 neural foramen, and a mildly expansile L2 mass without significant spinal stenosis.    MR Orbit, Face, and/or Neck No Cont 3/11  1.  Numerous foci of bone marrow signal abnormality consistent with osseous metastatic disease. Notable lesions include:  2.  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  3.  Metastatic lesions of the T2 and T3 vertebral bodies on the right with a associated large rightparaspinal soft tissue mass invading the right second rib and right T2-3 neural foramen. Small right lateral epidural component noted without cord compression.  4.  Left occipital bone mass measuring 4 cm with overlying subgaleal component and underlying small epidural component.    MR Thoracic Spine No Cont 3/18:  - Numerous foci of bone marrow signal abnormality compatible with metastatic disease, including lesions of the right T1/T2 and T5/T6 vertebral bodies with large right paraspinal soft tissue masses. These masses extend into the right neural foramen with obliteration at C7-T1, T1-T2, and T5-T6. There is additional extension of tumor into the ventral epidural space at T5-6 which results in moderate spinal canal stenosis.  - Moderate-sized bilateral pleural effusions.  - Degenerative changes of the spine as noted above.    MR Head No Cont 3/18:  - Allowing for limitation from absence of intravenous contrast, there are multiple calvarial lesions compatible with osseous metastasis. The largest lesion in the left occipital calvarium demonstrates mildly expansile subgaleal soft tissue component.  - Redemonstrated partially imaged right mandibular mass, better evaluated on the previously performed MRI of the neck dated 3/11/2021.        PHYSICAL EXAM:  GEN: No acute distress  LUNGS: Clear to auscultation bilaterally   HEART: Regular, S1&S2, no murmurs appreciated  ABD: Soft, non-tender, non-distended.  EXT: No edema  NEURO: AAOX3    Intravenous access:   NG tube:   Santos Catheter:   Indwelling Urethral Catheter:     Connect To:  Straight Drainage/[place]    Indication:  Urinary Retention / Obstruction (03-18-21 @ 10:53) (not performed) [Active]  Indwelling Urethral Catheter:     Connect To:  Straight Drainage/[place]    Indication:  Urologic Procedure,  Surgery (03-18-21 @ 04:15) (not performed) [Active]  Indwelling Urethral Catheter:     Connect To:  Straight Drainage/[place]    Indication:  Urologic Procedure,  Surgery (03-17-21 @ 11:13) (not performed) [Active]  Indwelling Urethral Catheter:     Connect To:  Straight Drainage/[place]    Indication:  Perioperative use for Selected Surgical Procedures (03-17-21 @ 04:35) (not performed) [Active]  Indwelling Urethral Catheter:     Connect To:  Straight Drainage/[place]    Indication:  Perioperative use for Selected Surgical Procedures (03-16-21 @ 09:41) (not performed) [Active]  Indwelling Urethral Catheter:     Connect To:  Straight Drainage/[place]    Indication:  Urinary Retention / Obstruction (03-16-21 @ 05:21) (not performed) [Active]

## 2021-03-27 LAB
ALBUMIN SERPL ELPH-MCNC: 2.8 G/DL — LOW (ref 3.5–5.2)
ALP SERPL-CCNC: 79 U/L — SIGNIFICANT CHANGE UP (ref 30–115)
ALT FLD-CCNC: 25 U/L — SIGNIFICANT CHANGE UP (ref 0–41)
ANION GAP SERPL CALC-SCNC: 11 MMOL/L — SIGNIFICANT CHANGE UP (ref 7–14)
ANION GAP SERPL CALC-SCNC: 11 MMOL/L — SIGNIFICANT CHANGE UP (ref 7–14)
AST SERPL-CCNC: 22 U/L — SIGNIFICANT CHANGE UP (ref 0–41)
BASOPHILS # BLD AUTO: 0 K/UL — SIGNIFICANT CHANGE UP (ref 0–0.2)
BASOPHILS NFR BLD AUTO: 0 % — SIGNIFICANT CHANGE UP (ref 0–1)
BILIRUB SERPL-MCNC: 0.5 MG/DL — SIGNIFICANT CHANGE UP (ref 0.2–1.2)
BUN SERPL-MCNC: 32 MG/DL — HIGH (ref 10–20)
BUN SERPL-MCNC: 36 MG/DL — HIGH (ref 10–20)
CALCIUM SERPL-MCNC: 7.9 MG/DL — LOW (ref 8.5–10.1)
CALCIUM SERPL-MCNC: 8.4 MG/DL — LOW (ref 8.5–10.1)
CHLORIDE SERPL-SCNC: 102 MMOL/L — SIGNIFICANT CHANGE UP (ref 98–110)
CHLORIDE SERPL-SCNC: 104 MMOL/L — SIGNIFICANT CHANGE UP (ref 98–110)
CO2 SERPL-SCNC: 19 MMOL/L — SIGNIFICANT CHANGE UP (ref 17–32)
CO2 SERPL-SCNC: 20 MMOL/L — SIGNIFICANT CHANGE UP (ref 17–32)
CREAT SERPL-MCNC: 0.7 MG/DL — SIGNIFICANT CHANGE UP (ref 0.7–1.5)
CREAT SERPL-MCNC: 0.8 MG/DL — SIGNIFICANT CHANGE UP (ref 0.7–1.5)
EOSINOPHIL # BLD AUTO: 0 K/UL — SIGNIFICANT CHANGE UP (ref 0–0.7)
EOSINOPHIL NFR BLD AUTO: 0 % — SIGNIFICANT CHANGE UP (ref 0–8)
GLUCOSE SERPL-MCNC: 114 MG/DL — HIGH (ref 70–99)
GLUCOSE SERPL-MCNC: 125 MG/DL — HIGH (ref 70–99)
HCT VFR BLD CALC: 29 % — LOW (ref 42–52)
HGB BLD-MCNC: 9.1 G/DL — LOW (ref 14–18)
IMM GRANULOCYTES NFR BLD AUTO: 0.7 % — HIGH (ref 0.1–0.3)
LYMPHOCYTES # BLD AUTO: 0.11 K/UL — LOW (ref 1.2–3.4)
LYMPHOCYTES # BLD AUTO: 1.3 % — LOW (ref 20.5–51.1)
MAGNESIUM SERPL-MCNC: 2 MG/DL — SIGNIFICANT CHANGE UP (ref 1.8–2.4)
MCHC RBC-ENTMCNC: 28.6 PG — SIGNIFICANT CHANGE UP (ref 27–31)
MCHC RBC-ENTMCNC: 31.4 G/DL — LOW (ref 32–37)
MCV RBC AUTO: 91.2 FL — SIGNIFICANT CHANGE UP (ref 80–94)
MONOCYTES # BLD AUTO: 0.09 K/UL — LOW (ref 0.1–0.6)
MONOCYTES NFR BLD AUTO: 1.1 % — LOW (ref 1.7–9.3)
NEUTROPHILS # BLD AUTO: 8.13 K/UL — HIGH (ref 1.4–6.5)
NEUTROPHILS NFR BLD AUTO: 96.9 % — HIGH (ref 42.2–75.2)
NRBC # BLD: 0 /100 WBCS — SIGNIFICANT CHANGE UP (ref 0–0)
PLATELET # BLD AUTO: 170 K/UL — SIGNIFICANT CHANGE UP (ref 130–400)
POTASSIUM SERPL-MCNC: 5.5 MMOL/L — HIGH (ref 3.5–5)
POTASSIUM SERPL-MCNC: 5.6 MMOL/L — HIGH (ref 3.5–5)
POTASSIUM SERPL-SCNC: 5.5 MMOL/L — HIGH (ref 3.5–5)
POTASSIUM SERPL-SCNC: 5.6 MMOL/L — HIGH (ref 3.5–5)
PROT SERPL-MCNC: 4.8 G/DL — LOW (ref 6–8)
RBC # BLD: 3.18 M/UL — LOW (ref 4.7–6.1)
RBC # FLD: 17.4 % — HIGH (ref 11.5–14.5)
SODIUM SERPL-SCNC: 132 MMOL/L — LOW (ref 135–146)
SODIUM SERPL-SCNC: 135 MMOL/L — SIGNIFICANT CHANGE UP (ref 135–146)
WBC # BLD: 8.39 K/UL — SIGNIFICANT CHANGE UP (ref 4.8–10.8)
WBC # FLD AUTO: 8.39 K/UL — SIGNIFICANT CHANGE UP (ref 4.8–10.8)

## 2021-03-27 PROCEDURE — 99232 SBSQ HOSP IP/OBS MODERATE 35: CPT

## 2021-03-27 PROCEDURE — 99233 SBSQ HOSP IP/OBS HIGH 50: CPT

## 2021-03-27 RX ORDER — SODIUM ZIRCONIUM CYCLOSILICATE 10 G/10G
10 POWDER, FOR SUSPENSION ORAL ONCE
Refills: 0 | Status: COMPLETED | OUTPATIENT
Start: 2021-03-27 | End: 2021-03-27

## 2021-03-27 RX ORDER — ZOLEDRONIC ACID 5 MG/100ML
4 INJECTION, SOLUTION INTRAVENOUS
Refills: 0 | Status: DISCONTINUED | OUTPATIENT
Start: 2021-03-27 | End: 2021-04-16

## 2021-03-27 RX ADMIN — ENOXAPARIN SODIUM 70 MILLIGRAM(S): 100 INJECTION SUBCUTANEOUS at 17:16

## 2021-03-27 RX ADMIN — ENOXAPARIN SODIUM 70 MILLIGRAM(S): 100 INJECTION SUBCUTANEOUS at 06:43

## 2021-03-27 RX ADMIN — METHOCARBAMOL 500 MILLIGRAM(S): 500 TABLET, FILM COATED ORAL at 06:43

## 2021-03-27 RX ADMIN — LIDOCAINE 2 PATCH: 4 CREAM TOPICAL at 09:35

## 2021-03-27 RX ADMIN — Medication 1 APPLICATION(S): at 21:11

## 2021-03-27 RX ADMIN — Medication 650 MILLIGRAM(S): at 21:33

## 2021-03-27 RX ADMIN — LIDOCAINE 2 PATCH: 4 CREAM TOPICAL at 09:01

## 2021-03-27 RX ADMIN — METHOCARBAMOL 500 MILLIGRAM(S): 500 TABLET, FILM COATED ORAL at 13:01

## 2021-03-27 RX ADMIN — SODIUM ZIRCONIUM CYCLOSILICATE 10 GRAM(S): 10 POWDER, FOR SUSPENSION ORAL at 17:16

## 2021-03-27 RX ADMIN — Medication 4 MILLIGRAM(S): at 21:33

## 2021-03-27 RX ADMIN — LIDOCAINE 2 PATCH: 4 CREAM TOPICAL at 21:33

## 2021-03-27 RX ADMIN — BICALUTAMIDE 50 MILLIGRAM(S): 50 TABLET, FILM COATED ORAL at 13:01

## 2021-03-27 RX ADMIN — MIDODRINE HYDROCHLORIDE 10 MILLIGRAM(S): 2.5 TABLET ORAL at 17:15

## 2021-03-27 RX ADMIN — PANTOPRAZOLE SODIUM 40 MILLIGRAM(S): 20 TABLET, DELAYED RELEASE ORAL at 06:43

## 2021-03-27 RX ADMIN — MIDODRINE HYDROCHLORIDE 10 MILLIGRAM(S): 2.5 TABLET ORAL at 06:43

## 2021-03-27 RX ADMIN — LATANOPROST 1 DROP(S): 0.05 SOLUTION/ DROPS OPHTHALMIC; TOPICAL at 21:33

## 2021-03-27 RX ADMIN — Medication 4 MILLIGRAM(S): at 13:00

## 2021-03-27 RX ADMIN — Medication 650 MILLIGRAM(S): at 06:43

## 2021-03-27 RX ADMIN — Medication 4 MILLIGRAM(S): at 06:42

## 2021-03-27 RX ADMIN — Medication 650 MILLIGRAM(S): at 13:01

## 2021-03-27 RX ADMIN — METHOCARBAMOL 500 MILLIGRAM(S): 500 TABLET, FILM COATED ORAL at 21:33

## 2021-03-27 RX ADMIN — MIDODRINE HYDROCHLORIDE 10 MILLIGRAM(S): 2.5 TABLET ORAL at 13:01

## 2021-03-27 NOTE — PROGRESS NOTE ADULT - SUBJECTIVE AND OBJECTIVE BOX
SUBJECTIVE:    Patient is a 80y old Male who presents with a chief complaint of 80 YEAR OLD MALE C/O MULTIPLE MEDICAL COMPLAINTS . (26 Mar 2021 15:57)    Currently admitted to medicine with the primary diagnosis of Ambulatory dysfunction       Today is hospital day 19d. This morning he is resting comfortably in bed and reports no new issues or overnight events.     PAST MEDICAL & SURGICAL HISTORY  Inguinal hernia    CHF (congestive heart failure)    S/P CABG x 3    ALLERGIES:  No Known Allergies    MEDICATIONS:  STANDING MEDICATIONS  bicalutamide 50 milliGRAM(s) Oral daily  chlorhexidine 4% Liquid 1 Application(s) Topical daily  collagenase Ointment 1 Application(s) Topical daily  dexAMETHasone     Tablet 4 milliGRAM(s) Oral every 8 hours  enoxaparin Injectable 70 milliGRAM(s) SubCutaneous two times a day  latanoprost 0.005% Ophthalmic Solution 1 Drop(s) Both EYES at bedtime  lidocaine   Patch 2 Patch Transdermal daily  methocarbamol 500 milliGRAM(s) Oral three times a day  midodrine. 10 milliGRAM(s) Oral three times a day  pantoprazole    Tablet 40 milliGRAM(s) Oral before breakfast  sodium bicarbonate 650 milliGRAM(s) Oral three times a day    PRN MEDICATIONS  acetaminophen   Tablet .. 650 milliGRAM(s) Oral every 6 hours PRN  aluminum hydroxide/magnesium hydroxide/simethicone Suspension 30 milliLiter(s) Oral every 6 hours PRN  brimonidine 0.2% Ophthalmic Solution 1 Drop(s) Both EYES every 8 hours PRN    VITALS:   T(F): 96.9  HR: 63  BP: 106/52  RR: 18  SpO2: 98%    LABS:                        9.2    11.17 )-----------( 213      ( 26 Mar 2021 06:49 )             28.8     03-26    130<L>  |  98  |  29<H>  ----------------------------<  110<H>  5.2<H>   |  22  |  0.7    Ca    8.7      26 Mar 2021 06:49  Mg     1.9     03-26    TPro  4.8<L>  /  Alb  2.9<L>  /  TBili  0.6  /  DBili  x   /  AST  18  /  ALT  20  /  AlkPhos  78  03-26                  RADIOLOGY:  CT Neck Soft Tissue No Cont 3/8:  - Large right mandibular soft tissue density mass measuring 6.5 x 7.1 x 9.2 cm likely arising from the right mandible extending from the right mandibular condyle to the body of the mandible. There is an associated extensive bony erosion of the right mandible within the mass and the posterior wall of the right maxilla. Findings are highly suspicious for malignancy.    CT Abdomen and Pelvis No Cont 3/8:  - Findings compatible with metastatic disease as demonstrated by multiple bony lesions, the largest soft tissue mass centered at S2-S3 measuring up to 7.4 cm with associated bony destruction and obliteration of the exiting neural foramina. Additional smaller lesions at the right sacral ala, L2 and L3.  - Moderate to severe bilateral hydroureteronephrosis to the level of a markedly distended urinary bladder. Findings presumably secondary to urinary bladder outlet obstruction or urinary retention. Enlarged prostate gland.    CT Abdomen and Pelvis No Cont 3/16:  1.  No evidence of retroperitoneal hematoma.  2.   Dominant destructive sacral lesion with relationship to surrounding structures more fullydepicted on MR of 3/11/2021. Additional pelvic lesions and lesions of the lumbar spine also as detailed in the lumbar spine report.  3.  Improving bilateral hydronephrosis.    CT Chest No Cont 3/16:  - Right upper lobe paravertebral mass measuring approximately 5.8 x 4.1 x 4.5 cm with destructive osseous involvement as detailed above and extension the spine causing mass effect on the spinal cord. Smaller apical paravertebral nodule with adjacent osseous involvement. It is unclear whether these lesions originated in the bone extending to the lung or vice versa.  - Additional osseous lesions as above likely metastatic.  - Bilateral small pleural effusions and adjacent consolidative changes/atelectasis.    MR Lumbar Spine No Cont 3/11:  1.  Findings consistent with extensive diffuse osseous metastatic disease.  2.  The largest lesion is within the sacrum (S2-S4) measuring 8.4 cm, impinging and obliterating the neural foramen.  3.  There is also right S1 mass invading the right L5-S1 neural foramen, and a mildly expansile L2 mass without significant spinal stenosis.    MR Orbit, Face, and/or Neck No Cont 3/11  1.  Numerous foci of bone marrow signal abnormality consistent with osseous metastatic disease. Notable lesions include:  2.  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  3.  Metastatic lesions of the T2 and T3 vertebral bodies on the right with a associated large rightparaspinal soft tissue mass invading the right second rib and right T2-3 neural foramen. Small right lateral epidural component noted without cord compression.  4.  Left occipital bone mass measuring 4 cm with overlying subgaleal component and underlying small epidural component.    MR Thoracic Spine No Cont 3/18:  - Numerous foci of bone marrow signal abnormality compatible with metastatic disease, including lesions of the right T1/T2 and T5/T6 vertebral bodies with large right paraspinal soft tissue masses. These masses extend into the right neural foramen with obliteration at C7-T1, T1-T2, and T5-T6. There is additional extension of tumor into the ventral epidural space at T5-6 which results in moderate spinal canal stenosis.  - Moderate-sized bilateral pleural effusions.  - Degenerative changes of the spine as noted above.    MR Head No Cont 3/18:  - Allowing for limitation from absence of intravenous contrast, there are multiple calvarial lesions compatible with osseous metastasis. The largest lesion in the left occipital calvarium demonstrates mildly expansile subgaleal soft tissue component.  - Redemonstrated partially imaged right mandibular mass, better evaluated on the previously performed MRI of the neck dated 3/11/2021.        PHYSICAL EXAM:  GEN: No acute distress  LUNGS: Clear to auscultation bilaterally   HEART: Regular, S1&S2, no murmurs appreciated  ABD: Soft, non-tender, non-distended.  EXT: No edema  NEURO: AAOX3, RLE weakness 2/5    Intravenous access:   NG tube:   Santos Catheter:   Indwelling Urethral Catheter:     Connect To:  Straight Drainage/[place]    Indication:  Urinary Retention / Obstruction (03-18-21 @ 10:53) (not performed) [Active]  Indwelling Urethral Catheter:     Connect To:  Straight Drainage/[place]    Indication:  Urologic Procedure,  Surgery (03-18-21 @ 04:15) (not performed) [Active]  Indwelling Urethral Catheter:     Connect To:  Straight Drainage/[place]    Indication:  Urologic Procedure,  Surgery (03-17-21 @ 11:13) (not performed) [Active]  Indwelling Urethral Catheter:     Connect To:  Straight Drainage/[place]    Indication:  Perioperative use for Selected Surgical Procedures (03-17-21 @ 04:35) (not performed) [Active]  Indwelling Urethral Catheter:     Connect To:  Straight Drainage/[place]    Indication:  Perioperative use for Selected Surgical Procedures (03-16-21 @ 09:41) (not performed) [Active]  Indwelling Urethral Catheter:     Connect To:  Straight Drainage/[place]    Indication:  Urinary Retention / Obstruction (03-16-21 @ 05:21) (not performed) [Active]       SUBJECTIVE:    Patient is a 80y old Male who presents with a chief complaint of 80 YEAR OLD MALE C/O MULTIPLE MEDICAL COMPLAINTS . (26 Mar 2021 15:57)    Currently admitted to medicine with the primary diagnosis of Ambulatory dysfunction       Today is hospital day 19d. No overnight events, tolerated chemo well last night. This morning resting comfortably in bed, no new issues.    PAST MEDICAL & SURGICAL HISTORY  Inguinal hernia    CHF (congestive heart failure)    S/P CABG x 3    ALLERGIES:  No Known Allergies    MEDICATIONS:  STANDING MEDICATIONS  bicalutamide 50 milliGRAM(s) Oral daily  chlorhexidine 4% Liquid 1 Application(s) Topical daily  collagenase Ointment 1 Application(s) Topical daily  dexAMETHasone     Tablet 4 milliGRAM(s) Oral every 8 hours  enoxaparin Injectable 70 milliGRAM(s) SubCutaneous two times a day  latanoprost 0.005% Ophthalmic Solution 1 Drop(s) Both EYES at bedtime  lidocaine   Patch 2 Patch Transdermal daily  methocarbamol 500 milliGRAM(s) Oral three times a day  midodrine. 10 milliGRAM(s) Oral three times a day  pantoprazole    Tablet 40 milliGRAM(s) Oral before breakfast  sodium bicarbonate 650 milliGRAM(s) Oral three times a day    PRN MEDICATIONS  acetaminophen   Tablet .. 650 milliGRAM(s) Oral every 6 hours PRN  aluminum hydroxide/magnesium hydroxide/simethicone Suspension 30 milliLiter(s) Oral every 6 hours PRN  brimonidine 0.2% Ophthalmic Solution 1 Drop(s) Both EYES every 8 hours PRN    VITALS:   T(F): 96.9  HR: 63  BP: 106/52  RR: 18  SpO2: 98%    LABS:                        9.2    11.17 )-----------( 213      ( 26 Mar 2021 06:49 )             28.8     03-26    130<L>  |  98  |  29<H>  ----------------------------<  110<H>  5.2<H>   |  22  |  0.7    Ca    8.7      26 Mar 2021 06:49  Mg     1.9     03-26    TPro  4.8<L>  /  Alb  2.9<L>  /  TBili  0.6  /  DBili  x   /  AST  18  /  ALT  20  /  AlkPhos  78  03-26                  RADIOLOGY:  CT Neck Soft Tissue No Cont 3/8:  - Large right mandibular soft tissue density mass measuring 6.5 x 7.1 x 9.2 cm likely arising from the right mandible extending from the right mandibular condyle to the body of the mandible. There is an associated extensive bony erosion of the right mandible within the mass and the posterior wall of the right maxilla. Findings are highly suspicious for malignancy.    CT Abdomen and Pelvis No Cont 3/8:  - Findings compatible with metastatic disease as demonstrated by multiple bony lesions, the largest soft tissue mass centered at S2-S3 measuring up to 7.4 cm with associated bony destruction and obliteration of the exiting neural foramina. Additional smaller lesions at the right sacral ala, L2 and L3.  - Moderate to severe bilateral hydroureteronephrosis to the level of a markedly distended urinary bladder. Findings presumably secondary to urinary bladder outlet obstruction or urinary retention. Enlarged prostate gland.    CT Abdomen and Pelvis No Cont 3/16:  1.  No evidence of retroperitoneal hematoma.  2.   Dominant destructive sacral lesion with relationship to surrounding structures more fullydepicted on MR of 3/11/2021. Additional pelvic lesions and lesions of the lumbar spine also as detailed in the lumbar spine report.  3.  Improving bilateral hydronephrosis.    CT Chest No Cont 3/16:  - Right upper lobe paravertebral mass measuring approximately 5.8 x 4.1 x 4.5 cm with destructive osseous involvement as detailed above and extension the spine causing mass effect on the spinal cord. Smaller apical paravertebral nodule with adjacent osseous involvement. It is unclear whether these lesions originated in the bone extending to the lung or vice versa.  - Additional osseous lesions as above likely metastatic.  - Bilateral small pleural effusions and adjacent consolidative changes/atelectasis.    MR Lumbar Spine No Cont 3/11:  1.  Findings consistent with extensive diffuse osseous metastatic disease.  2.  The largest lesion is within the sacrum (S2-S4) measuring 8.4 cm, impinging and obliterating the neural foramen.  3.  There is also right S1 mass invading the right L5-S1 neural foramen, and a mildly expansile L2 mass without significant spinal stenosis.    MR Orbit, Face, and/or Neck No Cont 3/11  1.  Numerous foci of bone marrow signal abnormality consistent with osseous metastatic disease. Notable lesions include:  2.  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  3.  Metastatic lesions of the T2 and T3 vertebral bodies on the right with a associated large rightparaspinal soft tissue mass invading the right second rib and right T2-3 neural foramen. Small right lateral epidural component noted without cord compression.  4.  Left occipital bone mass measuring 4 cm with overlying subgaleal component and underlying small epidural component.    MR Thoracic Spine No Cont 3/18:  - Numerous foci of bone marrow signal abnormality compatible with metastatic disease, including lesions of the right T1/T2 and T5/T6 vertebral bodies with large right paraspinal soft tissue masses. These masses extend into the right neural foramen with obliteration at C7-T1, T1-T2, and T5-T6. There is additional extension of tumor into the ventral epidural space at T5-6 which results in moderate spinal canal stenosis.  - Moderate-sized bilateral pleural effusions.  - Degenerative changes of the spine as noted above.    MR Head No Cont 3/18:  - Allowing for limitation from absence of intravenous contrast, there are multiple calvarial lesions compatible with osseous metastasis. The largest lesion in the left occipital calvarium demonstrates mildly expansile subgaleal soft tissue component.  - Redemonstrated partially imaged right mandibular mass, better evaluated on the previously performed MRI of the neck dated 3/11/2021.        PHYSICAL EXAM:  GEN: No acute distress  LUNGS: Clear to auscultation bilaterally   HEART: Regular, S1&S2, no murmurs appreciated  ABD: Soft, non-tender, non-distended.  EXT: No edema  NEURO: AAOX3, RLE weakness 2/5    Intravenous access:   NG tube:   Santos Catheter:   Indwelling Urethral Catheter:     Connect To:  Straight Drainage/[place]    Indication:  Urinary Retention / Obstruction (03-18-21 @ 10:53) (not performed) [Active]  Indwelling Urethral Catheter:     Connect To:  Straight Drainage/[place]    Indication:  Urologic Procedure,  Surgery (03-18-21 @ 04:15) (not performed) [Active]  Indwelling Urethral Catheter:     Connect To:  Straight Drainage/[place]    Indication:  Urologic Procedure,  Surgery (03-17-21 @ 11:13) (not performed) [Active]  Indwelling Urethral Catheter:     Connect To:  Straight Drainage/[place]    Indication:  Perioperative use for Selected Surgical Procedures (03-17-21 @ 04:35) (not performed) [Active]  Indwelling Urethral Catheter:     Connect To:  Straight Drainage/[place]    Indication:  Perioperative use for Selected Surgical Procedures (03-16-21 @ 09:41) (not performed) [Active]  Indwelling Urethral Catheter:     Connect To:  Straight Drainage/[place]    Indication:  Urinary Retention / Obstruction (03-16-21 @ 05:21) (not performed) [Active]

## 2021-03-27 NOTE — PROGRESS NOTE ADULT - SUBJECTIVE AND OBJECTIVE BOX
Patient is a 80y old  Male who presents with a chief complaint of 80 YEAR OLD MALE C/O MULTIPLE MEDICAL COMPLAINTS . (27 Mar 2021 08:35)      Subjective: No new complaints.   He stated that he was not able to sleep last night after getting benadryl as premed.   Tolerated his chemo well. No issues         Vital Signs Last 24 Hrs  T(C): 36.1 (27 Mar 2021 04:42), Max: 36.1 (27 Mar 2021 04:42)  T(F): 96.9 (27 Mar 2021 04:42), Max: 96.9 (27 Mar 2021 04:42)  HR: 63 (27 Mar 2021 04:42) (58 - 72)  BP: 106/52 (27 Mar 2021 04:42) (106/52 - 122/66)  BP(mean): --  RR: 18 (27 Mar 2021 04:42) (16 - 18)  SpO2: 98% (27 Mar 2021 08:20) (98% - 98%)    PHYSICAL EXAM  General: adult in NAD  Neck: supple  CV: normal S1/S2   Lungs: positive air movement b/l ant lungs  Abdomen: soft non-tender   Ext: no clubbing cyanosis or edema  Neuro: alert and oriented X 4. Able to move all extremities and sensation intact b/l     MEDICATIONS  (STANDING):  bicalutamide 50 milliGRAM(s) Oral daily  chlorhexidine 4% Liquid 1 Application(s) Topical daily  collagenase Ointment 1 Application(s) Topical daily  dexAMETHasone     Tablet 4 milliGRAM(s) Oral every 8 hours  enoxaparin Injectable 70 milliGRAM(s) SubCutaneous two times a day  latanoprost 0.005% Ophthalmic Solution 1 Drop(s) Both EYES at bedtime  lidocaine   Patch 2 Patch Transdermal daily  methocarbamol 500 milliGRAM(s) Oral three times a day  midodrine. 10 milliGRAM(s) Oral three times a day  pantoprazole    Tablet 40 milliGRAM(s) Oral before breakfast  sodium bicarbonate 650 milliGRAM(s) Oral three times a day  sodium zirconium cyclosilicate 10 Gram(s) Oral once  zoledronic acid IVPB (ZOMETA) (Non - oncologic) 4 milliGRAM(s) IV Intermittent every 4 weeks    MEDICATIONS  (PRN):  acetaminophen   Tablet .. 650 milliGRAM(s) Oral every 6 hours PRN Moderate Pain (4 - 6)  aluminum hydroxide/magnesium hydroxide/simethicone Suspension 30 milliLiter(s) Oral every 6 hours PRN Dyspepsia  brimonidine 0.2% Ophthalmic Solution 1 Drop(s) Both EYES every 8 hours PRN dry eyes      LABS:                          9.1    8.39  )-----------( 170      ( 27 Mar 2021 07:28 )             29.0         Mean Cell Volume : 91.2 fL  Mean Cell Hemoglobin : 28.6 pg  Mean Cell Hemoglobin Concentration : 31.4 g/dL  Auto Neutrophil # : 8.13 K/uL  Auto Lymphocyte # : 0.11 K/uL  Auto Monocyte # : 0.09 K/uL  Auto Eosinophil # : 0.00 K/uL  Auto Basophil # : 0.00 K/uL  Auto Neutrophil % : 96.9 %  Auto Lymphocyte % : 1.3 %  Auto Monocyte % : 1.1 %  Auto Eosinophil % : 0.0 %  Auto Basophil % : 0.0 %      Serial CBC's  03-27 @ 07:28  Hct-29.0 / Hgb-9.1 / Plat-170 / RBC-3.18 / WBC-8.39  Serial CBC's  03-26 @ 06:49  Hct-28.8 / Hgb-9.2 / Plat-213 / RBC-3.22 / WBC-11.17  Serial CBC's  03-25 @ 05:59  Hct-30.3 / Hgb-9.8 / Plat-226 / RBC-3.42 / WBC-17.89  Serial CBC's  03-25 @ 02:45  Hct-31.4 / Hgb-10.1 / Plat-233 / RBC-3.52 / WBC-21.10  Serial CBC's  03-24 @ 05:04  Hct-28.2 / Hgb-9.3 / Plat-227 / RBC-3.21 / WBC-15.06      03-27    132<L>  |  102  |  36<H>  ----------------------------<  125<H>  5.5<H>   |  19  |  0.8    Ca    8.4<L>      27 Mar 2021 07:28  Mg     2.0     03-27    TPro  4.8<L>  /  Alb  2.8<L>  /  TBili  0.5  /  DBili  x   /  AST  22  /  ALT  25  /  AlkPhos  79  03-27                                  BLOOD SMEAR INTERPRETATION:       RADIOLOGY & ADDITIONAL STUDIES:

## 2021-03-27 NOTE — PROGRESS NOTE ADULT - ATTENDING COMMENTS
Karlene Mcgill MD  Hospitalist   Spectra: [phone]    Patient is a 80y old  Male who presents with a chief complaint of 80 YEAR OLD MALE C/O MULTIPLE MEDICAL COMPLAINTS . (27 Mar 2021 12:19)      INTERVAL HPI/OVERNIGHT EVENTS: no acute overnight events noted   patient tolerated first dose of chemo yesterday     REVIEW OF SYSTEMS: negative  Vital Signs Last 24 Hrs  T(C): 35.7 (27 Mar 2021 13:35), Max: 36.1 (27 Mar 2021 04:42)  T(F): 96.3 (27 Mar 2021 13:35), Max: 96.9 (27 Mar 2021 04:42)  HR: 62 (27 Mar 2021 13:35) (58 - 72)  BP: 118/56 (27 Mar 2021 13:35) (106/52 - 122/66)  BP(mean): --  RR: 18 (27 Mar 2021 13:35) (16 - 18)  SpO2: 98% (27 Mar 2021 08:20) (98% - 98%)    PHYSICAL EXAM:   NAD; Normocephalic;   LUNGS - no wheezing  HEART: S1 S2+   ABDOMEN: Soft, Nontender, non distended  EXTREMITIES: no cyanosis; no edema  NERVOUS SYSTEM:  Awake and alert; no focal neuro deficits appreciated  right jaw swelling noted     LABS:                        9.1    8.39  )-----------( 170      ( 27 Mar 2021 07:28 )             29.0     03-27    132<L>  |  102  |  36<H>  ----------------------------<  125<H>  5.5<H>   |  19  |  0.8    Ca    8.4<L>      27 Mar 2021 07:28  Mg     2.0     03-27    TPro  4.8<L>  /  Alb  2.8<L>  /  TBili  0.5  /  DBili  x   /  AST  22  /  ALT  25  /  AlkPhos  79  03-27        CAPILLARY BLOOD GLUCOSE      A/P:-  Pt is seen and evaluated by bedside.     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  - patient went to dental appointment 3/24 - no intervention   - dental cleared for bisphosphonate, or denosumab  - He is to get Zometa today     # UGI bleed-- No egd done--only coffee ground emesis-- s/p 1 unit of PRBC  - no further episode noted   - Hb stable at this time     # Chronic A fib  - on eliquis at home   - currently on full dose lovenox     # CHFrEF  - EF of 35%  - euvolemic--off diuretics.    Provider Handoff:  Pending: currently on chemo - as per radiation and hem/on  Dispo: Homeless currently. Patient will need to go to rehab/NH where he can continue chemo/radiation therapy .   SW consulted for locating family     Plan of care was discussed with patient ; all questions and concerns were addressed and care was aligned with patient's wishes.

## 2021-03-27 NOTE — PROGRESS NOTE ADULT - ASSESSMENT
79 yo male hx of CAD s/p CABG s/p 20+ years ago, HFrEF, right sided large inguinal hernia BIBA from hotel room because he was unable to ambulate due to Rt thigh weakness and numbness. Pt found to have prostate cancer with mets to sacrum and mandible. Course complicated by hypovolemic shock 2/2 possible UGIB, required upgrade to stepdown unit.    # Prostate adenocarcinoma with bone mets to sacrum and mandible.  - s/p IR biopsy of sacral mass 3/10, showing metastatic prostate ca, PSA significantly elevated   - s/p mandible biopsy on 3/19 by ENT, showing metastatic prostate cancer  - started ADT with anti-testosterone (bicalutamide 50mg daily) on 3.14.21: plan to administer 1st IM  dose of LUPRON (GNRH agonist) on 3.29.21   - will add chemo: dose reduced taxotere. Standard is 75mg/m2 IV every 3 weeks: will administer 20mg/m2 WEEKLY beginning 3.26.21 (CONTINUE DEXAMETHASONE while on RT  - 3/24/21: started RT to sacrum and spine (completed 2/5 doses 3.25.21: once finished, plan to initiate RT 5 DOSES to jaw area for palliation  - as per dental, cleared to start either bisphosphonates or denosumab: plan for 3.26.21  -  Plan to start taxotere (20mg/m2 qweekly while inpatient) 3/26  - OMFS following pt: No dental intervention needed at this time. Patient is cleared from dental.     # Hematuria  - 2 episode overnight, blood tinged urine   - Hb stable     # Hypovolemic shock   - Resolved. Hb now stable  - Likely multifactorial, hypovolemia and possible UGIB given Hb drop  - s/p 2 units  - S/p a total of 5L boluses  - Off Levo. C/w Midodrine 10mg PO Q8H   - CT a/p noncont neg for bleed      # DENISE  - Resolved  - Likely pre-renal  - S/p a total of 5L boluses      # Acute on chronic anemia  - concern for possible UGIB   - Hb stable  - Hemolysis w/o not c/w hemolysis  - GI c/s appreciated: no intervention at this time, cleared for AC  - C/w PPI PO QD  - CT A/P no retroperitoneal bleed    # B/l hydro  - Likely due to urinary retention 2/2 prostate cancer   - CT scan showed on admission moderate to severe bilateral hydroureteronephrosis to the level of a markedly distended urinary bladder. Findings presumably secondary to urinary bladder outlet obstruction or urinary retention. Enlarged prostate gland.   - c/w Santos catheter     # Chest pain  - likely musculoskeletal, reproducible  - Tylenol   - EKG NSR  - Trops (-)    # CHFrEF  - Euvolemic   - Echo EF 35%, mod mitral calcification, enlarged left atria    # Afib  - Rate controlled  - on eliquis 5 mg bid at home  - c/w lovenox BID    # HTN  - Controlled  - holding aldactone, lasix, ACE and metoprolol since hypovolemic shock.  - pt on midodrine for now    # CAD     # Anxiety  - Xanax 0.25mg Q12H PRN    # Misc  DVT ppx: lovenox  GI ppx: protonix  Diet: soft (can advance as tolerated)  Activity: IAT  Code status: Full  Dispo: Acute   79 yo male hx of CAD s/p CABG s/p 20+ years ago, HFrEF, right sided large inguinal hernia BIBA from hotel room because he was unable to ambulate due to Rt thigh weakness and numbness, pt also had mandibular swelling for over a year. CT lumbosacral spine showed mass suggestive of mets with obliteration of neural foramina so was started on decadron.  Sacral mass biopsied 3/10 showed prostate adenocarcinoma. Mandibluar mass biopsied 3/19 also showed prostate cancer. Hospital course was complicated by hypovolemic shock likely 2/2 UGIB, required pressors and upgrade to stepdown unit. Pt currently in hospital undergoing chemo and radiotherapy.     # Prostate adenocarcinoma with bone mets to sacrum and mandible.  - s/p IR biopsy of sacral mass 3/10, showing metastatic prostate ca, PSA significantly elevated   - s/p mandible biopsy on 3/19 by ENT, showing metastatic prostate cancer  - started ADT with anti-testosterone (bicalutamide 50mg daily) on 3.14.21: plan to administer 1st IM  dose of LUPRON (GNRH agonist) on 3.29.21   - will add chemo: dose reduced taxotere. Standard is 75mg/m2 IV every 3 weeks: will administer 20mg/m2 WEEKLY beginning 3.26.21 (CONTINUE DEXAMETHASONE while on RT  - 3/24/21: started RT to sacrum and spine (completed 3/5 doses 3.26.21, will resume 4th session on monday 3/29. Once finished, plan to initiate RT 5 DOSES to jaw area for palliation)  - as per dental, cleared to start either bisphosphonates or denosumab: started 3/27  - Start taxotere (20mg/m2 qweekly while inpatient) 3/26  - OMFS following pt: No dental intervention needed at this time. Patient is cleared from dental.      # Hypovolemic shock   - Resolved. Hb now stable  - Likely multifactorial, hypovolemia and possible UGIB given Hb drop  - s/p 2 units  - S/p a total of 5L boluses  - Off Levo. C/w Midodrine 10mg PO Q8H   - CT a/p noncont neg for bleed      # DENISE  - Resolved  - Likely pre-renal  - S/p a total of 5L boluses     # Hematuria  - 2 episodes 3/24 and 3/25  - Hb stable     # Acute on chronic anemia  - concern for possible UGIB   - Hb stable  - Hemolysis w/o not c/w hemolysis  - GI c/s appreciated: no intervention at this time, cleared for AC  - C/w PPI PO QD  - CT A/P no retroperitoneal bleed    # B/l hydro  - Likely due to urinary retention 2/2 prostate cancer   - CT scan showed on admission moderate to severe bilateral hydroureteronephrosis to the level of a markedly distended urinary bladder. Findings presumably secondary to urinary bladder outlet obstruction or urinary retention. Enlarged prostate gland.   - c/w Santos catheter     # Chest pain  - likely musculoskeletal, reproducible  - Tylenol   - EKG NSR  - Trops (-)    # CHFrEF  - Euvolemic   - Echo EF 35%, mod mitral calcification, enlarged left atria    # Afib  - Rate controlled  - on eliquis 5 mg bid at home  - c/w lovenox BID    # HTN  - Controlled  - holding aldactone, lasix, ACE and metoprolol since hypovolemic shock.  - pt on midodrine for now    # CAD     # Anxiety  - Xanax 0.25mg Q12H PRN    # Misc  DVT ppx: lovenox  GI ppx: protonix  Diet: soft (can advance as tolerated)  Activity: IAT  Code status: Full  Dispo: Acute, pt homeless, lost his phone can't reach out to his family, f/u social work consult

## 2021-03-27 NOTE — PROGRESS NOTE ADULT - ASSESSMENT
Mr. Mckee is a 81 yo male hx of CAD s/p CABG s/p 20+ years ago/ CHF/ right sided large inguinal hernia BIBA from hotel room 2/2 unable to ambulate with right thigh weakness and numbness, now found to have metastatic disease with a mandibular mass and large sacral mass    # Stage IV Prostate cancer with diffuse osseous involvement/ spinal mets with paraspinal mass   -   - Sacral biopsy favors prostate ( neoplastic cells are positive for AE1/AE3, PSA and CK7)  - Jaw mass biopsy : prostate adenocarcinoma  - TLS labs wnl  - CT chest showing [id number] paravertebral lesion with mass effect.   - CT AP no RP bleed, no visceral mets  - MR CTL, brain and  jaw completed, mets throughout cervical and t-spine  - dexamethasone, f/u NSx or rad onc for taper    PLAN  - started ADT with anti-testosterone (bicalutamide 50mg daily) on 3.14.21: plan to administer 1st IM  dose of LUPRON (GNRH agonist) on 3.29.21   -- He received 1st dose of low dose taxotere 20mg/m2 on 3.26.21 with no issues. This is a weekly chemo plan. Next one due April 2nd   -3/24/21:  started RT to sacrum and spine (completed 3/5 doses 3.26.21: once finished, plan to initiate RT 5 DOSES to jaw area for palliation  - Spoke with dental, cleared to start either bisphosphonates or denosumab:  3. 26.21  -- He is to get Zometa today     # Normocytic anemia: Improved   - Per GI, outpatient scope  - Hapto high and retic low, not consistent with hemolysis  - TSAT 67%    # Leukocytosis: Likely due to decadron- WBC going down now     # Hyponatremia: - check SOsm, urine studies    # DENISE: Resolved    # Mild hypercalcemia, likely due to bone mets- s/p calcitonin before and Ca is normal now   Check PTH, PTHrP and 25OH Vit D     /PSYCH F/U  PT EVAL    DVT ppx on eliquis 2/2 to Afib

## 2021-03-28 LAB
24R-OH-CALCIDIOL SERPL-MCNC: 19 NG/ML — LOW (ref 30–80)
ALBUMIN SERPL ELPH-MCNC: 2.9 G/DL — LOW (ref 3.5–5.2)
ALP SERPL-CCNC: 78 U/L — SIGNIFICANT CHANGE UP (ref 30–115)
ALT FLD-CCNC: 24 U/L — SIGNIFICANT CHANGE UP (ref 0–41)
ANION GAP SERPL CALC-SCNC: 8 MMOL/L — SIGNIFICANT CHANGE UP (ref 7–14)
AST SERPL-CCNC: 21 U/L — SIGNIFICANT CHANGE UP (ref 0–41)
BASOPHILS # BLD AUTO: 0.01 K/UL — SIGNIFICANT CHANGE UP (ref 0–0.2)
BASOPHILS NFR BLD AUTO: 0.1 % — SIGNIFICANT CHANGE UP (ref 0–1)
BILIRUB SERPL-MCNC: 0.5 MG/DL — SIGNIFICANT CHANGE UP (ref 0.2–1.2)
BLD GP AB SCN SERPL QL: SIGNIFICANT CHANGE UP
BUN SERPL-MCNC: 28 MG/DL — HIGH (ref 10–20)
CALCIUM SERPL-MCNC: 8.2 MG/DL — LOW (ref 8.5–10.1)
CHLORIDE SERPL-SCNC: 101 MMOL/L — SIGNIFICANT CHANGE UP (ref 98–110)
CO2 SERPL-SCNC: 23 MMOL/L — SIGNIFICANT CHANGE UP (ref 17–32)
CREAT SERPL-MCNC: 0.6 MG/DL — LOW (ref 0.7–1.5)
EOSINOPHIL # BLD AUTO: 0 K/UL — SIGNIFICANT CHANGE UP (ref 0–0.7)
EOSINOPHIL NFR BLD AUTO: 0 % — SIGNIFICANT CHANGE UP (ref 0–8)
GLUCOSE SERPL-MCNC: 116 MG/DL — HIGH (ref 70–99)
HCT VFR BLD CALC: 28.4 % — LOW (ref 42–52)
HGB BLD-MCNC: 9.1 G/DL — LOW (ref 14–18)
IMM GRANULOCYTES NFR BLD AUTO: 0.7 % — HIGH (ref 0.1–0.3)
LYMPHOCYTES # BLD AUTO: 0.09 K/UL — LOW (ref 1.2–3.4)
LYMPHOCYTES # BLD AUTO: 0.8 % — LOW (ref 20.5–51.1)
MAGNESIUM SERPL-MCNC: 2.1 MG/DL — SIGNIFICANT CHANGE UP (ref 1.8–2.4)
MCHC RBC-ENTMCNC: 28.9 PG — SIGNIFICANT CHANGE UP (ref 27–31)
MCHC RBC-ENTMCNC: 32 G/DL — SIGNIFICANT CHANGE UP (ref 32–37)
MCV RBC AUTO: 90.2 FL — SIGNIFICANT CHANGE UP (ref 80–94)
MONOCYTES # BLD AUTO: 0.12 K/UL — SIGNIFICANT CHANGE UP (ref 0.1–0.6)
MONOCYTES NFR BLD AUTO: 1 % — LOW (ref 1.7–9.3)
NEUTROPHILS # BLD AUTO: 11.41 K/UL — HIGH (ref 1.4–6.5)
NEUTROPHILS NFR BLD AUTO: 97.4 % — HIGH (ref 42.2–75.2)
NRBC # BLD: 0 /100 WBCS — SIGNIFICANT CHANGE UP (ref 0–0)
PLATELET # BLD AUTO: 171 K/UL — SIGNIFICANT CHANGE UP (ref 130–400)
POTASSIUM SERPL-MCNC: 5.4 MMOL/L — HIGH (ref 3.5–5)
POTASSIUM SERPL-SCNC: 5.4 MMOL/L — HIGH (ref 3.5–5)
PROT SERPL-MCNC: 4.8 G/DL — LOW (ref 6–8)
RBC # BLD: 3.15 M/UL — LOW (ref 4.7–6.1)
RBC # FLD: 17.4 % — HIGH (ref 11.5–14.5)
SODIUM SERPL-SCNC: 132 MMOL/L — LOW (ref 135–146)
WBC # BLD: 11.71 K/UL — HIGH (ref 4.8–10.8)
WBC # FLD AUTO: 11.71 K/UL — HIGH (ref 4.8–10.8)

## 2021-03-28 PROCEDURE — 99233 SBSQ HOSP IP/OBS HIGH 50: CPT

## 2021-03-28 RX ADMIN — Medication 4 MILLIGRAM(S): at 05:45

## 2021-03-28 RX ADMIN — ENOXAPARIN SODIUM 70 MILLIGRAM(S): 100 INJECTION SUBCUTANEOUS at 18:32

## 2021-03-28 RX ADMIN — Medication 4 MILLIGRAM(S): at 22:07

## 2021-03-28 RX ADMIN — MIDODRINE HYDROCHLORIDE 10 MILLIGRAM(S): 2.5 TABLET ORAL at 13:45

## 2021-03-28 RX ADMIN — METHOCARBAMOL 500 MILLIGRAM(S): 500 TABLET, FILM COATED ORAL at 22:07

## 2021-03-28 RX ADMIN — ZOLEDRONIC ACID 400 MILLIGRAM(S): 5 INJECTION, SOLUTION INTRAVENOUS at 13:44

## 2021-03-28 RX ADMIN — LIDOCAINE 2 PATCH: 4 CREAM TOPICAL at 05:11

## 2021-03-28 RX ADMIN — PANTOPRAZOLE SODIUM 40 MILLIGRAM(S): 20 TABLET, DELAYED RELEASE ORAL at 05:45

## 2021-03-28 RX ADMIN — Medication 650 MILLIGRAM(S): at 22:07

## 2021-03-28 RX ADMIN — Medication 4 MILLIGRAM(S): at 13:46

## 2021-03-28 RX ADMIN — MIDODRINE HYDROCHLORIDE 10 MILLIGRAM(S): 2.5 TABLET ORAL at 18:32

## 2021-03-28 RX ADMIN — Medication 650 MILLIGRAM(S): at 13:46

## 2021-03-28 RX ADMIN — Medication 1 APPLICATION(S): at 22:01

## 2021-03-28 RX ADMIN — LIDOCAINE 2 PATCH: 4 CREAM TOPICAL at 22:07

## 2021-03-28 RX ADMIN — BICALUTAMIDE 50 MILLIGRAM(S): 50 TABLET, FILM COATED ORAL at 18:33

## 2021-03-28 RX ADMIN — METHOCARBAMOL 500 MILLIGRAM(S): 500 TABLET, FILM COATED ORAL at 13:46

## 2021-03-28 RX ADMIN — LATANOPROST 1 DROP(S): 0.05 SOLUTION/ DROPS OPHTHALMIC; TOPICAL at 22:06

## 2021-03-28 RX ADMIN — LIDOCAINE 2 PATCH: 4 CREAM TOPICAL at 22:01

## 2021-03-28 RX ADMIN — MIDODRINE HYDROCHLORIDE 10 MILLIGRAM(S): 2.5 TABLET ORAL at 05:45

## 2021-03-28 RX ADMIN — ENOXAPARIN SODIUM 70 MILLIGRAM(S): 100 INJECTION SUBCUTANEOUS at 05:45

## 2021-03-28 RX ADMIN — Medication 650 MILLIGRAM(S): at 05:45

## 2021-03-28 RX ADMIN — METHOCARBAMOL 500 MILLIGRAM(S): 500 TABLET, FILM COATED ORAL at 05:45

## 2021-03-28 NOTE — PROGRESS NOTE ADULT - SUBJECTIVE AND OBJECTIVE BOX
Karlene Mcgill MD  Hospitalist   Spectra: [phone]    Patient is a 80y old  Male who presents with a chief complaint of 80 YEAR OLD MALE C/O MULTIPLE MEDICAL COMPLAINTS . (27 Mar 2021 12:19)      INTERVAL HPI/OVERNIGHT EVENTS:     REVIEW OF SYSTEMS: negative  Vital Signs Last 24 Hrs  T(C): 36 (28 Mar 2021 04:52), Max: 36.6 (27 Mar 2021 19:58)  T(F): 96.8 (28 Mar 2021 04:52), Max: 97.8 (27 Mar 2021 19:58)  HR: 67 (28 Mar 2021 04:52) (62 - 67)  BP: 100/52 (28 Mar 2021 04:52) (100/52 - 118/56)  BP(mean): --  RR: 18 (28 Mar 2021 04:52) (18 - 18)  SpO2: --    PHYSICAL EXAM:   NAD; Normocephalic;   LUNGS - no wheezing  HEART: S1 S2+   ABDOMEN: Soft, Nontender, non distended  EXTREMITIES: no cyanosis; no edema  NERVOUS SYSTEM:  Awake and alert; no focal neuro deficits appreciated    LABS:                        9.1    11.71 )-----------( 171      ( 28 Mar 2021 04:30 )             28.4     03-28    132<L>  |  101  |  28<H>  ----------------------------<  116<H>  5.4<H>   |  23  |  0.6<L>    Ca    8.2<L>      28 Mar 2021 04:30  Mg     2.1     03-28    TPro  4.8<L>  /  Alb  2.9<L>  /  TBili  0.5  /  DBili  x   /  AST  21  /  ALT  24  /  AlkPhos  78  03-28         Karlene Mcgill MD  Hospitalist   Spectra: [phone]    Patient is a 80y old  Male who presents with a chief complaint of 80 YEAR OLD MALE C/O MULTIPLE MEDICAL COMPLAINTS . (27 Mar 2021 12:19)      INTERVAL HPI/OVERNIGHT EVENTS: no acute overnight events noted   patient reported feeling sick after benadryl   also reports loss of appetite since starting chemo     REVIEW OF SYSTEMS: negative  Vital Signs Last 24 Hrs  T(C): 36 (28 Mar 2021 04:52), Max: 36.6 (27 Mar 2021 19:58)  T(F): 96.8 (28 Mar 2021 04:52), Max: 97.8 (27 Mar 2021 19:58)  HR: 67 (28 Mar 2021 04:52) (62 - 67)  BP: 100/52 (28 Mar 2021 04:52) (100/52 - 118/56)  BP(mean): --  RR: 18 (28 Mar 2021 04:52) (18 - 18)  SpO2: --    PHYSICAL EXAM:   NAD; Normocephalic;   LUNGS - no wheezing  HEART: S1 S2+   ABDOMEN: Soft, Nontender, non distended  EXTREMITIES: no cyanosis; no edema  NERVOUS SYSTEM:  Awake and alert; no focal neuro deficits appreciated  right jaw mass - swelling       LABS:                        9.1    11.71 )-----------( 171      ( 28 Mar 2021 04:30 )             28.4     03-28    132<L>  |  101  |  28<H>  ----------------------------<  116<H>  5.4<H>   |  23  |  0.6<L>    Ca    8.2<L>      28 Mar 2021 04:30  Mg     2.1     03-28    TPro  4.8<L>  /  Alb  2.9<L>  /  TBili  0.5  /  DBili  x   /  AST  21  /  ALT  24  /  AlkPhos  78  03-28

## 2021-03-28 NOTE — PROGRESS NOTE ADULT - ASSESSMENT
A/P:-  Pt is seen and evaluated by bedside.     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  - patient went to dental appointment 3/24 - no intervention   - dental cleared for bisphosphonate, or denosumab  - He is to get Zometa today     # UGI bleed-- No egd done--only coffee ground emesis-- s/p 1 unit of PRBC  - no further episode noted   - Hb stable at this time     # Chronic A fib  - on eliquis at home   - currently on full dose lovenox     # CHFrEF  - EF of 35%  - euvolemic--off diuretics.    Provider Handoff:  Pending: currently on chemo - as per radiation and hem/on  Dispo: Homeless currently. Patient will need to go to rehab/NH where he can continue chemo/radiation therapy .   SW consulted for locating family     Plan of care was discussed with patient ; all questions and concerns were addressed and care was aligned with patient's wishes.   A/P:-  Pt is seen and evaluated by bedside.     #Metastatic prostate cancer with diffuse metastatic dis   - currently on chemo and radiation as per Hem/onc   - started ADT with anti-testosterone (bicalutamide 50mg daily) on 3.14.21  - plan to administer 1st IM  dose of LUPRON (GNRH agonist) on 3.29.21   - started on  taxotere - recevied first dose yesterday   - 3/24/21:  started RT to sacrum and spine (completed 2/5 doses 3.25.21: once finished, plan to initiate RT 5 DOSES to jaw area for palliation  - dental cleared for bisphosphonate, or denosumab  - s/p zoledronic acid yesterday  - patient had mention about need for ? intervention - needle procedure - unclear at this time     # SCC of jaw-  - ENT did biopsy of jaw 3/19-- metastaic adenocarcinoma.  - patient went to dental appointment 3/24 - no intervention   - dental cleared for bisphosphonate, or denosumab  - s/p zolendronic acid yesterday     # UGI bleed-- No egd done--only coffee ground emesis-- s/p 1 unit of PRBC  - no further episode noted   - Hb stable at this time     # Chronic A fib  - on eliquis at home   - currently on full dose lovenox     # CHFrEF  - EF of 35%  - euvolemic--off diuretics.    # elevated calcium level   now normal   follow PTH, PTHrP and vitamin d level     # Severe protein-calorie malnutrition.  - dietary on board   - supplements added   - patient reports diarrhea with ensure     Provider Handoff:  Pending: currently on chemo - as per radiation and hem/on  Dispo: Homeless currently. Patient will need to go to rehab/NH where he can continue chemo/radiation therapy .   SW consulted for locating family     Plan of care was discussed with patient ; all questions and concerns were addressed and care was aligned with patient's wishes.

## 2021-03-29 ENCOUNTER — NON-APPOINTMENT (OUTPATIENT)
Age: 81
End: 2021-03-29

## 2021-03-29 DIAGNOSIS — C79.51 SECONDARY MALIGNANT NEOPLASM OF BONE: ICD-10-CM

## 2021-03-29 LAB
ALBUMIN SERPL ELPH-MCNC: 2.9 G/DL — LOW (ref 3.5–5.2)
ALP SERPL-CCNC: 70 U/L — SIGNIFICANT CHANGE UP (ref 30–115)
ALT FLD-CCNC: 23 U/L — SIGNIFICANT CHANGE UP (ref 0–41)
ANION GAP SERPL CALC-SCNC: 10 MMOL/L — SIGNIFICANT CHANGE UP (ref 7–14)
ANION GAP SERPL CALC-SCNC: 8 MMOL/L — SIGNIFICANT CHANGE UP (ref 7–14)
AST SERPL-CCNC: 15 U/L — SIGNIFICANT CHANGE UP (ref 0–41)
BASOPHILS # BLD AUTO: 0.02 K/UL — SIGNIFICANT CHANGE UP (ref 0–0.2)
BASOPHILS NFR BLD AUTO: 0.2 % — SIGNIFICANT CHANGE UP (ref 0–1)
BILIRUB SERPL-MCNC: 0.5 MG/DL — SIGNIFICANT CHANGE UP (ref 0.2–1.2)
BUN SERPL-MCNC: 26 MG/DL — HIGH (ref 10–20)
BUN SERPL-MCNC: 28 MG/DL — HIGH (ref 10–20)
CALCIUM SERPL-MCNC: 8.2 MG/DL — LOW (ref 8.5–10.1)
CALCIUM SERPL-MCNC: 8.4 MG/DL — LOW (ref 8.5–10.1)
CHLORIDE SERPL-SCNC: 97 MMOL/L — LOW (ref 98–110)
CHLORIDE SERPL-SCNC: 99 MMOL/L — SIGNIFICANT CHANGE UP (ref 98–110)
CO2 SERPL-SCNC: 22 MMOL/L — SIGNIFICANT CHANGE UP (ref 17–32)
CO2 SERPL-SCNC: 24 MMOL/L — SIGNIFICANT CHANGE UP (ref 17–32)
CREAT SERPL-MCNC: 0.5 MG/DL — LOW (ref 0.7–1.5)
CREAT SERPL-MCNC: 0.6 MG/DL — LOW (ref 0.7–1.5)
EOSINOPHIL # BLD AUTO: 0 K/UL — SIGNIFICANT CHANGE UP (ref 0–0.7)
EOSINOPHIL NFR BLD AUTO: 0 % — SIGNIFICANT CHANGE UP (ref 0–8)
GLUCOSE SERPL-MCNC: 123 MG/DL — HIGH (ref 70–99)
GLUCOSE SERPL-MCNC: 136 MG/DL — HIGH (ref 70–99)
HCT VFR BLD CALC: 27.6 % — LOW (ref 42–52)
HGB BLD-MCNC: 8.8 G/DL — LOW (ref 14–18)
IMM GRANULOCYTES NFR BLD AUTO: 1.2 % — HIGH (ref 0.1–0.3)
LYMPHOCYTES # BLD AUTO: 0.07 K/UL — LOW (ref 1.2–3.4)
LYMPHOCYTES # BLD AUTO: 0.7 % — LOW (ref 20.5–51.1)
MAGNESIUM SERPL-MCNC: 2 MG/DL — SIGNIFICANT CHANGE UP (ref 1.8–2.4)
MCHC RBC-ENTMCNC: 28.8 PG — SIGNIFICANT CHANGE UP (ref 27–31)
MCHC RBC-ENTMCNC: 31.9 G/DL — LOW (ref 32–37)
MCV RBC AUTO: 90.2 FL — SIGNIFICANT CHANGE UP (ref 80–94)
MONOCYTES # BLD AUTO: 0.12 K/UL — SIGNIFICANT CHANGE UP (ref 0.1–0.6)
MONOCYTES NFR BLD AUTO: 1.1 % — LOW (ref 1.7–9.3)
NEUTROPHILS # BLD AUTO: 10.13 K/UL — HIGH (ref 1.4–6.5)
NEUTROPHILS NFR BLD AUTO: 96.8 % — HIGH (ref 42.2–75.2)
NRBC # BLD: 0 /100 WBCS — SIGNIFICANT CHANGE UP (ref 0–0)
PLATELET # BLD AUTO: 142 K/UL — SIGNIFICANT CHANGE UP (ref 130–400)
POTASSIUM SERPL-MCNC: 5.2 MMOL/L — HIGH (ref 3.5–5)
POTASSIUM SERPL-MCNC: 5.7 MMOL/L — HIGH (ref 3.5–5)
POTASSIUM SERPL-SCNC: 5.2 MMOL/L — HIGH (ref 3.5–5)
POTASSIUM SERPL-SCNC: 5.7 MMOL/L — HIGH (ref 3.5–5)
PROT SERPL-MCNC: 4.6 G/DL — LOW (ref 6–8)
RBC # BLD: 3.06 M/UL — LOW (ref 4.7–6.1)
RBC # FLD: 17 % — HIGH (ref 11.5–14.5)
SODIUM SERPL-SCNC: 129 MMOL/L — LOW (ref 135–146)
SODIUM SERPL-SCNC: 131 MMOL/L — LOW (ref 135–146)
WBC # BLD: 10.47 K/UL — SIGNIFICANT CHANGE UP (ref 4.8–10.8)
WBC # FLD AUTO: 10.47 K/UL — SIGNIFICANT CHANGE UP (ref 4.8–10.8)

## 2021-03-29 PROCEDURE — 99233 SBSQ HOSP IP/OBS HIGH 50: CPT

## 2021-03-29 PROCEDURE — 99232 SBSQ HOSP IP/OBS MODERATE 35: CPT

## 2021-03-29 PROCEDURE — 77261 THER RADIOLOGY TX PLNG SMPL: CPT

## 2021-03-29 RX ORDER — DEXTROSE 50 % IN WATER 50 %
50 SYRINGE (ML) INTRAVENOUS ONCE
Refills: 0 | Status: COMPLETED | OUTPATIENT
Start: 2021-03-29 | End: 2021-03-29

## 2021-03-29 RX ORDER — CHOLECALCIFEROL (VITAMIN D3) 125 MCG
2000 CAPSULE ORAL DAILY
Refills: 0 | Status: DISCONTINUED | OUTPATIENT
Start: 2021-03-29 | End: 2021-04-08

## 2021-03-29 RX ORDER — INSULIN HUMAN 100 [IU]/ML
10 INJECTION, SOLUTION SUBCUTANEOUS ONCE
Refills: 0 | Status: COMPLETED | OUTPATIENT
Start: 2021-03-29 | End: 2021-03-29

## 2021-03-29 RX ORDER — SODIUM ZIRCONIUM CYCLOSILICATE 10 G/10G
10 POWDER, FOR SUSPENSION ORAL
Refills: 0 | Status: DISCONTINUED | OUTPATIENT
Start: 2021-03-29 | End: 2021-03-29

## 2021-03-29 RX ORDER — SODIUM ZIRCONIUM CYCLOSILICATE 10 G/10G
5 POWDER, FOR SUSPENSION ORAL
Refills: 0 | Status: DISCONTINUED | OUTPATIENT
Start: 2021-03-29 | End: 2021-04-02

## 2021-03-29 RX ADMIN — PANTOPRAZOLE SODIUM 40 MILLIGRAM(S): 20 TABLET, DELAYED RELEASE ORAL at 05:20

## 2021-03-29 RX ADMIN — Medication 4 MILLIGRAM(S): at 13:25

## 2021-03-29 RX ADMIN — LIDOCAINE 2 PATCH: 4 CREAM TOPICAL at 07:42

## 2021-03-29 RX ADMIN — LIDOCAINE 2 PATCH: 4 CREAM TOPICAL at 21:43

## 2021-03-29 RX ADMIN — ENOXAPARIN SODIUM 70 MILLIGRAM(S): 100 INJECTION SUBCUTANEOUS at 17:21

## 2021-03-29 RX ADMIN — SODIUM ZIRCONIUM CYCLOSILICATE 10 GRAM(S): 10 POWDER, FOR SUSPENSION ORAL at 10:01

## 2021-03-29 RX ADMIN — ENOXAPARIN SODIUM 70 MILLIGRAM(S): 100 INJECTION SUBCUTANEOUS at 05:20

## 2021-03-29 RX ADMIN — METHOCARBAMOL 500 MILLIGRAM(S): 500 TABLET, FILM COATED ORAL at 05:20

## 2021-03-29 RX ADMIN — Medication 1 APPLICATION(S): at 21:55

## 2021-03-29 RX ADMIN — SODIUM ZIRCONIUM CYCLOSILICATE 5 GRAM(S): 10 POWDER, FOR SUSPENSION ORAL at 17:21

## 2021-03-29 RX ADMIN — Medication 50 MILLILITER(S): at 09:56

## 2021-03-29 RX ADMIN — BICALUTAMIDE 50 MILLIGRAM(S): 50 TABLET, FILM COATED ORAL at 13:25

## 2021-03-29 RX ADMIN — MIDODRINE HYDROCHLORIDE 10 MILLIGRAM(S): 2.5 TABLET ORAL at 13:25

## 2021-03-29 RX ADMIN — LIDOCAINE 2 PATCH: 4 CREAM TOPICAL at 10:00

## 2021-03-29 RX ADMIN — Medication 4 MILLIGRAM(S): at 21:43

## 2021-03-29 RX ADMIN — Medication 650 MILLIGRAM(S): at 13:24

## 2021-03-29 RX ADMIN — MIDODRINE HYDROCHLORIDE 10 MILLIGRAM(S): 2.5 TABLET ORAL at 05:19

## 2021-03-29 RX ADMIN — METHOCARBAMOL 500 MILLIGRAM(S): 500 TABLET, FILM COATED ORAL at 13:24

## 2021-03-29 RX ADMIN — Medication 650 MILLIGRAM(S): at 05:20

## 2021-03-29 RX ADMIN — Medication 650 MILLIGRAM(S): at 21:43

## 2021-03-29 RX ADMIN — Medication 2000 UNIT(S): at 13:24

## 2021-03-29 RX ADMIN — Medication 7.5 MILLIGRAM(S): at 16:12

## 2021-03-29 RX ADMIN — LATANOPROST 1 DROP(S): 0.05 SOLUTION/ DROPS OPHTHALMIC; TOPICAL at 21:55

## 2021-03-29 RX ADMIN — Medication 4 MILLIGRAM(S): at 05:20

## 2021-03-29 RX ADMIN — MIDODRINE HYDROCHLORIDE 10 MILLIGRAM(S): 2.5 TABLET ORAL at 17:21

## 2021-03-29 RX ADMIN — INSULIN HUMAN 10 UNIT(S): 100 INJECTION, SOLUTION SUBCUTANEOUS at 09:54

## 2021-03-29 RX ADMIN — METHOCARBAMOL 500 MILLIGRAM(S): 500 TABLET, FILM COATED ORAL at 21:43

## 2021-03-29 NOTE — PROGRESS NOTE ADULT - ASSESSMENT
79 yo male hx of CAD s/p CABG s/p 20+ years ago, HFrEF, right sided large inguinal hernia BIBA from hotel room because he was unable to ambulate due to Rt thigh weakness and numbness, pt also had mandibular swelling for over a year. CT lumbosacral spine showed mass suggestive of mets with obliteration of neural foramina so was started on decadron.  Sacral mass biopsied 3/10 showed prostate adenocarcinoma. Mandibluar mass biopsied 3/19 also showed prostate cancer. Hospital course was complicated by hypovolemic shock likely 2/2 UGIB, required pressors and upgrade to stepdown unit. Pt currently in hospital undergoing chemo and radiotherapy.       #Metastatic prostate cancer with diffuse metastatic lesions   - currently on chemo and radiation as per Hem/onc   - started ADT with anti-testosterone (bicalutamide 50mg daily) on 3.14.21  - plan to administer 1st IM  dose of LUPRON (GNRH agonist) on 3.29.21   - started on  taxotere - recevied first dose yesterday   - 3/24/21:  started RT to sacrum and spine (completed 2/5 doses 3.25.21: once finished, plan to initiate RT 5 DOSES to jaw area for palliation  - dental cleared for bisphosphonate, or denosumab  - s/p zoledronic acid yesterday      # SCC of jaw-  - ENT did biopsy of jaw 3/19-- metastaic adenocarcinoma.  - patient went to dental appointment 3/24 - no intervention   - dental cleared for bisphosphonate, or denosumab  - s/p zolendronic acid yesterday     # UGI bleed-- No egd done--only coffee ground emesis-- s/p 1 unit of PRBC  - no further episode noted   - Hb stable at this time     # Chronic A fib  - on eliquis at home   - currently on full dose lovenox     # CHFrEF  - EF of 35%  - euvolemic--off diuretics.    # elevated calcium level   now normal   follow PTH, PTHrP and vitamin d level     # Severe protein-calorie malnutrition.  - dietary on board   - supplements added   - patient reports diarrhea with ensure     Provider Handoff:  Pending: currently on chemo - as per radiation and hem/on  Dispo: Homeless currently. Patient will need to go to rehab/NH where he can continue chemo/radiation therapy .   SW consulted for locating family    79 yo male hx of CAD s/p CABG s/p 20+ years ago, HFrEF, right sided large inguinal hernia BIBA from hotel room because he was unable to ambulate due to Rt thigh weakness and numbness, pt also had mandibular swelling for over a year. CT lumbosacral spine showed mass suggestive of mets with obliteration of neural foramina so was started on decadron.  Sacral mass biopsied 3/10 showed prostate adenocarcinoma. Mandibluar mass biopsied 3/19 also showed prostate cancer. Hospital course was complicated by hypovolemic shock likely 2/2 UGIB, required pressors and upgrade to stepdown unit. Pt currently in hospital undergoing chemo and radiotherapy.       #Metastatic prostate cancer with diffuse metastatic lesions   - currently on chemo and radiation as per Hem/onc   - started ADT with anti-testosterone (bicalutamide 50mg daily) on 3.14.21  - plan to administer 1st IM  dose of LUPRON (GNRH agonist) on 3.29.21   - started on  taxotere - recevied first dose (3/27)  - 3/24/21:  started RT to sacrum and spine (completed 3/5 doses 3.29.21: once finished, plan to initiate RT 5 DOSES to jaw area for palliation  - dental cleared for bisphosphonate, or denosumab  - s/p zoledronic acid 4mg q4 weeks (3/27)      # SCC of jaw-  - ENT did biopsy of jaw 3/19-- metastaic adenocarcinoma.  - patient went to dental appointment 3/24 - no intervention   - dental cleared for bisphosphonate, or denosumab  - s/p zolendronic acid (3/27)    # UGI bleed-- No egd done--only coffee ground emesis-- s/p 1 unit of PRBC  - no further episode noted   - Hb stable at this time     # Chronic A fib  - on eliquis at home   - currently on full dose lovenox     # CHFrEF  - EF of 35%  - euvolemic--off diuretics.    # elevated calcium level   now normal   follow PTH, PTHrP and vitamin d level     # Severe protein-calorie malnutrition.  - dietary on board   - supplements added   - patient reports diarrhea with ensure     Provider Handoff:  Pending: currently on chemo - as per radiation and hem/on  Dispo: Homeless currently. Patient will need to go to rehab/NH where he can continue chemo/radiation therapy .   SW consulted for locating family    79 yo male hx of CAD s/p CABG s/p 20+ years ago, HFrEF, right sided large inguinal hernia BIBA from hotel room because he was unable to ambulate due to Rt thigh weakness and numbness, pt also had mandibular swelling for over a year. CT lumbosacral spine showed mass suggestive of mets with obliteration of neural foramina so was started on decadron.  Sacral mass biopsied 3/10 showed prostate adenocarcinoma. Mandibluar mass biopsied 3/19 also showed prostate cancer. Hospital course was complicated by hypovolemic shock likely 2/2 UGIB, required pressors and upgrade to stepdown unit. Pt currently in hospital undergoing chemo and radiotherapy.       #Metastatic prostate cancer with diffuse metastatic lesions   - currently on chemo and radiation as per Hem/onc   - started ADT with anti-testosterone (bicalutamide 50mg daily) on 3.14.21  - plan to administer 1st IM  dose of LUPRON (GNRH agonist) today  - started on  taxotere - recevied first dose (3/27)  - 3/24/21:  started RT to sacrum and spine (completed 4/5 doses 3.29.21: once finished, plan to initiate RT 5 DOSES to jaw area for palliation  - dental cleared for bisphosphonate, or denosumab  - s/p zoledronic acid 4mg q4 weeks (3/27)    # SCC of jaw-  - ENT did biopsy of jaw 3/19-- metastaic adenocarcinoma.  - patient went to dental appointment 3/24 - no intervention   - dental cleared for bisphosphonate, or denosumab  - s/p zolendronic acid (3/27)    # UGI bleed-- No egd done--only coffee ground emesis-- s/p 1 unit of PRBC  - no further episode noted   - Hb stable at this time     # Chronic A fib  - on eliquis at home   - currently on full dose lovenox     # CHFrEF  - EF of 35%  - euvolemic--off diuretics.    # elevated calcium level   - now normal s/p calcitrol   - Vitamin D 19  - f/u PTH, PTHrP    # Severe protein-calorie malnutrition.  - dietary on board   - supplements added   - patient reports diarrhea with ensure     #DVT PPx: Lovenox  #GI PPx: Protonix  FULL CODE  Dispo: Acute, likely d/c to NH   81 yo male hx of CAD s/p CABG s/p 20+ years ago, HFrEF, right sided large inguinal hernia BIBA from hotel room because he was unable to ambulate due to Rt thigh weakness and numbness, pt also had mandibular swelling for over a year. CT lumbosacral spine showed mass suggestive of mets with obliteration of neural foramina so was started on decadron.  Sacral mass biopsied 3/10 showed prostate adenocarcinoma. Mandibluar mass biopsied 3/19 also showed prostate cancer. Hospital course was complicated by hypovolemic shock likely 2/2 UGIB, required pressors and upgrade to stepdown unit. Pt currently in hospital undergoing chemo and radiotherapy.       #Metastatic prostate cancer with diffuse metastatic lesions   - currently on chemo and radiation as per Hem/onc   - started ADT with anti-testosterone (bicalutamide 50mg daily) on 3.14.21  - plan to administer 1st IM  dose of LUPRON (GNRH agonist) today  - started on  taxotere - recevied first dose (3/27)  - 3/24/21:  started RT to sacrum and spine (completed 4/5 doses 3.29.21: once finished, plan to initiate RT 5 DOSES to jaw area for palliation  - dental cleared for bisphosphonate, or denosumab  - s/p zoledronic acid 4mg q4 weeks (3/27)    # SCC of jaw-  - ENT did biopsy of jaw 3/19-- metastaic adenocarcinoma.  - patient went to dental appointment 3/24 - no intervention   - dental cleared for bisphosphonate, or denosumab  - s/p zolendronic acid (3/27)    #Hyperkalemia  - start Lokelma 10mg daily   - s/p insulin/d50  - f/u 4pm BMP    # UGI bleed-- No egd done--only coffee ground emesis-- s/p 1 unit of PRBC  - no further episode noted   - Hb stable at this time     # Chronic A fib  - on eliquis at home   - currently on full dose lovenox     # CHFrEF  - EF of 35%  - euvolemic--off diuretics.    # elevated calcium level   - now normal s/p calcitrol   - Vitamin D 19  - f/u PTH, PTHrP    # Severe protein-calorie malnutrition.  - dietary on board   - supplements added   - patient reports diarrhea with ensure     #DVT PPx: Lovenox  #GI PPx: Protonix  FULL CODE  Dispo: Acute, likely d/c to NH   79 yo male hx of CAD s/p CABG s/p 20+ years ago, HFrEF, right sided large inguinal hernia BIBA from hotel room because he was unable to ambulate due to Rt thigh weakness and numbness, pt also had mandibular swelling for over a year. CT lumbosacral spine showed mass suggestive of mets with obliteration of neural foramina so was started on decadron.  Sacral mass biopsied 3/10 showed prostate adenocarcinoma. Mandibluar mass biopsied 3/19 also showed prostate cancer. Hospital course was complicated by hypovolemic shock likely 2/2 UGIB, required pressors and upgrade to stepdown unit. Pt currently in hospital undergoing chemo and radiotherapy.       #Metastatic prostate cancer with diffuse metastatic lesions   - currently on chemo and radiation as per Hem/onc   - started ADT with anti-testosterone (bicalutamide 50mg daily) on 3.14.21  - plan to administer 1st IM  dose of LUPRON (GNRH agonist) today  - started on  taxotere - recevied first dose (3/27)  - 3/24/21:  started RT to sacrum and spine (completed 4/5 doses 3.29.21: once finished, plan to initiate RT 5 DOSES to jaw area for palliation  - dental cleared for bisphosphonate, or denosumab  - s/p zoledronic acid 4mg q4 weeks (3/27)    # SCC of jaw-  - ENT did biopsy of jaw 3/19-- metastaic adenocarcinoma.  - patient went to dental appointment 3/24 - no intervention   - dental cleared for bisphosphonate, or denosumab  - s/p zolendronic acid (3/27)    #Hyperkalemia  - start Lokelma 10mg daily   - s/p insulin/d50  - f/u 4pm BMP    # UGI bleed-- No egd done--only coffee ground emesis-- s/p 1 unit of PRBC  - no further episode noted   - Hb stable at this time     # Chronic A fib  - on eliquis at home   - currently on full dose lovenox     # CHFrEF  - EF of 35%  - euvolemic--off diuretics.    # elevated calcium level  # Low Vitamin D level   - now normal s/p calcitrol   - Vitamin D 19, started on vitamin D supplements 2000units daily   - f/u PTH, PTHrP    # Severe protein-calorie malnutrition.  - dietary on board   - supplements added   - patient reports diarrhea with ensure     #DVT PPx: Lovenox  #GI PPx: Protonix  FULL CODE  Dispo: Acute, likely d/c to NH once ready

## 2021-03-29 NOTE — PROGRESS NOTE ADULT - SUBJECTIVE AND OBJECTIVE BOX
24H events:    No acute events over the weekend  Afebrile, VSS  C/w RT  Plan for leupron today    PAST MEDICAL & SURGICAL HISTORY  Inguinal hernia    CHF (congestive heart failure)    S/P CABG x 3      SOCIAL HISTORY:  Negative for smoking/alcohol/drug use.     ALLERGIES:  No Known Allergies    MEDICATIONS:  STANDING MEDICATIONS  bicalutamide 50 milliGRAM(s) Oral daily  chlorhexidine 4% Liquid 1 Application(s) Topical daily  cholecalciferol 2000 Unit(s) Oral daily  collagenase Ointment 1 Application(s) Topical daily  dexAMETHasone     Tablet 4 milliGRAM(s) Oral every 8 hours  enoxaparin Injectable 70 milliGRAM(s) SubCutaneous two times a day  latanoprost 0.005% Ophthalmic Solution 1 Drop(s) Both EYES at bedtime  leuprolide depot Injectable (LUPRON-DEPOT) 7.5 milliGRAM(s) IntraMuscular once  lidocaine   Patch 2 Patch Transdermal daily  methocarbamol 500 milliGRAM(s) Oral three times a day  midodrine. 10 milliGRAM(s) Oral three times a day  pantoprazole    Tablet 40 milliGRAM(s) Oral before breakfast  sodium bicarbonate 650 milliGRAM(s) Oral three times a day  sodium zirconium cyclosilicate 10 Gram(s) Oral two times a day  zoledronic acid IVPB (ZOMETA) (Non - oncologic) 4 milliGRAM(s) IV Intermittent every 4 weeks    PRN MEDICATIONS  acetaminophen   Tablet .. 650 milliGRAM(s) Oral every 6 hours PRN  aluminum hydroxide/magnesium hydroxide/simethicone Suspension 30 milliLiter(s) Oral every 6 hours PRN  brimonidine 0.2% Ophthalmic Solution 1 Drop(s) Both EYES every 8 hours PRN    VITALS:   T(F): 97  HR: 64  BP: 105/46  RR: 18  SpO2: 98%    LABS:                        8.8    10.47 )-----------( 142      ( 29 Mar 2021 06:51 )             27.6     03-29    131<L>  |  99  |  28<H>  ----------------------------<  123<H>  5.7<H>   |  24  |  0.6<L>    Ca    8.4<L>      29 Mar 2021 06:51  Mg     2.0     03-29    TPro  4.6<L>  /  Alb  2.9<L>  /  TBili  0.5  /  DBili  x   /  AST  15  /  ALT  23  /  AlkPhos  70  03-29                  RADIOLOGY:    PHYSICAL EXAM:  GEN: No acute distress  HENT: NCAT, EOMI  LYMPH: No appreciable adenopathy  LUNGS: No respiratory distress, clear to auscultation bilaterally   HEART: regular rate and rhythm  ABD: Soft, non-tender, non-distended  SKIN: No rash  NEURO: AAOX3

## 2021-03-29 NOTE — CHART NOTE - NSCHARTNOTEFT_GEN_A_CORE
3 Day Calorie Count Results  Diet order: Soft  Supplements: Prosource Gelatin Plus BID, Ensure Enlive BID, Ensure pudding BID    Day 1: 3/26--only 50% Ensure intake noted   total kcal = 1276kcal    total protein = 53.5g     Day 2: 3/27--no documentation at dinner; no documentation of supplement intake    total kcal = 441kcal   total protein = 14g    Day 3: 3/328--no documentation     Unable to calculate avg intake d/t inconsistent PO documentation. Pt being followed by [facility], defer to team for further interventions.

## 2021-03-29 NOTE — PROGRESS NOTE ADULT - ATTENDING COMMENTS
Karlene Mcgill MD  Hospitalist   Spectra: [phone]    Patient is a 80y old  Male who presents with a chief complaint of 80 YEAR OLD MALE C/O MULTIPLE MEDICAL COMPLAINTS . (29 Mar 2021 12:29)      INTERVAL HPI/OVERNIGHT EVENTS: no acute overnight events noted     REVIEW OF SYSTEMS: negative  Vital Signs Last 24 Hrs  T(C): 35.6 (29 Mar 2021 13:38), Max: 36.4 (28 Mar 2021 19:44)  T(F): 96.1 (29 Mar 2021 13:38), Max: 97.6 (28 Mar 2021 19:44)  HR: 65 (29 Mar 2021 13:38) (64 - 72)  BP: 128/60 (29 Mar 2021 13:38) (105/46 - 128/60)  BP(mean): --  RR: 18 (29 Mar 2021 13:38) (18 - 18)  SpO2: 98% (28 Mar 2021 18:00) (98% - 98%)    PHYSICAL EXAM:   NAD; Normocephalic;   LUNGS - no wheezing  HEART: S1 S2+   ABDOMEN: Soft, Nontender, non distended  EXTREMITIES: no cyanosis; no edema  NERVOUS SYSTEM:  Awake and alert; no focal neuro deficits appreciated    LABS:                        8.8    10.47 )-----------( 142      ( 29 Mar 2021 06:51 )             27.6     03-29    131<L>  |  99  |  28<H>  ----------------------------<  123<H>  5.7<H>   |  24  |  0.6<L>    Ca    8.4<L>      29 Mar 2021 06:51  Mg     2.0     03-29    TPro  4.6<L>  /  Alb  2.9<L>  /  TBili  0.5  /  DBili  x   /  AST  15  /  ALT  23  /  AlkPhos  70  03-29      A/P:-  Pt is seen and evaluated by bedside.     #Metastatic prostate cancer with diffuse metastatic dis   - currently on chemo and radiation as per Hem/onc   - started ADT with anti-testosterone (bicalutamide 50mg daily) on 3.14.21  - plan to administer 1st IM  dose of LUPRON (GNRH agonist) on 3.29.21   - started on  taxotere - recevied first dose yesterday   - 3/24/21:  started RT to sacrum and spine (completed 2/5 doses 3.25.21: once finished, plan to initiate RT 5 DOSES to jaw area for palliation  - dental cleared for bisphosphonate, or denosumab  - s/p zoledronic acid yesterday  - patient had mention about need for ? intervention - needle procedure - unclear at this time     # SCC of jaw-  - ENT did biopsy of jaw 3/19-- metastaic adenocarcinoma.  - patient went to dental appointment 3/24 - no intervention   - dental cleared for bisphosphonate, or denosumab  - s/p zolendronic acid yesterday     # UGI bleed-- No egd done--only coffee ground emesis-- s/p 1 unit of PRBC  - no further episode noted   - Hb stable at this time     # Chronic A fib  - on eliquis at home   - currently on full dose lovenox     # CHFrEF  - EF of 35%  - euvolemic--off diuretics.    # elevated calcium level   now normal   follow PTH, PTHrP and vitamin d level     # Severe protein-calorie malnutrition.  - dietary on board   - supplements added   - patient reports diarrhea with ensure     Provider Handoff:  Pending: currently on chemo - as per radiation and hem/on  Dispo: Homeless currently. Patient will need to go to rehab/NH where he can continue chemo/radiation therapy .   SW consulted for locating family     Plan of care was discussed with patient ; all questions and concerns were addressed and care was aligned with patient's wishes.

## 2021-03-29 NOTE — PROGRESS NOTE ADULT - ASSESSMENT
Mr. Mckee is a 79 yo male hx of AFib, CAD s/p CABG s/p 20+ years ago/ CHF/ right sided large inguinal hernia BIBA from hotel room 2/2 unable to ambulate with right thigh weakness and numbness, now found to have metastatic disease with a mandibular mass and large sacral mass    # Stage IV Prostate cancer with diffuse osseous involvement/ spinal mets with paraspinal mass   -   - Sacral biopsy favors prostate ( neoplastic cells are positive for AE1/AE3, PSA and CK7)  - Jaw mass biopsy : prostate adenocarcinoma  - TLS labs wnl  - CT chest showing [id number] paravertebral lesion with mass effect.   - CT AP no RP bleed, no visceral mets  - MR CTL, brain and  jaw completed, mets throughout cervical and t-spine  - dexamethasone, f/u rad onc for taper    PLAN  -- started ADT with anti-testosterone (bicalutamide 50mg daily) on 3.14.21: plan to administer 1st IM  dose of LUPRON 7.5mg (GNRH agonist) on 3.29.21 (next dose in 1 month)  -- He received 1st dose of low dose taxotere 20mg/m2 on 3.26.21 with no issues, will give next cycle after RT is finished   -- 3/24/21:  started RT to sacrum and spine (completed 3/5 doses 3.26.21: once finished, plan to initiate RT 5 DOSES to jaw area for palliation  -- Spoke with dental, cleared to start either bisphosphonates or denosumab:  3. 26.21, he received zometa 3/27      # Normocytic anemia: Improved   - Per GI, outpatient scope  - Hapto high and retic low, not consistent with hemolysis  - TSAT 67%    # Leukocytosis: Likely due to decadron- WBC going down now     # Hyponatremia: - check SOsm, urine studies    # DENISE: Resolved    # Mild hypercalcemia, likely due to bone mets- s/p calcitonin before and Ca is normal now   Check PTH, PTHrP and 25OH Vit D     /PSYCH F/U  PT EVAL    DVT ppx on eliquis 2/2 to Afib        Mr. Mckee is a 81 yo male hx of AFib, CAD s/p CABG s/p 20+ years ago/ CHF/ right sided large inguinal hernia BIBA from hotel room 2/2 unable to ambulate with right thigh weakness and numbness, now found to have metastatic disease with a mandibular mass and large sacral mass    # Stage IV Prostate cancer with diffuse osseous involvement/ spinal mets with paraspinal mass   -   - Sacral biopsy favors prostate ( neoplastic cells are positive for AE1/AE3, PSA and CK7)  - Jaw mass biopsy : prostate adenocarcinoma  - TLS labs wnl  - CT chest showing [id number] [id number] paravertebral lesion with mass effect.   - CT AP no RP bleed, no visceral mets  - MR CTL, brain and  jaw completed, mets throughout cervical and t-spine  - dexamethasone, f/u rad onc for taper    PLAN  -- started ADT with anti-testosterone (bicalutamide 50mg daily) on 3.14.21:  1st IM  dose of LUPRON 7.5mg (GNRH agonist) on 3.29.21 (next dose in 1 month)  -- He received 1st dose of low dose taxotere 20mg/m2 on 3.26.21 with no issues, will give next cycle after full RT is finished (sarcum/spine AND jaw areas)  -- 3/24/21:  started RT to sacrum and spine (completed 4/5 doses 3.29.21: once finished, plan to initiate RT 5 DOSES to jaw area for palliation; expected to be completed by 4/6/21  -- Spoke with dental, cleared to start either bisphosphonates or denosumab:  3. 26.21, he received zometa 3/27      # Normocytic anemia: Improved   - Per GI, outpatient scope  - Hapto high and retic low, not consistent with hemolysis  - TSAT 67%    # Leukocytosis: Likely due to decadron- WBC going down now     # Hyponatremia: - check SOsm, urine studies    # DENISE: Resolved    # Mild hypercalcemia, likely due to bone mets- s/p calcitonin before and Ca is normal now   Check PTH, PTHrP and 25OH Vit D     /PSYCH F/U  PT EVAL    DVT ppx on eliquis 2/2 to Afib

## 2021-03-29 NOTE — PROGRESS NOTE ADULT - SUBJECTIVE AND OBJECTIVE BOX
HPI  Patient is a 80y old Male who presents with a chief complaint of 80 YEAR OLD MALE C/O MULTIPLE MEDICAL COMPLAINTS . (28 Mar 2021 12:07)    Currently admitted to medicine with the primary diagnosis of Ambulatory dysfunction       Today is hospital day 21d.     INTERVAL HPI / OVERNIGHT EVENTS:  Patient was examined and seen at bedside. This morning he is resting comfortably in bed and reports no new issues or overnight events.     ROS: Otherwise unremarkable     PAST MEDICAL & SURGICAL HISTORY  Inguinal hernia    CHF (congestive heart failure)    S/P CABG x 3      ALLERGIES  No Known Allergies    MEDICATIONS  STANDING MEDICATIONS  bicalutamide 50 milliGRAM(s) Oral daily  chlorhexidine 4% Liquid 1 Application(s) Topical daily  collagenase Ointment 1 Application(s) Topical daily  dexAMETHasone     Tablet 4 milliGRAM(s) Oral every 8 hours  enoxaparin Injectable 70 milliGRAM(s) SubCutaneous two times a day  latanoprost 0.005% Ophthalmic Solution 1 Drop(s) Both EYES at bedtime  lidocaine   Patch 2 Patch Transdermal daily  methocarbamol 500 milliGRAM(s) Oral three times a day  midodrine. 10 milliGRAM(s) Oral three times a day  pantoprazole    Tablet 40 milliGRAM(s) Oral before breakfast  sodium bicarbonate 650 milliGRAM(s) Oral three times a day  zoledronic acid IVPB (ZOMETA) (Non - oncologic) 4 milliGRAM(s) IV Intermittent every 4 weeks    PRN MEDICATIONS  acetaminophen   Tablet .. 650 milliGRAM(s) Oral every 6 hours PRN  aluminum hydroxide/magnesium hydroxide/simethicone Suspension 30 milliLiter(s) Oral every 6 hours PRN  brimonidine 0.2% Ophthalmic Solution 1 Drop(s) Both EYES every 8 hours PRN    VITALS:  T(F): 97  HR: 64  BP: 105/46  RR: 18  SpO2: 98%    PHYSICAL EXAM  GEN: NAD, Resting comfortably in bed  PULM: Clear to auscultation bilaterally, No wheezes  CVS: Regular rate and rhythm, S1-S2, no murmurs  ABD: Soft, non-tender, non-distended, no guarding  EXT: No edema  NEURO: A&Ox3, no focal deficits    LABS                        8.8    10.47 )-----------( 142      ( 29 Mar 2021 06:51 )             27.6     03-28    132<L>  |  101  |  28<H>  ----------------------------<  116<H>  5.4<H>   |  23  |  0.6<L>    Ca    8.2<L>      28 Mar 2021 04:30  Mg     2.1     03-28    TPro  4.8<L>  /  Alb  2.9<L>  /  TBili  0.5  /  DBili  x   /  AST  21  /  ALT  24  /  AlkPhos  78  03-28                  RADIOLOGY     HPI  Patient is a 80y old Male who presents with a chief complaint of 80 YEAR OLD MALE C/O MULTIPLE MEDICAL COMPLAINTS . (28 Mar 2021 12:07)    Currently admitted to medicine with the primary diagnosis of Ambulatory dysfunction       Today is hospital day 21d.     INTERVAL HPI / OVERNIGHT EVENTS:  Patient was examined and seen at bedside. This morning he is resting comfortably in bed and reports no new issues or overnight events. He reports tolerating his chemo so far, ready for radiation therapy today. Denies any pain.     ROS: Otherwise unremarkable     PAST MEDICAL & SURGICAL HISTORY  Inguinal hernia    CHF (congestive heart failure)    S/P CABG x 3      ALLERGIES  No Known Allergies    MEDICATIONS  STANDING MEDICATIONS  bicalutamide 50 milliGRAM(s) Oral daily  chlorhexidine 4% Liquid 1 Application(s) Topical daily  collagenase Ointment 1 Application(s) Topical daily  dexAMETHasone     Tablet 4 milliGRAM(s) Oral every 8 hours  enoxaparin Injectable 70 milliGRAM(s) SubCutaneous two times a day  latanoprost 0.005% Ophthalmic Solution 1 Drop(s) Both EYES at bedtime  lidocaine   Patch 2 Patch Transdermal daily  methocarbamol 500 milliGRAM(s) Oral three times a day  midodrine. 10 milliGRAM(s) Oral three times a day  pantoprazole    Tablet 40 milliGRAM(s) Oral before breakfast  sodium bicarbonate 650 milliGRAM(s) Oral three times a day  zoledronic acid IVPB (ZOMETA) (Non - oncologic) 4 milliGRAM(s) IV Intermittent every 4 weeks    PRN MEDICATIONS  acetaminophen   Tablet .. 650 milliGRAM(s) Oral every 6 hours PRN  aluminum hydroxide/magnesium hydroxide/simethicone Suspension 30 milliLiter(s) Oral every 6 hours PRN  brimonidine 0.2% Ophthalmic Solution 1 Drop(s) Both EYES every 8 hours PRN    VITALS:  T(F): 97  HR: 64  BP: 105/46  RR: 18  SpO2: 98%    PHYSICAL EXAM  GEN: NAD, Resting comfortably in bed. Cachectic  PULM: Clear to auscultation bilaterally, No wheezes  CVS: Regular rate and rhythm, S1-S2, no murmurs  ABD: Soft, non-tender, non-distended, no guarding  EXT: No edema  NEURO: A&Ox3, no focal deficits    LABS                        8.8    10.47 )-----------( 142      ( 29 Mar 2021 06:51 )             27.6     03-28    132<L>  |  101  |  28<H>  ----------------------------<  116<H>  5.4<H>   |  23  |  0.6<L>    Ca    8.2<L>      28 Mar 2021 04:30  Mg     2.1     03-28    TPro  4.8<L>  /  Alb  2.9<L>  /  TBili  0.5  /  DBili  x   /  AST  21  /  ALT  24  /  AlkPhos  78  03-28

## 2021-03-30 VITALS
OXYGEN SATURATION: 100 % | TEMPERATURE: 97.7 F | SYSTOLIC BLOOD PRESSURE: 135 MMHG | RESPIRATION RATE: 16 BRPM | DIASTOLIC BLOOD PRESSURE: 60 MMHG | HEART RATE: 63 BPM

## 2021-03-30 PROBLEM — K40.90 UNILATERAL INGUINAL HERNIA, WITHOUT OBSTRUCTION OR GANGRENE, NOT SPECIFIED AS RECURRENT: Chronic | Status: ACTIVE | Noted: 2021-03-08

## 2021-03-30 PROBLEM — Z00.00 ENCOUNTER FOR PREVENTIVE HEALTH EXAMINATION: Status: ACTIVE | Noted: 2021-03-30

## 2021-03-30 PROBLEM — I50.9 HEART FAILURE, UNSPECIFIED: Chronic | Status: ACTIVE | Noted: 2021-03-08

## 2021-03-30 PROBLEM — C79.51 BONE METASTASES: Status: ACTIVE | Noted: 2021-03-30

## 2021-03-30 LAB
ALBUMIN SERPL ELPH-MCNC: 2.9 G/DL — LOW (ref 3.5–5.2)
ALP SERPL-CCNC: 70 U/L — SIGNIFICANT CHANGE UP (ref 30–115)
ALT FLD-CCNC: 20 U/L — SIGNIFICANT CHANGE UP (ref 0–41)
ANION GAP SERPL CALC-SCNC: 12 MMOL/L — SIGNIFICANT CHANGE UP (ref 7–14)
ANION GAP SERPL CALC-SCNC: 7 MMOL/L — SIGNIFICANT CHANGE UP (ref 7–14)
AST SERPL-CCNC: 12 U/L — SIGNIFICANT CHANGE UP (ref 0–41)
BASOPHILS # BLD AUTO: 0.04 K/UL — SIGNIFICANT CHANGE UP (ref 0–0.2)
BASOPHILS NFR BLD AUTO: 0.7 % — SIGNIFICANT CHANGE UP (ref 0–1)
BILIRUB SERPL-MCNC: 0.6 MG/DL — SIGNIFICANT CHANGE UP (ref 0.2–1.2)
BUN SERPL-MCNC: 23 MG/DL — HIGH (ref 10–20)
BUN SERPL-MCNC: 24 MG/DL — HIGH (ref 10–20)
CALCIUM SERPL-MCNC: 7.7 MG/DL — LOW (ref 8.5–10.1)
CALCIUM SERPL-MCNC: 8 MG/DL — LOW (ref 8.5–10.1)
CALCIUM SERPL-MCNC: 8.3 MG/DL — LOW (ref 8.4–10.5)
CHLORIDE SERPL-SCNC: 96 MMOL/L — LOW (ref 98–110)
CHLORIDE SERPL-SCNC: 97 MMOL/L — LOW (ref 98–110)
CO2 SERPL-SCNC: 21 MMOL/L — SIGNIFICANT CHANGE UP (ref 17–32)
CO2 SERPL-SCNC: 23 MMOL/L — SIGNIFICANT CHANGE UP (ref 17–32)
CREAT ?TM UR-MCNC: 41 MG/DL — SIGNIFICANT CHANGE UP
CREAT SERPL-MCNC: 0.5 MG/DL — LOW (ref 0.7–1.5)
CREAT SERPL-MCNC: 0.6 MG/DL — LOW (ref 0.7–1.5)
EOSINOPHIL # BLD AUTO: 0 K/UL — SIGNIFICANT CHANGE UP (ref 0–0.7)
EOSINOPHIL NFR BLD AUTO: 0 % — SIGNIFICANT CHANGE UP (ref 0–8)
GLUCOSE SERPL-MCNC: 113 MG/DL — HIGH (ref 70–99)
GLUCOSE SERPL-MCNC: 123 MG/DL — HIGH (ref 70–99)
HCT VFR BLD CALC: 28.3 % — LOW (ref 42–52)
HGB BLD-MCNC: 9.1 G/DL — LOW (ref 14–18)
IMM GRANULOCYTES NFR BLD AUTO: 1.6 % — HIGH (ref 0.1–0.3)
LYMPHOCYTES # BLD AUTO: 0.04 K/UL — LOW (ref 1.2–3.4)
LYMPHOCYTES # BLD AUTO: 0.7 % — LOW (ref 20.5–51.1)
MAGNESIUM SERPL-MCNC: 2 MG/DL — SIGNIFICANT CHANGE UP (ref 1.8–2.4)
MCHC RBC-ENTMCNC: 28.6 PG — SIGNIFICANT CHANGE UP (ref 27–31)
MCHC RBC-ENTMCNC: 32.2 G/DL — SIGNIFICANT CHANGE UP (ref 32–37)
MCV RBC AUTO: 89 FL — SIGNIFICANT CHANGE UP (ref 80–94)
MONOCYTES # BLD AUTO: 0.06 K/UL — LOW (ref 0.1–0.6)
MONOCYTES NFR BLD AUTO: 1.1 % — LOW (ref 1.7–9.3)
NEUTROPHILS # BLD AUTO: 5.35 K/UL — SIGNIFICANT CHANGE UP (ref 1.4–6.5)
NEUTROPHILS NFR BLD AUTO: 95.9 % — HIGH (ref 42.2–75.2)
NRBC # BLD: 0 /100 WBCS — SIGNIFICANT CHANGE UP (ref 0–0)
OSMOLALITY SERPL: 279 MOS/KG — LOW (ref 280–301)
OSMOLALITY UR: 652 MOS/KG — SIGNIFICANT CHANGE UP (ref 50–1200)
PLATELET # BLD AUTO: 107 K/UL — LOW (ref 130–400)
POTASSIUM SERPL-MCNC: 5 MMOL/L — SIGNIFICANT CHANGE UP (ref 3.5–5)
POTASSIUM SERPL-MCNC: 5.1 MMOL/L — HIGH (ref 3.5–5)
POTASSIUM SERPL-SCNC: 5 MMOL/L — SIGNIFICANT CHANGE UP (ref 3.5–5)
POTASSIUM SERPL-SCNC: 5.1 MMOL/L — HIGH (ref 3.5–5)
POTASSIUM UR-SCNC: 41 MMOL/L — SIGNIFICANT CHANGE UP
PROT ?TM UR-MCNC: 31 MG/DLG/24H — SIGNIFICANT CHANGE UP
PROT SERPL-MCNC: 4.8 G/DL — LOW (ref 6–8)
PROT/CREAT UR-RTO: 0.8 RATIO — HIGH (ref 0–0.2)
PTH-INTACT FLD-MCNC: 137 PG/ML — HIGH (ref 15–65)
RBC # BLD: 3.18 M/UL — LOW (ref 4.7–6.1)
RBC # FLD: 16.9 % — HIGH (ref 11.5–14.5)
SODIUM SERPL-SCNC: 127 MMOL/L — LOW (ref 135–146)
SODIUM SERPL-SCNC: 129 MMOL/L — LOW (ref 135–146)
SODIUM UR-SCNC: 114 MMOL/L — SIGNIFICANT CHANGE UP
WBC # BLD: 5.58 K/UL — SIGNIFICANT CHANGE UP (ref 4.8–10.8)
WBC # FLD AUTO: 5.58 K/UL — SIGNIFICANT CHANGE UP (ref 4.8–10.8)

## 2021-03-30 PROCEDURE — 99233 SBSQ HOSP IP/OBS HIGH 50: CPT

## 2021-03-30 RX ADMIN — LATANOPROST 1 DROP(S): 0.05 SOLUTION/ DROPS OPHTHALMIC; TOPICAL at 21:39

## 2021-03-30 RX ADMIN — MIDODRINE HYDROCHLORIDE 10 MILLIGRAM(S): 2.5 TABLET ORAL at 17:33

## 2021-03-30 RX ADMIN — METHOCARBAMOL 500 MILLIGRAM(S): 500 TABLET, FILM COATED ORAL at 16:05

## 2021-03-30 RX ADMIN — SODIUM ZIRCONIUM CYCLOSILICATE 5 GRAM(S): 10 POWDER, FOR SUSPENSION ORAL at 05:14

## 2021-03-30 RX ADMIN — Medication 4 MILLIGRAM(S): at 16:05

## 2021-03-30 RX ADMIN — Medication 650 MILLIGRAM(S): at 16:05

## 2021-03-30 RX ADMIN — Medication 4 MILLIGRAM(S): at 05:13

## 2021-03-30 RX ADMIN — Medication 650 MILLIGRAM(S): at 21:37

## 2021-03-30 RX ADMIN — METHOCARBAMOL 500 MILLIGRAM(S): 500 TABLET, FILM COATED ORAL at 05:13

## 2021-03-30 RX ADMIN — LIDOCAINE 2 PATCH: 4 CREAM TOPICAL at 07:00

## 2021-03-30 RX ADMIN — SODIUM ZIRCONIUM CYCLOSILICATE 5 GRAM(S): 10 POWDER, FOR SUSPENSION ORAL at 17:33

## 2021-03-30 RX ADMIN — Medication 1 APPLICATION(S): at 21:37

## 2021-03-30 RX ADMIN — LIDOCAINE 2 PATCH: 4 CREAM TOPICAL at 21:38

## 2021-03-30 RX ADMIN — METHOCARBAMOL 500 MILLIGRAM(S): 500 TABLET, FILM COATED ORAL at 21:37

## 2021-03-30 RX ADMIN — ENOXAPARIN SODIUM 70 MILLIGRAM(S): 100 INJECTION SUBCUTANEOUS at 17:32

## 2021-03-30 RX ADMIN — Medication 4 MILLIGRAM(S): at 21:38

## 2021-03-30 RX ADMIN — Medication 2000 UNIT(S): at 16:05

## 2021-03-30 RX ADMIN — MIDODRINE HYDROCHLORIDE 10 MILLIGRAM(S): 2.5 TABLET ORAL at 16:05

## 2021-03-30 RX ADMIN — PANTOPRAZOLE SODIUM 40 MILLIGRAM(S): 20 TABLET, DELAYED RELEASE ORAL at 05:13

## 2021-03-30 RX ADMIN — CHLORHEXIDINE GLUCONATE 1 APPLICATION(S): 213 SOLUTION TOPICAL at 21:38

## 2021-03-30 RX ADMIN — ENOXAPARIN SODIUM 70 MILLIGRAM(S): 100 INJECTION SUBCUTANEOUS at 05:14

## 2021-03-30 RX ADMIN — BICALUTAMIDE 50 MILLIGRAM(S): 50 TABLET, FILM COATED ORAL at 16:05

## 2021-03-30 RX ADMIN — Medication 650 MILLIGRAM(S): at 05:13

## 2021-03-30 NOTE — PHYSICAL EXAM
[Thin] : thin [] : no respiratory distress [Normal] : oriented to person, place and time, the affect was normal, the mood was normal and not anxious [de-identified] : Large mandibular mass present

## 2021-03-30 NOTE — PROGRESS NOTE ADULT - ATTENDING COMMENTS
Karlene Mcgill MD  Hospitalist   Spectra: [phone]    Patient is a 80y old  Male who presents with a chief complaint of 80 YEAR OLD MALE C/O MULTIPLE MEDICAL COMPLAINTS . (30 Mar 2021 07:46)      INTERVAL HPI/OVERNIGHT EVENTS: reports feeling fatigued   no acute events noted - no distress on exam   s/p plevic radiation this am - tolerated well     REVIEW OF SYSTEMS: negative  Vital Signs Last 24 Hrs  T(C): 35.9 (30 Mar 2021 04:30), Max: 35.9 (30 Mar 2021 04:30)  T(F): 96.7 (30 Mar 2021 04:30), Max: 96.7 (30 Mar 2021 04:30)  HR: 70 (30 Mar 2021 04:30) (54 - 70)  BP: 164/64 (30 Mar 2021 04:30) (108/61 - 164/64)  BP(mean): --  RR: 18 (30 Mar 2021 04:30) (17 - 18)  SpO2: 98% (30 Mar 2021 06:00) (80% - 98%)    PHYSICAL EXAM:   NAD; Normocephalic;   LUNGS - no wheezing  HEART: S1 S2+   ABDOMEN: Soft, Nontender, non distended  EXTREMITIES: no cyanosis; no edema  NERVOUS SYSTEM:  Awake and alert; no focal neuro deficits appreciated  left fascial swelling     LABS:                        9.1    5.58  )-----------( 107      ( 30 Mar 2021 08:00 )             28.3     03-30    127<L>  |  97<L>  |  24<H>  ----------------------------<  113<H>  5.0   |  23  |  0.5<L>    Ca    7.7<L>      30 Mar 2021 08:00  Mg     2.0     03-30    TPro  4.8<L>  /  Alb  2.9<L>  /  TBili  0.6  /  DBili  x   /  AST  12  /  ALT  20  /  AlkPhos  70  03-30    A/P:-  Pt is seen and evaluated by bedside.     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  - patient went to dental appointment 3/24 - no intervention   - dental cleared for bisphosphonate, or denosumab  - s/p zolendronic acid yesterday   - plan to start on jaw radiation from tomorrow     # UGI bleed-- No egd done--only coffee ground emesis-- s/p 1 unit of PRBC  - no further episode noted   - Hb stable at this time     # Chronic A fib  - on eliquis at home   - currently on full dose lovenox     # CHFrEF  - EF of 35%  - euvolemic--off diuretics.    # elevated calcium level   now normal   follow PTH, PTHrP and vitamin d level     # Severe protein-calorie malnutrition.  - dietary on board   - supplements added   - patient reports diarrhea with ensure     Provider Handoff:  Pending: currently on chemo - completed pelvic radiation on 3/30 --> now to start jaw radiation   Dispo: Homeless currently. Patient will need to go to rehab/NH where he can continue chemo/radiation therapy .   SW consulted for locating family     Plan of care was discussed with patient ; all questions and concerns were addressed and care was aligned with patient's wishes.

## 2021-03-30 NOTE — PROGRESS NOTE ADULT - ASSESSMENT
79 yo male hx of CAD s/p CABG s/p 20+ years ago, HFrEF, right sided large inguinal hernia BIBA from hotel room because he was unable to ambulate due to Rt thigh weakness and numbness, pt also had mandibular swelling for over a year. CT lumbosacral spine showed mass suggestive of mets with obliteration of neural foramina so was started on decadron.  Sacral mass biopsied 3/10 showed prostate adenocarcinoma. Mandibluar mass biopsied 3/19 also showed prostate cancer. Hospital course was complicated by hypovolemic shock likely 2/2 UGIB, required pressors and upgrade to stepdown unit. Pt currently in hospital undergoing chemo and radiotherapy.       #Metastatic prostate cancer with diffuse metastatic lesions   - currently on chemo and radiation as per Hem/onc   - continue with bicalutamide 50mg daily (started 3/14/21)  - s/p Lupron ([facility] agonist) IM, first dose 3/29 --> next dose in 1 month  - received first dose of taxotere (3/27), continue after radiation therapy is completed  - 3/24/21:  started RT to sacrum and spine (completed 4/5 doses) once finished, plan to initiate RT 5 DOSES to jaw area for palliation. Expected completion 4/6/21  - dental cleared for bisphosphonate, or denosumab  - s/p zoledronic acid 4mg q4 weeks (3/27)    # SCC of jaw-  - 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  - Evaluated by Dental 3/24 - no intervention   - dental cleared for bisphosphonate, or denosumab  - s/p zolendronic acid (3/27)    #Hyperkalemia  - continue with Lokelma 5mg BID  - hold if K < 4.7  - s/p insulin/d50      # UGI bleed-- No egd done--only coffee ground emesis-- s/p 1 unit of PRBC  - no further episode noted   - Hb stable at this time     # Chronic A fib  - on eliquis at home   - currently on full dose lovenox     # CHFrEF  - EF of 35%  - euvolemic--off diuretics.    # elevated calcium level  # Low Vitamin D level   - now normal s/p calcitrol   - Vitamin D 19, started on vitamin D supplements 2000units daily   -   - f/u PTHrP    # Severe protein-calorie malnutrition.  - dietary on board   - supplements added   - patient reports diarrhea with ensure     #DVT PPx: Lovenox  #GI PPx: Protonix  FULL CODE  Dispo: Acute, likely d/c to NH once ready

## 2021-03-30 NOTE — HISTORY OF PRESENT ILLNESS
[FreeTextEntry1] : Nursing OTV: Pt is currently admitted. Denies pain or discomfort. Not taking pain medication. See inpt chart for medication list.

## 2021-03-30 NOTE — CHART NOTE - NSCHARTNOTEFT_GEN_A_CORE
Spoke with Radiation Oncologist Dr. Cortés regarding steroid taper  Their recommendations include:      - switching to Dexamethasone 4mg BID for 5 days       - then taper down to 4mg once daily for 5 days       - then taper down to 2mg once daily for 5 days, after which stop    Plan to start taper 3/31/21. Plan to complete taper by 4/15/21

## 2021-03-30 NOTE — DISEASE MANAGEMENT
[Pathological] : TNM Stage: p [FreeTextEntry4] : Metastatic Prostate Cancer [TTNM] : . [NTNM] : . [MTNM] : 1 [IV] : IV [de-identified] : 3796cGy [de-identified] : 2000cGy [de-identified] : Sacrum/ T-spine

## 2021-03-30 NOTE — VITALS
[Maximal Pain Intensity: 3/10] : 3/10 [Least Pain Intensity: 0/10] : 0/10 [60: Requires occasional assistance, but is able to care for most of his/her needs] : 60: Requires occasional assistance, but is able to care for most of his/her needs

## 2021-03-30 NOTE — PROGRESS NOTE ADULT - SUBJECTIVE AND OBJECTIVE BOX
HPI  Patient is a 80y old Male who presents with a chief complaint of 80 YEAR OLD MALE C/O MULTIPLE MEDICAL COMPLAINTS . (29 Mar 2021 12:29)    Currently admitted to medicine with the primary diagnosis of Ambulatory dysfunction       Today is hospital day 22d.     INTERVAL HPI / OVERNIGHT EVENTS:  Patient was examined and seen at bedside. This morning he is resting comfortably in bed and reports no new issues or overnight events. He states that he tolerated radiation therapy well. Denies any generalized weakness, any pain, any nausea or vomiting.     ROS: Otherwise unremarkable     PAST MEDICAL & SURGICAL HISTORY  CHF (congestive heart failure)    Inguinal hernia    S/P CABG x 3      ALLERGIES  No Known Allergies    MEDICATIONS  STANDING MEDICATIONS  bicalutamide 50 milliGRAM(s) Oral daily  chlorhexidine 4% Liquid 1 Application(s) Topical daily  cholecalciferol 2000 Unit(s) Oral daily  collagenase Ointment 1 Application(s) Topical daily  dexAMETHasone     Tablet 4 milliGRAM(s) Oral every 8 hours  enoxaparin Injectable 70 milliGRAM(s) SubCutaneous two times a day  latanoprost 0.005% Ophthalmic Solution 1 Drop(s) Both EYES at bedtime  lidocaine   Patch 2 Patch Transdermal daily  methocarbamol 500 milliGRAM(s) Oral three times a day  midodrine. 10 milliGRAM(s) Oral three times a day  pantoprazole    Tablet 40 milliGRAM(s) Oral before breakfast  sodium bicarbonate 650 milliGRAM(s) Oral three times a day  sodium zirconium cyclosilicate 5 Gram(s) Oral two times a day  zoledronic acid IVPB (ZOMETA) (Non - oncologic) 4 milliGRAM(s) IV Intermittent every 4 weeks    PRN MEDICATIONS  acetaminophen   Tablet .. 650 milliGRAM(s) Oral every 6 hours PRN  aluminum hydroxide/magnesium hydroxide/simethicone Suspension 30 milliLiter(s) Oral every 6 hours PRN  brimonidine 0.2% Ophthalmic Solution 1 Drop(s) Both EYES every 8 hours PRN    VITALS:  T(F): 96.7  HR: 70  BP: 164/64  RR: 18  SpO2: 98%    PHYSICAL EXAM  GEN: NAD, Resting comfortably in bed, cachectic  PULM: Clear to auscultation bilaterally, No wheezes  CVS: Regular rate and rhythm, S1-S2, no murmurs  ABD: Soft, non-tender, non-distended, no guarding  EXT: No edema  NEURO: A&Ox3, no focal deficits    LABS                        8.8    10.47 )-----------( 142      ( 29 Mar 2021 06:51 )             27.6     03-29    129<L>  |  97<L>  |  26<H>  ----------------------------<  136<H>  5.2<H>   |  22  |  0.5<L>    Ca    8.2<L>      29 Mar 2021 18:05  Mg     2.0     03-29    TPro  4.6<L>  /  Alb  2.9<L>  /  TBili  0.5  /  DBili  x   /  AST  15  /  ALT  23  /  AlkPhos  70  03-29

## 2021-03-31 LAB
ALBUMIN SERPL ELPH-MCNC: 2.8 G/DL — LOW (ref 3.5–5.2)
ALP SERPL-CCNC: 70 U/L — SIGNIFICANT CHANGE UP (ref 30–115)
ALT FLD-CCNC: 19 U/L — SIGNIFICANT CHANGE UP (ref 0–41)
ANION GAP SERPL CALC-SCNC: 12 MMOL/L — SIGNIFICANT CHANGE UP (ref 7–14)
ANION GAP SERPL CALC-SCNC: 8 MMOL/L — SIGNIFICANT CHANGE UP (ref 7–14)
AST SERPL-CCNC: 13 U/L — SIGNIFICANT CHANGE UP (ref 0–41)
BASOPHILS # BLD AUTO: 0.02 K/UL — SIGNIFICANT CHANGE UP (ref 0–0.2)
BASOPHILS NFR BLD AUTO: 0.6 % — SIGNIFICANT CHANGE UP (ref 0–1)
BILIRUB SERPL-MCNC: 0.7 MG/DL — SIGNIFICANT CHANGE UP (ref 0.2–1.2)
BUN SERPL-MCNC: 20 MG/DL — SIGNIFICANT CHANGE UP (ref 10–20)
BUN SERPL-MCNC: 23 MG/DL — HIGH (ref 10–20)
CALCIUM SERPL-MCNC: 7.9 MG/DL — LOW (ref 8.5–10.1)
CALCIUM SERPL-MCNC: 7.9 MG/DL — LOW (ref 8.5–10.1)
CHLORIDE SERPL-SCNC: 97 MMOL/L — LOW (ref 98–110)
CHLORIDE SERPL-SCNC: 97 MMOL/L — LOW (ref 98–110)
CO2 SERPL-SCNC: 19 MMOL/L — SIGNIFICANT CHANGE UP (ref 17–32)
CO2 SERPL-SCNC: 23 MMOL/L — SIGNIFICANT CHANGE UP (ref 17–32)
CREAT SERPL-MCNC: 0.5 MG/DL — LOW (ref 0.7–1.5)
CREAT SERPL-MCNC: 0.5 MG/DL — LOW (ref 0.7–1.5)
EOSINOPHIL # BLD AUTO: 0 K/UL — SIGNIFICANT CHANGE UP (ref 0–0.7)
EOSINOPHIL NFR BLD AUTO: 0 % — SIGNIFICANT CHANGE UP (ref 0–8)
GLUCOSE SERPL-MCNC: 107 MG/DL — HIGH (ref 70–99)
GLUCOSE SERPL-MCNC: 123 MG/DL — HIGH (ref 70–99)
HCT VFR BLD CALC: 28.8 % — LOW (ref 42–52)
HGB BLD-MCNC: 9.5 G/DL — LOW (ref 14–18)
IMM GRANULOCYTES NFR BLD AUTO: 1.9 % — HIGH (ref 0.1–0.3)
LYMPHOCYTES # BLD AUTO: 0.04 K/UL — LOW (ref 1.2–3.4)
LYMPHOCYTES # BLD AUTO: 1.1 % — LOW (ref 20.5–51.1)
MAGNESIUM SERPL-MCNC: 2.1 MG/DL — SIGNIFICANT CHANGE UP (ref 1.8–2.4)
MCHC RBC-ENTMCNC: 28.7 PG — SIGNIFICANT CHANGE UP (ref 27–31)
MCHC RBC-ENTMCNC: 33 G/DL — SIGNIFICANT CHANGE UP (ref 32–37)
MCV RBC AUTO: 87 FL — SIGNIFICANT CHANGE UP (ref 80–94)
MONOCYTES # BLD AUTO: 0.08 K/UL — LOW (ref 0.1–0.6)
MONOCYTES NFR BLD AUTO: 2.2 % — SIGNIFICANT CHANGE UP (ref 1.7–9.3)
MRSA PCR RESULT.: NEGATIVE — SIGNIFICANT CHANGE UP
NEUTROPHILS # BLD AUTO: 3.41 K/UL — SIGNIFICANT CHANGE UP (ref 1.4–6.5)
NEUTROPHILS NFR BLD AUTO: 94.2 % — HIGH (ref 42.2–75.2)
NRBC # BLD: 0 /100 WBCS — SIGNIFICANT CHANGE UP (ref 0–0)
PLATELET # BLD AUTO: 89 K/UL — LOW (ref 130–400)
POTASSIUM SERPL-MCNC: 5.2 MMOL/L — HIGH (ref 3.5–5)
POTASSIUM SERPL-MCNC: 5.2 MMOL/L — HIGH (ref 3.5–5)
POTASSIUM SERPL-SCNC: 5.2 MMOL/L — HIGH (ref 3.5–5)
POTASSIUM SERPL-SCNC: 5.2 MMOL/L — HIGH (ref 3.5–5)
PROT SERPL-MCNC: 4.8 G/DL — LOW (ref 6–8)
RBC # BLD: 3.31 M/UL — LOW (ref 4.7–6.1)
RBC # FLD: 16.7 % — HIGH (ref 11.5–14.5)
SODIUM SERPL-SCNC: 128 MMOL/L — LOW (ref 135–146)
SODIUM SERPL-SCNC: 128 MMOL/L — LOW (ref 135–146)
WBC # BLD: 3.62 K/UL — LOW (ref 4.8–10.8)
WBC # FLD AUTO: 3.62 K/UL — LOW (ref 4.8–10.8)

## 2021-03-31 PROCEDURE — 77280 THER RAD SIMULAJ FIELD SMPL: CPT | Mod: 26

## 2021-03-31 PROCEDURE — 99231 SBSQ HOSP IP/OBS SF/LOW 25: CPT

## 2021-03-31 PROCEDURE — 99233 SBSQ HOSP IP/OBS HIGH 50: CPT

## 2021-03-31 RX ORDER — DEXAMETHASONE 0.5 MG/5ML
4 ELIXIR ORAL EVERY 12 HOURS
Refills: 0 | Status: COMPLETED | OUTPATIENT
Start: 2021-03-31 | End: 2021-04-05

## 2021-03-31 RX ADMIN — ENOXAPARIN SODIUM 70 MILLIGRAM(S): 100 INJECTION SUBCUTANEOUS at 18:00

## 2021-03-31 RX ADMIN — Medication 4 MILLIGRAM(S): at 06:09

## 2021-03-31 RX ADMIN — MIDODRINE HYDROCHLORIDE 10 MILLIGRAM(S): 2.5 TABLET ORAL at 06:09

## 2021-03-31 RX ADMIN — BICALUTAMIDE 50 MILLIGRAM(S): 50 TABLET, FILM COATED ORAL at 17:59

## 2021-03-31 RX ADMIN — METHOCARBAMOL 500 MILLIGRAM(S): 500 TABLET, FILM COATED ORAL at 06:09

## 2021-03-31 RX ADMIN — Medication 2000 UNIT(S): at 12:36

## 2021-03-31 RX ADMIN — LIDOCAINE 2 PATCH: 4 CREAM TOPICAL at 21:12

## 2021-03-31 RX ADMIN — PANTOPRAZOLE SODIUM 40 MILLIGRAM(S): 20 TABLET, DELAYED RELEASE ORAL at 06:09

## 2021-03-31 RX ADMIN — MIDODRINE HYDROCHLORIDE 10 MILLIGRAM(S): 2.5 TABLET ORAL at 12:36

## 2021-03-31 RX ADMIN — Medication 650 MILLIGRAM(S): at 18:01

## 2021-03-31 RX ADMIN — SODIUM ZIRCONIUM CYCLOSILICATE 5 GRAM(S): 10 POWDER, FOR SUSPENSION ORAL at 05:00

## 2021-03-31 RX ADMIN — SODIUM ZIRCONIUM CYCLOSILICATE 5 GRAM(S): 10 POWDER, FOR SUSPENSION ORAL at 18:00

## 2021-03-31 RX ADMIN — Medication 650 MILLIGRAM(S): at 21:12

## 2021-03-31 RX ADMIN — Medication 4 MILLIGRAM(S): at 17:59

## 2021-03-31 RX ADMIN — MIDODRINE HYDROCHLORIDE 10 MILLIGRAM(S): 2.5 TABLET ORAL at 17:59

## 2021-03-31 RX ADMIN — METHOCARBAMOL 500 MILLIGRAM(S): 500 TABLET, FILM COATED ORAL at 21:12

## 2021-03-31 RX ADMIN — ENOXAPARIN SODIUM 70 MILLIGRAM(S): 100 INJECTION SUBCUTANEOUS at 06:35

## 2021-03-31 RX ADMIN — Medication 650 MILLIGRAM(S): at 06:09

## 2021-03-31 RX ADMIN — Medication 1 APPLICATION(S): at 21:12

## 2021-03-31 RX ADMIN — METHOCARBAMOL 500 MILLIGRAM(S): 500 TABLET, FILM COATED ORAL at 18:00

## 2021-03-31 NOTE — PROGRESS NOTE ADULT - SUBJECTIVE AND OBJECTIVE BOX
seen/examined  no new complaints    ROS  Weakness  fair appetiite  legs weakness  no bone pain  no change in urination  no fever    EXAM  nad  VSS  CONNOR  RRR S1S2NL no murmur  CTA B/L  no organomegaly  tender bone masses  jaw mass

## 2021-03-31 NOTE — ADVANCED PRACTICE NURSE CONSULT - ASSESSMENT
81 yo male hx of CAD s/p CABG s/p 20+ years ago/ CHF/ right sided large inguinal hernia BIBA from hotel room 2/2 unable to ambulate with right thigh weakness and numbness, reported it started from knee to hip, denies fall and injury. He was just sitting and the symptoms started, reported off/on back pain in the past. denies fever/chill/recent illness/coughing/chest pain/abd pain/n/v/d and urinary sxs. Also has right sided facial swelling for about 1 year after 3 teeth extraction. reports swelling worsens after eating. swelling was better in the past and started swelling again in the past few months. didn't follow up with his dentist 2/2 COVID. Patient found w/ new sacral mass found on CT, new onset LE weakness, also has large right mandibular mass and further bony lesions in spine all suggestive of metastatic disease.   Sacral mass biopsied 3/10 showed prostate adenocarcinoma. Mandibluar mass biopsied 3/19 also showed prostate cancer. Hospital course was complicated by hypovolemic shock likely 2/2 UGIB, required pressors and upgrade to stepdown unit. Pt currently in hospital undergoing chemo and radiotherapy.     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    Received patient on 4B, laying supine in bed, pillow underneath BLEs elevating heels), HOB elevated 30 degrees. Pt asleep upon WOCN arrival, easily arouseable, made aware of purpose of this WOCN visit, agreeable to consult.         Type of wound: Deep tissue pressure injury (DTPI); healing   Location: right heel  Wound measurements: 0.5cm x 1cm x 0cm, true depth indeterminable   Tunneling/Undermining: none  Wound bed: now presenting as dark pink colored tissue, still in transverse linear pattern   Wound edges: attached, flush, irregular   Periwound: intact   Wound exudate: none  Wound odor: none  Induration, erythema, warmth: none   Wound pain: denies     Patient mostly bedbound, able to turn/position in bed w/ assistance, + lemus, incontinent of stool. Ordered for soft diet, probably inadequate intake as per reported Thai score.   81 yo male hx of CAD s/p CABG s/p 20+ years ago/ CHF/ right sided large inguinal hernia BIBA from hotel room 2/2 unable to ambulate with right thigh weakness and numbness, reported it started from knee to hip, denies fall and injury. He was just sitting and the symptoms started, reported off/on back pain in the past. denies fever/chill/recent illness/coughing/chest pain/abd pain/n/v/d and urinary sxs. Also has right sided facial swelling for about 1 year after 3 teeth extraction. reports swelling worsens after eating. swelling was better in the past and started swelling again in the past few months. didn't follow up with his dentist 2/2 COVID. Patient found w/ new sacral mass found on CT, new onset LE weakness, also has large right mandibular mass and further bony lesions in spine all suggestive of metastatic disease.   Sacral mass biopsied 3/10 showed prostate adenocarcinoma. Mandibluar mass biopsied 3/19 also showed prostate cancer. Hospital course was complicated by hypovolemic shock likely 2/2 UGIB, required pressors and upgrade to stepdown unit. Pt currently in hospital undergoing chemo and radiotherapy.     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    Received patient on 4B, laying supine in bed, pillow underneath BLEs elevating heels), HOB elevated 30 degrees. Pt asleep upon WOCN arrival, easily arouseable, made aware of purpose of this WOCN visit, agreeable to consult.  Burn consult pending for sacral pressure injury assessment.     Type of wound: Deep tissue pressure injury (DTPI); healing   Location: right heel  Wound measurements: 0.5cm x 1cm x 0cm, true depth indeterminable   Tunneling/Undermining: none  Wound bed: now presenting as dark pink colored tissue, still in transverse linear pattern   Wound edges: attached, flush, irregular   Periwound: intact   Wound exudate: none  Wound odor: none  Induration, erythema, warmth: none   Wound pain: denies     Patient mostly bedbound, able to turn/position in bed w/ assistance, + lemus, incontinent of stool. Ordered for soft diet, probably inadequate intake as per reported Thai score.

## 2021-03-31 NOTE — PROGRESS NOTE ADULT - ASSESSMENT
81 yo man with ECOG 2 has diffusely metastatic prostate adenocarcinoma     PLAN  - continue ADT: lupron 3.29.21, first dose 7.5 mg; next is in one month  - continue palliative RT; completed tx to spine/sacrum; need to complete tx to the jaw mass  - started low dose weekly taxotere on 3.26.21; will hold until the completion of RT  - 1st dose of zometa on 3.25.21  - PT eval

## 2021-03-31 NOTE — PROGRESS NOTE ADULT - SUBJECTIVE AND OBJECTIVE BOX
HPI  Patient is a 80y old Male who presents with a chief complaint of 80 YEAR OLD MALE C/O MULTIPLE MEDICAL COMPLAINTS . (30 Mar 2021 07:46)    Currently admitted to medicine with the primary diagnosis of Ambulatory dysfunction       Today is hospital day 23d.     INTERVAL HPI / OVERNIGHT EVENTS:  Patient was examined and seen at bedside. This morning he is resting comfortably in bed and reports no new issues or overnight events.     ROS: Otherwise unremarkable     PAST MEDICAL & SURGICAL HISTORY  CHF (congestive heart failure)    Inguinal hernia    S/P CABG x 3      ALLERGIES  No Known Allergies    MEDICATIONS  STANDING MEDICATIONS  bicalutamide 50 milliGRAM(s) Oral daily  chlorhexidine 4% Liquid 1 Application(s) Topical daily  cholecalciferol 2000 Unit(s) Oral daily  collagenase Ointment 1 Application(s) Topical daily  dexAMETHasone     Tablet 4 milliGRAM(s) Oral every 12 hours  enoxaparin Injectable 70 milliGRAM(s) SubCutaneous two times a day  latanoprost 0.005% Ophthalmic Solution 1 Drop(s) Both EYES at bedtime  lidocaine   Patch 2 Patch Transdermal daily  methocarbamol 500 milliGRAM(s) Oral three times a day  midodrine. 10 milliGRAM(s) Oral three times a day  pantoprazole    Tablet 40 milliGRAM(s) Oral before breakfast  sodium bicarbonate 650 milliGRAM(s) Oral three times a day  sodium zirconium cyclosilicate 5 Gram(s) Oral two times a day  zoledronic acid IVPB (ZOMETA) (Non - oncologic) 4 milliGRAM(s) IV Intermittent every 4 weeks    PRN MEDICATIONS  acetaminophen   Tablet .. 650 milliGRAM(s) Oral every 6 hours PRN  aluminum hydroxide/magnesium hydroxide/simethicone Suspension 30 milliLiter(s) Oral every 6 hours PRN  brimonidine 0.2% Ophthalmic Solution 1 Drop(s) Both EYES every 8 hours PRN    VITALS:  T(F): 97.2  HR: 72  BP: 111/53  RR: 18  SpO2: --    PHYSICAL EXAM  GEN: NAD, Resting comfortably in bed  PULM: Clear to auscultation bilaterally, No wheezes  CVS: Regular rate and rhythm, S1-S2, no murmurs  ABD: Soft, non-tender, non-distended, no guarding  EXT: No edema  NEURO: A&Ox3, no focal deficits    LABS                        9.1    5.58  )-----------( 107      ( 30 Mar 2021 08:00 )             28.3     03-30    129<L>  |  96<L>  |  23<H>  ----------------------------<  123<H>  5.1<H>   |  21  |  0.6<L>    Ca    8.0<L>      30 Mar 2021 16:39  Mg     2.0     03-30    TPro  4.8<L>  /  Alb  2.9<L>  /  TBili  0.6  /  DBili  x   /  AST  12  /  ALT  20  /  AlkPhos  70  03-30                  RADIOLOGY     HPI  Patient is a 80y old Male who presents with a chief complaint of 80 YEAR OLD MALE C/O MULTIPLE MEDICAL COMPLAINTS . (30 Mar 2021 07:46)    Currently admitted to medicine with the primary diagnosis of Ambulatory dysfunction       Today is hospital day 23d.     INTERVAL HPI / OVERNIGHT EVENTS:  Patient was examined and seen at bedside. This morning he is resting comfortably in bed and reports no new issues or overnight events. He does report feeling a bit yesterday after his radiation. But with some rest, he feels better now.     ROS: Otherwise unremarkable     PAST MEDICAL & SURGICAL HISTORY  CHF (congestive heart failure)    Inguinal hernia    S/P CABG x 3      ALLERGIES  No Known Allergies    MEDICATIONS  STANDING MEDICATIONS  bicalutamide 50 milliGRAM(s) Oral daily  chlorhexidine 4% Liquid 1 Application(s) Topical daily  cholecalciferol 2000 Unit(s) Oral daily  collagenase Ointment 1 Application(s) Topical daily  dexAMETHasone     Tablet 4 milliGRAM(s) Oral every 12 hours  enoxaparin Injectable 70 milliGRAM(s) SubCutaneous two times a day  latanoprost 0.005% Ophthalmic Solution 1 Drop(s) Both EYES at bedtime  lidocaine   Patch 2 Patch Transdermal daily  methocarbamol 500 milliGRAM(s) Oral three times a day  midodrine. 10 milliGRAM(s) Oral three times a day  pantoprazole    Tablet 40 milliGRAM(s) Oral before breakfast  sodium bicarbonate 650 milliGRAM(s) Oral three times a day  sodium zirconium cyclosilicate 5 Gram(s) Oral two times a day  zoledronic acid IVPB (ZOMETA) (Non - oncologic) 4 milliGRAM(s) IV Intermittent every 4 weeks    PRN MEDICATIONS  acetaminophen   Tablet .. 650 milliGRAM(s) Oral every 6 hours PRN  aluminum hydroxide/magnesium hydroxide/simethicone Suspension 30 milliLiter(s) Oral every 6 hours PRN  brimonidine 0.2% Ophthalmic Solution 1 Drop(s) Both EYES every 8 hours PRN    VITALS:  T(F): 97.2  HR: 72  BP: 111/53  RR: 18  SpO2: --    PHYSICAL EXAM  GEN: NAD, Resting comfortably in bed. Cachectic  PULM: Clear to auscultation bilaterally, No wheezes  CVS: Regular rate and rhythm, S1-S2, no murmurs  ABD: Soft, non-tender, non-distended, no guarding  EXT: No edema  NEURO: A&Ox3, no focal deficits    LABS                        9.1    5.58  )-----------( 107      ( 30 Mar 2021 08:00 )             28.3     03-30    129<L>  |  96<L>  |  23<H>  ----------------------------<  123<H>  5.1<H>   |  21  |  0.6<L>    Ca    8.0<L>      30 Mar 2021 16:39  Mg     2.0     03-30    TPro  4.8<L>  /  Alb  2.9<L>  /  TBili  0.6  /  DBili  x   /  AST  12  /  ALT  20  /  AlkPhos  70  03-30                 0 = independent

## 2021-03-31 NOTE — PROGRESS NOTE ADULT - ATTENDING COMMENTS
patient seen and examined earlier today.  discussed with medical resident.  patient today says he feels weak, otherwise he has no c/o pain.  Vital Signs Last 24 Hrs  T(C): 36.2 (31 Mar 2021 04:00), Max: 36.2 (31 Mar 2021 04:00)  T(F): 97.2 (31 Mar 2021 04:00), Max: 97.2 (31 Mar 2021 04:00)  HR: 72 (31 Mar 2021 04:00) (70 - 72)  BP: 111/53 (31 Mar 2021 04:00) (109/61 - 111/53)  RR: 18 (31 Mar 2021 04:00) (18 - 18)  conj pale, no jaundice  bilateral temporal wasting.    neck supple no JVD  lungs clear to auscultation - anterior chest has a tatto for radiation  heart irregular rhythm.  abd. soft nontender. scaphoid  extremities right heel decubitus and sacral decubitus both with dressings.                        9.5    3.62  )-----------( 89       ( 31 Mar 2021 04:30 )             28.8   03-31    128<L>  |  97<L>  |  23<H>  ----------------------------<  123<H>  5.2<H>   |  23  |  0.5<L>    Ca    7.9<L>      31 Mar 2021 04:30  Mg     2.1     03-31    TPro  4.8<L>  /  Alb  2.8<L>  /  TBili  0.7  /  DBili  x   /  AST  13  /  ALT  19  /  AlkPhos  70  03-31      A:  prostate CA - metastatic to spine and jaw - s/p radiation therapy to spine, receiving radiation to jaw  receiving bicalutamide , received taxotere and lupron - schedule per oncology  hypercalcemia - now resolved  hyponatremia, likely inappropriate ADH, need to r/o adrenal insufficiency but on dexamethasone taper so cannot evaluate at present time  chronic atrial fibrillation on therapeutic LMWH  severe protein/calorie malnutrition  sacral decubitus and heel decubitus    P:  complete radiation course  fluid restriction  moniotr 25-oh Vit D/Ca/PTH levels  monitor serum sodium  continue LMWH  encourage diet plus supplements  wound care - await recommendations from burn service regarding sacral decubitus

## 2021-03-31 NOTE — PROGRESS NOTE ADULT - ASSESSMENT
81 yo male hx of CAD s/p CABG s/p 20+ years ago, HFrEF, right sided large inguinal hernia BIBA from hotel room because he was unable to ambulate due to Rt thigh weakness and numbness, pt also had mandibular swelling for over a year. CT lumbosacral spine showed mass suggestive of mets with obliteration of neural foramina so was started on decadron.  Sacral mass biopsied 3/10 showed prostate adenocarcinoma. Mandibluar mass biopsied 3/19 also showed prostate cancer. Hospital course was complicated by hypovolemic shock likely 2/2 UGIB, required pressors and upgrade to stepdown unit. Pt currently in hospital undergoing chemo and radiotherapy.       #Metastatic prostate cancer with diffuse metastatic lesions   - currently on chemo and radiation as per Hem/onc   - continue with bicalutamide 50mg daily (started 3/14/21)  - s/p Lupron (GnRH agonist) IM, first dose 3/29 --> next dose in 1 month  - received first dose of taxotere (3/27), continue after radiation therapy is completed  - 3/24/21:  started RT to sacrum and spine (completed 4/5 doses) once finished, plan to initiate RT 5 DOSES to jaw area for palliation. Expected completion 4/6/21  - dental cleared for bisphosphonate, or denosumab  - s/p zoledronic acid 4mg q4 weeks (3/27)    # SCC of jaw-  - 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  - Evaluated by Dental 3/24 - no intervention   - dental cleared for bisphosphonate, or denosumab  - s/p zolendronic acid (3/27)    #Hyperkalemia  - continue with Lokelma 5mg BID  - hold if K < 4.7  - s/p insulin/d50      # UGI bleed-- No egd done--only coffee ground emesis-- s/p 1 unit of PRBC  - no further episode noted   - Hb stable at this time     # Chronic A fib  - on eliquis at home   - currently on full dose lovenox     # CHFrEF  - EF of 35%  - euvolemic--off diuretics.    # elevated calcium level  # Low Vitamin D level   - now normal s/p calcitrol   - Vitamin D 19, started on vitamin D supplements 2000units daily   -   - f/u PTHrP    # Severe protein-calorie malnutrition.  - dietary on board   - supplements added   - patient reports diarrhea with ensure     #DVT PPx: Lovenox  #GI PPx: Protonix  FULL CODE  Dispo: Acute, likely d/c to NH once ready   81 yo male hx of CAD s/p CABG s/p 20+ years ago, HFrEF, right sided large inguinal hernia BIBA from hotel room because he was unable to ambulate due to Rt thigh weakness and numbness, pt also had mandibular swelling for over a year. CT lumbosacral spine showed mass suggestive of mets with obliteration of neural foramina so was started on decadron.  Sacral mass biopsied 3/10 showed prostate adenocarcinoma. Mandibluar mass biopsied 3/19 also showed prostate cancer. Hospital course was complicated by hypovolemic shock likely 2/2 UGIB, required pressors and upgrade to stepdown unit. Pt currently in hospital undergoing chemo and radiotherapy.       #Metastatic prostate cancer with diffuse metastatic lesions   - currently on chemo and radiation as per Hem/onc   - continue with bicalutamide 50mg daily (started 3/14/21)  - s/p Lupron (GnRH agonist) IM, first dose 3/29 --> next dose in 1 month  - received first dose of taxotere (3/27), continue after radiation therapy is completed  - Completed RT to sacrum and spine 5 doses (3/24 - 3/30), plan to initiate RT 5 DOSES to jaw area for palliation. Expected completion 4/6/21  - dental cleared for bisphosphonate, or denosumab s/p zoledronic acid 4mg q4 weeks (3/27)  - start Dexamethasone taper: 4mg q12 for 5 days (started 3/31)    # SCC of jaw-  - 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  - Evaluated by Dental 3/24 - no intervention   - dental cleared for bisphosphonate, or denosumab  - s/p zolendronic acid (3/27)    #Hyperkalemia  - continue with Lokelma 5mg BID  - hold if K < 4.7  - s/p insulin/d50    # UGI bleed-- No egd done--only coffee ground emesis-- s/p 1 unit of PRBC  - no further episode noted   - Hb stable at this time     # Chronic A fib  - on eliquis at home   - currently on full dose lovenox     # CHFrEF  - EF of 35%  - euvolemic--off diuretics.    # elevated calcium level  # Low Vitamin D level   - now normal s/p calcitrol   - Vitamin D 19, started on vitamin D supplements 2000units daily   -   - f/u PTHrP    # Severe protein-calorie malnutrition.  - dietary on board   - supplements added   - patient reports diarrhea with ensure     #DVT PPx: Lovenox  #GI PPx: Protonix  FULL CODE  Dispo: Acute, likely d/c to NH once ready   81 yo male hx of CAD s/p CABG s/p 20+ years ago, HFrEF, right sided large inguinal hernia BIBA from hotel room because he was unable to ambulate due to Rt thigh weakness and numbness, pt also had mandibular swelling for over a year. CT lumbosacral spine showed mass suggestive of mets with obliteration of neural foramina so was started on decadron.  Sacral mass biopsied 3/10 showed prostate adenocarcinoma. Mandibluar mass biopsied 3/19 also showed prostate cancer. Hospital course was complicated by hypovolemic shock likely 2/2 UGIB, required pressors and upgrade to stepdown unit. Pt currently in hospital undergoing chemo and radiotherapy.     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 Serum Osm 279  - Glu on BMP 120s-130s  - TSH 0.36  - free water restriction, fluid restriction 1500ml/day    #Metastatic prostate cancer with diffuse metastatic lesions   - currently on chemo and radiation as per Hem/onc   - continue with bicalutamide 50mg daily (started 3/14/21)  - s/p Lupron (GnRH agonist) IM, first dose 3/29 --> next dose in 1 month  - received first dose of taxotere (3/27), continue after radiation therapy is completed  - Completed RT to sacrum and spine 5 doses (3/24 - 3/30), plan to initiate RT 5 DOSES to jaw area for palliation. Expected completion 4/6/21  - dental cleared for bisphosphonate, or denosumab s/p zoledronic acid 4mg q4 weeks (3/27)  - start Dexamethasone taper: 4mg q12 for 5 days (started 3/31)    # SCC of jaw-  - 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  - Evaluated by Dental 3/24 - no intervention   - dental cleared for bisphosphonate, or denosumab  - s/p zolendronic acid (3/27)    #Stage 3 sacral ulcer  - now fouling smelling  - Burn consult     #Hyperkalemia  - continue with Lokelma 5mg BID  - hold if K < 4.7  - s/p insulin/d50    # UGI bleed-- No egd done--only coffee ground emesis-- s/p 1 unit of PRBC  - no further episode noted   - Hb stable at this time     # Chronic A fib  - on eliquis at home   - currently on full dose lovenox     # CHFrEF  - EF of 35%  - euvolemic--off diuretics.    # elevated calcium level  # Low Vitamin D level   - now normal s/p calcitrol   - Vitamin D 19, started on vitamin D supplements 2000units daily   -   - f/u PTHrP    # Severe protein-calorie malnutrition.  - dietary on board   - supplements added   - patient reports diarrhea with ensure     #DVT PPx: Lovenox  #GI PPx: Protonix  FULL CODE  Dispo: Acute, likely d/c to NH once ready

## 2021-04-01 LAB
ANION GAP SERPL CALC-SCNC: 10 MMOL/L — SIGNIFICANT CHANGE UP (ref 7–14)
BUN SERPL-MCNC: 23 MG/DL — HIGH (ref 10–20)
CALCIUM SERPL-MCNC: 7.7 MG/DL — LOW (ref 8.5–10.1)
CHLORIDE SERPL-SCNC: 97 MMOL/L — LOW (ref 98–110)
CO2 SERPL-SCNC: 20 MMOL/L — SIGNIFICANT CHANGE UP (ref 17–32)
CREAT SERPL-MCNC: 0.6 MG/DL — LOW (ref 0.7–1.5)
GLUCOSE SERPL-MCNC: 116 MG/DL — HIGH (ref 70–99)
POTASSIUM SERPL-MCNC: 4.5 MMOL/L — SIGNIFICANT CHANGE UP (ref 3.5–5)
POTASSIUM SERPL-SCNC: 4.5 MMOL/L — SIGNIFICANT CHANGE UP (ref 3.5–5)
PSA FLD-MCNC: 138 NG/ML — HIGH (ref 0–4)
SARS-COV-2 RNA SPEC QL NAA+PROBE: SIGNIFICANT CHANGE UP
SODIUM SERPL-SCNC: 127 MMOL/L — LOW (ref 135–146)

## 2021-04-01 PROCEDURE — 99232 SBSQ HOSP IP/OBS MODERATE 35: CPT

## 2021-04-01 RX ORDER — OXYCODONE AND ACETAMINOPHEN 5; 325 MG/1; MG/1
1 TABLET ORAL EVERY 6 HOURS
Refills: 0 | Status: DISCONTINUED | OUTPATIENT
Start: 2021-04-01 | End: 2021-04-08

## 2021-04-01 RX ADMIN — Medication 650 MILLIGRAM(S): at 21:11

## 2021-04-01 RX ADMIN — SODIUM ZIRCONIUM CYCLOSILICATE 5 GRAM(S): 10 POWDER, FOR SUSPENSION ORAL at 05:22

## 2021-04-01 RX ADMIN — MIDODRINE HYDROCHLORIDE 10 MILLIGRAM(S): 2.5 TABLET ORAL at 05:23

## 2021-04-01 RX ADMIN — PANTOPRAZOLE SODIUM 40 MILLIGRAM(S): 20 TABLET, DELAYED RELEASE ORAL at 05:23

## 2021-04-01 RX ADMIN — METHOCARBAMOL 500 MILLIGRAM(S): 500 TABLET, FILM COATED ORAL at 05:23

## 2021-04-01 RX ADMIN — ENOXAPARIN SODIUM 70 MILLIGRAM(S): 100 INJECTION SUBCUTANEOUS at 17:40

## 2021-04-01 RX ADMIN — MIDODRINE HYDROCHLORIDE 10 MILLIGRAM(S): 2.5 TABLET ORAL at 17:37

## 2021-04-01 RX ADMIN — OXYCODONE AND ACETAMINOPHEN 1 TABLET(S): 5; 325 TABLET ORAL at 11:40

## 2021-04-01 RX ADMIN — MIDODRINE HYDROCHLORIDE 10 MILLIGRAM(S): 2.5 TABLET ORAL at 11:36

## 2021-04-01 RX ADMIN — LATANOPROST 1 DROP(S): 0.05 SOLUTION/ DROPS OPHTHALMIC; TOPICAL at 21:12

## 2021-04-01 RX ADMIN — SODIUM ZIRCONIUM CYCLOSILICATE 5 GRAM(S): 10 POWDER, FOR SUSPENSION ORAL at 17:37

## 2021-04-01 RX ADMIN — METHOCARBAMOL 500 MILLIGRAM(S): 500 TABLET, FILM COATED ORAL at 14:41

## 2021-04-01 RX ADMIN — Medication 4 MILLIGRAM(S): at 17:37

## 2021-04-01 RX ADMIN — ENOXAPARIN SODIUM 70 MILLIGRAM(S): 100 INJECTION SUBCUTANEOUS at 05:23

## 2021-04-01 RX ADMIN — Medication 4 MILLIGRAM(S): at 05:23

## 2021-04-01 RX ADMIN — Medication 650 MILLIGRAM(S): at 14:41

## 2021-04-01 RX ADMIN — LIDOCAINE 2 PATCH: 4 CREAM TOPICAL at 09:50

## 2021-04-01 RX ADMIN — BICALUTAMIDE 50 MILLIGRAM(S): 50 TABLET, FILM COATED ORAL at 11:38

## 2021-04-01 RX ADMIN — Medication 1 APPLICATION(S): at 21:11

## 2021-04-01 RX ADMIN — Medication 2000 UNIT(S): at 11:36

## 2021-04-01 RX ADMIN — METHOCARBAMOL 500 MILLIGRAM(S): 500 TABLET, FILM COATED ORAL at 21:11

## 2021-04-01 RX ADMIN — Medication 650 MILLIGRAM(S): at 05:23

## 2021-04-01 RX ADMIN — LIDOCAINE 2 PATCH: 4 CREAM TOPICAL at 08:21

## 2021-04-01 RX ADMIN — LIDOCAINE 2 PATCH: 4 CREAM TOPICAL at 21:11

## 2021-04-01 RX ADMIN — OXYCODONE AND ACETAMINOPHEN 1 TABLET(S): 5; 325 TABLET ORAL at 12:34

## 2021-04-01 NOTE — PROGRESS NOTE ADULT - SUBJECTIVE AND OBJECTIVE BOX
24H events:  C/o fatigue  Plt decreasing, like from chemo  C/w radiation  PSA significantly dropped from over 900 to nearly 100      PAST MEDICAL & SURGICAL HISTORY  CHF (congestive heart failure)    Inguinal hernia    S/P CABG x 3      SOCIAL HISTORY:  Negative for smoking/alcohol/drug use.     ALLERGIES:  No Known Allergies    MEDICATIONS:  STANDING MEDICATIONS  bicalutamide 50 milliGRAM(s) Oral daily  chlorhexidine 4% Liquid 1 Application(s) Topical daily  cholecalciferol 2000 Unit(s) Oral daily  collagenase Ointment 1 Application(s) Topical daily  dexAMETHasone     Tablet 4 milliGRAM(s) Oral every 12 hours  enoxaparin Injectable 70 milliGRAM(s) SubCutaneous two times a day  latanoprost 0.005% Ophthalmic Solution 1 Drop(s) Both EYES at bedtime  lidocaine   Patch 2 Patch Transdermal daily  methocarbamol 500 milliGRAM(s) Oral three times a day  midodrine. 10 milliGRAM(s) Oral three times a day  pantoprazole    Tablet 40 milliGRAM(s) Oral before breakfast  sodium bicarbonate 650 milliGRAM(s) Oral three times a day  sodium zirconium cyclosilicate 5 Gram(s) Oral two times a day  zoledronic acid IVPB (ZOMETA) (Non - oncologic) 4 milliGRAM(s) IV Intermittent every 4 weeks    PRN MEDICATIONS  acetaminophen   Tablet .. 650 milliGRAM(s) Oral every 6 hours PRN  aluminum hydroxide/magnesium hydroxide/simethicone Suspension 30 milliLiter(s) Oral every 6 hours PRN  brimonidine 0.2% Ophthalmic Solution 1 Drop(s) Both EYES every 8 hours PRN  oxycodone    5 mG/acetaminophen 325 mG 1 Tablet(s) Oral every 6 hours PRN    VITALS:   T(F): 96.5  HR: 74  BP: 109/52  RR: 20  SpO2: 98%    LABS:                        10.0   3.03  )-----------( 87       ( 01 Apr 2021 08:30 )             30.9     04-01    128<L>  |  98  |  22<H>  ----------------------------<  98  4.8   |  21  |  0.6<L>    Ca    7.9<L>      01 Apr 2021 08:30  Mg     2.0     04-01    TPro  4.9<L>  /  Alb  3.0<L>  /  TBili  0.8  /  DBili  x   /  AST  17  /  ALT  21  /  AlkPhos  70  04-01    RADIOLOGY:  < from: MR Head No Cont (03.18.21 @ 22:41) >    IMPRESSION:    Allowing for limitation from absence of intravenous contrast, there are multiple calvarial lesions compatible with osseous metastasis. The largest lesion in the left occipital calvarium demonstrates mildly expansile subgaleal soft tissue component.    Redemonstrated partially imaged right mandibular mass, better evaluated on the previously performed MRI of the neck dated 3/11/2021.      < end of copied text >        PHYSICAL EXAM:  GEN: No acute distress  HENT: NCAT, EOMI  LYMPH: No appreciable adenopathy  LUNGS: No respiratory distress, clear to auscultation bilaterally   HEART: regular rate and rhythm  ABD: Soft, non-tender, non-distended  SKIN: No rash  EXT: No edema  NEURO: AAOX3

## 2021-04-01 NOTE — PROGRESS NOTE ADULT - ASSESSMENT
Mr. Mckee is a 81 yo male hx of AFib, CAD s/p CABG s/p 20+ years ago/ CHF/ right sided large inguinal hernia BIBA from hotel room 2/2 unable to ambulate with right thigh weakness and numbness, now found to have metastatic disease with a mandibular mass and large sacral mass    # Stage IV Prostate cancer with diffuse osseous involvement/ spinal mets with paraspinal mass   -   - Sacral biopsy favors prostate ( neoplastic cells are positive for AE1/AE3, PSA and CK7)  - Jaw mass biopsy : prostate adenocarcinoma  - TLS labs wnl  - CT chest showing RUL [id number] paravertebral lesion with mass effect.   - CT AP no RP bleed, no visceral mets  - MR CTL, brain and  jaw completed, mets throughout cervical and t-spine  - Decadron taper per rad onc  - Completed spine RT, on day 2/5 of palliative jaw radiation  - Zometa 4mg 3/27/21, cleared by dental  - Continue casodex 50mg  - Leupron 7.5mg 3/29/21 (1 month dose)  - Received 1 dose of taxotere 20mg/m2 on 3.26.21, will hold until he completes RT    # Normocytic anemia:  - Per GI, outpatient scope  - Hapto high and retic low, not consistent with hemolysis  - TSAT 67%    # Hyponatremia:  - Fluid restriction    # Mild hypercalcemia, likely due to bone mets  - s/p calcitonin   - Monitor PTH, PTHrP and 25OH Vit D     /PSYCH F/U  PT EVAL    DVT ppx on eliquis 2/2 to Afib

## 2021-04-01 NOTE — CHART NOTE - NSCHARTNOTEFT_GEN_A_CORE
Called by RN, pt bleeding from his urethra after straight cath. At bedside, pt comfortable, hemodynamically stable, not in pain, minor bleeding seen.  Spoke with urology PA, likely trauma from straight cath, recommended lemus if pt still retaining at next bladder scan.  f/u am Hb

## 2021-04-01 NOTE — PROGRESS NOTE ADULT - ATTENDING COMMENTS
seen/examined w [facility] fellow   note reviewed  plan discussed

## 2021-04-01 NOTE — PROGRESS NOTE ADULT - SUBJECTIVE AND OBJECTIVE BOX
HPI  Patient is a 80y old Male who presents with a chief complaint of 80 YEAR OLD MALE C/O MULTIPLE MEDICAL COMPLAINTS . (31 Mar 2021 20:15)    Currently admitted to medicine with the primary diagnosis of Ambulatory dysfunction       Today is hospital day 24d.     INTERVAL HPI / OVERNIGHT EVENTS:  Patient was examined and seen at bedside. This morning he is resting comfortably in bed and reports no new issues or overnight events.     ROS: Otherwise unremarkable     PAST MEDICAL & SURGICAL HISTORY  CHF (congestive heart failure)    Inguinal hernia    S/P CABG x 3      ALLERGIES  No Known Allergies    MEDICATIONS  STANDING MEDICATIONS  bicalutamide 50 milliGRAM(s) Oral daily  chlorhexidine 4% Liquid 1 Application(s) Topical daily  cholecalciferol 2000 Unit(s) Oral daily  collagenase Ointment 1 Application(s) Topical daily  dexAMETHasone     Tablet 4 milliGRAM(s) Oral every 12 hours  enoxaparin Injectable 70 milliGRAM(s) SubCutaneous two times a day  latanoprost 0.005% Ophthalmic Solution 1 Drop(s) Both EYES at bedtime  lidocaine   Patch 2 Patch Transdermal daily  methocarbamol 500 milliGRAM(s) Oral three times a day  midodrine. 10 milliGRAM(s) Oral three times a day  pantoprazole    Tablet 40 milliGRAM(s) Oral before breakfast  sodium bicarbonate 650 milliGRAM(s) Oral three times a day  sodium zirconium cyclosilicate 5 Gram(s) Oral two times a day  zoledronic acid IVPB (ZOMETA) (Non - oncologic) 4 milliGRAM(s) IV Intermittent every 4 weeks    PRN MEDICATIONS  acetaminophen   Tablet .. 650 milliGRAM(s) Oral every 6 hours PRN  aluminum hydroxide/magnesium hydroxide/simethicone Suspension 30 milliLiter(s) Oral every 6 hours PRN  brimonidine 0.2% Ophthalmic Solution 1 Drop(s) Both EYES every 8 hours PRN    VITALS:  T(F): 95.9  HR: 76  BP: 111/53  RR: --  SpO2: --    PHYSICAL EXAM  GEN: NAD, Resting comfortably in bed  PULM: Clear to auscultation bilaterally, No wheezes  CVS: Regular rate and rhythm, S1-S2, no murmurs  ABD: Soft, non-tender, non-distended, no guarding  EXT: No edema  NEURO: A&Ox3, no focal deficits    LABS                        9.5    3.62  )-----------( 89       ( 31 Mar 2021 04:30 )             28.8     03-31    128<L>  |  97<L>  |  20  ----------------------------<  107<H>  5.2<H>   |  19  |  0.5<L>    Ca    7.9<L>      31 Mar 2021 17:50  Mg     2.1     03-31    TPro  4.8<L>  /  Alb  2.8<L>  /  TBili  0.7  /  DBili  x   /  AST  13  /  ALT  19  /  AlkPhos  70  03-31                  RADIOLOGY

## 2021-04-01 NOTE — PROGRESS NOTE ADULT - ATTENDING COMMENTS
patient seen and examined earlier today.  discussed with medical resident.  patient today says he feels weak, otherwise he has no c/o pain, is requesting increase supplements  Vital Signs Last 24 Hrs  T(C): 35.8 (01 Apr 2021 13:03), Max: 35.8 (31 Mar 2021 20:00)  T(F): 96.5 (01 Apr 2021 13:03), Max: 96.5 (01 Apr 2021 13:03)  HR: 74 (01 Apr 2021 13:03) (69 - 76)  BP: 109/52 (01 Apr 2021 13:03) (109/52 - 144/62)  RR: 20 (01 Apr 2021 13:03) (20 - 20)  SpO2: 98% (01 Apr 2021 08:25) (98% - 98%)  conj pale, no jaundice  bilateral temporal wasting.    neck supple no JVD  lungs clear to auscultation - anterior chest has a tatto for radiation  heart irregular rhythm.  abd. soft nontender. scaphoid  extremities right heel decubitus and sacral decubitus both with dressings.                             10.0   3.03  )-----------( 87       ( 01 Apr 2021 08:30 )             30.9   04-01    128<L>  |  98  |  22<H>  ----------------------------<  98  4.8   |  21  |  0.6<L>    Ca    7.9<L>      01 Apr 2021 08:30  Mg     2.0     04-01    TPro  4.9<L>  /  Alb  3.0<L>  /  TBili  0.8  /  DBili  x   /  AST  17  /  ALT  21  /  AlkPhos  70  04-01        A:  prostate CA - metastatic to spine and jaw - s/p radiation therapy to spine, receiving radiation to jaw  receiving bicalutamide , received taxotere and lupron - schedule per oncology  hypercalcemia - now resolved, Calcium even on low side  hyponatremia, likely inappropriate ADH, need to r/o adrenal insufficiency but on dexamethasone taper so cannot evaluate at present time  chronic atrial fibrillation on therapeutic LMWH  severe protein/calorie malnutrition  sacral decubitus and heel decubitus    P:  complete radiation course  fluid restriction  moniotr 25-oh Vit D/Ca/PTH levels  monitor serum sodium  continue LMWH  encourage diet plus supplements  wound care - await recommendations from burn service regarding sacral decubitus  increase supplements to 3x/day

## 2021-04-01 NOTE — PROGRESS NOTE ADULT - ASSESSMENT
79 yo male hx of CAD s/p CABG s/p 20+ years ago, HFrEF, right sided large inguinal hernia BIBA from hotel room because he was unable to ambulate due to Rt thigh weakness and numbness, pt also had mandibular swelling for over a year. CT lumbosacral spine showed mass suggestive of mets with obliteration of neural foramina so was started on decadron.  Sacral mass biopsied 3/10 showed prostate adenocarcinoma. Mandibluar mass biopsied 3/19 also showed prostate cancer. Hospital course was complicated by hypovolemic shock likely 2/2 UGIB, required pressors and upgrade to stepdown unit. Pt currently in hospital undergoing chemo and radiotherapy.     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 Serum Osm 279  - Glu on BMP 120s-130s  - TSH 0.36  - free water restriction, fluid restriction 1500ml/day    #Metastatic prostate cancer with diffuse metastatic lesions   - currently on chemo and radiation as per Hem/onc   - continue with bicalutamide 50mg daily (started 3/14/21)  - s/p Lupron (GnRH agonist) IM, first dose 3/29 --> next dose in 1 month  - received first dose of taxotere (3/27), continue after radiation therapy is completed  - Completed RT to sacrum and spine 5 doses (3/24 - 3/30), started RT 5 DOSES to jaw area for palliation. Expected completion 4/6/21  - dental cleared for bisphosphonate, or denosumab s/p zoledronic acid 4mg q4 weeks (3/27)  - continue with Dexamethasone taper: 4mg q12 for 5 days (started 3/31 - 4/4)    # SCC of jaw-  - 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  - Evaluated by Dental 3/24 - no intervention   - dental cleared for bisphosphonate, or denosumab  - s/p zolendronic acid (3/27)    #Stage 3 sacral ulcer  - now fouling smelling  - Burn consult     #Hyperkalemia - improving  - continue with Lokelma 5mg BID  - hold if K < 4.7  - s/p insulin/d50    # UGI bleed-- No egd done--only coffee ground emesis-- s/p 1 unit of PRBC  - no further episode noted   - Hb stable at this time     # Chronic A fib  - on eliquis at home   - currently on full dose lovenox     # CHFrEF  - EF of 35%  - euvolemic--off diuretics.    # elevated calcium level  # Low Vitamin D level   - now normal s/p calcitrol   - Vitamin D 19, started on vitamin D supplements 2000units daily   -   - f/u PTHrP    # Severe protein-calorie malnutrition.  - dietary on board   - supplements added   - patient reports diarrhea with ensure     #DVT PPx: Lovenox  #GI PPx: Protonix  FULL CODE  Dispo: Acute, likely d/c to NH once ready

## 2021-04-02 LAB
ALBUMIN SERPL ELPH-MCNC: 3.2 G/DL — LOW (ref 3.5–5.2)
ALP SERPL-CCNC: 65 U/L — SIGNIFICANT CHANGE UP (ref 30–115)
ALT FLD-CCNC: 20 U/L — SIGNIFICANT CHANGE UP (ref 0–41)
ANION GAP SERPL CALC-SCNC: 9 MMOL/L — SIGNIFICANT CHANGE UP (ref 7–14)
AST SERPL-CCNC: 16 U/L — SIGNIFICANT CHANGE UP (ref 0–41)
BASOPHILS # BLD AUTO: 0 K/UL — SIGNIFICANT CHANGE UP (ref 0–0.2)
BASOPHILS NFR BLD AUTO: 0 % — SIGNIFICANT CHANGE UP (ref 0–1)
BILIRUB SERPL-MCNC: 0.7 MG/DL — SIGNIFICANT CHANGE UP (ref 0.2–1.2)
BUN SERPL-MCNC: 22 MG/DL — HIGH (ref 10–20)
BURR CELLS BLD QL SMEAR: PRESENT — SIGNIFICANT CHANGE UP
BURR CELLS BLD QL SMEAR: SLIGHT — SIGNIFICANT CHANGE UP
CALCIUM SERPL-MCNC: 7.8 MG/DL — LOW (ref 8.5–10.1)
CHLORIDE SERPL-SCNC: 98 MMOL/L — SIGNIFICANT CHANGE UP (ref 98–110)
CO2 SERPL-SCNC: 23 MMOL/L — SIGNIFICANT CHANGE UP (ref 17–32)
CREAT SERPL-MCNC: 0.5 MG/DL — LOW (ref 0.7–1.5)
EOSINOPHIL # BLD AUTO: 0 K/UL — SIGNIFICANT CHANGE UP (ref 0–0.7)
EOSINOPHIL NFR BLD AUTO: 0 % — SIGNIFICANT CHANGE UP (ref 0–8)
GIANT PLATELETS BLD QL SMEAR: PRESENT — SIGNIFICANT CHANGE UP
GLUCOSE SERPL-MCNC: 108 MG/DL — HIGH (ref 70–99)
HCT VFR BLD CALC: 28.9 % — LOW (ref 42–52)
HGB BLD-MCNC: 9.4 G/DL — LOW (ref 14–18)
LYMPHOCYTES # BLD AUTO: 0.07 K/UL — LOW (ref 1.2–3.4)
LYMPHOCYTES # BLD AUTO: 2.6 % — LOW (ref 20.5–51.1)
MAGNESIUM SERPL-MCNC: 1.9 MG/DL — SIGNIFICANT CHANGE UP (ref 1.8–2.4)
MANUAL SMEAR VERIFICATION: SIGNIFICANT CHANGE UP
MCHC RBC-ENTMCNC: 28.3 PG — SIGNIFICANT CHANGE UP (ref 27–31)
MCHC RBC-ENTMCNC: 32.5 G/DL — SIGNIFICANT CHANGE UP (ref 32–37)
MCV RBC AUTO: 87 FL — SIGNIFICANT CHANGE UP (ref 80–94)
METAMYELOCYTES # FLD: 1.8 % — HIGH (ref 0–0)
MONOCYTES # BLD AUTO: 0 K/UL — LOW (ref 0.1–0.6)
MONOCYTES NFR BLD AUTO: 0 % — LOW (ref 1.7–9.3)
MYELOCYTES NFR BLD: 2.7 % — HIGH (ref 0–0)
NEUTROPHILS # BLD AUTO: 2.39 K/UL — SIGNIFICANT CHANGE UP (ref 1.4–6.5)
NEUTROPHILS NFR BLD AUTO: 92.9 % — HIGH (ref 42.2–75.2)
NRBC # BLD: 2 /100 — HIGH (ref 0–0)
NRBC # BLD: SIGNIFICANT CHANGE UP /100 WBCS (ref 0–0)
PLAT MORPH BLD: ABNORMAL
PLATELET # BLD AUTO: 88 K/UL — LOW (ref 130–400)
POIKILOCYTOSIS BLD QL AUTO: SIGNIFICANT CHANGE UP
POLYCHROMASIA BLD QL SMEAR: SLIGHT — SIGNIFICANT CHANGE UP
POTASSIUM SERPL-MCNC: 4.5 MMOL/L — SIGNIFICANT CHANGE UP (ref 3.5–5)
POTASSIUM SERPL-SCNC: 4.5 MMOL/L — SIGNIFICANT CHANGE UP (ref 3.5–5)
PROT SERPL-MCNC: 4.9 G/DL — LOW (ref 6–8)
RBC # BLD: 3.32 M/UL — LOW (ref 4.7–6.1)
RBC # FLD: 16.2 % — HIGH (ref 11.5–14.5)
RBC BLD AUTO: ABNORMAL
SODIUM SERPL-SCNC: 130 MMOL/L — LOW (ref 135–146)
TOXIC GRANULES BLD QL SMEAR: PRESENT — SIGNIFICANT CHANGE UP
WBC # BLD: 2.57 K/UL — LOW (ref 4.8–10.8)
WBC # FLD AUTO: 2.57 K/UL — LOW (ref 4.8–10.8)

## 2021-04-02 PROCEDURE — 99232 SBSQ HOSP IP/OBS MODERATE 35: CPT

## 2021-04-02 RX ORDER — SODIUM CHLORIDE 9 MG/ML
1 INJECTION INTRAMUSCULAR; INTRAVENOUS; SUBCUTANEOUS
Refills: 0 | Status: DISCONTINUED | OUTPATIENT
Start: 2021-04-02 | End: 2021-04-12

## 2021-04-02 RX ORDER — CHLORHEXIDINE GLUCONATE 213 G/1000ML
1 SOLUTION TOPICAL
Refills: 0 | Status: DISCONTINUED | OUTPATIENT
Start: 2021-04-02 | End: 2021-04-16

## 2021-04-02 RX ORDER — MORPHINE SULFATE 50 MG/1
2 CAPSULE, EXTENDED RELEASE ORAL ONCE
Refills: 0 | Status: DISCONTINUED | OUTPATIENT
Start: 2021-04-02 | End: 2021-04-02

## 2021-04-02 RX ADMIN — Medication 4 MILLIGRAM(S): at 05:17

## 2021-04-02 RX ADMIN — OXYCODONE AND ACETAMINOPHEN 1 TABLET(S): 5; 325 TABLET ORAL at 14:04

## 2021-04-02 RX ADMIN — Medication 2000 UNIT(S): at 12:26

## 2021-04-02 RX ADMIN — OXYCODONE AND ACETAMINOPHEN 1 TABLET(S): 5; 325 TABLET ORAL at 12:39

## 2021-04-02 RX ADMIN — PANTOPRAZOLE SODIUM 40 MILLIGRAM(S): 20 TABLET, DELAYED RELEASE ORAL at 05:22

## 2021-04-02 RX ADMIN — SODIUM CHLORIDE 1 GRAM(S): 9 INJECTION INTRAMUSCULAR; INTRAVENOUS; SUBCUTANEOUS at 17:36

## 2021-04-02 RX ADMIN — OXYCODONE AND ACETAMINOPHEN 1 TABLET(S): 5; 325 TABLET ORAL at 09:00

## 2021-04-02 RX ADMIN — METHOCARBAMOL 500 MILLIGRAM(S): 500 TABLET, FILM COATED ORAL at 05:18

## 2021-04-02 RX ADMIN — MIDODRINE HYDROCHLORIDE 10 MILLIGRAM(S): 2.5 TABLET ORAL at 05:17

## 2021-04-02 RX ADMIN — SODIUM ZIRCONIUM CYCLOSILICATE 5 GRAM(S): 10 POWDER, FOR SUSPENSION ORAL at 05:17

## 2021-04-02 RX ADMIN — Medication 4 MILLIGRAM(S): at 17:36

## 2021-04-02 RX ADMIN — MORPHINE SULFATE 2 MILLIGRAM(S): 50 CAPSULE, EXTENDED RELEASE ORAL at 10:38

## 2021-04-02 RX ADMIN — METHOCARBAMOL 500 MILLIGRAM(S): 500 TABLET, FILM COATED ORAL at 15:38

## 2021-04-02 RX ADMIN — ENOXAPARIN SODIUM 70 MILLIGRAM(S): 100 INJECTION SUBCUTANEOUS at 17:37

## 2021-04-02 RX ADMIN — OXYCODONE AND ACETAMINOPHEN 1 TABLET(S): 5; 325 TABLET ORAL at 05:47

## 2021-04-02 RX ADMIN — LIDOCAINE 2 PATCH: 4 CREAM TOPICAL at 11:01

## 2021-04-02 RX ADMIN — Medication 650 MILLIGRAM(S): at 05:17

## 2021-04-02 RX ADMIN — Medication 650 MILLIGRAM(S): at 15:38

## 2021-04-02 RX ADMIN — LATANOPROST 1 DROP(S): 0.05 SOLUTION/ DROPS OPHTHALMIC; TOPICAL at 21:34

## 2021-04-02 RX ADMIN — MIDODRINE HYDROCHLORIDE 10 MILLIGRAM(S): 2.5 TABLET ORAL at 17:36

## 2021-04-02 RX ADMIN — METHOCARBAMOL 500 MILLIGRAM(S): 500 TABLET, FILM COATED ORAL at 21:34

## 2021-04-02 RX ADMIN — BICALUTAMIDE 50 MILLIGRAM(S): 50 TABLET, FILM COATED ORAL at 15:37

## 2021-04-02 RX ADMIN — Medication 650 MILLIGRAM(S): at 21:34

## 2021-04-02 RX ADMIN — ENOXAPARIN SODIUM 70 MILLIGRAM(S): 100 INJECTION SUBCUTANEOUS at 05:17

## 2021-04-02 RX ADMIN — MIDODRINE HYDROCHLORIDE 10 MILLIGRAM(S): 2.5 TABLET ORAL at 12:26

## 2021-04-02 RX ADMIN — LIDOCAINE 2 PATCH: 4 CREAM TOPICAL at 09:00

## 2021-04-02 RX ADMIN — MORPHINE SULFATE 2 MILLIGRAM(S): 50 CAPSULE, EXTENDED RELEASE ORAL at 12:25

## 2021-04-02 RX ADMIN — Medication 1 APPLICATION(S): at 21:34

## 2021-04-02 NOTE — CHART NOTE - NSCHARTNOTEFT_GEN_A_CORE
Attempted to see patient yesterday 4/1, and pt was in radiation. Attempted to see pt again today 4/2, and pt refused to be seen/ to have dressing changed.    Will attempt again tomorrow.

## 2021-04-02 NOTE — PROGRESS NOTE ADULT - ASSESSMENT
Mr. Mckee is a 79 yo male hx of AFib, CAD s/p CABG s/p 20+ years ago/ CHF/ right sided large inguinal hernia BIBA from hotel room 2/2 unable to ambulate with right thigh weakness and numbness, now found to have metastatic disease with a mandibular mass and large sacral mass    # Stage IV Prostate cancer with diffuse osseous involvement/ spinal mets with paraspinal mass   -   - Sacral biopsy favors prostate ( neoplastic cells are positive for AE1/AE3, PSA and CK7)  - Jaw mass biopsy : prostate adenocarcinoma  - TLS labs wnl  - CT chest showing [id number] [id number] paravertebral lesion with mass effect.   - CT AP no RP bleed, no visceral mets  - MR CTL, brain and  jaw completed, mets throughout cervical and t-spine  - Decadron taper per rad onc  - Completed spine RT, on day 2/5 of palliative jaw radiation  - Zometa 4mg 3/27/21, cleared by dental  - Continue casodex 50mg  - Leupron 7.5mg 3/29/21 (1 month dose)  - Received 1 dose of taxotere 20mg/m2 on 3.26.21, will hold until he completes RT    # Normocytic anemia:  - Per GI, outpatient scope  - Hapto high and retic low, not consistent with hemolysis  - TSAT 67%    # Hyponatremia:  - Fluid restriction    # Mild hypercalcemia, likely due to bone mets  - s/p calcitonin   - Monitor PTH, PTHrP and 25OH Vit D     /PSYCH F/U  PT EVAL    DVT ppx on lovenox BID

## 2021-04-02 NOTE — PROGRESS NOTE ADULT - ASSESSMENT
79 yo male hx of CAD s/p CABG s/p 20+ years ago, HFrEF, right sided large inguinal hernia BIBA from hotel room because he was unable to ambulate due to Rt thigh weakness and numbness, pt also had mandibular swelling for over a year. CT lumbosacral spine showed mass suggestive of mets with obliteration of neural foramina so was started on decadron.  Sacral mass biopsied 3/10 showed prostate adenocarcinoma. Mandibluar mass biopsied 3/19 also showed prostate cancer. Hospital course was complicated by hypovolemic shock likely 2/2 UGIB, required pressors and upgrade to stepdown unit. Pt currently in hospital undergoing chemo and radiotherapy.     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 Serum Osm 279  - Glu on BMP 120s-130s  - TSH 0.36  - free water restriction, fluid restriction 1500ml/day    #Hematuria likely secondary to trauma - resolved  - patient was undergoing TOV, noted hematuria  - now resolved    #Metastatic prostate cancer with diffuse metastatic lesions   - currently on chemo and radiation as per Hem/onc   - continue with bicalutamide 50mg daily (started 3/14/21)  - s/p Lupron (GnRH agonist) IM, first dose 3/29 --> next dose in 1 month  - received first dose of taxotere (3/27), continue after radiation therapy is completed  - Completed RT to sacrum and spine 5 doses (3/24 - 3/30), started RT 5 DOSES to jaw area for palliation. Expected completion 4/6/21  - dental cleared for bisphosphonate, or denosumab s/p zoledronic acid 4mg q4 weeks (3/27)  - continue with Dexamethasone taper: 4mg q12 for 5 days (started 3/31 - 4/4)    # SCC of jaw-  - 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  - Evaluated by Dental 3/24 - no intervention   - dental cleared for bisphosphonate, or denosumab  - s/p zolendronic acid (3/27)    #Stage 3 sacral ulcer  - now fouling smelling  - Burn consult still pending    #Hyperkalemia - improved  - continue with Lokelma 5mg BID  - hold if K < 4.7  - s/p insulin/d50    # UGI bleed-- No egd done--only coffee ground emesis-- s/p 1 unit of PRBC  - no further episode noted   - Hb stable at this time     # Chronic A fib  - on eliquis at home   - currently on full dose lovenox     # CHFrEF  - EF of 35%  - euvolemic--off diuretics.    # elevated calcium level  # Low Vitamin D level   - now normal s/p calcitrol   - Vitamin D 19, started on vitamin D supplements 2000units daily   -   - f/u PTHrP    # Severe protein-calorie malnutrition.  - dietary on board   - supplements added   - patient reports diarrhea with ensure     #DVT PPx: Lovenox  #GI PPx: Protonix  FULL CODE  Dispo: Acute, likely d/c to NH once ready   81 yo male hx of CAD s/p CABG s/p 20+ years ago, HFrEF, right sided large inguinal hernia BIBA from hotel room because he was unable to ambulate due to Rt thigh weakness and numbness, pt also had mandibular swelling for over a year. CT lumbosacral spine showed mass suggestive of mets with obliteration of neural foramina so was started on decadron.  Sacral mass biopsied 3/10 showed prostate adenocarcinoma. Mandibluar mass biopsied 3/19 also showed prostate cancer. Hospital course was complicated by hypovolemic shock likely 2/2 UGIB, required pressors and upgrade to stepdown unit. Pt currently in hospital undergoing chemo and radiotherapy.     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 Serum Osm 279  - Glu on BMP 120s-130s  - TSH 0.36  - free water restriction, fluid restriction 1500ml/day  - start Sodium chloride tablets    #Hematuria likely secondary to trauma - resolved  - patient was undergoing TOV, noted hematuria  - now resolved    #Thrombocytopenia   #Leukopenia   - platelets and WBC downtrending since 3/27  - likely due to chemotherapy (taxotere)  - monitor for any signs of bleeding   - transfuse if plt <10K, or <50K with bleeding  - f/u daily CBC with diff    #Metastatic prostate cancer with diffuse metastatic lesions   - currently on chemo and radiation as per Hem/onc   - continue with bicalutamide 50mg daily (started 3/14/21)  - s/p Lupron (GnRH agonist) IM, first dose 3/29 --> next dose in 1 month  - received first dose of taxotere (3/27), continue after radiation therapy is completed  - Completed RT to sacrum and spine 5 doses (3/24 - 3/30), started RT 5 DOSES to jaw area for palliation. Expected completion 4/6/21  - dental cleared for bisphosphonate, or denosumab s/p zoledronic acid 4mg q4 weeks (3/27)  - continue with Dexamethasone taper: 4mg q12 for 5 days (started 3/31 - 4/4)    # SCC of jaw-  - 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  - Evaluated by Dental 3/24 - no intervention   - dental cleared for bisphosphonate, or denosumab  - s/p zolendronic acid (3/27)    #Stage 3 sacral ulcer  - now fouling smelling  - Burn consult still pending    #Hyperkalemia - improved  - continue with Lokelma 5mg BID  - hold if K < 4.7  - s/p insulin/d50    # UGI bleed-- No egd done--only coffee ground emesis-- s/p 1 unit of PRBC  - no further episode noted   - Hb stable at this time     # Chronic A fib  - on eliquis at home   - currently on full dose lovenox     # CHFrEF  - EF of 35%  - euvolemic--off diuretics.    # elevated calcium level  # Low Vitamin D level   - now normal s/p calcitrol   - Vitamin D 19, started on vitamin D supplements 2000units daily   -   - f/u PTHrP    # Severe protein-calorie malnutrition.  - dietary on board   - supplements added   - patient reports diarrhea with ensure     #DVT PPx: Lovenox  #GI PPx: Protonix  FULL CODE  Dispo: Acute, likely d/c to NH once ready

## 2021-04-02 NOTE — PROGRESS NOTE ADULT - SUBJECTIVE AND OBJECTIVE BOX
HPI  Patient is a 80y old Male who presents with a chief complaint of 80 YEAR OLD MALE C/O MULTIPLE MEDICAL COMPLAINTS . (01 Apr 2021 14:41)    Currently admitted to medicine with the primary diagnosis of Ambulatory dysfunction       Today is hospital day 25d.     INTERVAL HPI / OVERNIGHT EVENTS:  Patient was examined and seen at bedside. This morning he appears to be very weak and tired. He is willing to complete the radiation today, but would like a break over the weekend.       ROS: Otherwise unremarkable     PAST MEDICAL & SURGICAL HISTORY  CHF (congestive heart failure)    Inguinal hernia    S/P CABG x 3      ALLERGIES  No Known Allergies    MEDICATIONS  STANDING MEDICATIONS  bicalutamide 50 milliGRAM(s) Oral daily  chlorhexidine 4% Liquid 1 Application(s) Topical daily  cholecalciferol 2000 Unit(s) Oral daily  collagenase Ointment 1 Application(s) Topical daily  dexAMETHasone     Tablet 4 milliGRAM(s) Oral every 12 hours  enoxaparin Injectable 70 milliGRAM(s) SubCutaneous two times a day  latanoprost 0.005% Ophthalmic Solution 1 Drop(s) Both EYES at bedtime  lidocaine   Patch 2 Patch Transdermal daily  methocarbamol 500 milliGRAM(s) Oral three times a day  midodrine. 10 milliGRAM(s) Oral three times a day  pantoprazole    Tablet 40 milliGRAM(s) Oral before breakfast  sodium bicarbonate 650 milliGRAM(s) Oral three times a day  zoledronic acid IVPB (ZOMETA) (Non - oncologic) 4 milliGRAM(s) IV Intermittent every 4 weeks    PRN MEDICATIONS  acetaminophen   Tablet .. 650 milliGRAM(s) Oral every 6 hours PRN  aluminum hydroxide/magnesium hydroxide/simethicone Suspension 30 milliLiter(s) Oral every 6 hours PRN  brimonidine 0.2% Ophthalmic Solution 1 Drop(s) Both EYES every 8 hours PRN  oxycodone    5 mG/acetaminophen 325 mG 1 Tablet(s) Oral every 6 hours PRN    VITALS:  T(F): 98  HR: 75  BP: 101/51  RR: 18  SpO2: 99%    PHYSICAL EXAM  GEN: NAD, appears tired and weak  PULM: Clear to auscultation bilaterally, No wheezes  CVS: Regular rate and rhythm, S1-S2, no murmurs  ABD: Soft, non-tender, non-distended, no guarding. Urine cleared up  EXT: No edema  NEURO: A&Ox3, no focal deficits    LABS                        9.4    2.57  )-----------( 88       ( 02 Apr 2021 07:01 )             28.9     04-01    127<L>  |  97<L>  |  23<H>  ----------------------------<  116<H>  4.5   |  20  |  0.6<L>    Ca    7.7<L>      01 Apr 2021 21:21  Mg     2.0     04-01    TPro  4.9<L>  /  Alb  3.0<L>  /  TBili  0.8  /  DBili  x   /  AST  17  /  ALT  21  /  AlkPhos  70  04-01

## 2021-04-02 NOTE — PROGRESS NOTE ADULT - ATTENDING COMMENTS
patient seen and examined earlier today.  discussed with medical resident.  patient still feeling weak, otherwise he has no c/o pain.  Vital Signs Last 24 Hrs  T(C): 36.1 (02 Apr 2021 14:00), Max: 36.7 (02 Apr 2021 04:00)  T(F): 97 (02 Apr 2021 14:00), Max: 98 (02 Apr 2021 04:00)  HR: 72 (02 Apr 2021 15:43) (71 - 87)  BP: 108/52 (02 Apr 2021 15:43) (92/45 - 119/60)  RR: 18 (02 Apr 2021 04:00) (18 - 18)  SpO2: 99% (02 Apr 2021 07:59) (99% - 100%)  conj pale, no jaundice  bilateral temporal wasting.    neck supple no JVD  lungs clear to auscultation - anterior chest has a tatto for radiation  heart irregular rhythm.  abd. soft nontender. scaphoid  extremities right heel decubitus and sacral decubitus both with dressings.                          9.4    2.57  )-----------( 88       ( 02 Apr 2021 07:01 )             28.9   04-02    130<L>  |  98  |  22<H>  ----------------------------<  108<H>  4.5   |  23  |  0.5<L>    Ca    7.8<L>      02 Apr 2021 07:01  Mg     1.9     04-02    TPro  4.9<L>  /  Alb  3.2<L>  /  TBili  0.7  /  DBili  x   /  AST  16  /  ALT  20  /  AlkPhos  65  04-02        A:  prostate CA - metastatic to spine and jaw - s/p radiation therapy to spine, receiving radiation to jaw  receiving bicalutamide , received taxotere and lupron - schedule per oncology  hypercalcemia - now resolved  hyponatremia, likely inappropriate ADH, need to r/o adrenal insufficiency but on dexamethasone taper so cannot evaluate at present time  chronic atrial fibrillation on therapeutic LMWH  severe protein/calorie malnutrition  sacral decubitus and heel decubitus    P:  complete radiation course (to be completed after Monday 4/5)  fluid restriction  moniotr 25-oh Vit D/Ca/PTH levels  monitor serum sodium - salt po added to his regimen  continue LMWH  encourage diet plus supplements  wound care - await recommendations from burn service regarding sacral decubitus .

## 2021-04-02 NOTE — PROGRESS NOTE ADULT - SUBJECTIVE AND OBJECTIVE BOX
24H events:    No acute events  He is feeling fatigued  C/o back pain  C/w radiation tx to jaw      PAST MEDICAL & SURGICAL HISTORY  CHF (congestive heart failure)    Inguinal hernia    S/P CABG x 3      SOCIAL HISTORY:  Negative for smoking/alcohol/drug use.     ALLERGIES:  No Known Allergies    MEDICATIONS:  STANDING MEDICATIONS  bicalutamide 50 milliGRAM(s) Oral daily  chlorhexidine 4% Liquid 1 Application(s) Topical <User Schedule>  cholecalciferol 2000 Unit(s) Oral daily  collagenase Ointment 1 Application(s) Topical daily  dexAMETHasone     Tablet 4 milliGRAM(s) Oral every 12 hours  enoxaparin Injectable 70 milliGRAM(s) SubCutaneous two times a day  latanoprost 0.005% Ophthalmic Solution 1 Drop(s) Both EYES at bedtime  lidocaine   Patch 2 Patch Transdermal daily  methocarbamol 500 milliGRAM(s) Oral three times a day  midodrine. 10 milliGRAM(s) Oral three times a day  pantoprazole    Tablet 40 milliGRAM(s) Oral before breakfast  sodium bicarbonate 650 milliGRAM(s) Oral three times a day  sodium chloride 1 Gram(s) Oral two times a day  zoledronic acid IVPB (ZOMETA) (Non - oncologic) 4 milliGRAM(s) IV Intermittent every 4 weeks    PRN MEDICATIONS  acetaminophen   Tablet .. 650 milliGRAM(s) Oral every 6 hours PRN  aluminum hydroxide/magnesium hydroxide/simethicone Suspension 30 milliLiter(s) Oral every 6 hours PRN  brimonidine 0.2% Ophthalmic Solution 1 Drop(s) Both EYES every 8 hours PRN  oxycodone    5 mG/acetaminophen 325 mG 1 Tablet(s) Oral every 6 hours PRN    VITALS:   T(F): 97  HR: 87  BP: 92/45  RR: 18  SpO2: 99%    LABS:                        9.4    2.57  )-----------( 88       ( 02 Apr 2021 07:01 )             28.9     04-02    130<L>  |  98  |  22<H>  ----------------------------<  108<H>  4.5   |  23  |  0.5<L>    Ca    7.8<L>      02 Apr 2021 07:01  Mg     1.9     04-02    TPro  4.9<L>  /  Alb  3.2<L>  /  TBili  0.7  /  DBili  x   /  AST  16  /  ALT  20  /  AlkPhos  65  04-02      RADIOLOGY:    PHYSICAL EXAM:  GEN: No acute distress  HENT: NCAT, EOMI  LYMPH: No appreciable adenopathy  LUNGS: No respiratory distress, clear to auscultation bilaterally   HEART: regular rate and rhythm  ABD: Soft, non-tender, non-distended  SKIN: No rash  EXT: No edema  NEURO: AAOX3

## 2021-04-03 LAB
ALBUMIN SERPL ELPH-MCNC: 3.1 G/DL — LOW (ref 3.5–5.2)
ALP SERPL-CCNC: 70 U/L — SIGNIFICANT CHANGE UP (ref 30–115)
ALT FLD-CCNC: 20 U/L — SIGNIFICANT CHANGE UP (ref 0–41)
ANION GAP SERPL CALC-SCNC: 13 MMOL/L — SIGNIFICANT CHANGE UP (ref 7–14)
ANION GAP SERPL CALC-SCNC: 14 MMOL/L — SIGNIFICANT CHANGE UP (ref 7–14)
AST SERPL-CCNC: 14 U/L — SIGNIFICANT CHANGE UP (ref 0–41)
BASOPHILS # BLD AUTO: 0.01 K/UL — SIGNIFICANT CHANGE UP (ref 0–0.2)
BASOPHILS NFR BLD AUTO: 0.5 % — SIGNIFICANT CHANGE UP (ref 0–1)
BILIRUB SERPL-MCNC: 0.7 MG/DL — SIGNIFICANT CHANGE UP (ref 0.2–1.2)
BUN SERPL-MCNC: 24 MG/DL — HIGH (ref 10–20)
BUN SERPL-MCNC: 25 MG/DL — HIGH (ref 10–20)
CALCIUM SERPL-MCNC: 8.1 MG/DL — LOW (ref 8.5–10.1)
CALCIUM SERPL-MCNC: 8.1 MG/DL — LOW (ref 8.5–10.1)
CHLORIDE SERPL-SCNC: 100 MMOL/L — SIGNIFICANT CHANGE UP (ref 98–110)
CHLORIDE SERPL-SCNC: 98 MMOL/L — SIGNIFICANT CHANGE UP (ref 98–110)
CO2 SERPL-SCNC: 17 MMOL/L — SIGNIFICANT CHANGE UP (ref 17–32)
CO2 SERPL-SCNC: 21 MMOL/L — SIGNIFICANT CHANGE UP (ref 17–32)
CREAT SERPL-MCNC: 0.5 MG/DL — LOW (ref 0.7–1.5)
CREAT SERPL-MCNC: 0.7 MG/DL — SIGNIFICANT CHANGE UP (ref 0.7–1.5)
EOSINOPHIL # BLD AUTO: 0 K/UL — SIGNIFICANT CHANGE UP (ref 0–0.7)
EOSINOPHIL NFR BLD AUTO: 0 % — SIGNIFICANT CHANGE UP (ref 0–8)
GLUCOSE SERPL-MCNC: 106 MG/DL — HIGH (ref 70–99)
GLUCOSE SERPL-MCNC: 117 MG/DL — HIGH (ref 70–99)
HCT VFR BLD CALC: 31.2 % — LOW (ref 42–52)
HGB BLD-MCNC: 10 G/DL — LOW (ref 14–18)
IMM GRANULOCYTES NFR BLD AUTO: 2.1 % — HIGH (ref 0.1–0.3)
LYMPHOCYTES # BLD AUTO: 0.04 K/UL — LOW (ref 1.2–3.4)
LYMPHOCYTES # BLD AUTO: 2.1 % — LOW (ref 20.5–51.1)
MAGNESIUM SERPL-MCNC: 2.1 MG/DL — SIGNIFICANT CHANGE UP (ref 1.8–2.4)
MCHC RBC-ENTMCNC: 28.5 PG — SIGNIFICANT CHANGE UP (ref 27–31)
MCHC RBC-ENTMCNC: 32.1 G/DL — SIGNIFICANT CHANGE UP (ref 32–37)
MCV RBC AUTO: 88.9 FL — SIGNIFICANT CHANGE UP (ref 80–94)
MONOCYTES # BLD AUTO: 0.09 K/UL — LOW (ref 0.1–0.6)
MONOCYTES NFR BLD AUTO: 4.7 % — SIGNIFICANT CHANGE UP (ref 1.7–9.3)
NEUTROPHILS # BLD AUTO: 1.75 K/UL — SIGNIFICANT CHANGE UP (ref 1.4–6.5)
NEUTROPHILS NFR BLD AUTO: 90.6 % — HIGH (ref 42.2–75.2)
NRBC # BLD: 0 /100 WBCS — SIGNIFICANT CHANGE UP (ref 0–0)
PLATELET # BLD AUTO: 83 K/UL — LOW (ref 130–400)
POTASSIUM SERPL-MCNC: 5.2 MMOL/L — HIGH (ref 3.5–5)
POTASSIUM SERPL-MCNC: 5.3 MMOL/L — HIGH (ref 3.5–5)
POTASSIUM SERPL-SCNC: 5.2 MMOL/L — HIGH (ref 3.5–5)
POTASSIUM SERPL-SCNC: 5.3 MMOL/L — HIGH (ref 3.5–5)
PROT SERPL-MCNC: 5.2 G/DL — LOW (ref 6–8)
PSA SERPL-MCNC: 65.9 NG/ML — HIGH (ref 0–4)
RBC # BLD: 3.51 M/UL — LOW (ref 4.7–6.1)
RBC # FLD: 16.9 % — HIGH (ref 11.5–14.5)
SODIUM SERPL-SCNC: 131 MMOL/L — LOW (ref 135–146)
SODIUM SERPL-SCNC: 132 MMOL/L — LOW (ref 135–146)
WBC # BLD: 1.93 K/UL — LOW (ref 4.8–10.8)
WBC # FLD AUTO: 1.93 K/UL — LOW (ref 4.8–10.8)

## 2021-04-03 PROCEDURE — 99233 SBSQ HOSP IP/OBS HIGH 50: CPT

## 2021-04-03 PROCEDURE — 99221 1ST HOSP IP/OBS SF/LOW 40: CPT

## 2021-04-03 RX ORDER — SODIUM ZIRCONIUM CYCLOSILICATE 10 G/10G
5 POWDER, FOR SUSPENSION ORAL
Refills: 0 | Status: DISCONTINUED | OUTPATIENT
Start: 2021-04-03 | End: 2021-04-12

## 2021-04-03 RX ORDER — FENTANYL CITRATE 50 UG/ML
1 INJECTION INTRAVENOUS
Refills: 0 | Status: DISCONTINUED | OUTPATIENT
Start: 2021-04-03 | End: 2021-04-09

## 2021-04-03 RX ORDER — AMPICILLIN SODIUM AND SULBACTAM SODIUM 250; 125 MG/ML; MG/ML
INJECTION, POWDER, FOR SUSPENSION INTRAMUSCULAR; INTRAVENOUS
Refills: 0 | Status: DISCONTINUED | OUTPATIENT
Start: 2021-04-03 | End: 2021-04-04

## 2021-04-03 RX ORDER — MORPHINE SULFATE 50 MG/1
2 CAPSULE, EXTENDED RELEASE ORAL ONCE
Refills: 0 | Status: DISCONTINUED | OUTPATIENT
Start: 2021-04-03 | End: 2021-04-03

## 2021-04-03 RX ORDER — AMPICILLIN SODIUM AND SULBACTAM SODIUM 250; 125 MG/ML; MG/ML
3 INJECTION, POWDER, FOR SUSPENSION INTRAMUSCULAR; INTRAVENOUS EVERY 6 HOURS
Refills: 0 | Status: DISCONTINUED | OUTPATIENT
Start: 2021-04-03 | End: 2021-04-04

## 2021-04-03 RX ORDER — SODIUM ZIRCONIUM CYCLOSILICATE 10 G/10G
10 POWDER, FOR SUSPENSION ORAL ONCE
Refills: 0 | Status: COMPLETED | OUTPATIENT
Start: 2021-04-03 | End: 2021-04-03

## 2021-04-03 RX ORDER — SODIUM HYPOCHLORITE 0.125 %
1 SOLUTION, NON-ORAL MISCELLANEOUS
Refills: 0 | Status: DISCONTINUED | OUTPATIENT
Start: 2021-04-03 | End: 2021-04-16

## 2021-04-03 RX ORDER — ALPRAZOLAM 0.25 MG
0.5 TABLET ORAL ONCE
Refills: 0 | Status: DISCONTINUED | OUTPATIENT
Start: 2021-04-03 | End: 2021-04-03

## 2021-04-03 RX ORDER — ALPRAZOLAM 0.25 MG
0.25 TABLET ORAL ONCE
Refills: 0 | Status: DISCONTINUED | OUTPATIENT
Start: 2021-04-03 | End: 2021-04-03

## 2021-04-03 RX ORDER — MORPHINE SULFATE 50 MG/1
2 CAPSULE, EXTENDED RELEASE ORAL EVERY 6 HOURS
Refills: 0 | Status: DISCONTINUED | OUTPATIENT
Start: 2021-04-03 | End: 2021-04-09

## 2021-04-03 RX ORDER — PHENYLEPHRINE-SHARK LIVER OIL-MINERAL OIL-PETROLATUM RECTAL OINTMENT
1 OINTMENT (GRAM) RECTAL EVERY 8 HOURS
Refills: 0 | Status: DISCONTINUED | OUTPATIENT
Start: 2021-04-03 | End: 2021-04-03

## 2021-04-03 RX ORDER — AMPICILLIN SODIUM AND SULBACTAM SODIUM 250; 125 MG/ML; MG/ML
3 INJECTION, POWDER, FOR SUSPENSION INTRAMUSCULAR; INTRAVENOUS ONCE
Refills: 0 | Status: COMPLETED | OUTPATIENT
Start: 2021-04-03 | End: 2021-04-03

## 2021-04-03 RX ADMIN — Medication 1 APPLICATION(S): at 22:07

## 2021-04-03 RX ADMIN — MIDODRINE HYDROCHLORIDE 10 MILLIGRAM(S): 2.5 TABLET ORAL at 06:05

## 2021-04-03 RX ADMIN — MIDODRINE HYDROCHLORIDE 10 MILLIGRAM(S): 2.5 TABLET ORAL at 17:19

## 2021-04-03 RX ADMIN — ENOXAPARIN SODIUM 70 MILLIGRAM(S): 100 INJECTION SUBCUTANEOUS at 17:18

## 2021-04-03 RX ADMIN — FENTANYL CITRATE 1 PATCH: 50 INJECTION INTRAVENOUS at 11:41

## 2021-04-03 RX ADMIN — AMPICILLIN SODIUM AND SULBACTAM SODIUM 200 GRAM(S): 250; 125 INJECTION, POWDER, FOR SUSPENSION INTRAMUSCULAR; INTRAVENOUS at 11:41

## 2021-04-03 RX ADMIN — METHOCARBAMOL 500 MILLIGRAM(S): 500 TABLET, FILM COATED ORAL at 22:07

## 2021-04-03 RX ADMIN — FENTANYL CITRATE 1 PATCH: 50 INJECTION INTRAVENOUS at 18:04

## 2021-04-03 RX ADMIN — SODIUM ZIRCONIUM CYCLOSILICATE 10 GRAM(S): 10 POWDER, FOR SUSPENSION ORAL at 22:07

## 2021-04-03 RX ADMIN — LIDOCAINE 2 PATCH: 4 CREAM TOPICAL at 23:12

## 2021-04-03 RX ADMIN — Medication 650 MILLIGRAM(S): at 06:05

## 2021-04-03 RX ADMIN — SODIUM CHLORIDE 1 GRAM(S): 9 INJECTION INTRAMUSCULAR; INTRAVENOUS; SUBCUTANEOUS at 17:19

## 2021-04-03 RX ADMIN — CHLORHEXIDINE GLUCONATE 1 APPLICATION(S): 213 SOLUTION TOPICAL at 06:05

## 2021-04-03 RX ADMIN — METHOCARBAMOL 500 MILLIGRAM(S): 500 TABLET, FILM COATED ORAL at 13:00

## 2021-04-03 RX ADMIN — MORPHINE SULFATE 2 MILLIGRAM(S): 50 CAPSULE, EXTENDED RELEASE ORAL at 06:09

## 2021-04-03 RX ADMIN — PANTOPRAZOLE SODIUM 40 MILLIGRAM(S): 20 TABLET, DELAYED RELEASE ORAL at 06:05

## 2021-04-03 RX ADMIN — SODIUM ZIRCONIUM CYCLOSILICATE 5 GRAM(S): 10 POWDER, FOR SUSPENSION ORAL at 17:19

## 2021-04-03 RX ADMIN — LATANOPROST 1 DROP(S): 0.05 SOLUTION/ DROPS OPHTHALMIC; TOPICAL at 22:08

## 2021-04-03 RX ADMIN — Medication 4 MILLIGRAM(S): at 17:19

## 2021-04-03 RX ADMIN — Medication 2000 UNIT(S): at 11:42

## 2021-04-03 RX ADMIN — Medication 1 APPLICATION(S): at 17:19

## 2021-04-03 RX ADMIN — SODIUM CHLORIDE 1 GRAM(S): 9 INJECTION INTRAMUSCULAR; INTRAVENOUS; SUBCUTANEOUS at 06:05

## 2021-04-03 RX ADMIN — MORPHINE SULFATE 2 MILLIGRAM(S): 50 CAPSULE, EXTENDED RELEASE ORAL at 18:30

## 2021-04-03 RX ADMIN — MORPHINE SULFATE 2 MILLIGRAM(S): 50 CAPSULE, EXTENDED RELEASE ORAL at 18:14

## 2021-04-03 RX ADMIN — METHOCARBAMOL 500 MILLIGRAM(S): 500 TABLET, FILM COATED ORAL at 06:05

## 2021-04-03 RX ADMIN — AMPICILLIN SODIUM AND SULBACTAM SODIUM 200 GRAM(S): 250; 125 INJECTION, POWDER, FOR SUSPENSION INTRAMUSCULAR; INTRAVENOUS at 17:18

## 2021-04-03 RX ADMIN — ENOXAPARIN SODIUM 70 MILLIGRAM(S): 100 INJECTION SUBCUTANEOUS at 06:05

## 2021-04-03 RX ADMIN — Medication 4 MILLIGRAM(S): at 06:05

## 2021-04-03 RX ADMIN — AMPICILLIN SODIUM AND SULBACTAM SODIUM 200 GRAM(S): 250; 125 INJECTION, POWDER, FOR SUSPENSION INTRAMUSCULAR; INTRAVENOUS at 23:11

## 2021-04-03 RX ADMIN — BICALUTAMIDE 50 MILLIGRAM(S): 50 TABLET, FILM COATED ORAL at 12:59

## 2021-04-03 RX ADMIN — MIDODRINE HYDROCHLORIDE 10 MILLIGRAM(S): 2.5 TABLET ORAL at 11:42

## 2021-04-03 NOTE — CONSULT NOTE ADULT - CONSULT REQUESTED BY NAME
Dr. Meyer
Medical
CCU
Balbina Nassar
Medicine
CCU team/ ENT
Dr Meyer
Dr Pride
Medicine
Silvino Mcgill
Dr. Bagley/Merry
medicine

## 2021-04-03 NOTE — CONSULT NOTE ADULT - CONSULT REASON
clearance for bisphosphonates needed
mandibular mass
clearance for bisphosphonates, evaluation of jaw swelling
r/o malignancy  r/o cord compression
Inefcted Sacral Ulcer
evaluate for palliative radiotherapy
facial mass
sacral wound
Upper GI bleeding
Metastatic Prostate Ca. Pain control
Sacral mass biopsy
sacral mass

## 2021-04-03 NOTE — CONSULT NOTE ADULT - REASON FOR ADMISSION
80 YEAR OLD MALE C/O MULTIPLE MEDICAL COMPLAINTS .
leg weakness, mandibular mass
80 YEAR OLD MALE C/O MULTIPLE MEDICAL COMPLAINTS .

## 2021-04-03 NOTE — CONSULT NOTE ADULT - PROVIDER SPECIALTY LIST ADULT
Intervent Radiology
Palliative Care
Dental
Dental
Neurosurgery
Burn
ENT
Infectious Disease
Nutrition Support
Gastroenterology
Heme/Onc
Rad Onc

## 2021-04-03 NOTE — PROGRESS NOTE ADULT - ASSESSMENT
79 yo male hx of CAD s/p CABG s/p 20+ years ago, HFrEF, right sided large inguinal hernia BIBA from hotel room because he was unable to ambulate due to Rt thigh weakness and numbness, pt also had mandibular swelling for over a year. CT lumbosacral spine showed mass suggestive of mets with obliteration of neural foramina so was started on decadron.  Sacral mass biopsied 3/10 showed prostate adenocarcinoma. Mandibluar mass biopsied 3/19 also showed prostate cancer. Hospital course was complicated by hypovolemic shock likely 2/2 UGIB, required pressors and upgrade to stepdown unit. Pt currently in hospital undergoing chemo and radiotherapy.     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 Serum Osm 279  - Glu on BMP 120s-130s  - TSH 0.36  - free water restriction, fluid restriction 1500ml/day  - start Sodium chloride tablets    #Hematuria likely secondary to trauma - resolved  - patient was undergoing TOV, noted hematuria  - now resolved    #Thrombocytopenia   #Leukopenia   - platelets and WBC downtrending since 3/27  - likely due to chemotherapy (taxotere)  - monitor for any signs of bleeding   - transfuse if plt <10K, or <50K with bleeding  - f/u daily CBC with diff    #Metastatic prostate cancer with diffuse metastatic lesions   - currently on chemo and radiation as per Hem/onc   - continue with bicalutamide 50mg daily (started 3/14/21)  - s/p Lupron (GnRH agonist) IM, first dose 3/29 --> next dose in 1 month  - received first dose of taxotere (3/27), continue after radiation therapy is completed  - Completed RT to sacrum and spine 5 doses (3/24 - 3/30), started RT 5 DOSES to jaw area for palliation. Expected completion 4/6/21  - dental cleared for bisphosphonate, or denosumab s/p zoledronic acid 4mg q4 weeks (3/27)  - continue with Dexamethasone taper: 4mg q12 for 5 days (started 3/31 - 4/4)    # SCC of jaw-  - 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  - Evaluated by Dental 3/24 - no intervention   - dental cleared for bisphosphonate, or denosumab  - s/p zolendronic acid (3/27)    #Stage 3 sacral ulcer  - now fouling smelling  - Burn consult still pending    #Hyperkalemia - improved  - continue with Lokelma 5mg BID  - hold if K < 4.7  - s/p insulin/d50    # UGI bleed-- No egd done--only coffee ground emesis-- s/p 1 unit of PRBC  - no further episode noted   - Hb stable at this time     # Chronic A fib  - on eliquis at home   - currently on full dose lovenox     # CHFrEF  - EF of 35%  - euvolemic--off diuretics.    # elevated calcium level  # Low Vitamin D level   - now normal s/p calcitrol   - Vitamin D 19, started on vitamin D supplements 2000units daily   -   - f/u PTHrP    # Severe protein-calorie malnutrition.  - dietary on board   - supplements added   - patient reports diarrhea with ensure     #DVT PPx: Lovenox  #GI PPx: Protonix  FULL CODE  Dispo: Acute, likely d/c to NH once ready     81 yo male hx of CAD s/p CABG s/p 20+ years ago, HFrEF, right sided large inguinal hernia BIBA from hotel room because he was unable to ambulate due to Rt thigh weakness and numbness, pt also had mandibular swelling for over a year. CT lumbosacral spine showed mass suggestive of mets with obliteration of neural foramina so was started on decadron.  Sacral mass biopsied 3/10 showed prostate adenocarcinoma. Mandibluar mass biopsied 3/19 also showed prostate cancer. Hospital course was complicated by hypovolemic shock likely 2/2 UGIB, required pressors and upgrade to stepdown unit. Pt currently in hospital undergoing chemo and radiotherapy.     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 Serum Osm 279  - Glu on BMP 120s-130s  - TSH 0.36  - free water restriction, fluid restriction 1500ml/day  - continue with Sodium chloride tablets    #Stage 3 sacral ulcer  - now fouling smelling  - Burn consult still pending      #Thrombocytopenia - worsening   #Leukopenia   - platelets and WBC downtrending since 3/27  - likely due to chemotherapy (taxotere)  - monitor for any signs of bleeding   - transfuse if plt <10K, or <50K with bleeding  - f/u daily CBC with diff    #Metastatic prostate cancer with diffuse metastatic lesions   - currently on chemo and radiation as per Hem/onc   - continue with bicalutamide 50mg daily (started 3/14/21)  - s/p Lupron (GnRH agonist) IM, first dose 3/29 --> next dose in 1 month  - received first dose of taxotere (3/27), continue after radiation therapy is completed  - Completed RT to sacrum and spine 5 doses (3/24 - 3/30), started RT 5 DOSES to jaw area for palliation. Expected completion 4/6/21  - dental cleared for bisphosphonate, or denosumab s/p zoledronic acid 4mg q4 weeks (3/27)  - continue with Dexamethasone taper: 4mg q12 for 5 days (started 3/31 - 4/4), then switch to 4mg daily for 5 days (see steroid chart note)    # SCC of jaw-  - 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  - Evaluated by Dental 3/24 - no intervention   - dental cleared for bisphosphonate, or denosumab  - s/p zolendronic acid (3/27)    # UGI bleed-- No egd done--only coffee ground emesis-- s/p 1 unit of PRBC  - no further episode noted   - Hb stable at this time     # Chronic A fib  - on eliquis at home   - currently on full dose lovenox     # CHFrEF  - EF of 35%  - euvolemic--off diuretics.    # elevated calcium level  # Low Vitamin D level   - now normal s/p calcitrol   - Vitamin D 19, continue with vitamin D supplements 2000units daily   -   - f/u PTHrP still pending    # Severe protein-calorie malnutrition.  - dietary on board   - supplements added   - patient reports diarrhea with ensure     #DVT PPx: Lovenox  #GI PPx: Protonix  FULL CODE  Dispo: Acute, likely d/c to NH once ready     81 yo male hx of CAD s/p CABG s/p 20+ years ago, HFrEF, right sided large inguinal hernia BIBA from hotel room because he was unable to ambulate due to Rt thigh weakness and numbness, pt also had mandibular swelling for over a year. CT lumbosacral spine showed mass suggestive of mets with obliteration of neural foramina so was started on decadron.  Sacral mass biopsied 3/10 showed prostate adenocarcinoma. Mandibluar mass biopsied 3/19 also showed prostate cancer. Hospital course was complicated by hypovolemic shock likely 2/2 UGIB, required pressors and upgrade to stepdown unit. Pt currently in hospital undergoing chemo and radiotherapy.     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 Serum Osm 279  - Glu on BMP 120s-130s  - TSH 0.36  - free water restriction, fluid restriction 1500ml/day  - continue with Sodium chloride tablets    #Stage 3 sacral ulcer  - now fouling smelling  - Burn consult still pending  - start Unasyn 3mg q6  - ID consult   - send wound cultures      #Thrombocytopenia - worsening   #Leukopenia   - platelets and WBC downtrending since 3/27  - likely due to chemotherapy (taxotere)  - monitor for any signs of bleeding   - transfuse if plt <10K, or <50K with bleeding  - f/u daily CBC with diff    #Metastatic prostate cancer with diffuse metastatic lesions   - currently on chemo and radiation as per Hem/onc   - continue with bicalutamide 50mg daily (started 3/14/21)  - s/p Lupron (GnRH agonist) IM, first dose 3/29 --> next dose in 1 month  - received first dose of taxotere (3/27), continue after radiation therapy is completed  - Completed RT to sacrum and spine 5 doses (3/24 - 3/30), started RT 5 DOSES to jaw area for palliation. Expected completion 4/6/21  - dental cleared for bisphosphonate, or denosumab s/p zoledronic acid 4mg q4 weeks (3/27)  - continue with Dexamethasone taper: 4mg q12 for 5 days (started 3/31 - 4/4), then switch to 4mg daily for 5 days (see steroid chart note)    # SCC of jaw-  - 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  - Evaluated by Dental 3/24 - no intervention   - dental cleared for bisphosphonate, or denosumab  - s/p zolendronic acid (3/27)    # UGI bleed-- No egd done--only coffee ground emesis-- s/p 1 unit of PRBC  - no further episode noted   - Hb stable at this time     # Chronic A fib  - on eliquis at home   - currently on full dose lovenox     # CHFrEF  - EF of 35%  - euvolemic--off diuretics.    # elevated calcium level  # Low Vitamin D level   - now normal s/p calcitrol   - Vitamin D 19, continue with vitamin D supplements 2000units daily   -   - f/u PTHrP still pending    # Severe protein-calorie malnutrition.  - dietary on board   - supplements added   - patient reports diarrhea with ensure     #DVT PPx: Lovenox  #GI PPx: Protonix  FULL CODE  Dispo: Acute, likely d/c to NH once ready

## 2021-04-03 NOTE — CONSULT NOTE ADULT - CONSULT REQUESTED DATE/TIME
17-Mar-2021 16:01
09-Mar-2021 21:39
15-Mar-2021
17-Mar-2021 14:06
03-Apr-2021 11:45
03-Apr-2021 14:30
17-Mar-2021 14:03
11-Mar-2021 18:51
15-Mar-2021 09:33
08-Mar-2021 13:36
15-Mar-2021 09:35
11-Mar-2021 19:15

## 2021-04-03 NOTE — CONSULT NOTE ADULT - SUBJECTIVE AND OBJECTIVE BOX
80y  Male  HPI:   81 yo male hx of CAD s/p CABG s/p 20+ years ago/ CHF/ right sided large inguinal hernia BIBA from hotel room 2/2 unable to ambulate with right thigh weakness and numbness. reported it started from knee to hip. denies fall and injury. He was just sitting and the symptoms started. reported off/on back pain in the past. denies fever/chill/recent illness/coughing/chest pain/abd pain/n/v/d and urinary sxs.   	Also has right sided facial swelling for about 1 year after 3 teeth extraction. reports swelling worsens after eating. swelling was better in the past and started swelling again in the past few months. didn't follow up with his dentist 2/2 COVID.   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 he used to be able to ambulate without assistant and he commutes with his car. (08 Mar 2021 05:03)    Allergies    No Known Allergies    Intolerances      PAST MEDICAL & SURGICAL HISTORY:  CHF (congestive heart failure)    Inguinal hernia    S/P CABG x 3        Labs:                        10.0   1.93  )-----------( 83       ( 03 Apr 2021 06:52 )             31.2         PE:  PHYSICAL EXAM: AAO x 3  Full thickness wound to sacrum approx 4x5 x0  100% necrotic tissue  serosang dc  no erythema, no swelling

## 2021-04-03 NOTE — CONSULT NOTE ADULT - ASSESSMENT
ASSESSMENT  79 yo male hx of CAD s/p CABG s/p 20+ years ago, HFrEF, right sided large inguinal hernia BIBA from hotel room because he was unable to ambulate due to Rt thigh weakness and numbness, pt also had mandibular swelling for over a year. CT lumbosacral spine showed mass suggestive of mets with obliteration of neural foramina so was started on decadron.  Sacral mass biopsied 3/10 showed prostate adenocarcinoma. Mandibluar mass biopsied 3/19 also showed prostate cancer. Hospital course was complicated by hypovolemic shock likely 2/2 UGIB, required pressors and upgrade to stepdown unit. Pt currently in hospital undergoing chemo and radiotherapy.     IMPRESSION  #Prostate Cancer with mets, including Sacral lesions  #SCC of Jaw  #Sacral Ulcer  #CAD s/p CABG  #Abx allergy: NKDA    RECOMMENDATIONS  This is a preliminary incomplete pended note, all final recommendations to follow after interview and examination of the patient.    Spectra [id number] ASSESSMENT  79 yo male hx of CAD s/p CABG s/p 20+ years ago, HFrEF, right sided large inguinal hernia BIBA from hotel room because he was unable to ambulate due to Rt thigh weakness and numbness, pt also had mandibular swelling for over a year. CT lumbosacral spine showed mass suggestive of mets with obliteration of neural foramina so was started on decadron.  Sacral mass biopsied 3/10 showed prostate adenocarcinoma. Mandibluar mass biopsied 3/19 also showed prostate cancer. Hospital course was complicated by hypovolemic shock likely 2/2 UGIB, required pressors and upgrade to stepdown unit. Pt currently in hospital undergoing chemo and radiotherapy.     IMPRESSION  #Prostate Cancer with mets, including Sacral lesions  #SCC of Jaw  #Sacral Ulcer  #Leukopenia   #CAD s/p CABG  #Abx allergy: NKDA    Creatinine, Serum: 0.7 mg/dL (04.03.21 @ 06:52)  Weight (kg): 73.8 (15 Mar 2021 00:51)    RECOMMENDATIONS  - change unasyn to ceftriaxone 2g daily and flagyl 500 mg TID for broader GN coverage  - if with fevers or worsening leukopenia, can start vancomycin 1g q 12 hours  - will follow plans for surgical debridement    Please call or message on [employer] Teams if with any questions.  Spectra [id number]

## 2021-04-03 NOTE — PROGRESS NOTE ADULT - SUBJECTIVE AND OBJECTIVE BOX
HPI  Patient is a 80y old Male who presents with a chief complaint of 80 YEAR OLD MALE C/O MULTIPLE MEDICAL COMPLAINTS . (02 Apr 2021 15:24)    Currently admitted to medicine with the primary diagnosis of Ambulatory dysfunction       Today is hospital day 26d.     INTERVAL HPI / OVERNIGHT EVENTS:  Patient was examined and seen at bedside. This morning he is resting comfortably in bed and reports no new issues or overnight events.     ROS: Otherwise unremarkable     PAST MEDICAL & SURGICAL HISTORY  CHF (congestive heart failure)    Inguinal hernia    S/P CABG x 3      ALLERGIES  No Known Allergies    MEDICATIONS  STANDING MEDICATIONS  bicalutamide 50 milliGRAM(s) Oral daily  chlorhexidine 4% Liquid 1 Application(s) Topical <User Schedule>  cholecalciferol 2000 Unit(s) Oral daily  collagenase Ointment 1 Application(s) Topical daily  dexAMETHasone     Tablet 4 milliGRAM(s) Oral every 12 hours  enoxaparin Injectable 70 milliGRAM(s) SubCutaneous two times a day  latanoprost 0.005% Ophthalmic Solution 1 Drop(s) Both EYES at bedtime  lidocaine   Patch 2 Patch Transdermal daily  methocarbamol 500 milliGRAM(s) Oral three times a day  midodrine. 10 milliGRAM(s) Oral three times a day  pantoprazole    Tablet 40 milliGRAM(s) Oral before breakfast  sodium bicarbonate 650 milliGRAM(s) Oral three times a day  sodium chloride 1 Gram(s) Oral two times a day  zoledronic acid IVPB (ZOMETA) (Non - oncologic) 4 milliGRAM(s) IV Intermittent every 4 weeks    PRN MEDICATIONS  acetaminophen   Tablet .. 650 milliGRAM(s) Oral every 6 hours PRN  aluminum hydroxide/magnesium hydroxide/simethicone Suspension 30 milliLiter(s) Oral every 6 hours PRN  brimonidine 0.2% Ophthalmic Solution 1 Drop(s) Both EYES every 8 hours PRN  oxycodone    5 mG/acetaminophen 325 mG 1 Tablet(s) Oral every 6 hours PRN    VITALS:  T(F): 96.8  HR: 78  BP: 102/54  RR: 19  SpO2: --    PHYSICAL EXAM  GEN: NAD, Resting comfortably in bed  PULM: Clear to auscultation bilaterally, No wheezes  CVS: Regular rate and rhythm, S1-S2, no murmurs  ABD: Soft, non-tender, non-distended, no guarding  EXT: No edema  NEURO: A&Ox3, no focal deficits    LABS                        10.0   1.93  )-----------( 83       ( 03 Apr 2021 06:52 )             31.2     04-02    130<L>  |  98  |  22<H>  ----------------------------<  108<H>  4.5   |  23  |  0.5<L>    Ca    7.8<L>      02 Apr 2021 07:01  Mg     1.9     04-02    TPro  4.9<L>  /  Alb  3.2<L>  /  TBili  0.7  /  DBili  x   /  AST  16  /  ALT  20  /  AlkPhos  65  04-02                  RADIOLOGY     HPI  Patient is a 80y old Male who presents with a chief complaint of 80 YEAR OLD MALE C/O MULTIPLE MEDICAL COMPLAINTS . (02 Apr 2021 15:24)    Currently admitted to medicine with the primary diagnosis of Ambulatory dysfunction       Today is hospital day 26d.     INTERVAL HPI / OVERNIGHT EVENTS:  Patient was examined and seen at bedside. This morning he is resting comfortably in bed and reports no new issues or overnight events. He appears very weak and tired. No radiation this weekend    ROS: Otherwise unremarkable     PAST MEDICAL & SURGICAL HISTORY  CHF (congestive heart failure)    Inguinal hernia    S/P CABG x 3      ALLERGIES  No Known Allergies    MEDICATIONS  STANDING MEDICATIONS  bicalutamide 50 milliGRAM(s) Oral daily  chlorhexidine 4% Liquid 1 Application(s) Topical <User Schedule>  cholecalciferol 2000 Unit(s) Oral daily  collagenase Ointment 1 Application(s) Topical daily  dexAMETHasone     Tablet 4 milliGRAM(s) Oral every 12 hours  enoxaparin Injectable 70 milliGRAM(s) SubCutaneous two times a day  latanoprost 0.005% Ophthalmic Solution 1 Drop(s) Both EYES at bedtime  lidocaine   Patch 2 Patch Transdermal daily  methocarbamol 500 milliGRAM(s) Oral three times a day  midodrine. 10 milliGRAM(s) Oral three times a day  pantoprazole    Tablet 40 milliGRAM(s) Oral before breakfast  sodium bicarbonate 650 milliGRAM(s) Oral three times a day  sodium chloride 1 Gram(s) Oral two times a day  zoledronic acid IVPB (ZOMETA) (Non - oncologic) 4 milliGRAM(s) IV Intermittent every 4 weeks    PRN MEDICATIONS  acetaminophen   Tablet .. 650 milliGRAM(s) Oral every 6 hours PRN  aluminum hydroxide/magnesium hydroxide/simethicone Suspension 30 milliLiter(s) Oral every 6 hours PRN  brimonidine 0.2% Ophthalmic Solution 1 Drop(s) Both EYES every 8 hours PRN  oxycodone    5 mG/acetaminophen 325 mG 1 Tablet(s) Oral every 6 hours PRN    VITALS:  T(F): 96.8  HR: 78  BP: 102/54  RR: 19  SpO2: --    PHYSICAL EXAM  GEN: NAD, Resting comfortably in bed, cachectic, tired, weak  PULM: Clear to auscultation bilaterally, No wheezes  CVS: Regular rate and rhythm, S1-S2, no murmurs  ABD: Soft, non-tender, non-distended, no guarding  EXT: No edema  NEURO: A&Ox3, no focal deficits    LABS                        10.0   1.93  )-----------( 83       ( 03 Apr 2021 06:52 )             31.2     04-02    130<L>  |  98  |  22<H>  ----------------------------<  108<H>  4.5   |  23  |  0.5<L>    Ca    7.8<L>      02 Apr 2021 07:01  Mg     1.9     04-02    TPro  4.9<L>  /  Alb  3.2<L>  /  TBili  0.7  /  DBili  x   /  AST  16  /  ALT  20  /  AlkPhos  65  04-02

## 2021-04-03 NOTE — CONSULT NOTE ADULT - SUBJECTIVE AND OBJECTIVE BOX
MANUELITO HEDRICK  80y, Male  Allergy: No Known Allergies      CHIEF COMPLAINT: 80 YEAR OLD MALE C/O MULTIPLE MEDICAL COMPLAINTS . (03 Apr 2021 13:00)      LOS  26d    HPI:   79 yo male hx of CAD s/p CABG s/p 20+ years ago/ CHF/ right sided large inguinal hernia BIBA from hotel room 2/2 unable to ambulate with right thigh weakness and numbness. reported it started from knee to hip. denies fall and injury. He was just sitting and the symptoms started. reported off/on back pain in the past. denies fever/chill/recent illness/coughing/chest pain/abd pain/n/v/d and urinary sxs.   	Also has right sided facial swelling for about 1 year after 3 teeth extraction. reports swelling worsens after eating. swelling was better in the past and started swelling again in the past few months. didn't follow up with his dentist 2/2 COVID.   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 he used to be able to ambulate without assistant and he commutes with his car. (08 Mar 2021 05:03)      INFECTIOUS DISEASE HISTORY:  History as above      PAST MEDICAL & SURGICAL HISTORY:  CHF (congestive heart failure)    Inguinal hernia    S/P CABG x 3        FAMILY HISTORY  Family Hx reviewed and non-contributory     SOCIAL HISTORY  Social History:  PATIENT IS LIVE AT [facility] BY HIM SELF AND HE IS NOT SMOKING . (08 Mar 2021 05:03)        ROS  General: Denies rigors, nightsweats  HEENT: Denies headache, rhinorrhea, sore throat, eye pain  CV: Denies CP, palpitations  PULM: Denies wheezing, hemoptysis  GI: Denies hematemesis, hematochezia, melena  : Denies discharge, hematuria  MSK: Denies arthralgias, myalgias  SKIN: Denies rash, lesions  NEURO: Denies paresthesias, weakness  PSYCH: Denies depression, anxiety    VITALS:  T(F): 97.2, Max: 97.2 (04-03-21 @ 13:07)  HR: 89  BP: 94/51  RR: 19Vital Signs Last 24 Hrs  T(C): 36.2 (03 Apr 2021 13:07), Max: 36.2 (03 Apr 2021 13:07)  T(F): 97.2 (03 Apr 2021 13:07), Max: 97.2 (03 Apr 2021 13:07)  HR: 89 (03 Apr 2021 13:07) (72 - 92)  BP: 94/51 (03 Apr 2021 13:07) (94/51 - 108/52)  BP(mean): --  RR: 19 (03 Apr 2021 13:07) (19 - 19)  SpO2: 99% (03 Apr 2021 07:51) (99% - 99%)    PHYSICAL EXAM:  Gen: NAD, resting in bed  HEENT: Normocephalic, atraumatic  Neck: supple, no lymphadenopathy  CV: Regular rate & regular rhythm  Lungs: decreased BS at bases, no fremitus  Abdomen: Soft, BS present  Ext: Warm, well perfused  Neuro: non focal, awake  Skin: no rash, no erythema  Lines: no phlebitis    TESTS & MEASUREMENTS:                        10.0   1.93  )-----------( 83       ( 03 Apr 2021 06:52 )             31.2     04-03    132<L>  |  98  |  25<H>  ----------------------------<  106<H>  5.3<H>   |  21  |  0.7    Ca    8.1<L>      03 Apr 2021 06:52  Mg     2.1     04-03    TPro  5.2<L>  /  Alb  3.1<L>  /  TBili  0.7  /  DBili  x   /  AST  14  /  ALT  20  /  AlkPhos  70  04-03    eGFR if Non African American: 90 mL/min/1.73M2 (04-03-21 @ 06:52)  eGFR if : 104 mL/min/1.73M2 (04-03-21 @ 06:52)    LIVER FUNCTIONS - ( 03 Apr 2021 06:52 )  Alb: 3.1 g/dL / Pro: 5.2 g/dL / ALK PHOS: 70 U/L / ALT: 20 U/L / AST: 14 U/L / GGT: x               Culture - Urine (collected 03-08-21 @ 04:35)  Source: .Urine Catheterized  Final Report (03-09-21 @ 13:43):    No growth            INFECTIOUS DISEASES TESTING  COVID-19 PCR: NotDetec (03-31-21 @ 16:10)  MRSA PCR Result.: Negative (03-31-21 @ 11:15)  COVID-19 PCR: NotDetec (03-15-21 @ 09:46)      RADIOLOGY & ADDITIONAL TESTS:  I have personally reviewed the last Chest xray  CXR      CT      CARDIOLOGY TESTING      MEDICATIONS  ampicillin/sulbactam  IVPB 3 IV Intermittent every 6 hours  ampicillin/sulbactam  IVPB     bicalutamide 50 Oral daily  chlorhexidine 4% Liquid 1 Topical <User Schedule>  cholecalciferol 2000 Oral daily  collagenase Ointment 1 Topical daily  Dakins Solution - 1/2 Strength 1 Topical two times a day  dexAMETHasone     Tablet 4 Oral every 12 hours  enoxaparin Injectable 70 SubCutaneous two times a day  fentaNYL   Patch  25 MICROgram(s)/Hr. 1 Transdermal every 48 hours  latanoprost 0.005% Ophthalmic Solution 1 Both EYES at bedtime  lidocaine   Patch 2 Transdermal daily  methocarbamol 500 Oral three times a day  midodrine. 10 Oral three times a day  pantoprazole    Tablet 40 Oral before breakfast  sodium chloride 1 Oral two times a day  sodium zirconium cyclosilicate 5 Oral two times a day  zoledronic acid IVPB (ZOMETA) (Non - oncologic) 4 IV Intermittent every 4 weeks      Weight  Weight (kg): 73.8 (03-15-21 @ 00:51)    ANTIBIOTICS:  ampicillin/sulbactam  IVPB 3 Gram(s) IV Intermittent every 6 hours  ampicillin/sulbactam  IVPB          ALLERGIES:  No Known Allergies           MANUELITO HEDRICK  80y, Male  Allergy: No Known Allergies      CHIEF COMPLAINT: 80 YEAR OLD MALE C/O MULTIPLE MEDICAL COMPLAINTS . (03 Apr 2021 13:00)      LOS  26d    HPI:   79 yo male hx of CAD s/p CABG s/p 20+ years ago/ CHF/ right sided large inguinal hernia BIBA from hotel room 2/2 unable to ambulate with right thigh weakness and numbness. reported it started from knee to hip. denies fall and injury. He was just sitting and the symptoms started. reported off/on back pain in the past. denies fever/chill/recent illness/coughing/chest pain/abd pain/n/v/d and urinary sxs.   	Also has right sided facial swelling for about 1 year after 3 teeth extraction. reports swelling worsens after eating. swelling was better in the past and started swelling again in the past few months. didn't follow up with his dentist 2/2 COVID.   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 he used to be able to ambulate without assistant and he commutes with his car. (08 Mar 2021 05:03)      INFECTIOUS DISEASE HISTORY:  History as above      PAST MEDICAL & SURGICAL HISTORY:  CHF (congestive heart failure)    Inguinal hernia    S/P CABG x 3        FAMILY HISTORY  Family Hx reviewed and non-contributory     SOCIAL HISTORY  Social History:  PATIENT IS LIVE AT [facility] BY HIM SELF AND HE IS NOT SMOKING . (08 Mar 2021 05:03)        ROS  General: Denies rigors, nightsweats  HEENT: Denies headache, rhinorrhea, sore throat, eye pain  CV: Denies CP, palpitations  PULM: Denies wheezing, hemoptysis  GI: Denies hematemesis, hematochezia, melena  : Denies discharge, hematuria  MSK: Denies arthralgias, myalgias  SKIN: Denies rash, lesions  NEURO: Denies paresthesias, weakness  PSYCH: Denies depression, anxiety    VITALS:  T(F): 97.2, Max: 97.2 (04-03-21 @ 13:07)  HR: 89  BP: 94/51  RR: 19Vital Signs Last 24 Hrs  T(C): 36.2 (03 Apr 2021 13:07), Max: 36.2 (03 Apr 2021 13:07)  T(F): 97.2 (03 Apr 2021 13:07), Max: 97.2 (03 Apr 2021 13:07)  HR: 89 (03 Apr 2021 13:07) (72 - 92)  BP: 94/51 (03 Apr 2021 13:07) (94/51 - 108/52)  BP(mean): --  RR: 19 (03 Apr 2021 13:07) (19 - 19)  SpO2: 99% (03 Apr 2021 07:51) (99% - 99%)    PHYSICAL EXAM:  Gen: NAD, resting in bed  HEENT: Normocephalic, atraumatic  Neck: supple, no lymphadenopathy  CV: Regular rate & regular rhythm  Lungs: decreased BS at bases, no fremitus  Abdomen: Soft, BS present  Ext: Warm, well perfused  Neuro: non focal, awake  Skin: sacral wound -- unable to visualize, per burn "Full thickness wound to sacrum approx 4x5 x0  100% necrotic tissue"  Lines: no phlebitis    TESTS & MEASUREMENTS:                        10.0   1.93  )-----------( 83       ( 03 Apr 2021 06:52 )             31.2     04-03    132<L>  |  98  |  25<H>  ----------------------------<  106<H>  5.3<H>   |  21  |  0.7    Ca    8.1<L>      03 Apr 2021 06:52  Mg     2.1     04-03    TPro  5.2<L>  /  Alb  3.1<L>  /  TBili  0.7  /  DBili  x   /  AST  14  /  ALT  20  /  AlkPhos  70  04-03    eGFR if Non African American: 90 mL/min/1.73M2 (04-03-21 @ 06:52)  eGFR if : 104 mL/min/1.73M2 (04-03-21 @ 06:52)    LIVER FUNCTIONS - ( 03 Apr 2021 06:52 )  Alb: 3.1 g/dL / Pro: 5.2 g/dL / ALK PHOS: 70 U/L / ALT: 20 U/L / AST: 14 U/L / GGT: x               Culture - Urine (collected 03-08-21 @ 04:35)  Source: .Urine Catheterized  Final Report (03-09-21 @ 13:43):    No growth            INFECTIOUS DISEASES TESTING  COVID-19 PCR: NotDetec (03-31-21 @ 16:10)  MRSA PCR Result.: Negative (03-31-21 @ 11:15)  COVID-19 PCR: NotDetec (03-15-21 @ 09:46)      RADIOLOGY & ADDITIONAL TESTS:  I have personally reviewed the last Chest xray  CXR      CT      CARDIOLOGY TESTING      MEDICATIONS  ampicillin/sulbactam  IVPB 3 IV Intermittent every 6 hours  ampicillin/sulbactam  IVPB     bicalutamide 50 Oral daily  chlorhexidine 4% Liquid 1 Topical <User Schedule>  cholecalciferol 2000 Oral daily  collagenase Ointment 1 Topical daily  Dakins Solution - 1/2 Strength 1 Topical two times a day  dexAMETHasone     Tablet 4 Oral every 12 hours  enoxaparin Injectable 70 SubCutaneous two times a day  fentaNYL   Patch  25 MICROgram(s)/Hr. 1 Transdermal every 48 hours  latanoprost 0.005% Ophthalmic Solution 1 Both EYES at bedtime  lidocaine   Patch 2 Transdermal daily  methocarbamol 500 Oral three times a day  midodrine. 10 Oral three times a day  pantoprazole    Tablet 40 Oral before breakfast  sodium chloride 1 Oral two times a day  sodium zirconium cyclosilicate 5 Oral two times a day  zoledronic acid IVPB (ZOMETA) (Non - oncologic) 4 IV Intermittent every 4 weeks      Weight  Weight (kg): 73.8 (03-15-21 @ 00:51)    ANTIBIOTICS:  ampicillin/sulbactam  IVPB 3 Gram(s) IV Intermittent every 6 hours  ampicillin/sulbactam  IVPB          ALLERGIES:  No Known Allergies

## 2021-04-03 NOTE — CONSULT NOTE ADULT - ASSESSMENT
ASSESSMENT:  Unsatgeable pressure ulcer to sacrum    RECOMMENDATION:  Wound care - Santyl/ Wet Kerlex w/ 1/4 Dakins/DPD BID  Surgical debridement   Offloading/ positional changes  Will follow.

## 2021-04-04 LAB
ALBUMIN SERPL ELPH-MCNC: 3 G/DL — LOW (ref 3.5–5.2)
ALP SERPL-CCNC: 73 U/L — SIGNIFICANT CHANGE UP (ref 30–115)
ALT FLD-CCNC: 18 U/L — SIGNIFICANT CHANGE UP (ref 0–41)
ANION GAP SERPL CALC-SCNC: 15 MMOL/L — HIGH (ref 7–14)
AST SERPL-CCNC: 16 U/L — SIGNIFICANT CHANGE UP (ref 0–41)
BASOPHILS # BLD AUTO: 0.01 K/UL — SIGNIFICANT CHANGE UP (ref 0–0.2)
BASOPHILS NFR BLD AUTO: 0.6 % — SIGNIFICANT CHANGE UP (ref 0–1)
BILIRUB SERPL-MCNC: 0.6 MG/DL — SIGNIFICANT CHANGE UP (ref 0.2–1.2)
BUN SERPL-MCNC: 29 MG/DL — HIGH (ref 10–20)
CALCIUM SERPL-MCNC: 8.4 MG/DL — LOW (ref 8.5–10.1)
CHLORIDE SERPL-SCNC: 100 MMOL/L — SIGNIFICANT CHANGE UP (ref 98–110)
CO2 SERPL-SCNC: 18 MMOL/L — SIGNIFICANT CHANGE UP (ref 17–32)
CREAT SERPL-MCNC: 0.8 MG/DL — SIGNIFICANT CHANGE UP (ref 0.7–1.5)
EOSINOPHIL # BLD AUTO: 0 K/UL — SIGNIFICANT CHANGE UP (ref 0–0.7)
EOSINOPHIL NFR BLD AUTO: 0 % — SIGNIFICANT CHANGE UP (ref 0–8)
GLUCOSE SERPL-MCNC: 104 MG/DL — HIGH (ref 70–99)
HCT VFR BLD CALC: 31.6 % — LOW (ref 42–52)
HGB BLD-MCNC: 9.9 G/DL — LOW (ref 14–18)
IMM GRANULOCYTES NFR BLD AUTO: 1.7 % — HIGH (ref 0.1–0.3)
LYMPHOCYTES # BLD AUTO: 0.05 K/UL — LOW (ref 1.2–3.4)
LYMPHOCYTES # BLD AUTO: 2.9 % — LOW (ref 20.5–51.1)
MAGNESIUM SERPL-MCNC: 2.2 MG/DL — SIGNIFICANT CHANGE UP (ref 1.8–2.4)
MCHC RBC-ENTMCNC: 28.4 PG — SIGNIFICANT CHANGE UP (ref 27–31)
MCHC RBC-ENTMCNC: 31.3 G/DL — LOW (ref 32–37)
MCV RBC AUTO: 90.8 FL — SIGNIFICANT CHANGE UP (ref 80–94)
MONOCYTES # BLD AUTO: 0.11 K/UL — SIGNIFICANT CHANGE UP (ref 0.1–0.6)
MONOCYTES NFR BLD AUTO: 6.3 % — SIGNIFICANT CHANGE UP (ref 1.7–9.3)
NEUTROPHILS # BLD AUTO: 1.55 K/UL — SIGNIFICANT CHANGE UP (ref 1.4–6.5)
NEUTROPHILS NFR BLD AUTO: 88.5 % — HIGH (ref 42.2–75.2)
NRBC # BLD: 0 /100 WBCS — SIGNIFICANT CHANGE UP (ref 0–0)
PLATELET # BLD AUTO: 68 K/UL — LOW (ref 130–400)
POTASSIUM SERPL-MCNC: 5.7 MMOL/L — HIGH (ref 3.5–5)
POTASSIUM SERPL-SCNC: 5.7 MMOL/L — HIGH (ref 3.5–5)
PROT SERPL-MCNC: 5.4 G/DL — LOW (ref 6–8)
RBC # BLD: 3.48 M/UL — LOW (ref 4.7–6.1)
RBC # FLD: 17.2 % — HIGH (ref 11.5–14.5)
SODIUM SERPL-SCNC: 133 MMOL/L — LOW (ref 135–146)
WBC # BLD: 1.75 K/UL — LOW (ref 4.8–10.8)
WBC # FLD AUTO: 1.75 K/UL — LOW (ref 4.8–10.8)

## 2021-04-04 PROCEDURE — 99233 SBSQ HOSP IP/OBS HIGH 50: CPT

## 2021-04-04 RX ORDER — SODIUM POLYSTYRENE SULFONATE 4.1 MEQ/G
15 POWDER, FOR SUSPENSION ORAL ONCE
Refills: 0 | Status: COMPLETED | OUTPATIENT
Start: 2021-04-04 | End: 2021-04-04

## 2021-04-04 RX ORDER — METRONIDAZOLE 500 MG
TABLET ORAL
Refills: 0 | Status: DISCONTINUED | OUTPATIENT
Start: 2021-04-04 | End: 2021-04-12

## 2021-04-04 RX ORDER — GABAPENTIN 400 MG/1
100 CAPSULE ORAL THREE TIMES A DAY
Refills: 0 | Status: DISCONTINUED | OUTPATIENT
Start: 2021-04-04 | End: 2021-04-12

## 2021-04-04 RX ORDER — CEFTRIAXONE 500 MG/1
2 INJECTION, POWDER, FOR SOLUTION INTRAMUSCULAR; INTRAVENOUS EVERY 24 HOURS
Refills: 0 | Status: DISCONTINUED | OUTPATIENT
Start: 2021-04-04 | End: 2021-04-04

## 2021-04-04 RX ORDER — SACCHAROMYCES BOULARDII 250 MG
250 POWDER IN PACKET (EA) ORAL
Refills: 0 | Status: DISCONTINUED | OUTPATIENT
Start: 2021-04-04 | End: 2021-04-12

## 2021-04-04 RX ORDER — CEFTRIAXONE 500 MG/1
1 INJECTION, POWDER, FOR SOLUTION INTRAMUSCULAR; INTRAVENOUS EVERY 24 HOURS
Refills: 0 | Status: DISCONTINUED | OUTPATIENT
Start: 2021-04-04 | End: 2021-04-04

## 2021-04-04 RX ORDER — METRONIDAZOLE 500 MG
500 TABLET ORAL ONCE
Refills: 0 | Status: COMPLETED | OUTPATIENT
Start: 2021-04-04 | End: 2021-04-04

## 2021-04-04 RX ORDER — CEFTRIAXONE 500 MG/1
2000 INJECTION, POWDER, FOR SOLUTION INTRAMUSCULAR; INTRAVENOUS EVERY 24 HOURS
Refills: 0 | Status: COMPLETED | OUTPATIENT
Start: 2021-04-04 | End: 2021-04-10

## 2021-04-04 RX ORDER — METRONIDAZOLE 500 MG
500 TABLET ORAL EVERY 8 HOURS
Refills: 0 | Status: DISCONTINUED | OUTPATIENT
Start: 2021-04-04 | End: 2021-04-12

## 2021-04-04 RX ADMIN — GABAPENTIN 100 MILLIGRAM(S): 400 CAPSULE ORAL at 16:26

## 2021-04-04 RX ADMIN — Medication 4 MILLIGRAM(S): at 06:42

## 2021-04-04 RX ADMIN — PANTOPRAZOLE SODIUM 40 MILLIGRAM(S): 20 TABLET, DELAYED RELEASE ORAL at 06:44

## 2021-04-04 RX ADMIN — AMPICILLIN SODIUM AND SULBACTAM SODIUM 200 GRAM(S): 250; 125 INJECTION, POWDER, FOR SUSPENSION INTRAMUSCULAR; INTRAVENOUS at 11:38

## 2021-04-04 RX ADMIN — ENOXAPARIN SODIUM 70 MILLIGRAM(S): 100 INJECTION SUBCUTANEOUS at 06:43

## 2021-04-04 RX ADMIN — Medication 2000 UNIT(S): at 11:32

## 2021-04-04 RX ADMIN — LIDOCAINE 2 PATCH: 4 CREAM TOPICAL at 13:21

## 2021-04-04 RX ADMIN — Medication 100 MILLIGRAM(S): at 21:47

## 2021-04-04 RX ADMIN — CHLORHEXIDINE GLUCONATE 1 APPLICATION(S): 213 SOLUTION TOPICAL at 06:42

## 2021-04-04 RX ADMIN — MIDODRINE HYDROCHLORIDE 10 MILLIGRAM(S): 2.5 TABLET ORAL at 18:11

## 2021-04-04 RX ADMIN — FENTANYL CITRATE 1 PATCH: 50 INJECTION INTRAVENOUS at 07:15

## 2021-04-04 RX ADMIN — FENTANYL CITRATE 1 PATCH: 50 INJECTION INTRAVENOUS at 19:12

## 2021-04-04 RX ADMIN — ENOXAPARIN SODIUM 70 MILLIGRAM(S): 100 INJECTION SUBCUTANEOUS at 18:12

## 2021-04-04 RX ADMIN — MIDODRINE HYDROCHLORIDE 10 MILLIGRAM(S): 2.5 TABLET ORAL at 06:43

## 2021-04-04 RX ADMIN — Medication 4 MILLIGRAM(S): at 18:11

## 2021-04-04 RX ADMIN — MORPHINE SULFATE 2 MILLIGRAM(S): 50 CAPSULE, EXTENDED RELEASE ORAL at 11:47

## 2021-04-04 RX ADMIN — LIDOCAINE 2 PATCH: 4 CREAM TOPICAL at 07:15

## 2021-04-04 RX ADMIN — SODIUM POLYSTYRENE SULFONATE 15 GRAM(S): 4.1 POWDER, FOR SUSPENSION ORAL at 11:32

## 2021-04-04 RX ADMIN — AMPICILLIN SODIUM AND SULBACTAM SODIUM 200 GRAM(S): 250; 125 INJECTION, POWDER, FOR SUSPENSION INTRAMUSCULAR; INTRAVENOUS at 06:42

## 2021-04-04 RX ADMIN — SODIUM CHLORIDE 1 GRAM(S): 9 INJECTION INTRAMUSCULAR; INTRAVENOUS; SUBCUTANEOUS at 18:12

## 2021-04-04 RX ADMIN — METHOCARBAMOL 500 MILLIGRAM(S): 500 TABLET, FILM COATED ORAL at 06:43

## 2021-04-04 RX ADMIN — CEFTRIAXONE 100 MILLIGRAM(S): 500 INJECTION, POWDER, FOR SOLUTION INTRAMUSCULAR; INTRAVENOUS at 21:09

## 2021-04-04 RX ADMIN — Medication 1 APPLICATION(S): at 06:42

## 2021-04-04 RX ADMIN — SODIUM ZIRCONIUM CYCLOSILICATE 5 GRAM(S): 10 POWDER, FOR SUSPENSION ORAL at 18:12

## 2021-04-04 RX ADMIN — Medication 250 MILLIGRAM(S): at 18:11

## 2021-04-04 RX ADMIN — METHOCARBAMOL 500 MILLIGRAM(S): 500 TABLET, FILM COATED ORAL at 13:21

## 2021-04-04 RX ADMIN — MIDODRINE HYDROCHLORIDE 10 MILLIGRAM(S): 2.5 TABLET ORAL at 11:32

## 2021-04-04 RX ADMIN — LATANOPROST 1 DROP(S): 0.05 SOLUTION/ DROPS OPHTHALMIC; TOPICAL at 21:09

## 2021-04-04 RX ADMIN — MORPHINE SULFATE 2 MILLIGRAM(S): 50 CAPSULE, EXTENDED RELEASE ORAL at 11:32

## 2021-04-04 RX ADMIN — Medication 100 MILLIGRAM(S): at 16:25

## 2021-04-04 RX ADMIN — METHOCARBAMOL 500 MILLIGRAM(S): 500 TABLET, FILM COATED ORAL at 21:09

## 2021-04-04 RX ADMIN — SODIUM CHLORIDE 1 GRAM(S): 9 INJECTION INTRAMUSCULAR; INTRAVENOUS; SUBCUTANEOUS at 06:42

## 2021-04-04 RX ADMIN — SODIUM ZIRCONIUM CYCLOSILICATE 5 GRAM(S): 10 POWDER, FOR SUSPENSION ORAL at 06:44

## 2021-04-04 RX ADMIN — Medication 1 APPLICATION(S): at 18:12

## 2021-04-04 RX ADMIN — GABAPENTIN 100 MILLIGRAM(S): 400 CAPSULE ORAL at 21:09

## 2021-04-04 RX ADMIN — BICALUTAMIDE 50 MILLIGRAM(S): 50 TABLET, FILM COATED ORAL at 11:31

## 2021-04-04 NOTE — PROGRESS NOTE ADULT - ASSESSMENT
81 yo male hx of CAD s/p CABG s/p 20+ years ago, HFrEF, right sided large inguinal hernia BIBA from hotel room because he was unable to ambulate due to Rt thigh weakness and numbness, pt also had mandibular swelling for over a year. CT lumbosacral spine showed mass suggestive of mets with obliteration of neural foramina so was started on decadron.  Sacral mass biopsied 3/10 showed prostate adenocarcinoma. Mandibluar mass biopsied 3/19 also showed prostate cancer. Hospital course was complicated by hypovolemic shock likely 2/2 UGIB, required pressors and upgrade to stepdown unit. Pt currently in hospital undergoing chemo and radiotherapy.     A/P     #Metastatic prostate cancer  - currently on chemo and radiation   - continue with bicalutamide 50mg daily (started 3/14/21)  - s/p Lupron (GnRH agonist) IM, first dose 3/29 --> next dose in 1 month  - received first dose of taxotere (3/27), continue after radiation therapy is completed  - Completed RT to sacrum and spine 5 doses (3/24 - 3/30), started RT 5 DOSES to jaw area for palliation  - last RT on  4/6/21  - dental cleared for bisphosphonate, or denosumab s/p zoledronic acid 4mg q4 weeks (3/27)  - continue with Dexamethasone taper  - started on  Fentanyl patch, c/w  Morphine PRN  - start Neurontin 100 mg TID for neuropathic pain   - palliative care consult for pain management and GOC     # SCC of jaw  - 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   - resolving   - sodium pills   - c/w free water restriction   - monitor Na level     #  sacral ulcer  - now fouling smelling  - Burn consulted, will follow up recommendations   - ID consulted , Ceftriaxone and Flagyl recommended   - send wound cultures    #Thrombocytopenia/ Leukopenia   - worsening   - oncology follow up   - likely due to chemotherapy (taxotere)  - monitor for any signs of bleeding      # UGI bleed  - s/p 1 unit of PRBC  - monitor H/H, keep Hb above 7.5 and active type/cross      # Chronic A fib  - on therapeutic dose of Lovenox     # CHFrEF  - fluid restriction 1200 ml in 24 hours   - intake and output monitoring, daily weight   - EF of 35%    # Hypercalcemia/  Vitamin D level   - resolved. likely due to advanced malignancy   -  continue with vitamin D supplements 2000units daily   -   - f/u PTHrP still pending    # Severe protein-calorie malnutrition.  - dietary on board   - supplements added   - patient reports diarrhea with ensure     #DVT PPx: Lovenox  #GI PPx: Protonix  FULL CODE    #Progress Note Handoff  Pending (specify):   palliative care consult for pain management and GOC conversation, d/c Unasyn, start Ceftriaxone and Flagyl, send sacral wound Cx, c/w local wound care, resume RT on Monday, oncology follow up, start Neurontin 100 mg TID for neuropathic pain   Family discussion: I spoke with pt, he agreed with a plan of care   Disposition: Home___/SNF___/Other________/Unknown at this time___x _____     79 yo male hx of CAD s/p CABG s/p 20+ years ago, HFrEF, right sided large inguinal hernia BIBA from hotel room because he was unable to ambulate due to Rt thigh weakness and numbness, pt also had mandibular swelling for over a year. CT lumbosacral spine showed mass suggestive of mets with obliteration of neural foramina so was started on decadron.  Sacral mass biopsied 3/10 showed prostate adenocarcinoma. Mandibluar mass biopsied 3/19 also showed prostate cancer. Hospital course was complicated by hypovolemic shock likely 2/2 UGIB, required pressors and upgrade to stepdown unit. Pt currently in hospital undergoing chemo and radiotherapy.     A/P     #Metastatic prostate cancer  - currently on chemo and radiation   - continue with bicalutamide 50mg daily (started 3/14/21)  - s/p Lupron (GnRH agonist) IM, first dose 3/29 --> next dose in 1 month  - received first dose of taxotere (3/27), continue after radiation therapy is completed  - Completed RT to sacrum and spine 5 doses (3/24 - 3/30), started RT 5 DOSES to jaw area for palliation  - last RT on  4/6/21  - dental cleared for bisphosphonate, or denosumab s/p zoledronic acid 4mg q4 weeks (3/27)  - continue with Dexamethasone taper  - started on  Fentanyl patch, c/w  Morphine PRN  - start Neurontin 100 mg TID for neuropathic pain   - palliative care consult for pain management and GOC     # SCC of jaw  - 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   - resolving   - sodium pills   - c/w free water restriction   - monitor Na level     #  sacral ulcer  - now fouling smelling  - Burn consulted, will follow up recommendations   - ID consulted , Ceftriaxone and Flagyl recommended   - send wound cultures    #Thrombocytopenia/ Leukopenia   - worsening   - oncology follow up   - likely due to chemotherapy (taxotere)  - monitor for any signs of bleeding      # UGI bleed  - s/p 1 unit of PRBC  - monitor H/H, keep Hb above 7.5 and active type/cross      # Chronic A fib  - on therapeutic dose of Lovenox     # CHFrEF  - fluid restriction 1200 ml in 24 hours   - intake and output monitoring, daily weight   - EF of 35%    # Hypercalcemia/  Vitamin D level   - resolved. likely due to advanced malignancy   -  continue with vitamin D supplements 2000units daily   -   - f/u PTHrP still pending    # Hyperkalemia  - low potasium diet   - give one dose of Kayexalate today   - on Lokelma   - monitor potassium level     # Severe protein-calorie malnutrition.  - dietary on board   - supplements added   - patient reports diarrhea with ensure     #DVT PPx: Lovenox  #GI PPx: Protonix  FULL CODE    #Progress Note Handoff  Pending (specify):   palliative care consult for pain management and GOC conversation, d/c Unasyn, start Ceftriaxone and Flagyl, send sacral wound Cx, c/w local wound care, resume RT on Monday, oncology follow up, start Neurontin 100 mg TID for neuropathic pain , low potassium diet, give one dose of Kayexalate today, monitor potassium level   Family discussion: I spoke with pt, he agreed with a plan of care   Disposition: Home___/SNF___/Other________/Unknown at this time___x _____

## 2021-04-04 NOTE — PROGRESS NOTE ADULT - SUBJECTIVE AND OBJECTIVE BOX
79 yo male hx of CAD s/p CABG s/p 20+ years ago, HFrEF, right sided large inguinal hernia BIBA from hotel room because he was unable to ambulate due to Rt thigh weakness and numbness, pt also had mandibular swelling for over a year. CT lumbosacral spine showed mass suggestive of mets with obliteration of neural foramina so was started on decadron.  Sacral mass biopsied 3/10 showed prostate adenocarcinoma. Mandibluar mass biopsied 3/19 also showed prostate cancer. Hospital course was complicated by hypovolemic shock likely 2/2 UGIB, required pressors and upgrade to stepdown unit. Pt currently in hospital undergoing chemo and radiotherapy. In addition he has unstageable sacral ulcer, consulted by burn, needs surgical debridement, please send Cx after debridement, pt was started on IV Unasyn, ID consulted.  Today pf is c/o shouting RLE pain, feels very weak.     ROS: Otherwise unremarkable     PAST MEDICAL & SURGICAL HISTORY  CHF (congestive heart failure)  Inguinal hernia  S/P CABG x 3      ALLERGIES  No Known Allergies    Vital Signs Last 24 Hrs  T(C): 35.7 (04 Apr 2021 12:00), Max: 36.2 (04 Apr 2021 05:16)  T(F): 96.2 (04 Apr 2021 12:00), Max: 97.1 (04 Apr 2021 05:16)  HR: 108 (04 Apr 2021 12:00) (83 - 108)  BP: 135/57 (04 Apr 2021 12:00) (98/55 - 135/57)  BP(mean): --  RR: 18 (03 Apr 2021 20:12) (18 - 18)  SpO2: 99% (04 Apr 2021 07:51) (99% - 100%)    PHYSICAL EXAM  GEN: NAD, cachectic, with temporal muscle waisting   Facial asymmetry noted, dense palpable mass noted on right lower jaw with facial swelling   PULM: decreased BS at bases   CVS: Regular rate and rhythm, S1-S2, no murmurs  ABD: Soft, non-tender, non-distended, no guarding, bulging noted below umbilicus   EXT: No edema  NEURO: A&Ox3, no focal deficits    LABS                                   9.9    1.75  )-----------( 68       ( 04 Apr 2021 06:52 )             31.6   04-04    133<L>  |  100  |  29<H>  ----------------------------<  104<H>  5.7<H>   |  18  |  0.8    Ca    8.4<L>      04 Apr 2021 06:52  Mg     2.2     04-04    TPro  5.4<L>  /  Alb  3.0<L>  /  TBili  0.6  /  DBili  x   /  AST  16  /  ALT  18  /  AlkPhos  73  04-04    RADIOLOGY:    < from: MR Head No Cont (03.18.21 @ 22:41) >  IMPRESSION:    Allowing for limitation from absence of intravenous contrast, there are multiple calvarial lesions compatible with osseous metastasis. The largest lesion in the left occipital calvarium demonstrates mildly expansile subgaleal soft tissue component.    Redemonstrated partially imaged right mandibular mass, better evaluated on the previously performed MRI of the neck dated 3/11/2021.    < end of copied text >  < from: MR Cervical Spine No Cont (03.18.21 @ 22:38) >    IMPRESSION:    Numerous foci of bone marrow signal abnormality compatible with metastatic disease, including lesions of the right T1/T2 and T5/T6 vertebral bodies with large right paraspinal soft tissue masses. These masses extend into the right neural foramen with obliteration at C7-T1, T1-T2, and T5-T6. There is additional extension of tumor into the ventral epidural space at T5-6 which results in moderate spinal canal stenosis.    Moderate-sized bilateral pleural effusions.    Degenerative changes of the spine as noted above.    < end of copied text >    < from: TTE Echo Complete w/o Contrast w/ Doppler (03.15.21 @ 13:23) >    Summary:   1. Moderately to severely decreased segmental left ventricular systolic function.   2. Multiple left ventricular regional wall motion abnormalities exist. See wall motion findings.   3. Severely enlarged left atrium.   4. LV Ejection Fraction by Chakraborty's Method with a biplane EF of 35 %.   5. Normal left ventricular internal cavity size.   6. Normal right atrial size.   7. Mild mitral valve regurgitation.   8. Moderate mitral annular calcification.   9. Mild tricuspid regurgitation.  10. Sclerotic aortic valve with normal opening.  11. LA volume Index is 60.8 ml/m² ml/m2.  12. There is moderate aortic root calcification.    MEDICATIONS  (STANDING):  ampicillin/sulbactam  IVPB 3 Gram(s) IV Intermittent every 6 hours  ampicillin/sulbactam  IVPB      bicalutamide 50 milliGRAM(s) Oral daily  chlorhexidine 4% Liquid 1 Application(s) Topical <User Schedule>  cholecalciferol 2000 Unit(s) Oral daily  collagenase Ointment 1 Application(s) Topical daily  Dakins Solution - 1/2 Strength 1 Application(s) Topical two times a day  dexAMETHasone     Tablet 4 milliGRAM(s) Oral every 12 hours  enoxaparin Injectable 70 milliGRAM(s) SubCutaneous two times a day  fentaNYL   Patch  25 MICROgram(s)/Hr. 1 Patch Transdermal every 48 hours  gabapentin 100 milliGRAM(s) Oral three times a day  latanoprost 0.005% Ophthalmic Solution 1 Drop(s) Both EYES at bedtime  lidocaine   Patch 2 Patch Transdermal daily  methocarbamol 500 milliGRAM(s) Oral three times a day  midodrine. 10 milliGRAM(s) Oral three times a day  pantoprazole    Tablet 40 milliGRAM(s) Oral before breakfast  sodium chloride 1 Gram(s) Oral two times a day  sodium zirconium cyclosilicate 5 Gram(s) Oral two times a day  zoledronic acid IVPB (ZOMETA) (Non - oncologic) 4 milliGRAM(s) IV Intermittent every 4 weeks    MEDICATIONS  (PRN):  acetaminophen   Tablet .. 650 milliGRAM(s) Oral every 6 hours PRN Moderate Pain (4 - 6)  aluminum hydroxide/magnesium hydroxide/simethicone Suspension 30 milliLiter(s) Oral every 6 hours PRN Dyspepsia  brimonidine 0.2% Ophthalmic Solution 1 Drop(s) Both EYES every 8 hours PRN dry eyes  morphine  - Injectable 2 milliGRAM(s) IV Push every 6 hours PRN Severe Pain (7 - 10)  oxycodone    5 mG/acetaminophen 325 mG 1 Tablet(s) Oral every 6 hours PRN Severe Pain (7 - 10)

## 2021-04-05 ENCOUNTER — NON-APPOINTMENT (OUTPATIENT)
Age: 81
End: 2021-04-05

## 2021-04-05 VITALS
HEART RATE: 68 BPM | RESPIRATION RATE: 16 BRPM | TEMPERATURE: 97.5 F | DIASTOLIC BLOOD PRESSURE: 50 MMHG | SYSTOLIC BLOOD PRESSURE: 90 MMHG | OXYGEN SATURATION: 99 %

## 2021-04-05 LAB
ALBUMIN SERPL ELPH-MCNC: 2.8 G/DL — LOW (ref 3.5–5.2)
ALP SERPL-CCNC: 59 U/L — SIGNIFICANT CHANGE UP (ref 30–115)
ALT FLD-CCNC: 14 U/L — SIGNIFICANT CHANGE UP (ref 0–41)
ANION GAP SERPL CALC-SCNC: 12 MMOL/L — SIGNIFICANT CHANGE UP (ref 7–14)
AST SERPL-CCNC: 14 U/L — SIGNIFICANT CHANGE UP (ref 0–41)
BASOPHILS # BLD AUTO: 0 K/UL — SIGNIFICANT CHANGE UP (ref 0–0.2)
BASOPHILS NFR BLD AUTO: 0 % — SIGNIFICANT CHANGE UP (ref 0–1)
BILIRUB SERPL-MCNC: 0.4 MG/DL — SIGNIFICANT CHANGE UP (ref 0.2–1.2)
BUN SERPL-MCNC: 38 MG/DL — HIGH (ref 10–20)
CALCIUM SERPL-MCNC: 7.9 MG/DL — LOW (ref 8.5–10.1)
CHLORIDE SERPL-SCNC: 102 MMOL/L — SIGNIFICANT CHANGE UP (ref 98–110)
CO2 SERPL-SCNC: 20 MMOL/L — SIGNIFICANT CHANGE UP (ref 17–32)
CREAT SERPL-MCNC: 0.8 MG/DL — SIGNIFICANT CHANGE UP (ref 0.7–1.5)
EOSINOPHIL # BLD AUTO: 0 K/UL — SIGNIFICANT CHANGE UP (ref 0–0.7)
EOSINOPHIL NFR BLD AUTO: 0 % — SIGNIFICANT CHANGE UP (ref 0–8)
GLUCOSE SERPL-MCNC: 95 MG/DL — SIGNIFICANT CHANGE UP (ref 70–99)
HCT VFR BLD CALC: 27.9 % — LOW (ref 42–52)
HGB BLD-MCNC: 9.1 G/DL — LOW (ref 14–18)
IMM GRANULOCYTES NFR BLD AUTO: 2.7 % — HIGH (ref 0.1–0.3)
LYMPHOCYTES # BLD AUTO: 0.06 K/UL — LOW (ref 1.2–3.4)
LYMPHOCYTES # BLD AUTO: 2.3 % — LOW (ref 20.5–51.1)
MAGNESIUM SERPL-MCNC: 2.1 MG/DL — SIGNIFICANT CHANGE UP (ref 1.8–2.4)
MCHC RBC-ENTMCNC: 28.7 PG — SIGNIFICANT CHANGE UP (ref 27–31)
MCHC RBC-ENTMCNC: 32.6 G/DL — SIGNIFICANT CHANGE UP (ref 32–37)
MCV RBC AUTO: 88 FL — SIGNIFICANT CHANGE UP (ref 80–94)
MONOCYTES # BLD AUTO: 0.12 K/UL — SIGNIFICANT CHANGE UP (ref 0.1–0.6)
MONOCYTES NFR BLD AUTO: 4.6 % — SIGNIFICANT CHANGE UP (ref 1.7–9.3)
NEUTROPHILS # BLD AUTO: 2.35 K/UL — SIGNIFICANT CHANGE UP (ref 1.4–6.5)
NEUTROPHILS NFR BLD AUTO: 90.4 % — HIGH (ref 42.2–75.2)
NRBC # BLD: 0 /100 WBCS — SIGNIFICANT CHANGE UP (ref 0–0)
PLATELET # BLD AUTO: 79 K/UL — LOW (ref 130–400)
POTASSIUM SERPL-MCNC: 4.9 MMOL/L — SIGNIFICANT CHANGE UP (ref 3.5–5)
POTASSIUM SERPL-SCNC: 4.9 MMOL/L — SIGNIFICANT CHANGE UP (ref 3.5–5)
PROT SERPL-MCNC: 4.7 G/DL — LOW (ref 6–8)
RBC # BLD: 3.17 M/UL — LOW (ref 4.7–6.1)
RBC # FLD: 17.2 % — HIGH (ref 11.5–14.5)
SODIUM SERPL-SCNC: 134 MMOL/L — LOW (ref 135–146)
WBC # BLD: 2.6 K/UL — LOW (ref 4.8–10.8)
WBC # FLD AUTO: 2.6 K/UL — LOW (ref 4.8–10.8)

## 2021-04-05 PROCEDURE — 99233 SBSQ HOSP IP/OBS HIGH 50: CPT

## 2021-04-05 PROCEDURE — 99232 SBSQ HOSP IP/OBS MODERATE 35: CPT

## 2021-04-05 RX ORDER — DEXAMETHASONE 0.5 MG/5ML
4 ELIXIR ORAL DAILY
Refills: 0 | Status: COMPLETED | OUTPATIENT
Start: 2021-04-06 | End: 2021-04-10

## 2021-04-05 RX ADMIN — GABAPENTIN 100 MILLIGRAM(S): 400 CAPSULE ORAL at 06:06

## 2021-04-05 RX ADMIN — ENOXAPARIN SODIUM 70 MILLIGRAM(S): 100 INJECTION SUBCUTANEOUS at 17:03

## 2021-04-05 RX ADMIN — Medication 100 MILLIGRAM(S): at 21:40

## 2021-04-05 RX ADMIN — MORPHINE SULFATE 2 MILLIGRAM(S): 50 CAPSULE, EXTENDED RELEASE ORAL at 12:38

## 2021-04-05 RX ADMIN — FENTANYL CITRATE 1 PATCH: 50 INJECTION INTRAVENOUS at 11:16

## 2021-04-05 RX ADMIN — SODIUM ZIRCONIUM CYCLOSILICATE 5 GRAM(S): 10 POWDER, FOR SUSPENSION ORAL at 06:06

## 2021-04-05 RX ADMIN — Medication 250 MILLIGRAM(S): at 17:03

## 2021-04-05 RX ADMIN — METHOCARBAMOL 500 MILLIGRAM(S): 500 TABLET, FILM COATED ORAL at 14:41

## 2021-04-05 RX ADMIN — Medication 1 APPLICATION(S): at 06:04

## 2021-04-05 RX ADMIN — Medication 1 APPLICATION(S): at 21:38

## 2021-04-05 RX ADMIN — MIDODRINE HYDROCHLORIDE 10 MILLIGRAM(S): 2.5 TABLET ORAL at 06:06

## 2021-04-05 RX ADMIN — FENTANYL CITRATE 1 PATCH: 50 INJECTION INTRAVENOUS at 11:15

## 2021-04-05 RX ADMIN — ENOXAPARIN SODIUM 70 MILLIGRAM(S): 100 INJECTION SUBCUTANEOUS at 06:06

## 2021-04-05 RX ADMIN — Medication 4 MILLIGRAM(S): at 06:06

## 2021-04-05 RX ADMIN — FENTANYL CITRATE 1 PATCH: 50 INJECTION INTRAVENOUS at 19:00

## 2021-04-05 RX ADMIN — CEFTRIAXONE 100 MILLIGRAM(S): 500 INJECTION, POWDER, FOR SOLUTION INTRAMUSCULAR; INTRAVENOUS at 21:40

## 2021-04-05 RX ADMIN — METHOCARBAMOL 500 MILLIGRAM(S): 500 TABLET, FILM COATED ORAL at 21:40

## 2021-04-05 RX ADMIN — BICALUTAMIDE 50 MILLIGRAM(S): 50 TABLET, FILM COATED ORAL at 11:15

## 2021-04-05 RX ADMIN — MIDODRINE HYDROCHLORIDE 10 MILLIGRAM(S): 2.5 TABLET ORAL at 11:11

## 2021-04-05 RX ADMIN — LATANOPROST 1 DROP(S): 0.05 SOLUTION/ DROPS OPHTHALMIC; TOPICAL at 21:39

## 2021-04-05 RX ADMIN — Medication 100 MILLIGRAM(S): at 06:06

## 2021-04-05 RX ADMIN — MORPHINE SULFATE 2 MILLIGRAM(S): 50 CAPSULE, EXTENDED RELEASE ORAL at 14:41

## 2021-04-05 RX ADMIN — METHOCARBAMOL 500 MILLIGRAM(S): 500 TABLET, FILM COATED ORAL at 06:06

## 2021-04-05 RX ADMIN — PANTOPRAZOLE SODIUM 40 MILLIGRAM(S): 20 TABLET, DELAYED RELEASE ORAL at 06:06

## 2021-04-05 RX ADMIN — GABAPENTIN 100 MILLIGRAM(S): 400 CAPSULE ORAL at 14:41

## 2021-04-05 RX ADMIN — GABAPENTIN 100 MILLIGRAM(S): 400 CAPSULE ORAL at 21:39

## 2021-04-05 RX ADMIN — Medication 2000 UNIT(S): at 11:11

## 2021-04-05 RX ADMIN — Medication 1 APPLICATION(S): at 17:01

## 2021-04-05 RX ADMIN — CHLORHEXIDINE GLUCONATE 1 APPLICATION(S): 213 SOLUTION TOPICAL at 06:00

## 2021-04-05 RX ADMIN — Medication 100 MILLIGRAM(S): at 14:41

## 2021-04-05 RX ADMIN — MIDODRINE HYDROCHLORIDE 10 MILLIGRAM(S): 2.5 TABLET ORAL at 17:03

## 2021-04-05 RX ADMIN — SODIUM CHLORIDE 1 GRAM(S): 9 INJECTION INTRAMUSCULAR; INTRAVENOUS; SUBCUTANEOUS at 06:06

## 2021-04-05 RX ADMIN — SODIUM ZIRCONIUM CYCLOSILICATE 5 GRAM(S): 10 POWDER, FOR SUSPENSION ORAL at 17:03

## 2021-04-05 RX ADMIN — SODIUM CHLORIDE 1 GRAM(S): 9 INJECTION INTRAMUSCULAR; INTRAVENOUS; SUBCUTANEOUS at 17:03

## 2021-04-05 RX ADMIN — FENTANYL CITRATE 1 PATCH: 50 INJECTION INTRAVENOUS at 06:07

## 2021-04-05 RX ADMIN — Medication 250 MILLIGRAM(S): at 06:06

## 2021-04-05 NOTE — VITALS
[Maximal Pain Intensity: 8/10] : 8/10 [Least Pain Intensity: 0/10] : 0/10 [Pain Location: ___] : Pain Location: [unfilled] [Opioid] : opioid [70: Cares for self; unalbe to carry on normal activity or do active work.] : 70: Cares for self; unable to carry on normal activity or do active work.

## 2021-04-05 NOTE — PROGRESS NOTE ADULT - ATTENDING COMMENTS
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

## 2021-04-05 NOTE — PHYSICAL EXAM
[] : no respiratory distress [Normal] : oriented to person, place and time, the affect was normal, the mood was normal and not anxious [de-identified] : Large mandibular mass.  No ulceration/ bleeding.  [de-identified] : Minimal erythema in radiation field.  No desquamation.

## 2021-04-05 NOTE — PROGRESS NOTE ADULT - SUBJECTIVE AND OBJECTIVE BOX
24H events:  Patient less fatigued over the weekend  PLanned for day 4/5 of radiation  Planned for possible debridement by burn  Afebrile, on ceftriaxone and flagyl    PAST MEDICAL & SURGICAL HISTORY  CHF (congestive heart failure)    Inguinal hernia    S/P CABG x 3      SOCIAL HISTORY:  Negative for smoking/alcohol/drug use.     ALLERGIES:  No Known Allergies    MEDICATIONS:  STANDING MEDICATIONS  bicalutamide 50 milliGRAM(s) Oral daily  cefTRIAXone   IVPB 2000 milliGRAM(s) IV Intermittent every 24 hours  chlorhexidine 4% Liquid 1 Application(s) Topical <User Schedule>  cholecalciferol 2000 Unit(s) Oral daily  collagenase Ointment 1 Application(s) Topical daily  Dakins Solution - 1/2 Strength 1 Application(s) Topical two times a day  enoxaparin Injectable 70 milliGRAM(s) SubCutaneous two times a day  fentaNYL   Patch  25 MICROgram(s)/Hr. 1 Patch Transdermal every 48 hours  gabapentin 100 milliGRAM(s) Oral three times a day  latanoprost 0.005% Ophthalmic Solution 1 Drop(s) Both EYES at bedtime  lidocaine   Patch 2 Patch Transdermal daily  methocarbamol 500 milliGRAM(s) Oral three times a day  metroNIDAZOLE  IVPB      metroNIDAZOLE  IVPB 500 milliGRAM(s) IV Intermittent every 8 hours  midodrine. 10 milliGRAM(s) Oral three times a day  pantoprazole    Tablet 40 milliGRAM(s) Oral before breakfast  saccharomyces boulardii 250 milliGRAM(s) Oral two times a day  sodium chloride 1 Gram(s) Oral two times a day  sodium zirconium cyclosilicate 5 Gram(s) Oral two times a day  zoledronic acid IVPB (ZOMETA) (Non - oncologic) 4 milliGRAM(s) IV Intermittent every 4 weeks    PRN MEDICATIONS  acetaminophen   Tablet .. 650 milliGRAM(s) Oral every 6 hours PRN  aluminum hydroxide/magnesium hydroxide/simethicone Suspension 30 milliLiter(s) Oral every 6 hours PRN  brimonidine 0.2% Ophthalmic Solution 1 Drop(s) Both EYES every 8 hours PRN  morphine  - Injectable 2 milliGRAM(s) IV Push every 6 hours PRN  oxycodone    5 mG/acetaminophen 325 mG 1 Tablet(s) Oral every 6 hours PRN    VITALS:   T(F): 96.9  HR: 110  BP: 132/56  RR: 18  SpO2: 99%    LABS:                        9.1    2.60  )-----------( 79       ( 05 Apr 2021 08:43 )             27.9     04-05    134<L>  |  102  |  38<H>  ----------------------------<  95  4.9   |  20  |  0.8    Ca    7.9<L>      05 Apr 2021 08:43  Mg     2.1     04-05    TPro  4.7<L>  /  Alb  2.8<L>  /  TBili  0.4  /  DBili  x   /  AST  14  /  ALT  14  /  AlkPhos  59  04-05    RADIOLOGY:    PHYSICAL EXAM:  GEN: No acute distress  HENT: NCAT, EOMI  LYMPH: No appreciable adenopathy  LUNGS: No respiratory distress  ABD: Soft, non-tender, non-distended  SKIN: Did not want me to evaluate sacrum  EXT: No edema  NEURO: AAOX3, moving all extremities but cant lift legs off bed

## 2021-04-05 NOTE — DISEASE MANAGEMENT
[Pathological] : TNM Stage: p [IV] : IV [FreeTextEntry4] : Metastatic Prostate Cancer [TTNM] : . [NTNM] : . [MTNM] : 1 [de-identified] : 8380cGy [de-identified] : 2000cGy [de-identified] : Mandible

## 2021-04-05 NOTE — HISTORY OF PRESENT ILLNESS
[FreeTextEntry1] : Nursing OTV: Pt denies pain or discomfort at this time. Was given Morphine 2mg IV prior to coming down for RT. States back pain reaches a 8/10. Has a fentanyl patch 25mcg. Prescribed Morphine 2mg IV PRN and Percocet PRN. States he is eating and drinking well. Denies dysphagia or oral discomfort.

## 2021-04-05 NOTE — PROGRESS NOTE ADULT - SUBJECTIVE AND OBJECTIVE BOX
SUBJECTIVE:    Patient is a 80y old Male who presents with a chief complaint of 80 YEAR OLD MALE C/O MULTIPLE MEDICAL COMPLAINTS . (04 Apr 2021 14:52)    Currently admitted to medicine with the primary diagnosis of Ambulatory dysfunction       Today is hospital day 28d. Overnight no new events. This morning he complains of pain in his sacral area. Otherwise no new complaints.     PAST MEDICAL & SURGICAL HISTORY  CHF (congestive heart failure)    Inguinal hernia    S/P CABG x 3      SOCIAL HISTORY:  Negative for smoking/alcohol/drug use.     ALLERGIES:  No Known Allergies    MEDICATIONS:  STANDING MEDICATIONS  bicalutamide 50 milliGRAM(s) Oral daily  cefTRIAXone   IVPB 2000 milliGRAM(s) IV Intermittent every 24 hours  chlorhexidine 4% Liquid 1 Application(s) Topical <User Schedule>  cholecalciferol 2000 Unit(s) Oral daily  collagenase Ointment 1 Application(s) Topical daily  Dakins Solution - 1/2 Strength 1 Application(s) Topical two times a day  enoxaparin Injectable 70 milliGRAM(s) SubCutaneous two times a day  fentaNYL   Patch  25 MICROgram(s)/Hr. 1 Patch Transdermal every 48 hours  gabapentin 100 milliGRAM(s) Oral three times a day  latanoprost 0.005% Ophthalmic Solution 1 Drop(s) Both EYES at bedtime  lidocaine   Patch 2 Patch Transdermal daily  methocarbamol 500 milliGRAM(s) Oral three times a day  metroNIDAZOLE  IVPB 500 milliGRAM(s) IV Intermittent every 8 hours  metroNIDAZOLE  IVPB      midodrine. 10 milliGRAM(s) Oral three times a day  pantoprazole    Tablet 40 milliGRAM(s) Oral before breakfast  saccharomyces boulardii 250 milliGRAM(s) Oral two times a day  sodium chloride 1 Gram(s) Oral two times a day  sodium zirconium cyclosilicate 5 Gram(s) Oral two times a day  zoledronic acid IVPB (ZOMETA) (Non - oncologic) 4 milliGRAM(s) IV Intermittent every 4 weeks    PRN MEDICATIONS  acetaminophen   Tablet .. 650 milliGRAM(s) Oral every 6 hours PRN  aluminum hydroxide/magnesium hydroxide/simethicone Suspension 30 milliLiter(s) Oral every 6 hours PRN  brimonidine 0.2% Ophthalmic Solution 1 Drop(s) Both EYES every 8 hours PRN  morphine  - Injectable 2 milliGRAM(s) IV Push every 6 hours PRN  oxycodone    5 mG/acetaminophen 325 mG 1 Tablet(s) Oral every 6 hours PRN    VITALS:   T(F): 96.9  HR: 110  BP: 132/56  RR: 18  SpO2: 99%    LABS:                        9.9    1.75  )-----------( 68       ( 04 Apr 2021 06:52 )             31.6     04-04    133<L>  |  100  |  29<H>  ----------------------------<  104<H>  5.7<H>   |  18  |  0.8    Ca    8.4<L>      04 Apr 2021 06:52  Mg     2.2     04-04    TPro  5.4<L>  /  Alb  3.0<L>  /  TBili  0.6  /  DBili  x   /  AST  16  /  ALT  18  /  AlkPhos  73  04-04                  RADIOLOGY:    PHYSICAL EXAM:  GENERAL: NAD, lying in bed comfortably  HEAD:  Atraumatic, Normocephalic  CHEST/LUNG: Clear to auscultation bilaterally; No rales, rhonchi, wheezing, or rubs. Unlabored respirations  HEART: Regular rate and rhythm; No murmurs, rubs, or gallops  ABDOMEN: Bowel sounds present; Soft, Nontender, Nondistended. No hepatomegally  EXTREMITIES:  2+ Peripheral Pulses, brisk capillary refill. No clubbing, cyanosis, or edema  NERVOUS SYSTEM:  Alert & Oriented X3, speech clear. No deficits   MSK: FROM all 4 extremities, full and equal strength  SKIN: No rashes or lesions       SUBJECTIVE:    Patient is a 80y old Male who presents with a chief complaint of 80 YEAR OLD MALE C/O MULTIPLE MEDICAL COMPLAINTS . (04 Apr 2021 14:52)    Currently admitted to medicine with the primary diagnosis of Ambulatory dysfunction       Today is hospital day 28d. Overnight no new events. This morning he complains of pain in his sacral area. Otherwise no new complaints.   no cp, sob, abd pain, fever  no ha, syncope, lightheadedness, dizziness    PAST MEDICAL & SURGICAL HISTORY  CHF (congestive heart failure)    Inguinal hernia    S/P CABG x 3      SOCIAL HISTORY:  Negative for smoking/alcohol/drug use.     ALLERGIES:  No Known Allergies    MEDICATIONS:  STANDING MEDICATIONS  bicalutamide 50 milliGRAM(s) Oral daily  cefTRIAXone   IVPB 2000 milliGRAM(s) IV Intermittent every 24 hours  chlorhexidine 4% Liquid 1 Application(s) Topical <User Schedule>  cholecalciferol 2000 Unit(s) Oral daily  collagenase Ointment 1 Application(s) Topical daily  Dakins Solution - 1/2 Strength 1 Application(s) Topical two times a day  enoxaparin Injectable 70 milliGRAM(s) SubCutaneous two times a day  fentaNYL   Patch  25 MICROgram(s)/Hr. 1 Patch Transdermal every 48 hours  gabapentin 100 milliGRAM(s) Oral three times a day  latanoprost 0.005% Ophthalmic Solution 1 Drop(s) Both EYES at bedtime  lidocaine   Patch 2 Patch Transdermal daily  methocarbamol 500 milliGRAM(s) Oral three times a day  metroNIDAZOLE  IVPB 500 milliGRAM(s) IV Intermittent every 8 hours  metroNIDAZOLE  IVPB      midodrine. 10 milliGRAM(s) Oral three times a day  pantoprazole    Tablet 40 milliGRAM(s) Oral before breakfast  saccharomyces boulardii 250 milliGRAM(s) Oral two times a day  sodium chloride 1 Gram(s) Oral two times a day  sodium zirconium cyclosilicate 5 Gram(s) Oral two times a day  zoledronic acid IVPB (ZOMETA) (Non - oncologic) 4 milliGRAM(s) IV Intermittent every 4 weeks    PRN MEDICATIONS  acetaminophen   Tablet .. 650 milliGRAM(s) Oral every 6 hours PRN  aluminum hydroxide/magnesium hydroxide/simethicone Suspension 30 milliLiter(s) Oral every 6 hours PRN  brimonidine 0.2% Ophthalmic Solution 1 Drop(s) Both EYES every 8 hours PRN  morphine  - Injectable 2 milliGRAM(s) IV Push every 6 hours PRN  oxycodone    5 mG/acetaminophen 325 mG 1 Tablet(s) Oral every 6 hours PRN    VITALS:   T(F): 96.9  HR: 110  BP: 132/56  RR: 18  SpO2: 99%    LABS:                        9.9    1.75  )-----------( 68       ( 04 Apr 2021 06:52 )             31.6     04-04    133<L>  |  100  |  29<H>  ----------------------------<  104<H>  5.7<H>   |  18  |  0.8    Ca    8.4<L>      04 Apr 2021 06:52  Mg     2.2     04-04    TPro  5.4<L>  /  Alb  3.0<L>  /  TBili  0.6  /  DBili  x   /  AST  16  /  ALT  18  /  AlkPhos  73  04-04                  RADIOLOGY:    PHYSICAL EXAM:  GENERAL: NAD, lying in bed comfortably  HEAD:  Atraumatic, Normocephalic  CHEST/LUNG: Clear to auscultation bilaterally; No rales, rhonchi, wheezing, or rubs. Unlabored respirations  HEART: Regular rate and rhythm; No murmurs, rubs, or gallops  ABDOMEN: Bowel sounds present; Soft, Nontender, Nondistended. No hepatomegally  EXTREMITIES:  2+ Peripheral Pulses, brisk capillary refill. No clubbing, cyanosis, or edema  NERVOUS SYSTEM:  Alert & Oriented X3, speech clear. No deficits   MSK: FROM all 4 extremities, full and equal strength  SKIN: No rashes or lesions

## 2021-04-05 NOTE — PROGRESS NOTE ADULT - ASSESSMENT
81 yo male hx of CAD s/p CABG s/p 20+ years ago, HFrEF, right sided large inguinal hernia BIBA from hotel room because he was unable to ambulate due to Rt thigh weakness and numbness, pt also had mandibular swelling for over a year. CT lumbosacral spine showed mass suggestive of mets with obliteration of neural foramina so was started on decadron.  Sacral mass biopsied 3/10 showed prostate adenocarcinoma. Mandibluar mass biopsied 3/19 also showed prostate cancer. Hospital course was complicated by hypovolemic shock likely 2/2 UGIB, required pressors and upgrade to stepdown unit. Pt currently in hospital undergoing chemo and radiotherapy.     #Metastatic prostate cancer  - currently on chemo and radiation   - continue with bicalutamide 50mg daily (started 3/14/21)  - s/p Lupron (GnRH agonist) IM, first dose 3/29 --> next dose in 1 month  - received first dose of taxotere (3/27), continue after radiation therapy is completed  - Completed RT to sacrum and spine 5 doses (3/24 - 3/30)  - started RT 5 DOSES to jaw area for palliation; last RT on 4/6/21  - dental cleared for bisphosphonate, or denosumab s/p zoledronic acid 4mg q4 weeks (3/27)  - s/p Dexamethasone taper 4/5  - continue with  on  Fentanyl patch, c/w  Morphine PRN  - continue with  Neurontin 100 mg TID for neuropathic pain   - palliative care consulted for pain management and GOC; signed off 3/19     # SCC of jaw  - 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   - resolving   - sodium pills   - c/w free water restriction   - monitor Na level     #  sacral ulcer  - now fouling smelling  - Burn consulted, will follow up recommendations--> Will perform debridement  - ID consulted on 4/3, Ceftriaxone and Flagyl recommended instead of unasyn  - send wound cultures    #Thrombocytopenia/ Leukopenia   Plt 87 --> 68  WBC 3.02 --> 2.5  - Follow up 11 AM labs  - oncology follow up   - likely due to chemotherapy (taxotere)  - monitor for any signs of bleeding      # UGI bleed Stable  - s/p 1 unit of PRBC  - monitor H/H, keep Hb above 7.5 and active type/cross      # Chronic A fib  - on therapeutic dose of Lovenox     # CHFrEF  - fluid restriction 1200 ml in 24 hours   - intake and output monitoring, daily weight   - EF of 35%    # Hypercalcemia/  Vitamin D level   - resolved. likely due to advanced malignancy   -  continue with vitamin D supplements 2000units daily   -   - f/u PTHrP still pending    # Hyperkalemia  - low potasium diet   - give one dose of Kayexalate today   - on [facility]   - Follow up 11 AM labs    # Severe protein-calorie malnutrition.  - dietary on board   - supplements added   - patient reports diarrhea with ensure     #DVT PPx: Lovenox  #GI PPx: Protonix  FULL CODE    #Progress Note Handoff  Pending (specify):   palliative care consult for pain management and GOC conversation, d/c Unasyn, start Ceftriaxone and Flagyl, send sacral wound Cx, c/w local wound care, resume RT on Monday, oncology follow up, start Neurontin 100 mg TID for neuropathic pain , low potassium diet, give one dose of Kayexalate today, monitor potassium level  79 yo male hx of CAD s/p CABG s/p 20+ years ago, HFrEF, right sided large inguinal hernia BIBA from hotel room because he was unable to ambulate due to Rt thigh weakness and numbness, pt also had mandibular swelling for over a year. CT lumbosacral spine showed mass suggestive of mets with obliteration of neural foramina so was started on decadron.  Sacral mass biopsied 3/10 showed prostate adenocarcinoma. Mandibluar mass biopsied 3/19 also showed prostate cancer. Hospital course was complicated by hypovolemic shock likely 2/2 UGIB, required pressors and upgrade to stepdown unit. Pt currently in hospital undergoing chemo and radiotherapy.     #Metastatic prostate cancer  - currently on chemo and radiation   - continue with bicalutamide 50mg daily (started 3/14/21)  - s/p Lupron (GnRH agonist) IM, first dose 3/29 --> next dose in 1 month  - received first dose of taxotere (3/27), continue after radiation therapy is completed  - Completed RT to sacrum and spine 5 doses (3/24 - 3/30)  - started RT 5 DOSES to jaw area for palliation; last RT on 4/6/21  - dental cleared for bisphosphonate, or denosumab s/p zoledronic acid 4mg q4 weeks (3/27)  - s/p Dexamethasone taper 4/5  - continue with  on  Fentanyl patch, c/w  Morphine PRN  - continue with  Neurontin 100 mg TID for neuropathic pain   - palliative care consulted for pain management and GOC; signed off 3/19     # SCC of jaw  - 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   - resolving   - sodium pills   - c/w free water restriction   - monitor Na level     #  sacral ulcer  - now fouling smelling  - Burn consulted, will follow up recommendations--> Will perform debridement  - ID consulted on 4/3, Ceftriaxone and Flagyl recommended instead of unasyn  - send wound cultures    #Thrombocytopenia/ Leukopenia   Plt 87 --> 68  WBC 3.02 --> 2.5  - Follow up AM labs  - oncology follow up   - likely due to chemotherapy (taxotere)  - monitor for any signs of bleeding      # UGI bleed Stable  - s/p 1 unit of PRBC  - monitor H/H, keep Hb above 7.5 and active type/cross      # Chronic A fib  - on therapeutic dose of Lovenox     # CHFrEF  - fluid restriction 1200 ml in 24 hours   - intake and output monitoring, daily weight   - EF of 35%    # Hypercalcemia/  Vitamin D level   - resolved. likely due to advanced malignancy   -  continue with vitamin D supplements 2000units daily   -   - f/u PTHrP still pending    # Hyperkalemia  - low potasium diet   - give one dose of Kayexalate today   - on [facility]   - Follow up AM labs    # Severe protein-calorie malnutrition.  - dietary on board   - supplements added   - patient reports diarrhea with ensure     #DVT PPx: Lovenox  #GI PPx: Protonix  FULL CODE    81 yo male hx of CAD s/p CABG s/p 20+ years ago, HFrEF, right sided large inguinal hernia BIBA from hotel room because he was unable to ambulate due to Rt thigh weakness and numbness, pt also had mandibular swelling for over a year. CT lumbosacral spine showed mass suggestive of mets with obliteration of neural foramina so was started on decadron.  Sacral mass biopsied 3/10 showed prostate adenocarcinoma. Mandibluar mass biopsied 3/19 also showed prostate cancer. Hospital course was complicated by hypovolemic shock likely 2/2 UGIB, required pressors and upgrade to stepdown unit. Pt currently in hospital undergoing chemo and radiotherapy.     #Metastatic prostate cancer  - currently on chemo and radiation   - continue with bicalutamide 50mg daily (started 3/14/21)  - s/p Lupron (GnRH agonist) IM, first dose 3/29 --> next dose in 1 month  - received first dose of taxotere (3/27), continue after radiation therapy is completed  - Completed RT to sacrum and spine 5 doses (3/24 - 3/30)  - started RT 5 DOSES to jaw area for palliation; last RT on 4/6/21  - dental cleared for bisphosphonate, or denosumab s/p zoledronic acid 4mg q4 weeks (3/27)  - continue with Dexamethasone taper; now 4 mg daily for 5 days starting 4/6  - continue with  on  Fentanyl patch, c/w  Morphine PRN  - continue with  Neurontin 100 mg TID for neuropathic pain   - palliative care consulted for pain management and GOC; signed off 3/19     # SCC of jaw  - 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 Serum Osm 279  - Glu on BMP 120s-130s  - TSH 0.36  - free water restriction, fluid restriction 1500ml/day  - continue with Sodium chloride tablets  - monitor Na level     #  sacral ulcer  - now fouling smelling  - Burn consulted, will follow up recommendations--> Will perform debridement  - ID consulted on 4/3, Ceftriaxone and Flagyl recommended instead of unasyn  - send wound cultures    #Thrombocytopenia/ Leukopenia   Plt 87 --> 68  WBC 3.02 --> 2.5  - platelets and WBC downtrending since 3/27  - monitor for any signs of bleeding   - transfuse if plt <10K, or <50K with bleeding  - Follow up AM labs  - oncology follow up   - likely due to chemotherapy (taxotere)     # UGI bleed Stable  - s/p 1 unit of PRBC  - No egd done--only coffee ground emesis  - monitor H/H, keep Hb above 7.5 and active type/cross      # Chronic A fib  - on therapeutic dose of Lovenox     # CHFrEF  - fluid restriction 1200 ml in 24 hours   - intake and output monitoring, daily weight   - EF of 35%    # Hypercalcemia/  Vitamin D level   - resolved. likely due to advanced malignancy   -  continue with vitamin D supplements 2000units daily   -   - f/u PTHrP still pending    # Hyperkalemia  - low potasium diet   - give one dose of Kayexalate today   - on [facility]   - Follow up AM labs    # Severe protein-calorie malnutrition.  - dietary on board   - supplements added   - patient reports diarrhea with ensure     #DVT PPx: Lovenox  #GI PPx: Protonix  FULL CODE      81 yo male hx of CAD s/p CABG s/p 20+ years ago, HFrEF, right sided large inguinal hernia BIBA from hotel room because he was unable to ambulate due to Rt thigh weakness and numbness, pt also had mandibular swelling for over a year. CT lumbosacral spine showed mass suggestive of mets with obliteration of neural foramina so was started on decadron.  Sacral mass biopsied 3/10 showed prostate adenocarcinoma. Mandibluar mass biopsied 3/19 also showed prostate cancer. Hospital course was complicated by hypovolemic shock likely 2/2 UGIB, required pressors and upgrade to stepdown unit. Pt currently in hospital undergoing chemo and radiotherapy.     #Metastatic prostate cancer  - currently on chemo and radiation   - continue with bicalutamide 50mg daily (started 3/14/21)  - s/p Lupron (GnRH agonist) IM, first dose 3/29 --> next dose in 1 month  - received first dose of taxotere (3/27), continue after radiation therapy is completed  - Completed RT to sacrum and spine 5 doses (3/24 - 3/30)  - started RT 5 DOSES to jaw area for palliation; last RT on 4/6/21  - dental cleared for bisphosphonate, or denosumab s/p zoledronic acid 4mg q4 weeks (3/27)  - continue with Dexamethasone taper; now 4 mg daily for 5 days starting 4/6  - continue with  on  Fentanyl patch, c/w  Morphine PRN  - continue with  Neurontin 100 mg TID for neuropathic pain   - palliative care consulted for pain management and GOC; signed off 3/19     # SCC of jaw  - 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 Serum Osm 279  - Glu on BMP 120s-130s  - TSH 0.36  - free water restriction, fluid restriction 1500ml/day  - continue with Sodium chloride tablets  - monitor Na level     #  sacral ulcer  - now fouling smelling  - Burn consulted, will follow up recommendations--> Will perform debridement at bedside on Tues or Wed as per burn; Most likely will not need to hold A/C before procedure but burn will follow up  - ID consulted on 4/3, Ceftriaxone and Flagyl recommended instead of unasyn  - send wound cultures    #Thrombocytopenia/ Leukopenia   Plt 87 --> 68  WBC 3.02 --> 2.5  - platelets and WBC downtrending since 3/27  - monitor for any signs of bleeding   - transfuse if plt <10K, or <50K with bleeding  - Follow up AM labs --> improved today  - oncology follow up   - likely due to chemotherapy (taxotere)     # UGI bleed Stable  - s/p 1 unit of PRBC  - No egd done--only coffee ground emesis  - monitor H/H, keep Hb above 7.5 and active type/cross      # Chronic A fib  - on therapeutic dose of Lovenox     # CHFrEF  - fluid restriction 1200 ml in 24 hours   - intake and output monitoring, daily weight   - EF of 35%    # Hypercalcemia/  Vitamin D level   - resolved. likely due to advanced malignancy   -  continue with vitamin D supplements 2000units daily   -   - f/u PTHrP still pending    # Hyperkalemia  - low potasium diet   - give one dose of Kayexalate today   - on [facility]   - Follow up AM labs --> 4.9     # Severe protein-calorie malnutrition.  - dietary on board   - supplements added   - patient reports diarrhea with ensure     #DVT PPx: Lovenox  #GI PPx: Protonix  FULL CODE

## 2021-04-05 NOTE — PROGRESS NOTE ADULT - ASSESSMENT
Mr. Mckee is a 79 yo male hx of AFib, CAD s/p CABG s/p 20+ years ago/ CHF/ right sided large inguinal hernia BIBA from hotel room 2/2 unable to ambulate with right thigh weakness and numbness, now found to have metastatic disease with a mandibular mass and large sacral mass    # Stage IV Prostate cancer with diffuse osseous involvement/ spinal mets with paraspinal mass   -   - Sacral biopsy favors prostate ( neoplastic cells are positive for AE1/AE3, PSA and CK7)  - Jaw mass biopsy : prostate adenocarcinoma  - TLS labs wnl  - CT chest showing RUL [id number] paravertebral lesion with mass effect.   - CT AP no RP bleed, no visceral mets  - MR CTL, brain and  jaw completed, mets throughout cervical and t-spine  - Decadron taper per rad onc  - Continue casodex 50mg  - Received 1 dose of taxotere 20mg/m2 on 3.26.21, will hold until he completes RT  - Zometa 4mg 3/27/21, cleared by dental  - Leupron 7.5mg 3/29/21 (1 month dose)  - Completed spine RT, on day 4/5 of palliative jaw radiation    # Sacral ulcer  - Plan for possible debridement by burn  - On ceftriaxone and flagyl    # Normocytic anemia:  - Per GI, outpatient scope  - Hapto high and retic low, not consistent with hemolysis  - TSAT 67%    # Hyponatremia:  - Fluid restriction    # Mild hypercalcemia, likely due to bone mets  - s/p calcitonin   - Monitor PTH, PTHrP and 25OH Vit D     /PSYCH F/U  PT EVAL    DVT ppx on lovenox BID

## 2021-04-06 ENCOUNTER — TRANSCRIPTION ENCOUNTER (OUTPATIENT)
Age: 81
End: 2021-04-06

## 2021-04-06 ENCOUNTER — NON-APPOINTMENT (OUTPATIENT)
Age: 81
End: 2021-04-06

## 2021-04-06 LAB
ALBUMIN SERPL ELPH-MCNC: 2.7 G/DL — LOW (ref 3.5–5.2)
ALP SERPL-CCNC: 59 U/L — SIGNIFICANT CHANGE UP (ref 30–115)
ALT FLD-CCNC: 15 U/L — SIGNIFICANT CHANGE UP (ref 0–41)
ANION GAP SERPL CALC-SCNC: 13 MMOL/L — SIGNIFICANT CHANGE UP (ref 7–14)
AST SERPL-CCNC: 15 U/L — SIGNIFICANT CHANGE UP (ref 0–41)
BASOPHILS # BLD AUTO: 0.01 K/UL — SIGNIFICANT CHANGE UP (ref 0–0.2)
BASOPHILS NFR BLD AUTO: 0.3 % — SIGNIFICANT CHANGE UP (ref 0–1)
BILIRUB SERPL-MCNC: 0.3 MG/DL — SIGNIFICANT CHANGE UP (ref 0.2–1.2)
BUN SERPL-MCNC: 44 MG/DL — HIGH (ref 10–20)
CALCIUM SERPL-MCNC: 8.2 MG/DL — LOW (ref 8.5–10.1)
CHLORIDE SERPL-SCNC: 104 MMOL/L — SIGNIFICANT CHANGE UP (ref 98–110)
CO2 SERPL-SCNC: 17 MMOL/L — SIGNIFICANT CHANGE UP (ref 17–32)
CREAT SERPL-MCNC: 1.2 MG/DL — SIGNIFICANT CHANGE UP (ref 0.7–1.5)
EOSINOPHIL # BLD AUTO: 0.01 K/UL — SIGNIFICANT CHANGE UP (ref 0–0.7)
EOSINOPHIL NFR BLD AUTO: 0.3 % — SIGNIFICANT CHANGE UP (ref 0–8)
GLUCOSE SERPL-MCNC: 115 MG/DL — HIGH (ref 70–99)
HCT VFR BLD CALC: 29.7 % — LOW (ref 42–52)
HGB BLD-MCNC: 9.4 G/DL — LOW (ref 14–18)
IMM GRANULOCYTES NFR BLD AUTO: 1.9 % — HIGH (ref 0.1–0.3)
LYMPHOCYTES # BLD AUTO: 0.07 K/UL — LOW (ref 1.2–3.4)
LYMPHOCYTES # BLD AUTO: 2.2 % — LOW (ref 20.5–51.1)
MAGNESIUM SERPL-MCNC: 2.2 MG/DL — SIGNIFICANT CHANGE UP (ref 1.8–2.4)
MCHC RBC-ENTMCNC: 28.5 PG — SIGNIFICANT CHANGE UP (ref 27–31)
MCHC RBC-ENTMCNC: 31.6 G/DL — LOW (ref 32–37)
MCV RBC AUTO: 90 FL — SIGNIFICANT CHANGE UP (ref 80–94)
MONOCYTES # BLD AUTO: 0.09 K/UL — LOW (ref 0.1–0.6)
MONOCYTES NFR BLD AUTO: 2.8 % — SIGNIFICANT CHANGE UP (ref 1.7–9.3)
NEUTROPHILS # BLD AUTO: 2.94 K/UL — SIGNIFICANT CHANGE UP (ref 1.4–6.5)
NEUTROPHILS NFR BLD AUTO: 92.5 % — HIGH (ref 42.2–75.2)
NRBC # BLD: 0 /100 WBCS — SIGNIFICANT CHANGE UP (ref 0–0)
PLATELET # BLD AUTO: 79 K/UL — LOW (ref 130–400)
POTASSIUM SERPL-MCNC: 4.6 MMOL/L — SIGNIFICANT CHANGE UP (ref 3.5–5)
POTASSIUM SERPL-SCNC: 4.6 MMOL/L — SIGNIFICANT CHANGE UP (ref 3.5–5)
PROT SERPL-MCNC: 4.8 G/DL — LOW (ref 6–8)
PTH RELATED PROT SERPL-MCNC: <2 PMOL/L — SIGNIFICANT CHANGE UP
RBC # BLD: 3.3 M/UL — LOW (ref 4.7–6.1)
RBC # FLD: 17.3 % — HIGH (ref 11.5–14.5)
SODIUM SERPL-SCNC: 134 MMOL/L — LOW (ref 135–146)
WBC # BLD: 3.18 K/UL — LOW (ref 4.8–10.8)
WBC # FLD AUTO: 3.18 K/UL — LOW (ref 4.8–10.8)

## 2021-04-06 PROCEDURE — 99232 SBSQ HOSP IP/OBS MODERATE 35: CPT

## 2021-04-06 PROCEDURE — 99231 SBSQ HOSP IP/OBS SF/LOW 25: CPT

## 2021-04-06 RX ORDER — LANOLIN ALCOHOL/MO/W.PET/CERES
3 CREAM (GRAM) TOPICAL AT BEDTIME
Refills: 0 | Status: DISCONTINUED | OUTPATIENT
Start: 2021-04-06 | End: 2021-04-16

## 2021-04-06 RX ORDER — SODIUM CHLORIDE 9 MG/ML
1000 INJECTION, SOLUTION INTRAVENOUS
Refills: 0 | Status: DISCONTINUED | OUTPATIENT
Start: 2021-04-06 | End: 2021-04-07

## 2021-04-06 RX ADMIN — Medication 1 APPLICATION(S): at 17:04

## 2021-04-06 RX ADMIN — MIDODRINE HYDROCHLORIDE 10 MILLIGRAM(S): 2.5 TABLET ORAL at 16:18

## 2021-04-06 RX ADMIN — MIDODRINE HYDROCHLORIDE 10 MILLIGRAM(S): 2.5 TABLET ORAL at 05:38

## 2021-04-06 RX ADMIN — METHOCARBAMOL 500 MILLIGRAM(S): 500 TABLET, FILM COATED ORAL at 16:17

## 2021-04-06 RX ADMIN — MIDODRINE HYDROCHLORIDE 10 MILLIGRAM(S): 2.5 TABLET ORAL at 11:16

## 2021-04-06 RX ADMIN — FENTANYL CITRATE 1 PATCH: 50 INJECTION INTRAVENOUS at 06:51

## 2021-04-06 RX ADMIN — GABAPENTIN 100 MILLIGRAM(S): 400 CAPSULE ORAL at 16:17

## 2021-04-06 RX ADMIN — CEFTRIAXONE 100 MILLIGRAM(S): 500 INJECTION, POWDER, FOR SOLUTION INTRAMUSCULAR; INTRAVENOUS at 21:28

## 2021-04-06 RX ADMIN — ENOXAPARIN SODIUM 70 MILLIGRAM(S): 100 INJECTION SUBCUTANEOUS at 17:02

## 2021-04-06 RX ADMIN — FENTANYL CITRATE 1 PATCH: 50 INJECTION INTRAVENOUS at 17:38

## 2021-04-06 RX ADMIN — Medication 1 APPLICATION(S): at 05:42

## 2021-04-06 RX ADMIN — Medication 100 MILLIGRAM(S): at 23:20

## 2021-04-06 RX ADMIN — ENOXAPARIN SODIUM 70 MILLIGRAM(S): 100 INJECTION SUBCUTANEOUS at 05:39

## 2021-04-06 RX ADMIN — GABAPENTIN 100 MILLIGRAM(S): 400 CAPSULE ORAL at 21:29

## 2021-04-06 RX ADMIN — Medication 4 MILLIGRAM(S): at 06:23

## 2021-04-06 RX ADMIN — METHOCARBAMOL 500 MILLIGRAM(S): 500 TABLET, FILM COATED ORAL at 21:29

## 2021-04-06 RX ADMIN — Medication 1 APPLICATION(S): at 17:01

## 2021-04-06 RX ADMIN — SODIUM CHLORIDE 1 GRAM(S): 9 INJECTION INTRAMUSCULAR; INTRAVENOUS; SUBCUTANEOUS at 05:38

## 2021-04-06 RX ADMIN — Medication 100 MILLIGRAM(S): at 16:17

## 2021-04-06 RX ADMIN — PANTOPRAZOLE SODIUM 40 MILLIGRAM(S): 20 TABLET, DELAYED RELEASE ORAL at 06:23

## 2021-04-06 RX ADMIN — GABAPENTIN 100 MILLIGRAM(S): 400 CAPSULE ORAL at 05:38

## 2021-04-06 RX ADMIN — BICALUTAMIDE 50 MILLIGRAM(S): 50 TABLET, FILM COATED ORAL at 11:16

## 2021-04-06 RX ADMIN — Medication 100 MILLIGRAM(S): at 05:39

## 2021-04-06 RX ADMIN — METHOCARBAMOL 500 MILLIGRAM(S): 500 TABLET, FILM COATED ORAL at 05:38

## 2021-04-06 RX ADMIN — SODIUM CHLORIDE 1 GRAM(S): 9 INJECTION INTRAMUSCULAR; INTRAVENOUS; SUBCUTANEOUS at 17:02

## 2021-04-06 RX ADMIN — MORPHINE SULFATE 2 MILLIGRAM(S): 50 CAPSULE, EXTENDED RELEASE ORAL at 18:07

## 2021-04-06 RX ADMIN — SODIUM ZIRCONIUM CYCLOSILICATE 5 GRAM(S): 10 POWDER, FOR SUSPENSION ORAL at 05:38

## 2021-04-06 RX ADMIN — Medication 2000 UNIT(S): at 11:16

## 2021-04-06 RX ADMIN — MORPHINE SULFATE 2 MILLIGRAM(S): 50 CAPSULE, EXTENDED RELEASE ORAL at 17:04

## 2021-04-06 RX ADMIN — Medication 250 MILLIGRAM(S): at 05:38

## 2021-04-06 RX ADMIN — Medication 3 MILLIGRAM(S): at 21:29

## 2021-04-06 RX ADMIN — CHLORHEXIDINE GLUCONATE 1 APPLICATION(S): 213 SOLUTION TOPICAL at 06:23

## 2021-04-06 RX ADMIN — LATANOPROST 1 DROP(S): 0.05 SOLUTION/ DROPS OPHTHALMIC; TOPICAL at 21:30

## 2021-04-06 RX ADMIN — Medication 250 MILLIGRAM(S): at 17:02

## 2021-04-06 NOTE — DISCHARGE NOTE PROVIDER - NSDCCPCAREPLAN_GEN_ALL_CORE_FT
PRINCIPAL DISCHARGE DIAGNOSIS  Diagnosis: Prostate cancer metastatic to multiple sites  Assessment and Plan of Treatment: You were found to have metastatic prostate cancer to the jaw and sacrum. You were started on chemo and given 5 doses of radiation to your sacrum and spine and 5 doses to your jaw area for palliation. You should follow up with oncology on discharge for montioring and possible restarting of chemo.      SECONDARY DISCHARGE DIAGNOSES  Diagnosis: Sacral ulcer  Assessment and Plan of Treatment: You were found to have an infected sacral ulcer. It was debrided and you were started on antibiotics. Please follow up with your PCP in 1 week.    Diagnosis: Upper GI bleed  Assessment and Plan of Treatment: You were found to have anemia secondary to a GI bleed. Anemia is a condition in which the concentration of red blood cells or hemoglobin in the blood is below normal. Hemoglobin is a substance in red blood cells that carries oxygen to the tissues of the body. Anemia results in not enough oxygen reaching these tissues which can cause symptoms such as weakness, dizziness/lightheadedness, shortness of breath, chest pain, paleness, or nausea. Your hemoglobin was dangerously low. You therefore received a blood transfusion. Usually reactions to transfusions occur immediately but monitor yourself for any fevers, rash, or shortness of breath.  No workup was done on your GI bleed as your symptoms resolved. Please follow up with your PCP.  SEEK IMMEDIATE MEDICAL CARE IF YOU HAVE ANY OF THE FOLLOWING SYMPTOMS: extreme weakness/chest pain/shortness of breath, black or bloody stools, vomiting blood, fainting, fever, or any signs of dehydration.     PRINCIPAL DISCHARGE DIAGNOSIS  Diagnosis: Prostate cancer metastatic to multiple sites  Assessment and Plan of Treatment: You were found to have metastatic prostate cancer to the jaw and sacrum. You were started on chemo and given 5 doses of radiation to your sacrum and spine and 5 doses to your jaw area for palliation. You decided to stop treatment for your cancer      SECONDARY DISCHARGE DIAGNOSES  Diagnosis: Sacral ulcer  Assessment and Plan of Treatment: You were found to have an infected sacral ulcer. It was debrided and you were started on antibiotics.    Diagnosis: Upper GI bleed  Assessment and Plan of Treatment: You were found to have anemia secondary to a GI bleed. Anemia is a condition in which the concentration of red blood cells or hemoglobin in the blood is below normal. Hemoglobin is a substance in red blood cells that carries oxygen to the tissues of the body. Anemia results in not enough oxygen reaching these tissues which can cause symptoms such as weakness, dizziness/lightheadedness, shortness of breath, chest pain, paleness, or nausea. Your hemoglobin was dangerously low. You therefore received a blood transfusion. Usually reactions to transfusions occur immediately but monitor yourself for any fevers, rash, or shortness of breath.  No workup was done on your GI bleed as your symptoms resolved.  SEEK IMMEDIATE MEDICAL CARE IF YOU HAVE ANY OF THE FOLLOWING SYMPTOMS: extreme weakness/chest pain/shortness of breath, black or bloody stools, vomiting blood, fainting, fever, or any signs of dehydration.     PRINCIPAL DISCHARGE DIAGNOSIS  Diagnosis: Prostate cancer metastatic to multiple sites  Assessment and Plan of Treatment: You were found to have metastatic prostate cancer to the jaw and sacrum. You were started on chemo and given 5 doses of radiation to your sacrum and spine and 5 doses to your jaw area for palliation. You decided to stop treatment for your cancer.      SECONDARY DISCHARGE DIAGNOSES  Diagnosis: Sacral ulcer  Assessment and Plan of Treatment: You were found to have an infected sacral ulcer. It was debrided and you were started on antibiotics. You decided to stop treatment for your antibiotics.    Diagnosis: Upper GI bleed  Assessment and Plan of Treatment: You were found to have anemia secondary to a GI bleed. Anemia is a condition in which the concentration of red blood cells or hemoglobin in the blood is below normal. Hemoglobin is a substance in red blood cells that carries oxygen to the tissues of the body. Anemia results in not enough oxygen reaching these tissues which can cause symptoms such as weakness, dizziness/lightheadedness, shortness of breath, chest pain, paleness, or nausea. Your hemoglobin was dangerously low. You therefore received a blood transfusion. Usually reactions to transfusions occur immediately but monitor yourself for any fevers, rash, or shortness of breath.  No workup was done on your GI bleed as your symptoms resolved.

## 2021-04-06 NOTE — DISCHARGE NOTE PROVIDER - DETAILS OF MALNUTRITION DIAGNOSIS/DIAGNOSES
This patient has been assessed with a concern for Malnutrition and was treated during this hospitalization for the following Nutrition diagnosis/diagnoses:     -  03/10/2021: Severe protein-calorie malnutrition

## 2021-04-06 NOTE — PROGRESS NOTE ADULT - SUBJECTIVE AND OBJECTIVE BOX
24H events:  No events  Last day of RT today     PAST MEDICAL & SURGICAL HISTORY  CHF (congestive heart failure)    Inguinal hernia    S/P CABG x 3      SOCIAL HISTORY:  Negative for smoking/alcohol/drug use.     ALLERGIES:  No Known Allergies    MEDICATIONS:  STANDING MEDICATIONS  bicalutamide 50 milliGRAM(s) Oral daily  cefTRIAXone   IVPB 2000 milliGRAM(s) IV Intermittent every 24 hours  chlorhexidine 4% Liquid 1 Application(s) Topical <User Schedule>  cholecalciferol 2000 Unit(s) Oral daily  collagenase Ointment 1 Application(s) Topical daily  Dakins Solution - 1/2 Strength 1 Application(s) Topical two times a day  dexAMETHasone     Tablet 4 milliGRAM(s) Oral daily  dextrose 5% + sodium chloride 0.45%. 1000 milliLiter(s) IV Continuous <Continuous>  enoxaparin Injectable 70 milliGRAM(s) SubCutaneous two times a day  fentaNYL   Patch  25 MICROgram(s)/Hr. 1 Patch Transdermal every 48 hours  gabapentin 100 milliGRAM(s) Oral three times a day  latanoprost 0.005% Ophthalmic Solution 1 Drop(s) Both EYES at bedtime  lidocaine   Patch 2 Patch Transdermal daily  melatonin 3 milliGRAM(s) Oral at bedtime  methocarbamol 500 milliGRAM(s) Oral three times a day  metroNIDAZOLE  IVPB 500 milliGRAM(s) IV Intermittent every 8 hours  metroNIDAZOLE  IVPB      midodrine. 10 milliGRAM(s) Oral three times a day  pantoprazole    Tablet 40 milliGRAM(s) Oral before breakfast  saccharomyces boulardii 250 milliGRAM(s) Oral two times a day  sodium chloride 1 Gram(s) Oral two times a day  sodium zirconium cyclosilicate 5 Gram(s) Oral two times a day  zoledronic acid IVPB (ZOMETA) (Non - oncologic) 4 milliGRAM(s) IV Intermittent every 4 weeks    PRN MEDICATIONS  acetaminophen   Tablet .. 650 milliGRAM(s) Oral every 6 hours PRN  aluminum hydroxide/magnesium hydroxide/simethicone Suspension 30 milliLiter(s) Oral every 6 hours PRN  brimonidine 0.2% Ophthalmic Solution 1 Drop(s) Both EYES every 8 hours PRN  morphine  - Injectable 2 milliGRAM(s) IV Push every 6 hours PRN  oxycodone    5 mG/acetaminophen 325 mG 1 Tablet(s) Oral every 6 hours PRN    VITALS:   T(F): 96.7  HR: 107  BP: 90/58  RR: 18  SpO2: --    LABS:                        9.1    2.60  )-----------( 79       ( 05 Apr 2021 08:43 )             27.9     04-05    134<L>  |  102  |  38<H>  ----------------------------<  95  4.9   |  20  |  0.8    Ca    7.9<L>      05 Apr 2021 08:43  Mg     2.1     04-05    TPro  4.7<L>  /  Alb  2.8<L>  /  TBili  0.4  /  DBili  x   /  AST  14  /  ALT  14  /  AlkPhos  59  04-05                  RADIOLOGY:    PHYSICAL EXAM:  GEN: No acute distress  HENT: NCAT, EOMI, right jaw swollen  LYMPH: No appreciable adenopathy  LUNGS: No respiratory distress, clear to auscultation bilaterally   HEART: regular rate and rhythm  ABD: Soft, non-tender, non-distended  SKIN: No rash  EXT: No edema  NEURO: AAOX3

## 2021-04-06 NOTE — DISCHARGE NOTE PROVIDER - NSDCFUADDINST_GEN_ALL_CORE_FT
DTPI R heel- Swab w/ Betadine 10% to maintain clean, dry wound bed and allow devitalized tissue to uproof naturally. Leave open to air.  -Assess skin/wound qshift, report changes to primary provider.

## 2021-04-06 NOTE — PROGRESS NOTE ADULT - ATTENDING COMMENTS
Sacral wound with full thickness skin necrosis - black eschar     Rec: debridement   Discussed at length with pt including possible need for multiple procedures - pt declines procedure today   Concerns addressed   Consent signed     Continue wound care and offloading measures

## 2021-04-06 NOTE — PROGRESS NOTE ADULT - ATTENDING COMMENTS
#Stage iv prostate ca metastatic to bone, R mandible/ masster/ pterygoid, spine  s/p jaw bx, sacral bx c/w prostate adenoca  psa improved  s/p spine RT  day 5/5 R mandible RT  chemo per onc  casodex  decadron taper  pain control  #Sacral ulcer  ceftriaxone, flagyl  check bcx  f/u burn, possible debridement today or tm  wound care  appreciate id  #Pancytopenia, presumed due to chemo  cbc stable, trend  appreciate onc

## 2021-04-06 NOTE — PROGRESS NOTE ADULT - ASSESSMENT
79 yo male hx of CAD s/p CABG s/p 20+ years ago/ CHF/ right sided large inguinal hernia, seen by BURN team for sacral wound management.    Full thickness wound of sacrum:    - Wound care twice daily: wash wound with soap and water, apply santyl, then apply moist with Dakins solution gauze, then cover with ABD, and tape.   - Offloading/ positional changes  - plan for bedside surgical debridement, consented

## 2021-04-06 NOTE — DISCHARGE NOTE PROVIDER - NSDCMRMEDTOKEN_GEN_ALL_CORE_FT
Eliquis 5 mg oral tablet: 1 tab(s) orally 2 times a day  Lasix 20 mg oral tablet: 10  orally once a day, As Needed  lisinopril 10 mg oral tablet: 10  orally 2 times a day  metoprolol succinate 50 mg oral tablet, extended release: 1 tab(s) orally once a day  spironolactone 25 mg oral tablet: 1 tab(s) orally 2 times a day   ALPRAZolam 0.25 mg oral tablet: 1 tab(s) orally every 8 hours, As needed, Anxiety  aluminum hydroxide-magnesium hydroxide 200 mg-200 mg/5 mL oral suspension: 30 milliliter(s) orally every 6 hours, As needed, Dyspepsia  brimonidine 0.2% ophthalmic solution: 1 drop(s) to each affected eye every 8 hours, As needed, dry eyes  collagenase 250 units/g topical ointment: 1 application topically once a day  fentaNYL 25 mcg/hr transdermal film, extended release: 1 patch transdermal every 48 hours  FIRST Mouthwash BLM mucous membrane suspension: 10 milliliter(s) mucous membrane 4 times a day  HYDROmorphone 2 mg oral tablet: 1 tab(s) orally every 4 hours, As needed, Mild, moderate, severe pain (1-10)  latanoprost 0.005% ophthalmic solution: 1 drop(s) to each affected eye once a day (at bedtime)  lidocaine 5% topical film: Apply topically to affected area once a day  melatonin 3 mg oral tablet: 1 tab(s) orally once a day (at bedtime)  midodrine 10 mg oral tablet: 1 tab(s) orally 3 times a day  pantoprazole 40 mg oral delayed release tablet: 1 tab(s) orally once a day (before a meal)  povidone iodine 10% topical ointment: 1 application topically 2 times a day  scopolamine 1 mg/72 hr transdermal film, extended release: 1 film(s) transdermal every 72 hours  sodium hypochlorite 0.25% topical solution: 1 application topically 2 times a day

## 2021-04-06 NOTE — PROGRESS NOTE ADULT - ASSESSMENT
Mr. Mckee is a 79 yo male hx of AFib, CAD s/p CABG s/p 20+ years ago/ CHF/ right sided large inguinal hernia BIBA from hotel room 2/2 unable to ambulate with right thigh weakness and numbness, now found to have metastatic disease with a mandibular mass and large sacral mass    # Stage IV Prostate cancer with diffuse osseous involvement/ spinal mets with paraspinal mass   -   - Sacral biopsy favors prostate ( neoplastic cells are positive for AE1/AE3, PSA and CK7)  - Jaw mass biopsy : prostate adenocarcinoma  - TLS labs wnl  - CT chest showing RUL [id number] paravertebral lesion with mass effect.   - CT AP no RP bleed, no visceral mets  - MR CTL, brain and  jaw completed, mets throughout cervical and t-spine  - Decadron taper per rad onc  - Continue casodex 50mg  - Received 1 dose of taxotere 20mg/m2 on 3.26.21, will hold until he completes RT  - Zometa 4mg 3/27/21, cleared by dental  - Leupron 7.5mg 3/29/21 (1 month dose)  - Completed spine RT, on day 4/5 of palliative jaw radiation    # Sacral ulcer  - Plan for possible debridement by burn  - On ceftriaxone and flagyl    # Normocytic anemia:  - Per GI, outpatient scope  - Hapto high and retic low, not consistent with hemolysis  - TSAT 67%    # Hyponatremia:  - Fluid restriction    # Mild hypercalcemia, likely due to bone mets  - s/p calcitonin   - Monitor PTH, PTHrP and 25OH Vit D       /PSYCH F/U  PT EVAL    DVT ppx on lovenox BID        Mr. Mckee is a 79 yo male hx of AFib, CAD s/p CABG s/p 20+ years ago/ CHF/ right sided large inguinal hernia BIBA from hotel room 2/2 unable to ambulate with right thigh weakness and numbness, now found to have metastatic disease with a mandibular mass and large sacral mass    # Stage IV Prostate cancer with diffuse osseous involvement/ spinal mets with paraspinal mass   -   - Sacral biopsy favors prostate ( neoplastic cells are positive for AE1/AE3, PSA and CK7)  - Jaw mass biopsy : prostate adenocarcinoma  - TLS labs wnl  - CT chest showing RUL [id number] paravertebral lesion with mass effect.   - CT AP no RP bleed, no visceral mets  - MR CTL, brain and  jaw completed, mets throughout cervical and t-spine  - Decadron taper per rad onc  - Continue casodex 50mg  - Received 1 dose of taxotere 20mg/m2 on 3.26.21  - Zometa 4mg 3/27/21, cleared by dental  - Leupron 7.5mg 3/29/21 (1 month dose)  - Completed spine RT, on day 4/5 of palliative jaw radiation  - On discharge, he will continue daily casodex. He will return to [facility] on 4/27/2021 for Leupron injection, which after this will be every 3 months. We will not give taxotere at this time due to his poor performance status. If after rehab he is doing better we will consider restarting it.     # Sacral ulcer  - Plan for possible debridement by burn  - On ceftriaxone and flagyl    # Normocytic anemia:  - Per GI, outpatient scope  - Hapto high and retic low, not consistent with hemolysis  - TSAT 67%    # Hyponatremia:  - Fluid restriction    # Mild hypercalcemia, likely due to bone mets  - s/p calcitonin   - Monitor PTH, PTHrP and 25OH Vit D     /PSYCH F/U  PT EVAL    DVT ppx on lovenox BID

## 2021-04-06 NOTE — PROGRESS NOTE ADULT - ATTENDING COMMENTS
seen/examined w/ [facility] fellow; note reviewed , plan discussed    - strongly recommend disposition to REHABILITATION for better optimization   - started anti testosterone 3/14: please continue casodex oral daily  - started GNRH agonist 3/28th. Next dose of lupron 22.5 mg IM every 3 months (4/27/21)  - monthly bisphosphanates  - completes RT 4/6/21  F/U [facility] 4/27/21

## 2021-04-06 NOTE — PROGRESS NOTE ADULT - ASSESSMENT
79 yo male hx of CAD s/p CABG s/p 20+ years ago, HFrEF, right sided large inguinal hernia BIBA from hotel room because he was unable to ambulate due to Rt thigh weakness and numbness, pt also had mandibular swelling for over a year. CT lumbosacral spine showed mass suggestive of mets with obliteration of neural foramina so was started on decadron.  Sacral mass biopsied 3/10 showed prostate adenocarcinoma. Mandibluar mass biopsied 3/19 also showed prostate cancer. Hospital course was complicated by hypovolemic shock likely 2/2 UGIB, required pressors and upgrade to stepdown unit. Pt currently in hospital undergoing chemo and radiotherapy.     #Metastatic prostate cancer  - currently on chemo and radiation   - continue with bicalutamide 50mg daily (started 3/14/21)  - s/p Lupron (GnRH agonist) IM, first dose 3/29 --> next dose in 1 month  - received first dose of taxotere (3/27), continue after radiation therapy is completed  - Completed RT to sacrum and spine 5 doses (3/24 - 3/30)  - started RT 5 DOSES to jaw area for palliation; last RT today  - dental cleared for bisphosphonate, or denosumab s/p zoledronic acid 4mg q4 weeks (3/27)  - continue with Dexamethasone taper; now 4 mg daily for 5 days starting 4/6 (day 1 today)  - continue with  on  Fentanyl patch, c/w  Morphine PRN  - continue with  Neurontin 100 mg TID for neuropathic pain   - palliative care consulted for pain management and GOC; signed off 3/19     # SCC of jaw  - 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 Serum Osm 279  - Glu on BMP 120s-130s  - TSH 0.36  - free water restriction, fluid restriction 1500ml/day  - continue with Sodium chloride tablets  - monitor Na level     #  sacral ulcer  - now fouling smelling  - Burn consulted, will follow up recommendations--> Will perform debridement at bedside today or Wed as per burn; Most likely will not need to hold A/C before procedure but burn will follow up  - ID consulted on 4/3, Ceftriaxone and Flagyl recommended instead of unasyn  - send wound cultures    #Thrombocytopenia/ Leukopenia   Plt 87 --> 68 --> 79  WBC 3.02 --> 2.5 --> 2.6  Platelets and WBC downtrending since 3/27; improved 4/5  - monitor for any signs of bleeding   - transfuse if plt <10K, or <50K with bleeding  - oncology follow up   - likely due to chemotherapy (taxotere)     # UGI bleed Stable  - s/p 1 unit of PRBC  - No egd done--only coffee ground emesis  - monitor H/H, keep Hb above 7.5 and active type/cross      # Chronic A fib  - on therapeutic dose of Lovenox     # CHFrEF  - fluid restriction 1200 ml in 24 hours   - intake and output monitoring, daily weight   - EF of 35%    # Hypercalcemia/  Vitamin D level Resolved  - likely due to advanced malignancy   - continue with vitamin D supplements 2000units daily   -   - f/u PTHrP still pending    # Hyperkalemia Resolved  s/p one dose of Kayexalate   - low potasium diet    - on [facility]   - Continue to monitor    # Severe protein-calorie malnutrition.  - dietary on board   - supplements added   - patient reports diarrhea with ensure     #DVT PPx: Lovenox  #GI PPx: Protonix  FULL CODE   79 yo male hx of CAD s/p CABG s/p 20+ years ago, HFrEF, right sided large inguinal hernia BIBA from hotel room because he was unable to ambulate due to Rt thigh weakness and numbness, pt also had mandibular swelling for over a year. CT lumbosacral spine showed mass suggestive of mets with obliteration of neural foramina so was started on decadron.  Sacral mass biopsied 3/10 showed prostate adenocarcinoma. Mandibluar mass biopsied 3/19 also showed prostate cancer. Hospital course was complicated by hypovolemic shock likely 2/2 UGIB, required pressors and upgrade to stepdown unit. Pt currently in hospital undergoing chemo and radiotherapy.     #Metastatic prostate cancer  - currently on chemo and radiation   - continue with bicalutamide 50mg daily (started 3/14/21)  - s/p Lupron (GnRH agonist) IM, first dose 3/29 --> next dose in 1 month  - received first dose of taxotere (3/27), continue after radiation therapy is completed  - Completed RT to sacrum and spine 5 doses (3/24 - 3/30)  - started RT 5 DOSES to jaw area for palliation; last RT today  - dental cleared for bisphosphonate, or denosumab s/p zoledronic acid 4mg q4 weeks (3/27)  - continue with Dexamethasone taper; now 4 mg daily for 5 days starting 4/6 (day 1 today)  - continue with  on  Fentanyl patch, c/w  Morphine PRN  - continue with  Neurontin 100 mg TID for neuropathic pain   - palliative care consulted for pain management and GOC; signed off 3/19     # SCC of jaw  - 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 Serum Osm 279  - Glu on BMP 120s-130s  - TSH 0.36  - free water restriction, fluid restriction 1500ml/day  - continue with Sodium chloride tablets  - monitor Na level     #  sacral ulcer  - now fouling smelling  - Burn consulted, will follow up recommendations--> Will perform debridement at bedside today or Wed as per burn; Most likely will not need to hold A/C before procedure but burn will follow up  - ID consulted on 4/3, Ceftriaxone and Flagyl recommended instead of unasyn  - send wound cultures    #Thrombocytopenia/ Leukopenia   Plt 87 --> 68 --> 79  WBC 3.02 --> 2.5 --> 2.6  Platelets and WBC downtrending since 3/27; improved 4/5  - monitor for any signs of bleeding   - transfuse if plt <10K, or <50K with bleeding  - oncology follow up   - likely due to chemotherapy (taxotere)     # UGI bleed Stable  - s/p 1 unit of PRBC  - No egd done--only coffee ground emesis  - monitor H/H, keep Hb above 7.5 and active type/cross      # Chronic A fib  - on therapeutic dose of Lovenox     # CHFrEF  - fluid restriction 1200 ml in 24 hours   - intake and output monitoring, daily weight   - EF of 35%    # Hypercalcemia/  Vitamin D level Resolved  - likely due to advanced malignancy   - continue with vitamin D supplements 2000units daily   -   - f/u PTHrP still pending    # Hyperkalemia Resolved  s/p one dose of Kayexalate   - low potasium diet    - on [facility]   - Continue to monitor    # Severe protein-calorie malnutrition.  - dietary on board   - supplements added   - patient reports diarrhea with ensure     # Insomnia  - started melatonin 3    #DVT PPx: Lovenox  #GI PPx: Protonix  FULL CODE  Dispo: Needs SNF   79 yo male hx of CAD s/p CABG s/p 20+ years ago, HFrEF, right sided large inguinal hernia BIBA from hotel room because he was unable to ambulate due to Rt thigh weakness and numbness, pt also had mandibular swelling for over a year. CT lumbosacral spine showed mass suggestive of mets with obliteration of neural foramina so was started on decadron.  Sacral mass biopsied 3/10 showed prostate adenocarcinoma. Mandibluar mass biopsied 3/19 also showed prostate cancer. Hospital course was complicated by hypovolemic shock likely 2/2 UGIB, required pressors and upgrade to stepdown unit. Pt currently in hospital undergoing chemo and radiotherapy.     #Metastatic prostate cancer  - currently on chemo and radiation   - continue with bicalutamide 50mg daily (started 3/14/21)  - s/p Lupron (GnRH agonist) IM, first dose 3/29 --> next dose in 1 month  - received first dose of taxotere (3/27), will hold chemo until patient recovers from RT  - Completed RT to sacrum and spine 5 doses (3/24 - 3/30)  - started RT 5 DOSES to jaw area for palliation; last RT today  - dental cleared for bisphosphonate, or denosumab s/p zoledronic acid 4mg q4 weeks (3/27)  - continue with Dexamethasone taper; now 4 mg daily for 5 days starting 4/6 (day 1 today)  - continue with  on  Fentanyl patch, c/w  Morphine PRN  - continue with  Neurontin 100 mg TID for neuropathic pain   - palliative care consulted for pain management and GOC; signed off 3/19     # SCC of jaw  - 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 Serum Osm 279  - Glu on BMP 120s-130s  - TSH 0.36  - free water restriction, fluid restriction 1500ml/day  - continue with Sodium chloride tablets  - monitor Na level     #  sacral ulcer  - now fouling smelling  - Burn consulted, will follow up recommendations--> Will perform debridement at bedside today or Wed as per burn; Most likely will not need to hold A/C before procedure but burn will follow up  - ID consulted on 4/3, Ceftriaxone and Flagyl recommended instead of unasyn  - send wound cultures    #Thrombocytopenia/ Leukopenia   Plt 87 --> 68 --> 79  WBC 3.02 --> 2.5 --> 2.6  Platelets and WBC downtrending since 3/27; improved 4/5  - monitor for any signs of bleeding   - transfuse if plt <10K, or <50K with bleeding  - oncology follow up   - likely due to chemotherapy (taxotere)     # UGI bleed Stable  - s/p 1 unit of PRBC  - No egd done--only coffee ground emesis  - monitor H/H, keep Hb above 7.5 and active type/cross      # Chronic A fib  - on therapeutic dose of Lovenox     # CHFrEF  - fluid restriction 1200 ml in 24 hours   - intake and output monitoring, daily weight   - EF of 35%    # Hypercalcemia/  Vitamin D level Resolved  - likely due to advanced malignancy   - continue with vitamin D supplements 2000units daily   -   - f/u PTHrP still pending    # Hyperkalemia Resolved  s/p one dose of Kayexalate   - low potasium diet    - on [facility]   - Continue to monitor    # Severe protein-calorie malnutrition.  - dietary on board   - supplements added   - patient reports diarrhea with ensure     # Insomnia  - started melatonin 3    #DVT PPx: Lovenox  #GI PPx: Protonix  FULL CODE  Dispo: Needs SNF   81 yo male hx of CAD s/p CABG s/p 20+ years ago, HFrEF, right sided large inguinal hernia BIBA from hotel room because he was unable to ambulate due to Rt thigh weakness and numbness, pt also had mandibular swelling for over a year. CT lumbosacral spine showed mass suggestive of mets with obliteration of neural foramina so was started on decadron.  Sacral mass biopsied 3/10 showed prostate adenocarcinoma. Mandibluar mass biopsied 3/19 also showed prostate cancer. Hospital course was complicated by hypovolemic shock likely 2/2 UGIB, required pressors and upgrade to stepdown unit. Pt currently in hospital undergoing chemo and radiotherapy.     #Metastatic prostate cancer  - currently on chemo and radiation   - continue with bicalutamide 50mg daily (started 3/14/21)  - s/p Lupron (GnRH agonist) IM, first dose 3/29 --> next dose in 1 month  - received first dose of taxotere (3/27), will hold chemo until patient recovers from RT  - Completed RT to sacrum and spine 5 doses (3/24 - 3/30)  - started RT 5 DOSES to jaw area for palliation; last RT today  - dental cleared for bisphosphonate, or denosumab s/p zoledronic acid 4mg q4 weeks (3/27)  - continue with Dexamethasone taper; now 4 mg daily for 5 days starting 4/6 (day 1 today)  - continue with  on  Fentanyl patch, c/w  Morphine PRN  - continue with  Neurontin 100 mg TID for neuropathic pain   - palliative care consulted for pain management and GOC; signed off 3/19     # SCC of jaw  - 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 Serum Osm 279  - Glu on BMP 120s-130s  - TSH 0.36  - free water restriction, fluid restriction 1500ml/day  - continue with Sodium chloride tablets  - monitor Na level     #  sacral ulcer  - now fouling smelling  - Burn consulted, will follow up recommendations--> Will perform debridement at bedside today or Wed as per burn; Most likely will not need to hold A/C before procedure but burn will follow up  - ID consulted on 4/3, Ceftriaxone and Flagyl recommended instead of unasyn  - send wound cultures    #Thrombocytopenia/ Leukopenia   Plt 87 --> 68 --> 79  WBC 3.02 --> 2.5 --> 2.6  Platelets and WBC downtrending since 3/27; improved 4/5  - monitor for any signs of bleeding   - transfuse if plt <10K, or <50K with bleeding  - oncology follow up   - likely due to chemotherapy (taxotere)     # UGI bleed Stable  - s/p 1 unit of PRBC  - No egd done--only coffee ground emesis  - monitor H/H, keep Hb above 7.5 and active type/cross      # Chronic A fib  - on therapeutic dose of Lovenox     # CHFrEF  - fluid restriction 1200 ml in 24 hours   - intake and output monitoring, daily weight   - EF of 35%    # Hypercalcemia/  Vitamin D level Resolved  - likely due to advanced malignancy   - continue with vitamin D supplements 2000units daily   -   - f/u PTHrP still pending    # Hyperkalemia Resolved  s/p one dose of Kayexalate   - low potasium diet    - on [facility]   - Continue to monitor    # Severe protein-calorie malnutrition.  - dietary on board   - supplements added   - patient reports diarrhea with ensure     # Insomnia  - started melatonin 3    #DVT PPx: Lovenox  #GI PPx: Protonix  FULL CODE  Dispo: Needs SNF  COVID swab Wednesday

## 2021-04-06 NOTE — PROGRESS NOTE ADULT - SUBJECTIVE AND OBJECTIVE BOX
79 yo male hx of CAD s/p CABG s/p 20+ years ago/ CHF/ right sided large inguinal hernia, seen by BURN team for sacral wound management.    Vital Signs Last 24 Hrs  T(C): 36 (06 Apr 2021 13:10), Max: 36.2 (05 Apr 2021 20:20)  T(F): 96.8 (06 Apr 2021 13:10), Max: 97.2 (05 Apr 2021 20:20)  HR: 109 (06 Apr 2021 17:11) (75 - 109)  BP: 99/58 (06 Apr 2021 17:11) (76/44 - 106/53)  RR: 16 (06 Apr 2021 13:10) (16 - 18)      Allergies  No Known Allergies        Lab Results:                        9.4    3.18  )-----------( 79       ( 06 Apr 2021 09:10 )             29.7     MEDICATIONS  (STANDING):  bicalutamide 50 milliGRAM(s) Oral daily  cefTRIAXone   IVPB 2000 milliGRAM(s) IV Intermittent every 24 hours  chlorhexidine 4% Liquid 1 Application(s) Topical <User Schedule>  cholecalciferol 2000 Unit(s) Oral daily  collagenase Ointment 1 Application(s) Topical daily  Dakins Solution - 1/2 Strength 1 Application(s) Topical two times a day  dexAMETHasone     Tablet 4 milliGRAM(s) Oral daily  dextrose 5% + sodium chloride 0.45%. 1000 milliLiter(s) (50 mL/Hr) IV Continuous <Continuous>  enoxaparin Injectable 70 milliGRAM(s) SubCutaneous two times a day  fentaNYL   Patch  25 MICROgram(s)/Hr. 1 Patch Transdermal every 48 hours  gabapentin 100 milliGRAM(s) Oral three times a day  latanoprost 0.005% Ophthalmic Solution 1 Drop(s) Both EYES at bedtime  lidocaine   Patch 2 Patch Transdermal daily  melatonin 3 milliGRAM(s) Oral at bedtime  methocarbamol 500 milliGRAM(s) Oral three times a day  metroNIDAZOLE  IVPB      metroNIDAZOLE  IVPB 500 milliGRAM(s) IV Intermittent every 8 hours  midodrine. 10 milliGRAM(s) Oral three times a day  pantoprazole    Tablet 40 milliGRAM(s) Oral before breakfast  saccharomyces boulardii 250 milliGRAM(s) Oral two times a day  sodium chloride 1 Gram(s) Oral two times a day  sodium zirconium cyclosilicate 5 Gram(s) Oral two times a day  zoledronic acid IVPB (ZOMETA) (Non - oncologic) 4 milliGRAM(s) IV Intermittent every 4 weeks    MEDICATIONS  (PRN):  acetaminophen   Tablet .. 650 milliGRAM(s) Oral every 6 hours PRN Moderate Pain (4 - 6)  aluminum hydroxide/magnesium hydroxide/simethicone Suspension 30 milliLiter(s) Oral every 6 hours PRN Dyspepsia  brimonidine 0.2% Ophthalmic Solution 1 Drop(s) Both EYES every 8 hours PRN dry eyes  morphine  - Injectable 2 milliGRAM(s) IV Push every 6 hours PRN Severe Pain (7 - 10)  oxycodone    5 mG/acetaminophen 325 mG 1 Tablet(s) Oral every 6 hours PRN Severe Pain (7 - 10)      PHYSICAL EXAM:  GENERAL: seen on bedside, A&O x3, cooperative, not in distress  Wound: Full thickness wound to sacrum approximately 4x5cm,  with black necrotic tissue, +serosang dc, no erythema, no swelling, no bleeding, no pus drainage.

## 2021-04-06 NOTE — PROGRESS NOTE ADULT - SUBJECTIVE AND OBJECTIVE BOX
SUBJECTIVE:    Patient is a 80y old Male who presents with a chief complaint of 80 YEAR OLD MALE C/O MULTIPLE MEDICAL COMPLAINTS . (05 Apr 2021 15:31)    Currently admitted to medicine with the primary diagnosis of Ambulatory dysfunction       Today is hospital day 29d. This morning he is resting comfortably in bed and reports no new issues or overnight events.     PAST MEDICAL & SURGICAL HISTORY  CHF (congestive heart failure)    Inguinal hernia    S/P CABG x 3      SOCIAL HISTORY:  Negative for smoking/alcohol/drug use.     ALLERGIES:  No Known Allergies    MEDICATIONS:  STANDING MEDICATIONS  bicalutamide 50 milliGRAM(s) Oral daily  cefTRIAXone   IVPB 2000 milliGRAM(s) IV Intermittent every 24 hours  chlorhexidine 4% Liquid 1 Application(s) Topical <User Schedule>  cholecalciferol 2000 Unit(s) Oral daily  collagenase Ointment 1 Application(s) Topical daily  Dakins Solution - 1/2 Strength 1 Application(s) Topical two times a day  dexAMETHasone     Tablet 4 milliGRAM(s) Oral daily  enoxaparin Injectable 70 milliGRAM(s) SubCutaneous two times a day  fentaNYL   Patch  25 MICROgram(s)/Hr. 1 Patch Transdermal every 48 hours  gabapentin 100 milliGRAM(s) Oral three times a day  latanoprost 0.005% Ophthalmic Solution 1 Drop(s) Both EYES at bedtime  lidocaine   Patch 2 Patch Transdermal daily  methocarbamol 500 milliGRAM(s) Oral three times a day  metroNIDAZOLE  IVPB 500 milliGRAM(s) IV Intermittent every 8 hours  metroNIDAZOLE  IVPB      midodrine. 10 milliGRAM(s) Oral three times a day  pantoprazole    Tablet 40 milliGRAM(s) Oral before breakfast  saccharomyces boulardii 250 milliGRAM(s) Oral two times a day  sodium chloride 1 Gram(s) Oral two times a day  sodium zirconium cyclosilicate 5 Gram(s) Oral two times a day  zoledronic acid IVPB (ZOMETA) (Non - oncologic) 4 milliGRAM(s) IV Intermittent every 4 weeks    PRN MEDICATIONS  acetaminophen   Tablet .. 650 milliGRAM(s) Oral every 6 hours PRN  aluminum hydroxide/magnesium hydroxide/simethicone Suspension 30 milliLiter(s) Oral every 6 hours PRN  brimonidine 0.2% Ophthalmic Solution 1 Drop(s) Both EYES every 8 hours PRN  morphine  - Injectable 2 milliGRAM(s) IV Push every 6 hours PRN  oxycodone    5 mG/acetaminophen 325 mG 1 Tablet(s) Oral every 6 hours PRN    VITALS:   T(F): 96.7  HR: 107  BP: 90/58  RR: 18  SpO2: --    LABS:                        9.1    2.60  )-----------( 80       ( 05 Apr 2021 08:43 )             27.9     04-05    134<L>  |  102  |  38<H>  ----------------------------<  95  4.9   |  20  |  0.8    Ca    7.9<L>      05 Apr 2021 08:43  Mg     2.1     04-05    TPro  4.7<L>  /  Alb  2.8<L>  /  TBili  0.4  /  DBili  x   /  AST  14  /  ALT  14  /  AlkPhos  59  04-05                  RADIOLOGY:    PHYSICAL EXAM:  GENERAL: NAD, lying in bed comfortably  HEAD:  Atraumatic, Normocephalic  CHEST/LUNG: Clear to auscultation bilaterally; No rales, rhonchi, wheezing, or rubs. Unlabored respirations  HEART: Regular rate and rhythm; No murmurs, rubs, or gallops  ABDOMEN: Bowel sounds present; Soft, Nontender, Nondistended. No hepatomegally  EXTREMITIES:  2+ Peripheral Pulses, brisk capillary refill. No clubbing, cyanosis, or edema  NERVOUS SYSTEM:  Alert & Oriented X3, speech clear. No deficits   MSK: FROM all 4 extremities, full and equal strength  SKIN: No rashes or lesions   SUBJECTIVE:    Patient is a 80y old Male who presents with a chief complaint of 80 YEAR OLD MALE C/O MULTIPLE MEDICAL COMPLAINTS . (05 Apr 2021 15:31)    Currently admitted to medicine with the primary diagnosis of Ambulatory dysfunction       Today is hospital day 29d. This morning he is complaining of trouble sleeping. Otherwise no new issues or overnight events.     PAST MEDICAL & SURGICAL HISTORY  CHF (congestive heart failure)    Inguinal hernia    S/P CABG x 3      SOCIAL HISTORY:  Negative for smoking/alcohol/drug use.     ALLERGIES:  No Known Allergies    MEDICATIONS:  STANDING MEDICATIONS  bicalutamide 50 milliGRAM(s) Oral daily  cefTRIAXone   IVPB 2000 milliGRAM(s) IV Intermittent every 24 hours  chlorhexidine 4% Liquid 1 Application(s) Topical <User Schedule>  cholecalciferol 2000 Unit(s) Oral daily  collagenase Ointment 1 Application(s) Topical daily  Dakins Solution - 1/2 Strength 1 Application(s) Topical two times a day  dexAMETHasone     Tablet 4 milliGRAM(s) Oral daily  enoxaparin Injectable 70 milliGRAM(s) SubCutaneous two times a day  fentaNYL   Patch  25 MICROgram(s)/Hr. 1 Patch Transdermal every 48 hours  gabapentin 100 milliGRAM(s) Oral three times a day  latanoprost 0.005% Ophthalmic Solution 1 Drop(s) Both EYES at bedtime  lidocaine   Patch 2 Patch Transdermal daily  methocarbamol 500 milliGRAM(s) Oral three times a day  metroNIDAZOLE  IVPB 500 milliGRAM(s) IV Intermittent every 8 hours  metroNIDAZOLE  IVPB      midodrine. 10 milliGRAM(s) Oral three times a day  pantoprazole    Tablet 40 milliGRAM(s) Oral before breakfast  saccharomyces boulardii 250 milliGRAM(s) Oral two times a day  sodium chloride 1 Gram(s) Oral two times a day  sodium zirconium cyclosilicate 5 Gram(s) Oral two times a day  zoledronic acid IVPB (ZOMETA) (Non - oncologic) 4 milliGRAM(s) IV Intermittent every 4 weeks    PRN MEDICATIONS  acetaminophen   Tablet .. 650 milliGRAM(s) Oral every 6 hours PRN  aluminum hydroxide/magnesium hydroxide/simethicone Suspension 30 milliLiter(s) Oral every 6 hours PRN  brimonidine 0.2% Ophthalmic Solution 1 Drop(s) Both EYES every 8 hours PRN  morphine  - Injectable 2 milliGRAM(s) IV Push every 6 hours PRN  oxycodone    5 mG/acetaminophen 325 mG 1 Tablet(s) Oral every 6 hours PRN    VITALS:   T(F): 96.7  HR: 107  BP: 90/58  RR: 18  SpO2: --    LABS:                        9.1    2.60  )-----------( 79       ( 05 Apr 2021 08:43 )             27.9     04-05    134<L>  |  102  |  38<H>  ----------------------------<  95  4.9   |  20  |  0.8    Ca    7.9<L>      05 Apr 2021 08:43  Mg     2.1     04-05    TPro  4.7<L>  /  Alb  2.8<L>  /  TBili  0.4  /  DBili  x   /  AST  14  /  ALT  14  /  AlkPhos  59  04-05                  RADIOLOGY:    PHYSICAL EXAM:  GENERAL: NAD, lying in bed comfortably  HEAD:  Atraumatic, Normocephalic  CHEST/LUNG: Clear to auscultation bilaterally; No rales, rhonchi, wheezing, or rubs. Unlabored respirations  HEART: Regular rate and rhythm; No murmurs, rubs, or gallops  ABDOMEN: Bowel sounds present; Soft, Nontender, Nondistended. No hepatomegally  EXTREMITIES:  No clubbing, cyanosis, or edema  NERVOUS SYSTEM:  Alert & Oriented X3, speech clear. No deficits   MSK: Unable to assess  SKIN: No rashes or lesions   SUBJECTIVE:    Patient is a 80y old Male who presents with a chief complaint of 80 YEAR OLD MALE C/O MULTIPLE MEDICAL COMPLAINTS . (05 Apr 2021 15:31)    Currently admitted to medicine with the primary diagnosis of Ambulatory dysfunction       Today is hospital day 29d. This morning he is complaining of trouble sleeping. Otherwise no new issues or overnight events.   no cp, sob, abd pain, fever  no sob, orthopnea, pnd, cough    PAST MEDICAL & SURGICAL HISTORY  CHF (congestive heart failure)    Inguinal hernia    S/P CABG x 3      SOCIAL HISTORY:  Negative for smoking/alcohol/drug use.     ALLERGIES:  No Known Allergies    MEDICATIONS:  STANDING MEDICATIONS  bicalutamide 50 milliGRAM(s) Oral daily  cefTRIAXone   IVPB 2000 milliGRAM(s) IV Intermittent every 24 hours  chlorhexidine 4% Liquid 1 Application(s) Topical <User Schedule>  cholecalciferol 2000 Unit(s) Oral daily  collagenase Ointment 1 Application(s) Topical daily  Dakins Solution - 1/2 Strength 1 Application(s) Topical two times a day  dexAMETHasone     Tablet 4 milliGRAM(s) Oral daily  enoxaparin Injectable 70 milliGRAM(s) SubCutaneous two times a day  fentaNYL   Patch  25 MICROgram(s)/Hr. 1 Patch Transdermal every 48 hours  gabapentin 100 milliGRAM(s) Oral three times a day  latanoprost 0.005% Ophthalmic Solution 1 Drop(s) Both EYES at bedtime  lidocaine   Patch 2 Patch Transdermal daily  methocarbamol 500 milliGRAM(s) Oral three times a day  metroNIDAZOLE  IVPB 500 milliGRAM(s) IV Intermittent every 8 hours  metroNIDAZOLE  IVPB      midodrine. 10 milliGRAM(s) Oral three times a day  pantoprazole    Tablet 40 milliGRAM(s) Oral before breakfast  saccharomyces boulardii 250 milliGRAM(s) Oral two times a day  sodium chloride 1 Gram(s) Oral two times a day  sodium zirconium cyclosilicate 5 Gram(s) Oral two times a day  zoledronic acid IVPB (ZOMETA) (Non - oncologic) 4 milliGRAM(s) IV Intermittent every 4 weeks    PRN MEDICATIONS  acetaminophen   Tablet .. 650 milliGRAM(s) Oral every 6 hours PRN  aluminum hydroxide/magnesium hydroxide/simethicone Suspension 30 milliLiter(s) Oral every 6 hours PRN  brimonidine 0.2% Ophthalmic Solution 1 Drop(s) Both EYES every 8 hours PRN  morphine  - Injectable 2 milliGRAM(s) IV Push every 6 hours PRN  oxycodone    5 mG/acetaminophen 325 mG 1 Tablet(s) Oral every 6 hours PRN    VITALS:   T(F): 96.7  HR: 107  BP: 90/58  RR: 18  SpO2: --    LABS:                        9.1    2.60  )-----------( 79       ( 05 Apr 2021 08:43 )             27.9     04-05    134<L>  |  102  |  38<H>  ----------------------------<  95  4.9   |  20  |  0.8    Ca    7.9<L>      05 Apr 2021 08:43  Mg     2.1     04-05    TPro  4.7<L>  /  Alb  2.8<L>  /  TBili  0.4  /  DBili  x   /  AST  14  /  ALT  14  /  AlkPhos  59  04-05                  RADIOLOGY:    PHYSICAL EXAM:  GENERAL: NAD, lying in bed comfortably  HEAD:  Atraumatic, Normocephalic  CHEST/LUNG: Clear to auscultation bilaterally; No rales, rhonchi, wheezing, or rubs. Unlabored respirations  HEART: Regular rate and rhythm; No murmurs, rubs, or gallops  ABDOMEN: Bowel sounds present; Soft, Nontender, Nondistended. No hepatomegally  EXTREMITIES:  No clubbing, cyanosis, or edema  NERVOUS SYSTEM:  Alert & Oriented X3, speech clear. No deficits   MSK: Unable to assess  SKIN: No rashes or lesions

## 2021-04-06 NOTE — DISCHARGE NOTE PROVIDER - NSDCFUSCHEDAPPT_GEN_ALL_CORE_FT
MANUELITO HEDRICK ; 05/12/2021 ; NPP Rad Med  [facility] MANUELITO HEDRICK ; 04/27/2021 ; NPP HemOnc [facility] MANUELITO Soriano ; 04/27/2021 ; NPP Chemo & Infus [facility] MANUELITO Szymanski ; 05/12/2021 ; NPP Rad Med  [facility]

## 2021-04-06 NOTE — DISCHARGE NOTE PROVIDER - HOSPITAL COURSE
79 yo male hx of CAD s/p CABG s/p 20+ years ago, HFrEF, right sided large inguinal hernia BIBA from hotel room because he was unable to ambulate due to Rt thigh weakness and numbness, pt also had mandibular swelling for over a year. CT lumbosacral spine showed mass suggestive of mets with obliteration of neural foramina so was started on decadron.  Sacral mass biopsied 3/10 showed prostate adenocarcinoma. Mandibluar mass biopsied 3/19 also showed prostate cancer. Hospital course was complicated by hypovolemic shock likely 2/2 UGIB, required pressors and upgrade to stepdown unit. Pt currently in hospital undergoing chemo and radiotherapy. ##################################### 79 yo male hx of CAD s/p CABG s/p 20+ years ago, HFrEF, right sided large inguinal hernia BIBA from hotel room because he was unable to ambulate due to Rt thigh weakness and numbness, pt also had mandibular swelling for over a year. CT lumbosacral spine showed mass suggestive of mets with obliteration of neural foramina so was started on decadron.  Sacral mass biopsied 3/10 showed prostate adenocarcinoma. Mandibluar mass biopsied 3/19 also showed prostate cancer. Hospital course was complicated by hypovolemic shock likely 2/2 UGIB, required pressors and upgrade to stepdown unit. Pt currently in hospital undergoing chemo and radiotherapy. #Metastatic prostate cancer  - currently on chemo and radiation   - continue with bicalutamide 50mg daily (started 3/14/21)  - s/p Lupron (GnRH agonist) IM, first dose 3/29 --> next dose in 1 month  - received first dose of taxotere (3/27), will hold chemo until patient recovers from RT  - Completed RT to sacrum and spine 5 doses (3/24 - 3/30)  - s/p RT 5 DOSES to jaw area for palliation; last RT 4/6  - dental cleared for bisphosphonate, or denosumab s/p zoledronic acid 4mg q4 weeks (3/27)  - continue with Dexamethasone taper; now 4 mg daily for 5 days starting 4/6 (day 2 today)  - continue with  on  Fentanyl patch, c/w  Morphine PRN  - continue with  Neurontin 100 mg TID for neuropathic pain   - palliative care consulted for pain management and GOC; signed off 3/19     # SCC of jaw  - 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 Serum Osm 279  - Glu on BMP 120s-130s  - TSH 0.36  - free water restriction, fluid restriction 1500ml/day  - continue with Sodium chloride tablets  - monitor Na level     #  sacral ulcer  - now fouling smelling  - Burn consulted, will follow up recommendations--> Will perform debridement at bedside today  - ID consulted on 4/3, Ceftriaxone and Flagyl recommended instead of unasyn  - send wound cultures    #Thrombocytopenia/ Leukopenia   Plt 87 --> 68 --> 79  WBC 3.02 --> 2.5 --> 2.6  Platelets and WBC downtrending since 3/27; improved 4/5  - monitor for any signs of bleeding   - transfuse if plt <10K, or <50K with bleeding  - oncology follow up   - likely due to chemotherapy (taxotere)     # UGI bleed Stable  - s/p 1 unit of PRBC  - No egd done--only coffee ground emesis  - monitor H/H, keep Hb above 7.5 and active type/cross      # Chronic A fib  - on therapeutic dose of Lovenox     # CHFrEF  - fluid restriction 1200 ml in 24 hours   - intake and output monitoring, daily weight   - EF of 35%    # Hypercalcemia/  Vitamin D level Resolved  - likely due to advanced malignancy   - continue with vitamin D supplements 2000units daily   -   - f/u PTHrP still pending    # Hyperkalemia Resolved  s/p one dose of Kayexalate   - low potasium diet    - on [facility]   - Continue to monitor    # Severe protein-calorie malnutrition.  - dietary on board   - supplements added   - patient reports diarrhea with ensure     # Insomnia  - continue with melatonin 3    #DVT PPx: Lovenox  #GI PPx: Protonix  FULL CODE  Dispo: Needs SNF  COVID swab today 81 yo male hx of CAD s/p CABG s/p 20+ years ago, HFrEF, right sided large inguinal hernia BIBA from hotel room because he was unable to ambulate due to Rt thigh weakness and numbness, pt also had mandibular swelling for over a year. CT lumbosacral spine showed mass suggestive of mets with obliteration of neural foramina so was started on decadron.  Sacral mass biopsied 3/10 showed prostate adenocarcinoma. Mandibluar mass biopsied 3/19 also showed prostate cancer. Hospital course was complicated by hypovolemic shock likely 2/2 UGIB, required pressors and upgrade to stepdown unit. He required 1 unit of PRBC and his CBC has since been stable. Pt was started on chemo and radiotherapy. He was given 5 doses of RT to sacrum and spine and 5 doses to jaw area for palliation. His sacral ulcer was debrided by burn and he was started empirically on Ceftriaxone and Flagyl for infection. Stable for discharge.   79 yo male hx of CAD s/p CABG s/p 20+ years ago, HFrEF, right sided large inguinal hernia BIBA from hotel room because he was unable to ambulate due to Rt thigh weakness and numbness, pt also had mandibular swelling for over a year. CT lumbosacral spine showed mass suggestive of mets with obliteration of neural foramina so was started on decadron.  Sacral mass biopsied 3/10 showed prostate adenocarcinoma. Mandibluar mass biopsied 3/19 also showed prostate cancer. Hospital course was complicated by hypovolemic shock likely 2/2 UGIB, required pressors and upgrade to stepdown unit. He required 1 unit of PRBC and his CBC has since been stable. Pt was started on chemo and radiotherapy. He was given 5 doses of RT to sacrum and spine and 5 doses to jaw area for palliation. His sacral ulcer was debrided by burn and he was started empirically on Ceftriaxone and Flagyl for infection. Stable for discharge.   79 yo male hx of CAD s/p CABG s/p 20+ years ago, HFrEF, right sided large inguinal hernia BIBA from hotel room because he was unable to ambulate due to Rt thigh weakness and numbness, pt also had mandibular swelling for over a year. CT lumbosacral spine showed mass suggestive of mets with obliteration of neural foramina so was started on decadron.  Sacral mass biopsied 3/10 showed prostate adenocarcinoma. Mandibluar mass biopsied 3/19 also showed prostate cancer. Hospital course was complicated by hypovolemic shock likely 2/2 UGIB, required pressors and upgrade to stepdown unit. He required 3 unit of PRBC and his CBC has since been stable. Pt was started on chemo and radiotherapy. He was given 5 doses of RT to sacrum and spine and 5 doses to jaw area for palliation. However decision was made to stop cancer treatment in light of worsening of functional ability. His sacral ulcer was debrided by burn and he was started empirically on Ceftriaxone and Flagyl for infection. Stable for discharge.   79 yo male hx of CAD s/p CABG s/p 20+ years ago, HFrEF, right sided large inguinal hernia BIBA from hotel room because he was unable to ambulate due to Rt thigh weakness and numbness, pt also had mandibular swelling for over a year. CT lumbosacral spine showed mass suggestive of mets with obliteration of neural foramina so was started on decadron.  Sacral mass biopsied 3/10 showed prostate adenocarcinoma. Mandibluar mass biopsied 3/19 also showed prostate cancer. Hospital course was complicated by hypovolemic shock likely 2/2 UGIB, required pressors and upgrade to stepdown unit. He required 3 unit of PRBC and his CBC has since been stable. Pt was started on chemo and radiotherapy. He was given 5 doses of RT to sacrum and spine and 5 doses to jaw area for palliation. However decision was made to stop cancer treatment in light of worsening of functional ability. His sacral ulcer was debrided by burn and he was started empirically on Ceftriaxone and Flagyl for infection.    Decision was made to pursue palliative care and patient is being discharged to nursing home with palliative care.

## 2021-04-07 ENCOUNTER — APPOINTMENT (OUTPATIENT)
Dept: RADIATION ONCOLOGY | Facility: HOSPITAL | Age: 81
End: 2021-04-07

## 2021-04-07 LAB
ALBUMIN SERPL ELPH-MCNC: 2.3 G/DL — LOW (ref 3.5–5.2)
ALP SERPL-CCNC: 55 U/L — SIGNIFICANT CHANGE UP (ref 30–115)
ALT FLD-CCNC: 13 U/L — SIGNIFICANT CHANGE UP (ref 0–41)
ANION GAP SERPL CALC-SCNC: 11 MMOL/L — SIGNIFICANT CHANGE UP (ref 7–14)
AST SERPL-CCNC: 16 U/L — SIGNIFICANT CHANGE UP (ref 0–41)
BASOPHILS # BLD AUTO: 0 K/UL — SIGNIFICANT CHANGE UP (ref 0–0.2)
BASOPHILS NFR BLD AUTO: 0 % — SIGNIFICANT CHANGE UP (ref 0–1)
BILIRUB SERPL-MCNC: 0.3 MG/DL — SIGNIFICANT CHANGE UP (ref 0.2–1.2)
BUN SERPL-MCNC: 49 MG/DL — HIGH (ref 10–20)
CALCIUM SERPL-MCNC: 8.3 MG/DL — LOW (ref 8.5–10.1)
CHLORIDE SERPL-SCNC: 104 MMOL/L — SIGNIFICANT CHANGE UP (ref 98–110)
CO2 SERPL-SCNC: 19 MMOL/L — SIGNIFICANT CHANGE UP (ref 17–32)
CREAT SERPL-MCNC: 1 MG/DL — SIGNIFICANT CHANGE UP (ref 0.7–1.5)
EOSINOPHIL # BLD AUTO: 0.01 K/UL — SIGNIFICANT CHANGE UP (ref 0–0.7)
EOSINOPHIL NFR BLD AUTO: 0.3 % — SIGNIFICANT CHANGE UP (ref 0–8)
GLUCOSE SERPL-MCNC: 122 MG/DL — HIGH (ref 70–99)
HCT VFR BLD CALC: 25.1 % — LOW (ref 42–52)
HGB BLD-MCNC: 8 G/DL — LOW (ref 14–18)
IMM GRANULOCYTES NFR BLD AUTO: 1.9 % — HIGH (ref 0.1–0.3)
LYMPHOCYTES # BLD AUTO: 0.07 K/UL — LOW (ref 1.2–3.4)
LYMPHOCYTES # BLD AUTO: 1.9 % — LOW (ref 20.5–51.1)
MAGNESIUM SERPL-MCNC: 2.2 MG/DL — SIGNIFICANT CHANGE UP (ref 1.8–2.4)
MCHC RBC-ENTMCNC: 28.5 PG — SIGNIFICANT CHANGE UP (ref 27–31)
MCHC RBC-ENTMCNC: 31.9 G/DL — LOW (ref 32–37)
MCV RBC AUTO: 89.3 FL — SIGNIFICANT CHANGE UP (ref 80–94)
MONOCYTES # BLD AUTO: 0.14 K/UL — SIGNIFICANT CHANGE UP (ref 0.1–0.6)
MONOCYTES NFR BLD AUTO: 3.8 % — SIGNIFICANT CHANGE UP (ref 1.7–9.3)
NEUTROPHILS # BLD AUTO: 3.37 K/UL — SIGNIFICANT CHANGE UP (ref 1.4–6.5)
NEUTROPHILS NFR BLD AUTO: 92.1 % — HIGH (ref 42.2–75.2)
NRBC # BLD: 0 /100 WBCS — SIGNIFICANT CHANGE UP (ref 0–0)
PLATELET # BLD AUTO: 72 K/UL — LOW (ref 130–400)
POTASSIUM SERPL-MCNC: 4.6 MMOL/L — SIGNIFICANT CHANGE UP (ref 3.5–5)
POTASSIUM SERPL-SCNC: 4.6 MMOL/L — SIGNIFICANT CHANGE UP (ref 3.5–5)
PROT SERPL-MCNC: 4.3 G/DL — LOW (ref 6–8)
RBC # BLD: 2.81 M/UL — LOW (ref 4.7–6.1)
RBC # FLD: 17.6 % — HIGH (ref 11.5–14.5)
SODIUM SERPL-SCNC: 134 MMOL/L — LOW (ref 135–146)
WBC # BLD: 3.66 K/UL — LOW (ref 4.8–10.8)
WBC # FLD AUTO: 3.66 K/UL — LOW (ref 4.8–10.8)

## 2021-04-07 PROCEDURE — 99233 SBSQ HOSP IP/OBS HIGH 50: CPT

## 2021-04-07 PROCEDURE — 99232 SBSQ HOSP IP/OBS MODERATE 35: CPT

## 2021-04-07 RX ORDER — LIDOCAINE HYDROCHLORIDE AND EPINEPHRINE 10; 10 MG/ML; UG/ML
20 INJECTION, SOLUTION INFILTRATION; PERINEURAL ONCE
Refills: 0 | Status: COMPLETED | OUTPATIENT
Start: 2021-04-07 | End: 2021-04-08

## 2021-04-07 RX ORDER — HYDROMORPHONE HYDROCHLORIDE 2 MG/ML
1 INJECTION INTRAMUSCULAR; INTRAVENOUS; SUBCUTANEOUS ONCE
Refills: 0 | Status: DISCONTINUED | OUTPATIENT
Start: 2021-04-07 | End: 2021-04-07

## 2021-04-07 RX ORDER — SODIUM CHLORIDE 9 MG/ML
1000 INJECTION INTRAMUSCULAR; INTRAVENOUS; SUBCUTANEOUS
Refills: 0 | Status: DISCONTINUED | OUTPATIENT
Start: 2021-04-07 | End: 2021-04-13

## 2021-04-07 RX ADMIN — SODIUM ZIRCONIUM CYCLOSILICATE 5 GRAM(S): 10 POWDER, FOR SUSPENSION ORAL at 05:45

## 2021-04-07 RX ADMIN — Medication 1 APPLICATION(S): at 17:21

## 2021-04-07 RX ADMIN — SODIUM CHLORIDE 1 GRAM(S): 9 INJECTION INTRAMUSCULAR; INTRAVENOUS; SUBCUTANEOUS at 05:44

## 2021-04-07 RX ADMIN — ENOXAPARIN SODIUM 70 MILLIGRAM(S): 100 INJECTION SUBCUTANEOUS at 05:44

## 2021-04-07 RX ADMIN — SODIUM CHLORIDE 1 GRAM(S): 9 INJECTION INTRAMUSCULAR; INTRAVENOUS; SUBCUTANEOUS at 17:20

## 2021-04-07 RX ADMIN — FENTANYL CITRATE 1 PATCH: 50 INJECTION INTRAVENOUS at 11:32

## 2021-04-07 RX ADMIN — Medication 2000 UNIT(S): at 11:34

## 2021-04-07 RX ADMIN — HYDROMORPHONE HYDROCHLORIDE 1 MILLIGRAM(S): 2 INJECTION INTRAMUSCULAR; INTRAVENOUS; SUBCUTANEOUS at 03:00

## 2021-04-07 RX ADMIN — SODIUM CHLORIDE 50 MILLILITER(S): 9 INJECTION INTRAMUSCULAR; INTRAVENOUS; SUBCUTANEOUS at 16:03

## 2021-04-07 RX ADMIN — METHOCARBAMOL 500 MILLIGRAM(S): 500 TABLET, FILM COATED ORAL at 21:07

## 2021-04-07 RX ADMIN — Medication 1 APPLICATION(S): at 17:19

## 2021-04-07 RX ADMIN — METHOCARBAMOL 500 MILLIGRAM(S): 500 TABLET, FILM COATED ORAL at 05:43

## 2021-04-07 RX ADMIN — GABAPENTIN 100 MILLIGRAM(S): 400 CAPSULE ORAL at 05:44

## 2021-04-07 RX ADMIN — MIDODRINE HYDROCHLORIDE 10 MILLIGRAM(S): 2.5 TABLET ORAL at 17:20

## 2021-04-07 RX ADMIN — Medication 4 MILLIGRAM(S): at 05:44

## 2021-04-07 RX ADMIN — FENTANYL CITRATE 1 PATCH: 50 INJECTION INTRAVENOUS at 17:17

## 2021-04-07 RX ADMIN — Medication 100 MILLIGRAM(S): at 21:08

## 2021-04-07 RX ADMIN — Medication 250 MILLIGRAM(S): at 05:45

## 2021-04-07 RX ADMIN — Medication 3 MILLIGRAM(S): at 21:07

## 2021-04-07 RX ADMIN — MIDODRINE HYDROCHLORIDE 10 MILLIGRAM(S): 2.5 TABLET ORAL at 11:34

## 2021-04-07 RX ADMIN — GABAPENTIN 100 MILLIGRAM(S): 400 CAPSULE ORAL at 21:07

## 2021-04-07 RX ADMIN — FENTANYL CITRATE 1 PATCH: 50 INJECTION INTRAVENOUS at 09:08

## 2021-04-07 RX ADMIN — ENOXAPARIN SODIUM 70 MILLIGRAM(S): 100 INJECTION SUBCUTANEOUS at 17:44

## 2021-04-07 RX ADMIN — CEFTRIAXONE 100 MILLIGRAM(S): 500 INJECTION, POWDER, FOR SOLUTION INTRAMUSCULAR; INTRAVENOUS at 21:03

## 2021-04-07 RX ADMIN — Medication 250 MILLIGRAM(S): at 17:20

## 2021-04-07 RX ADMIN — FENTANYL CITRATE 1 PATCH: 50 INJECTION INTRAVENOUS at 11:34

## 2021-04-07 RX ADMIN — Medication 100 MILLIGRAM(S): at 13:35

## 2021-04-07 RX ADMIN — LATANOPROST 1 DROP(S): 0.05 SOLUTION/ DROPS OPHTHALMIC; TOPICAL at 21:07

## 2021-04-07 RX ADMIN — MORPHINE SULFATE 2 MILLIGRAM(S): 50 CAPSULE, EXTENDED RELEASE ORAL at 00:41

## 2021-04-07 RX ADMIN — BICALUTAMIDE 50 MILLIGRAM(S): 50 TABLET, FILM COATED ORAL at 11:37

## 2021-04-07 RX ADMIN — PANTOPRAZOLE SODIUM 40 MILLIGRAM(S): 20 TABLET, DELAYED RELEASE ORAL at 06:10

## 2021-04-07 RX ADMIN — Medication 100 MILLIGRAM(S): at 05:43

## 2021-04-07 RX ADMIN — HYDROMORPHONE HYDROCHLORIDE 1 MILLIGRAM(S): 2 INJECTION INTRAMUSCULAR; INTRAVENOUS; SUBCUTANEOUS at 02:24

## 2021-04-07 RX ADMIN — MIDODRINE HYDROCHLORIDE 10 MILLIGRAM(S): 2.5 TABLET ORAL at 05:43

## 2021-04-07 RX ADMIN — CHLORHEXIDINE GLUCONATE 1 APPLICATION(S): 213 SOLUTION TOPICAL at 05:43

## 2021-04-07 NOTE — PROGRESS NOTE ADULT - SUBJECTIVE AND OBJECTIVE BOX
Patient is a 80y old  Male who presents with a chief complaint of 80 YEAR OLD MALE C/O MULTIPLE MEDICAL COMPLAINTS . (07 Apr 2021 15:48)  pt seen and evaluated   on po diet and has no complaints    ICU Vital Signs Last 24 Hrs  T(C): 36.1 (07 Apr 2021 13:23), Max: 36.2 (06 Apr 2021 20:29)  T(F): 96.9 (07 Apr 2021 13:23), Max: 97.2 (06 Apr 2021 20:29)  HR: 95 (07 Apr 2021 13:23) (88 - 109)  BP: 98/54 (07 Apr 2021 13:23) (98/54 - 113/50)  RR: 19 (07 Apr 2021 13:23) (19 - 19)  Drug Dosing Weight  Height (cm): 180.3 (15 Mar 2021 00:51)  Weight (kg): 73.8 (15 Mar 2021 00:51)  BMI (kg/m2): 22.7 (15 Mar 2021 00:51)  BSA (m2): 1.93 (15 Mar 2021 00:51)      06 Apr 2021 07:01  -  07 Apr 2021 07:00  --------------------------------------------------------  IN:    dextrose 5% + sodium chloride 0.45%: 400 mL    IV PiggyBack: 100 mL  Total IN: 500 mL    OUT:  Total OUT: 0 mL    Total NET: 500 mL     PHYSICAL EXAM:  Constitutional:  resting comfortably  Gastrointestinal:  soft n/d  MEDICATIONS  (STANDING):  bicalutamide 50 milliGRAM(s) Oral daily  cefTRIAXone   IVPB 2000 milliGRAM(s) IV Intermittent every 24 hours  chlorhexidine 4% Liquid 1 Application(s) Topical <User Schedule>  cholecalciferol 2000 Unit(s) Oral daily  collagenase Ointment 1 Application(s) Topical daily  Dakins Solution - 1/2 Strength 1 Application(s) Topical two times a day  dexAMETHasone     Tablet 4 milliGRAM(s) Oral daily  enoxaparin Injectable 70 milliGRAM(s) SubCutaneous two times a day  fentaNYL   Patch  25 MICROgram(s)/Hr. 1 Patch Transdermal every 48 hours  gabapentin 100 milliGRAM(s) Oral three times a day  latanoprost 0.005% Ophthalmic Solution 1 Drop(s) Both EYES at bedtime  lidocaine   Patch 2 Patch Transdermal daily  lidocaine 1%/epinephrine 1:100,000 Inj 20 milliLiter(s) Local Injection once  melatonin 3 milliGRAM(s) Oral at bedtime  methocarbamol 500 milliGRAM(s) Oral three times a day  metroNIDAZOLE  IVPB 500 milliGRAM(s) IV Intermittent every 8 hours  metroNIDAZOLE  IVPB      midodrine. 10 milliGRAM(s) Oral three times a day  pantoprazole    Tablet 40 milliGRAM(s) Oral before breakfast  saccharomyces boulardii 250 milliGRAM(s) Oral two times a day  sodium chloride 1 Gram(s) Oral two times a day  sodium chloride 0.9%. 1000 milliLiter(s) (50 mL/Hr) IV Continuous <Continuous>  sodium zirconium cyclosilicate 5 Gram(s) Oral two times a day  zoledronic acid IVPB (ZOMETA) (Non - oncologic) 4 milliGRAM(s) IV Intermittent every 4 weeks      Diet, Soft:   1500mL Fluid Restriction ([id number])  No Concentrated Potassium  Prosource Gelatein Plus     Qty per Day:  2  Supplement Feeding Modality:  Oral  Ensure Enlive Cans or Servings Per Day:  1       Frequency:  Two Times a day  Ensure Pudding Cans or Servings Per Day:  1       Frequency:  Two Times a day (03-30-21 @ 15:54)      LABS  04-07    134<L>  |  104  |  49<H>  ----------------------------<  122<H>  4.6   |  19  |  1.0    Ca    8.3<L>      07 Apr 2021 04:30  Mg     2.2     04-07    TPro  4.3<L>  /  Alb  2.3<L>  /  TBili  0.3  /  DBili  x   /  AST  16  /  ALT  13  /  AlkPhos  55  04-07                          8.0    3.66  )-----------( 72       ( 07 Apr 2021 04:30 )             25.1     Vitamin D, 25-Hydroxy (03.28.21 @ 07:00)   Vitamin D, 25-Hydroxy: 19: 30 - 80 ng/mL Optimum Levels  Patient is a 80y old  Male who presents with a chief complaint of mandibular mass, homelessness issues.  pt seen and evaluated     ICU Vital Signs Last 24 Hrs  T(C): 36.1 (07 Apr 2021 13:23), Max: 36.2 (06 Apr 2021 20:29)  T(F): 96.9 (07 Apr 2021 13:23), Max: 97.2 (06 Apr 2021 20:29)  HR: 95 (07 Apr 2021 13:23) (88 - 109)  BP: 98/54 (07 Apr 2021 13:23) (98/54 - 113/50)  RR: 19 (07 Apr 2021 13:23) (19 - 19)  Drug Dosing Weight  Height (cm): 180.3 (15 Mar 2021 00:51)  Weight (kg): 73.8 (15 Mar 2021 00:51)  BMI (kg/m2): 22.7 (15 Mar 2021 00:51)  BSA (m2): 1.93 (15 Mar 2021 00:51)    I&O hopelessly incomplete in EMR, also suspect pt not helpful in monitoring     PHYSICAL EXAM:  Constitutional:  resting comfortably  Gastrointestinal:  soft n/d    MEDICATIONS  (STANDING):  bicalutamide 50 milliGRAM(s) Oral daily  cefTRIAXone   IVPB 2000 milliGRAM(s) IV Intermittent every 24 hours  chlorhexidine 4% Liquid 1 Application(s) Topical <User Schedule>  cholecalciferol 2000 Unit(s) Oral daily  collagenase Ointment 1 Application(s) Topical daily  Dakins Solution - 1/2 Strength 1 Application(s) Topical two times a day  dexAMETHasone     Tablet 4 milliGRAM(s) Oral daily  enoxaparin Injectable 70 milliGRAM(s) SubCutaneous two times a day  fentaNYL   Patch  25 MICROgram(s)/Hr. 1 Patch Transdermal every 48 hours  gabapentin 100 milliGRAM(s) Oral three times a day  latanoprost 0.005% Ophthalmic Solution 1 Drop(s) Both EYES at bedtime  lidocaine   Patch 2 Patch Transdermal daily  lidocaine 1%/epinephrine 1:100,000 Inj 20 milliLiter(s) Local Injection once  melatonin 3 milliGRAM(s) Oral at bedtime  methocarbamol 500 milliGRAM(s) Oral three times a day  metroNIDAZOLE  IVPB 500 milliGRAM(s) IV Intermittent every 8 hours  midodrine. 10 milliGRAM(s) Oral three times a day  pantoprazole    Tablet 40 milliGRAM(s) Oral before breakfast  saccharomyces boulardii 250 milliGRAM(s) Oral two times a day  sodium chloride 1 Gram(s) Oral two times a day  sodium chloride 0.9%. 1000 milliLiter(s) (50 mL/Hr) IV Continuous <Continuous>  sodium zirconium cyclosilicate 5 Gram(s) Oral two times a day  zoledronic acid IVPB (ZOMETA) (Non - oncologic) 4 milliGRAM(s) IV Intermittent every 4 weeks      Diet, Soft:   1500mL Fluid Restriction ([id number])  No Concentrated Potassium  Prosource Gelatein Plus     Qty per Day:  2  Supplement Feeding Modality:  Oral  Ensure Enlive Cans or Servings Per Day:  1       Frequency:  Two Times a day  Ensure Pudding Cans or Servings Per Day:  1       Frequency:  Two Times a day (03-30-21 @ 15:54)      LABS  04-07    134<L>  |  104  |  49<H>  ----------------------------<  122<H>  4.6   |  19  |  1.0    Ca    8.3<L>      07 Apr 2021 04:30  Mg     2.2     04-07    TPro  4.3<L>  /  Alb  2.3<L>  /  TBili  0.3  /  DBili  x   /  AST  16  /  ALT  13  /  AlkPhos  55  04-07                          8.0    3.66  )-----------( 72       ( 07 Apr 2021 04:30 )             25.1     Vitamin D, 25-Hydroxy (03.28.21 @ 07:00)   Vitamin D, 25-Hydroxy: 19: 30 - 80 ng/mL Optimum Levels

## 2021-04-07 NOTE — PROGRESS NOTE ADULT - SUBJECTIVE AND OBJECTIVE BOX
RYLEY, MANUELITO  80y, Male  Allergy: No Known Allergies      LOS  30d    CHIEF COMPLAINT: 80 YEAR OLD MALE C/O MULTIPLE MEDICAL COMPLAINTS . (07 Apr 2021 07:02)      INTERVAL EVENTS/HPI  - No acute events overnight  - T(F): , Max: 97.2 (04-06-21 @ 20:29)  - WBC Count: 3.18 (04-06-21 @ 09:10)  WBC Count: 2.60 (04-05-21 @ 08:43)     - Creatinine, Serum: 1.2 (04-06-21 @ 09:10)  Creatinine, Serum: 0.8 (04-05-21 @ 08:43)       ROS  General: Denies rigors, nightsweats  HEENT: Denies headache, rhinorrhea, sore throat, eye pain  CV: Denies CP, palpitations  PULM: Denies wheezing, hemoptysis  GI: Denies hematemesis, hematochezia, melena  : Denies discharge, hematuria  MSK: Denies arthralgias, myalgias  SKIN: Denies rash, lesions  NEURO: Denies paresthesias, weakness  PSYCH: Denies depression, anxiety    VITALS:  T(F): 97, Max: 97.2 (04-06-21 @ 20:29)  HR: 88  BP: 109/50  RR: 19Vital Signs Last 24 Hrs  T(C): 36.1 (07 Apr 2021 04:00), Max: 36.2 (06 Apr 2021 20:29)  T(F): 97 (07 Apr 2021 04:00), Max: 97.2 (06 Apr 2021 20:29)  HR: 88 (07 Apr 2021 04:00) (75 - 109)  BP: 109/50 (07 Apr 2021 04:00) (81/37 - 113/50)  BP(mean): --  RR: 19 (07 Apr 2021 04:00) (16 - 19)  SpO2: --    PHYSICAL EXAM:  Gen: NAD, resting in bed  HEENT: Normocephalic, atraumatic  Neck: supple, no lymphadenopathy  CV: Regular rate & regular rhythm  Lungs: decreased BS at bases, no fremitus  Abdomen: Soft, BS present  Ext: Warm, well perfused  Neuro: non focal, awake  Skin: no rash, no erythema  Lines: no phlebitis    FH: Non-contributory  Social Hx: Non-contributory    TESTS & MEASUREMENTS:                        9.4    3.18  )-----------( 80       ( 06 Apr 2021 09:10 )             29.7     04-06    134<L>  |  104  |  44<H>  ----------------------------<  115<H>  4.6   |  17  |  1.2    Ca    8.2<L>      06 Apr 2021 09:10  Mg     2.2     04-06    TPro  4.8<L>  /  Alb  2.7<L>  /  TBili  0.3  /  DBili  x   /  AST  15  /  ALT  15  /  AlkPhos  59  04-06    eGFR if Non African American: 57 mL/min/1.73M2 (04-06-21 @ 09:10)  eGFR if : 66 mL/min/1.73M2 (04-06-21 @ 09:10)    LIVER FUNCTIONS - ( 06 Apr 2021 09:10 )  Alb: 2.7 g/dL / Pro: 4.8 g/dL / ALK PHOS: 59 U/L / ALT: 15 U/L / AST: 15 U/L / GGT: x                     INFECTIOUS DISEASES TESTING  COVID-19 PCR: NotDetec (03-31-21 @ 16:10)  MRSA PCR Result.: Negative (03-31-21 @ 11:15)  COVID-19 PCR: NotDetec (03-15-21 @ 09:46)  Rapid RVP Result: NotDetec (03-08-21 @ 00:45)      INFLAMMATORY MARKERS      RADIOLOGY & ADDITIONAL TESTS:  I have personally reviewed the last available Chest xray  CXR      CT      CARDIOLOGY TESTING      MEDICATIONS  bicalutamide 50 Oral daily  cefTRIAXone   IVPB 2000 IV Intermittent every 24 hours  chlorhexidine 4% Liquid 1 Topical <User Schedule>  cholecalciferol 2000 Oral daily  collagenase Ointment 1 Topical daily  Dakins Solution - 1/2 Strength 1 Topical two times a day  dexAMETHasone     Tablet 4 Oral daily  dextrose 5% + sodium chloride 0.45%. 1000 IV Continuous <Continuous>  enoxaparin Injectable 70 SubCutaneous two times a day  fentaNYL   Patch  25 MICROgram(s)/Hr. 1 Transdermal every 48 hours  gabapentin 100 Oral three times a day  latanoprost 0.005% Ophthalmic Solution 1 Both EYES at bedtime  lidocaine   Patch 2 Transdermal daily  melatonin 3 Oral at bedtime  methocarbamol 500 Oral three times a day  metroNIDAZOLE  IVPB 500 IV Intermittent every 8 hours  metroNIDAZOLE  IVPB     midodrine. 10 Oral three times a day  pantoprazole    Tablet 40 Oral before breakfast  saccharomyces boulardii 250 Oral two times a day  sodium chloride 1 Oral two times a day  sodium zirconium cyclosilicate 5 Oral two times a day  zoledronic acid IVPB (ZOMETA) (Non - oncologic) 4 IV Intermittent every 4 weeks      WEIGHT  Weight (kg): 73.8 (03-15-21 @ 00:51)  Creatinine, Serum: 1.2 mg/dL (04-06-21 @ 09:10)      ANTIBIOTICS:  cefTRIAXone   IVPB 2000 milliGRAM(s) IV Intermittent every 24 hours  metroNIDAZOLE  IVPB 500 milliGRAM(s) IV Intermittent every 8 hours  metroNIDAZOLE  IVPB          All available historical records have been reviewed       RYLEY, MANUELITO  80y, Male  Allergy: No Known Allergies      LOS  30d    CHIEF COMPLAINT: 80 YEAR OLD MALE C/O MULTIPLE MEDICAL COMPLAINTS . (07 Apr 2021 07:02)      INTERVAL EVENTS/HPI  - No acute events overnight  - T(F): , Max: 97.2 (04-06-21 @ 20:29)  - WBC Count: 3.18 (04-06-21 @ 09:10)  WBC Count: 2.60 (04-05-21 @ 08:43)  - no new complaints   - Creatinine, Serum: 1.2 (04-06-21 @ 09:10)  Creatinine, Serum: 0.8 (04-05-21 @ 08:43)       ROS  General: Denies rigors, nightsweats  HEENT: Denies headache, rhinorrhea, sore throat, eye pain  CV: Denies CP, palpitations  PULM: Denies wheezing, hemoptysis  GI: Denies hematemesis, hematochezia, melena  : Denies discharge, hematuria  MSK: Denies arthralgias, myalgias  SKIN: Denies rash, lesions  NEURO: Denies paresthesias, weakness  PSYCH: Denies depression, anxiety    VITALS:  T(F): 97, Max: 97.2 (04-06-21 @ 20:29)  HR: 88  BP: 109/50  RR: 19Vital Signs Last 24 Hrs  T(C): 36.1 (07 Apr 2021 04:00), Max: 36.2 (06 Apr 2021 20:29)  T(F): 97 (07 Apr 2021 04:00), Max: 97.2 (06 Apr 2021 20:29)  HR: 88 (07 Apr 2021 04:00) (75 - 109)  BP: 109/50 (07 Apr 2021 04:00) (81/37 - 113/50)  BP(mean): --  RR: 19 (07 Apr 2021 04:00) (16 - 19)  SpO2: --    PHYSICAL EXAM:  Gen: NAD, resting in bed  HEENT: Normocephalic, atraumatic  Neck: supple, no lymphadenopathy  CV: Regular rate & regular rhythm  Lungs: decreased BS at bases, no fremitus  Abdomen: Soft, BS present  Ext: Warm, well perfused  Neuro: non focal, awake  Skin: 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   Lines: no phlebitis    FH: Non-contributory  Social Hx: Non-contributory    TESTS & MEASUREMENTS:                        9.4    3.18  )-----------( 80       ( 06 Apr 2021 09:10 )             29.7     04-06    134<L>  |  104  |  44<H>  ----------------------------<  115<H>  4.6   |  17  |  1.2    Ca    8.2<L>      06 Apr 2021 09:10  Mg     2.2     04-06    TPro  4.8<L>  /  Alb  2.7<L>  /  TBili  0.3  /  DBili  x   /  AST  15  /  ALT  15  /  AlkPhos  59  04-06    eGFR if Non African American: 57 mL/min/1.73M2 (04-06-21 @ 09:10)  eGFR if : 66 mL/min/1.73M2 (04-06-21 @ 09:10)    LIVER FUNCTIONS - ( 06 Apr 2021 09:10 )  Alb: 2.7 g/dL / Pro: 4.8 g/dL / ALK PHOS: 59 U/L / ALT: 15 U/L / AST: 15 U/L / GGT: x                     INFECTIOUS DISEASES TESTING  COVID-19 PCR: NotDetec (03-31-21 @ 16:10)  MRSA PCR Result.: Negative (03-31-21 @ 11:15)  COVID-19 PCR: NotDetec (03-15-21 @ 09:46)  Rapid RVP Result: NotDetec (03-08-21 @ 00:45)      INFLAMMATORY MARKERS      RADIOLOGY & ADDITIONAL TESTS:  I have personally reviewed the last available Chest xray  CXR      CT      CARDIOLOGY TESTING      MEDICATIONS  bicalutamide 50 Oral daily  cefTRIAXone   IVPB 2000 IV Intermittent every 24 hours  chlorhexidine 4% Liquid 1 Topical <User Schedule>  cholecalciferol 2000 Oral daily  collagenase Ointment 1 Topical daily  Dakins Solution - 1/2 Strength 1 Topical two times a day  dexAMETHasone     Tablet 4 Oral daily  dextrose 5% + sodium chloride 0.45%. 1000 IV Continuous <Continuous>  enoxaparin Injectable 70 SubCutaneous two times a day  fentaNYL   Patch  25 MICROgram(s)/Hr. 1 Transdermal every 48 hours  gabapentin 100 Oral three times a day  latanoprost 0.005% Ophthalmic Solution 1 Both EYES at bedtime  lidocaine   Patch 2 Transdermal daily  melatonin 3 Oral at bedtime  methocarbamol 500 Oral three times a day  metroNIDAZOLE  IVPB 500 IV Intermittent every 8 hours  metroNIDAZOLE  IVPB     midodrine. 10 Oral three times a day  pantoprazole    Tablet 40 Oral before breakfast  saccharomyces boulardii 250 Oral two times a day  sodium chloride 1 Oral two times a day  sodium zirconium cyclosilicate 5 Oral two times a day  zoledronic acid IVPB (ZOMETA) (Non - oncologic) 4 IV Intermittent every 4 weeks      WEIGHT  Weight (kg): 73.8 (03-15-21 @ 00:51)  Creatinine, Serum: 1.2 mg/dL (04-06-21 @ 09:10)      ANTIBIOTICS:  cefTRIAXone   IVPB 2000 milliGRAM(s) IV Intermittent every 24 hours  metroNIDAZOLE  IVPB 500 milliGRAM(s) IV Intermittent every 8 hours  metroNIDAZOLE  IVPB          All available historical records have been reviewed

## 2021-04-07 NOTE — PROGRESS NOTE ADULT - SUBJECTIVE AND OBJECTIVE BOX
GENERAL: NAD, lying in bed comfortably  HEAD:  Atraumatic, Normocephalic  CHEST/LUNG: Clear to auscultation bilaterally; No rales, rhonchi, wheezing, or rubs. Unlabored respirations  HEART: Regular rate and rhythm; No murmurs, rubs, or gallops  ABDOMEN: Bowel sounds present; Soft, Nontender, Nondistended. No hepatomegally  EXTREMITIES:  No clubbing, cyanosis, or edema  NERVOUS SYSTEM:  Alert & Oriented X3, speech clear. No deficits   MSK: Unable to assess  SKIN: No rashes or lesions SUBJECTIVE:    Patient is a 80y old Male who presents with a chief complaint of 80 YEAR OLD MALE C/O MULTIPLE MEDICAL COMPLAINTS . (07 Apr 2021 07:18)    Currently admitted to medicine with the primary diagnosis of Ambulatory dysfunction       Today is hospital day 30d. This morning he is resting comfortably in bed and sleepy. Overnight patient was in a lot of pain and required 1 dose of dilaudid.    PAST MEDICAL & SURGICAL HISTORY  CHF (congestive heart failure)    Inguinal hernia    S/P CABG x 3      SOCIAL HISTORY:  Negative for smoking/alcohol/drug use.     ALLERGIES:  No Known Allergies    MEDICATIONS:  STANDING MEDICATIONS  bicalutamide 50 milliGRAM(s) Oral daily  cefTRIAXone   IVPB 2000 milliGRAM(s) IV Intermittent every 24 hours  chlorhexidine 4% Liquid 1 Application(s) Topical <User Schedule>  cholecalciferol 2000 Unit(s) Oral daily  collagenase Ointment 1 Application(s) Topical daily  Dakins Solution - 1/2 Strength 1 Application(s) Topical two times a day  dexAMETHasone     Tablet 4 milliGRAM(s) Oral daily  dextrose 5% + sodium chloride 0.45%. 1000 milliLiter(s) IV Continuous <Continuous>  enoxaparin Injectable 70 milliGRAM(s) SubCutaneous two times a day  fentaNYL   Patch  25 MICROgram(s)/Hr. 1 Patch Transdermal every 48 hours  gabapentin 100 milliGRAM(s) Oral three times a day  latanoprost 0.005% Ophthalmic Solution 1 Drop(s) Both EYES at bedtime  lidocaine   Patch 2 Patch Transdermal daily  lidocaine 1%/epinephrine 1:100,000 Inj 20 milliLiter(s) Local Injection once  melatonin 3 milliGRAM(s) Oral at bedtime  methocarbamol 500 milliGRAM(s) Oral three times a day  metroNIDAZOLE  IVPB 500 milliGRAM(s) IV Intermittent every 8 hours  metroNIDAZOLE  IVPB      midodrine. 10 milliGRAM(s) Oral three times a day  pantoprazole    Tablet 40 milliGRAM(s) Oral before breakfast  saccharomyces boulardii 250 milliGRAM(s) Oral two times a day  sodium chloride 1 Gram(s) Oral two times a day  sodium zirconium cyclosilicate 5 Gram(s) Oral two times a day  zoledronic acid IVPB (ZOMETA) (Non - oncologic) 4 milliGRAM(s) IV Intermittent every 4 weeks    PRN MEDICATIONS  acetaminophen   Tablet .. 650 milliGRAM(s) Oral every 6 hours PRN  aluminum hydroxide/magnesium hydroxide/simethicone Suspension 30 milliLiter(s) Oral every 6 hours PRN  brimonidine 0.2% Ophthalmic Solution 1 Drop(s) Both EYES every 8 hours PRN  morphine  - Injectable 2 milliGRAM(s) IV Push every 6 hours PRN  oxycodone    5 mG/acetaminophen 325 mG 1 Tablet(s) Oral every 6 hours PRN    VITALS:   T(F): 97  HR: 88  BP: 109/50  RR: 19  SpO2: --    LABS:                        9.4    3.18  )-----------( 80       ( 06 Apr 2021 09:10 )             29.7     04-06    134<L>  |  104  |  44<H>  ----------------------------<  115<H>  4.6   |  17  |  1.2    Ca    8.2<L>      06 Apr 2021 09:10  Mg     2.2     04-06    TPro  4.8<L>  /  Alb  2.7<L>  /  TBili  0.3  /  DBili  x   /  AST  15  /  ALT  15  /  AlkPhos  59  04-06                  RADIOLOGY:    PHYSICAL EXAM:  GENERAL: NAD, lying in bed comfortably  HEAD:  Atraumatic, Normocephalic  CHEST/LUNG: Clear to auscultation bilaterally; No rales, rhonchi, wheezing, or rubs. Unlabored respirations  HEART: Regular rate and rhythm; No murmurs, rubs, or gallops  ABDOMEN: Bowel sounds present; Soft, Nontender, Nondistended. No hepatomegally  EXTREMITIES:  No clubbing, cyanosis, or edema  NERVOUS SYSTEM:  Alert & Oriented X3, speech clear. No deficits   MSK: Unable to assess  SKIN: No rashes or lesions SUBJECTIVE:    Patient is a 80y old Male who presents with a chief complaint of 80 YEAR OLD MALE C/O MULTIPLE MEDICAL COMPLAINTS . (07 Apr 2021 07:18)    Currently admitted to medicine with the primary diagnosis of Ambulatory dysfunction       Today is hospital day 30d. This morning he is resting comfortably in bed and sleepy. Overnight patient was in a lot of pain and required 1 dose of dilaudid.    PAST MEDICAL & SURGICAL HISTORY  CHF (congestive heart failure)    Inguinal hernia    S/P CABG x 3      SOCIAL HISTORY:  Negative for smoking/alcohol/drug use.     ALLERGIES:  No Known Allergies    MEDICATIONS:  STANDING MEDICATIONS  bicalutamide 50 milliGRAM(s) Oral daily  cefTRIAXone   IVPB 2000 milliGRAM(s) IV Intermittent every 24 hours  chlorhexidine 4% Liquid 1 Application(s) Topical <User Schedule>  cholecalciferol 2000 Unit(s) Oral daily  collagenase Ointment 1 Application(s) Topical daily  Dakins Solution - 1/2 Strength 1 Application(s) Topical two times a day  dexAMETHasone     Tablet 4 milliGRAM(s) Oral daily  dextrose 5% + sodium chloride 0.45%. 1000 milliLiter(s) IV Continuous <Continuous>  enoxaparin Injectable 70 milliGRAM(s) SubCutaneous two times a day  fentaNYL   Patch  25 MICROgram(s)/Hr. 1 Patch Transdermal every 48 hours  gabapentin 100 milliGRAM(s) Oral three times a day  latanoprost 0.005% Ophthalmic Solution 1 Drop(s) Both EYES at bedtime  lidocaine   Patch 2 Patch Transdermal daily  lidocaine 1%/epinephrine 1:100,000 Inj 20 milliLiter(s) Local Injection once  melatonin 3 milliGRAM(s) Oral at bedtime  methocarbamol 500 milliGRAM(s) Oral three times a day  metroNIDAZOLE  IVPB 500 milliGRAM(s) IV Intermittent every 8 hours  metroNIDAZOLE  IVPB      midodrine. 10 milliGRAM(s) Oral three times a day  pantoprazole    Tablet 40 milliGRAM(s) Oral before breakfast  saccharomyces boulardii 250 milliGRAM(s) Oral two times a day  sodium chloride 1 Gram(s) Oral two times a day  sodium zirconium cyclosilicate 5 Gram(s) Oral two times a day  zoledronic acid IVPB (ZOMETA) (Non - oncologic) 4 milliGRAM(s) IV Intermittent every 4 weeks    PRN MEDICATIONS  acetaminophen   Tablet .. 650 milliGRAM(s) Oral every 6 hours PRN  aluminum hydroxide/magnesium hydroxide/simethicone Suspension 30 milliLiter(s) Oral every 6 hours PRN  brimonidine 0.2% Ophthalmic Solution 1 Drop(s) Both EYES every 8 hours PRN  morphine  - Injectable 2 milliGRAM(s) IV Push every 6 hours PRN  oxycodone    5 mG/acetaminophen 325 mG 1 Tablet(s) Oral every 6 hours PRN    VITALS:   T(F): 97  HR: 88  BP: 109/50  RR: 19  SpO2: --    LABS:                        9.4    3.18  )-----------( 80       ( 06 Apr 2021 09:10 )             29.7     04-06    134<L>  |  104  |  44<H>  ----------------------------<  115<H>  4.6   |  17  |  1.2    Ca    8.2<L>      06 Apr 2021 09:10  Mg     2.2     04-06    TPro  4.8<L>  /  Alb  2.7<L>  /  TBili  0.3  /  DBili  x   /  AST  15  /  ALT  15  /  AlkPhos  59  04-06          PHYSICAL EXAM:  GENERAL: NAD, lying in bed comfortably  HEAD:  Atraumatic, Normocephalic  CHEST/LUNG: Clear to auscultation bilaterally; No rales, rhonchi, wheezing, or rubs. Unlabored respirations  HEART: Regular rate and rhythm; No murmurs, rubs, or gallops  ABDOMEN: Bowel sounds present; Soft, Nontender, Nondistended. No hepatomegally  EXTREMITIES:  No clubbing, cyanosis, or edema  NERVOUS SYSTEM:  Alert & Oriented X3, speech clear. No deficits   MSK: Unable to assess  SKIN: No rashes or lesions

## 2021-04-07 NOTE — PROGRESS NOTE ADULT - ASSESSMENT
ASSESSMENT/PLAN  - right mandibular mass  - spine and lung mets  - h/o CHF  - UGI bleed, anemia  - vitamin D deficiency    continue  with  Ensure, pudding, high protein jello. (he dislikes yogurt)  Follow calorie counts to assess adequacy of oral intake, and so that ingestion of supplements is documented     ASSESSMENT/PLAN  - right mandibular mass  - spine and lung mets  - h/o CHF  - UGI bleed, anemia  - vitamin D deficiency    continue  with  Ensure, pudding, high protein jello. (he dislikes yogurt)  Follow calorie counts to assess adequacy of oral intake, and so that ingestion of supplements is documented  change vitamin D supplementation to 50,000 IU daily x 4 days, then 4000 IU daily, and repeat level in 4 weeks

## 2021-04-07 NOTE — PROGRESS NOTE ADULT - SUBJECTIVE AND OBJECTIVE BOX
24H events:    Afebrile, VSS  No acute events  Still c/o fatigue     PAST MEDICAL & SURGICAL HISTORY  CHF (congestive heart failure)    Inguinal hernia    S/P CABG x 3      SOCIAL HISTORY:  Negative for smoking/alcohol/drug use.     ALLERGIES:  No Known Allergies    MEDICATIONS:  STANDING MEDICATIONS  bicalutamide 50 milliGRAM(s) Oral daily  cefTRIAXone   IVPB 2000 milliGRAM(s) IV Intermittent every 24 hours  chlorhexidine 4% Liquid 1 Application(s) Topical <User Schedule>  cholecalciferol 2000 Unit(s) Oral daily  collagenase Ointment 1 Application(s) Topical daily  Dakins Solution - 1/2 Strength 1 Application(s) Topical two times a day  dexAMETHasone     Tablet 4 milliGRAM(s) Oral daily  enoxaparin Injectable 70 milliGRAM(s) SubCutaneous two times a day  fentaNYL   Patch  25 MICROgram(s)/Hr. 1 Patch Transdermal every 48 hours  gabapentin 100 milliGRAM(s) Oral three times a day  latanoprost 0.005% Ophthalmic Solution 1 Drop(s) Both EYES at bedtime  lidocaine   Patch 2 Patch Transdermal daily  lidocaine 1%/epinephrine 1:100,000 Inj 20 milliLiter(s) Local Injection once  melatonin 3 milliGRAM(s) Oral at bedtime  methocarbamol 500 milliGRAM(s) Oral three times a day  metroNIDAZOLE  IVPB      metroNIDAZOLE  IVPB 500 milliGRAM(s) IV Intermittent every 8 hours  midodrine. 10 milliGRAM(s) Oral three times a day  pantoprazole    Tablet 40 milliGRAM(s) Oral before breakfast  saccharomyces boulardii 250 milliGRAM(s) Oral two times a day  sodium chloride 1 Gram(s) Oral two times a day  sodium chloride 0.9%. 1000 milliLiter(s) IV Continuous <Continuous>  sodium zirconium cyclosilicate 5 Gram(s) Oral two times a day  zoledronic acid IVPB (ZOMETA) (Non - oncologic) 4 milliGRAM(s) IV Intermittent every 4 weeks    PRN MEDICATIONS  acetaminophen   Tablet .. 650 milliGRAM(s) Oral every 6 hours PRN  aluminum hydroxide/magnesium hydroxide/simethicone Suspension 30 milliLiter(s) Oral every 6 hours PRN  brimonidine 0.2% Ophthalmic Solution 1 Drop(s) Both EYES every 8 hours PRN  morphine  - Injectable 2 milliGRAM(s) IV Push every 6 hours PRN  oxycodone    5 mG/acetaminophen 325 mG 1 Tablet(s) Oral every 6 hours PRN    VITALS:   T(F): 96.9  HR: 95  BP: 98/54  RR: 19  SpO2: --    LABS:                        8.0    3.66  )-----------( 72       ( 07 Apr 2021 04:30 )             25.1     04-07    134<L>  |  104  |  49<H>  ----------------------------<  122<H>  4.6   |  19  |  1.0    Ca    8.3<L>      07 Apr 2021 04:30  Mg     2.2     04-07    TPro  4.3<L>  /  Alb  2.3<L>  /  TBili  0.3  /  DBili  x   /  AST  16  /  ALT  13  /  AlkPhos  55  04-07                  RADIOLOGY:    PHYSICAL EXAM:  GEN: No acute distress  HENT: NCAT, EOMI  LYMPH: No appreciable adenopathy  LUNGS: No respiratory distress   HEART: regular rate   ABD: Soft, non-tender, non-distended  NEURO: AAOX3

## 2021-04-07 NOTE — ADVANCED PRACTICE NURSE CONSULT - ASSESSMENT
81 yo male hx of CAD s/p CABG s/p 20+ years ago/ CHF/ right sided large inguinal hernia BIBA from hotel room 2/2 unable to ambulate with right thigh weakness and numbness, reported it started from knee to hip, denies fall and injury. He was just sitting and the symptoms started, reported off/on back pain in the past. denies fever/chill/recent illness/coughing/chest pain/abd pain/n/v/d and urinary sxs. Also has right sided facial swelling for about 1 year after 3 teeth extraction. reports swelling worsens after eating. swelling was better in the past and started swelling again in the past few months. didn't follow up with his dentist 2/2 COVID. Patient found w/ new sacral mass found on CT, new onset LE weakness, also has large right mandibular mass and further bony lesions in spine all suggestive of metastatic disease.   Sacral mass biopsied 3/10 showed prostate adenocarcinoma. Mandibluar mass biopsied 3/19 also showed prostate cancer. Hospital course was complicated by hypovolemic shock likely 2/2 UGIB, required pressors and upgrade to stepdown unit. Pt currently in hospital undergoing chemo and radiotherapy.     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    Received patient on 4B, laying supine in bed, turned to left side, (pillow under right side & underneath BLEs elevating heels), HOB elevated 30 degrees. Pt asleep upon WOCN arrival, arouseable but lethargic, made aware of purpose of this WOCN visit, agreeable to consult.      Type of wound: Deep tissue pressure injury (DTPI); healing   Location: right heel  Wound measurements: 0.5cm x 1cm x 0cm, true depth indeterminable   Tunneling/Undermining: none  Wound bed: still presenting as dark pink colored tissue, still in transverse linear pattern   Wound edges: attached, flush, irregular   Periwound: intact   Wound exudate: none  Wound odor: none  Induration, erythema, warmth: none   Wound pain: denies     Patient mostly bedbound, limited mobility in bed, + lemus, incontinent of stool. Ordered for soft diet, probably inadequate intake as per reported Thai score.

## 2021-04-07 NOTE — PROGRESS NOTE ADULT - ATTENDING COMMENTS
Patient seen and examined. Patient has no complaints. Wanted to sleep, got Dilaudid overnight.   Patient with metastatic prostate cancer s/p chemoradiation, now on hormonal therapy.   Planned for bedside surgical debridement of sacral ulcer by BURN, c/w abx as per ID.   Rest of plan as above.     #Progress Note Handoff  Pending (specify):  debridement   Family discussion: none available   Disposition: STR

## 2021-04-07 NOTE — PROGRESS NOTE ADULT - ASSESSMENT
Mr. Mckee is a 79 yo male hx of AFib, CAD s/p CABG s/p 20+ years ago/ CHF/ right sided large inguinal hernia BIBA from hotel room 2/2 unable to ambulate with right thigh weakness and numbness, now found to have metastatic disease with a mandibular mass and large sacral mass    # Stage IV Prostate cancer with diffuse osseous involvement/ spinal mets with paraspinal mass   - , improved to 66  - Sacral biopsy favors prostate ( neoplastic cells are positive for AE1/AE3, PSA and CK7)  - Jaw mass biopsy : prostate adenocarcinoma  - TLS labs wnl  - CT chest showing [id number] paravertebral lesion with mass effect.   - CT AP no RP bleed, no visceral mets  - MR CTL, brain and  jaw completed, mets throughout cervical and t-spine  - Decadron taper per rad onc  - Continue casodex 50mg  - Received 1 dose of taxotere 20mg/m2 on 3.26.21  - Zometa 4mg 3/27/21, cleared by dental  - Leupron 7.5mg 3/29/21 (1 month dose)  - Completed spine RT, on day 4/5 of palliative jaw radiation  - On discharge, he will continue daily casodex. He will return to [facility] on 4/27/2021 for Lupron injection, which after this will be every 3 months. We will not give taxotere at this time due to his poor performance status. If after rehab he is doing better we will consider restarting it. He is also hospice eligible if he elects for this.      # Sacral ulcer  - On ceftriaxone and flagyl  - F/u burn for debridements    # Normocytic anemia:  - Per GI, outpatient scope  - Hapto high and retic low, not consistent with hemolysis  - TSAT 67%    # Hyponatremia:  - Fluid restriction    # AFib  - On lovenox BID    # Mild hypercalcemia, likely due to bone mets  - s/p calcitonin   - Monitor PTH, PTHrP and 25OH Vit D     DVT ppx on lovenox BID

## 2021-04-07 NOTE — PROGRESS NOTE ADULT - ASSESSMENT
79 yo male hx of CAD s/p CABG s/p 20+ years ago, HFrEF, right sided large inguinal hernia BIBA from hotel room because he was unable to ambulate due to Rt thigh weakness and numbness, pt also had mandibular swelling for over a year. CT lumbosacral spine showed mass suggestive of mets with obliteration of neural foramina so was started on decadron.  Sacral mass biopsied 3/10 showed prostate adenocarcinoma. Mandibluar mass biopsied 3/19 also showed prostate cancer. Hospital course was complicated by hypovolemic shock likely 2/2 UGIB, required pressors and upgrade to stepdown unit. Pt currently in hospital undergoing chemo and radiotherapy.     #Metastatic prostate cancer  - currently on chemo and radiation   - continue with bicalutamide 50mg daily (started 3/14/21)  - s/p Lupron (GnRH agonist) IM, first dose 3/29 --> next dose in 1 month  - received first dose of taxotere (3/27), will hold chemo until patient recovers from RT  - Completed RT to sacrum and spine 5 doses (3/24 - 3/30)  - s/p RT 5 DOSES to jaw area for palliation; last RT 4/6  - dental cleared for bisphosphonate, or denosumab s/p zoledronic acid 4mg q4 weeks (3/27)  - continue with Dexamethasone taper; now 4 mg daily for 5 days starting 4/6 (day 2 today)  - continue with  on  Fentanyl patch, c/w  Morphine PRN  - continue with  Neurontin 100 mg TID for neuropathic pain   - palliative care consulted for pain management and GOC; signed off 3/19     # SCC of jaw  - 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 Serum Osm 279  - Glu on BMP 120s-130s  - TSH 0.36  - free water restriction, fluid restriction 1500ml/day  - continue with Sodium chloride tablets  - monitor Na level     #  sacral ulcer  - now fouling smelling  - Burn consulted, will follow up recommendations--> Will perform debridement at bedside tomorrow  - ID consulted on 4/3, Ceftriaxone and Flagyl recommended instead of unasyn  - send wound cultures    #Thrombocytopenia/ Leukopenia   Plt 87 --> 68 --> 79  WBC 3.02 --> 2.5 --> 2.6  Platelets and WBC downtrending since 3/27; improved 4/5  - monitor for any signs of bleeding   - transfuse if plt <10K, or <50K with bleeding  - oncology follow up   - likely due to chemotherapy (taxotere)     # UGI bleed Stable  - s/p 1 unit of PRBC  - No egd done--only coffee ground emesis  - monitor H/H, keep Hb above 7.5 and active type/cross      # Chronic A fib  - on therapeutic dose of Lovenox     # CHFrEF  - fluid restriction 1200 ml in 24 hours   - intake and output monitoring, daily weight   - EF of 35%    # Hypercalcemia/  Vitamin D level Resolved  - likely due to advanced malignancy   - continue with vitamin D supplements 2000units daily   -   - f/u PTHrP still pending    # Hyperkalemia Resolved  s/p one dose of Kayexalate   - low potasium diet    - on [facility]   - Continue to monitor    # Severe protein-calorie malnutrition.  - dietary on board   - supplements added   - patient reports diarrhea with ensure     # Insomnia  - started melatonin 3    #DVT PPx: Lovenox  #GI PPx: Protonix  FULL CODE  Dispo: Needs SNF  COVID swab today 81 yo male hx of CAD s/p CABG s/p 20+ years ago, HFrEF, right sided large inguinal hernia BIBA from hotel room because he was unable to ambulate due to Rt thigh weakness and numbness, pt also had mandibular swelling for over a year. CT lumbosacral spine showed mass suggestive of mets with obliteration of neural foramina so was started on decadron.  Sacral mass biopsied 3/10 showed prostate adenocarcinoma. Mandibluar mass biopsied 3/19 also showed prostate cancer. Hospital course was complicated by hypovolemic shock likely 2/2 UGIB, required pressors and upgrade to stepdown unit. Pt currently in hospital undergoing chemo and radiotherapy.     #Metastatic prostate cancer  - currently on chemo and radiation   - continue with bicalutamide 50mg daily (started 3/14/21)  - s/p Lupron (GnRH agonist) IM, first dose 3/29 --> next dose in 1 month  - received first dose of taxotere (3/27), will hold chemo until patient recovers from RT  - Completed RT to sacrum and spine 5 doses (3/24 - 3/30)  - s/p RT 5 DOSES to jaw area for palliation; last RT 4/6  - dental cleared for bisphosphonate, or denosumab s/p zoledronic acid 4mg q4 weeks (3/27)  - continue with Dexamethasone taper; now 4 mg daily for 5 days starting 4/6 (day 2 today)  - continue with  on  Fentanyl patch, c/w  Morphine PRN  - continue with  Neurontin 100 mg TID for neuropathic pain   - palliative care consulted for pain management and GOC; signed off 3/19     # SCC of jaw  - 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 Serum Osm 279  - Glu on BMP 120s-130s  - TSH 0.36  - free water restriction, fluid restriction 1500ml/day  - continue with Sodium chloride tablets  - monitor Na level     #  sacral ulcer  - now fouling smelling  - Burn consulted, will follow up recommendations--> Will perform debridement at bedside today  - ID consulted on 4/3, Ceftriaxone and Flagyl recommended instead of unasyn  - send wound cultures    #Thrombocytopenia/ Leukopenia   Plt 87 --> 68 --> 79  WBC 3.02 --> 2.5 --> 2.6  Platelets and WBC downtrending since 3/27; improved 4/5  - monitor for any signs of bleeding   - transfuse if plt <10K, or <50K with bleeding  - oncology follow up   - likely due to chemotherapy (taxotere)     # UGI bleed Stable  - s/p 1 unit of PRBC  - No egd done--only coffee ground emesis  - monitor H/H, keep Hb above 7.5 and active type/cross      # Chronic A fib  - on therapeutic dose of Lovenox     # CHFrEF  - fluid restriction 1200 ml in 24 hours   - intake and output monitoring, daily weight   - EF of 35%    # Hypercalcemia/  Vitamin D level Resolved  - likely due to advanced malignancy   - continue with vitamin D supplements 2000units daily   -   - f/u PTHrP still pending    # Hyperkalemia Resolved  s/p one dose of Kayexalate   - low potasium diet    - on [facility]   - Continue to monitor    # Severe protein-calorie malnutrition.  - dietary on board   - supplements added   - patient reports diarrhea with ensure     # Insomnia  - continue with melatonin 3    #DVT PPx: Lovenox  #GI PPx: Protonix  FULL CODE  Dispo: Needs SNF  COVID swab today 81 yo male hx of CAD s/p CABG s/p 20+ years ago, HFrEF, right sided large inguinal hernia BIBA from hotel room because he was unable to ambulate due to Rt thigh weakness and numbness, pt also had mandibular swelling for over a year. CT lumbosacral spine showed mass suggestive of mets with obliteration of neural foramina so was started on decadron.  Sacral mass biopsied 3/10 showed prostate adenocarcinoma. Mandibluar mass biopsied 3/19 also showed prostate cancer. Hospital course was complicated by hypovolemic shock likely 2/2 UGIB, required pressors and upgrade to stepdown unit. Pt currently in hospital undergoing chemo and radiotherapy.     #Metastatic prostate cancer  - currently on chemo and radiation   - continue with bicalutamide 50mg daily (started 3/14/21)  - s/p Lupron (GnRH agonist) IM, first dose 3/29 --> next dose in 1 month  - received first dose of taxotere (3/27), will hold chemo until patient recovers from RT  - Completed RT to sacrum and spine 5 doses (3/24 - 3/30)  - s/p RT 5 DOSES to jaw area for palliation; last RT 4/6  - dental cleared for bisphosphonate, or denosumab s/p zoledronic acid 4mg q4 weeks (3/27)  - continue with Dexamethasone taper; now 4 mg daily for 5 days starting 4/6 (day 2 today)  - continue with  on  Fentanyl patch, c/w  Morphine PRN  - continue with  Neurontin 100 mg TID for neuropathic pain   - palliative care consulted for pain management and GOC; signed off 3/19     # SCC of jaw  - 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 Serum Osm 279  - Glu on BMP 120s-130s  - TSH 0.36  - free water restriction, fluid restriction 1500ml/day  - continue with Sodium chloride tablets  - monitor Na level     #  sacral ulcer  - now fouling smelling  - Burn consulted, will follow up recommendations--> Will perform debridement at bedside today  - ID consulted on 4/3, Ceftriaxone and Flagyl recommended instead of unasyn  - send wound cultures    #Thrombocytopenia/ Leukopenia   Plt 87 --> 68 --> 79  WBC 3.02 --> 2.5 --> 2.6  Platelets and WBC downtrending since 3/27; improved 4/5  - monitor for any signs of bleeding   - transfuse if plt <10K, or <50K with bleeding  - oncology follow up   - likely due to chemotherapy (taxotere)     # UGI bleed Stable  - s/p 1 unit of PRBC  - No egd done--only coffee ground emesis  - monitor H/H, keep Hb above 7.5 and active type/cross      # Chronic A fib  - on therapeutic dose of Lovenox     # CHFrEF  - fluid restriction 1200 ml in 24 hours   - intake and output monitoring, daily weight   - EF of 35%    # Hypercalcemia/  Vitamin D level Resolved  - likely due to advanced malignancy   - continue with vitamin D supplements 2000units daily   -   - f/u PTHrP still pending    # Hyperkalemia Resolved  s/p one dose of Kayexalate   - low potasium diet    - on [facility]   - Continue to monitor    # Severe protein-calorie malnutrition.  - dietary on board   - supplements added   - patient reports diarrhea with ensure     # Insomnia  - continue with melatonin 3    #DVT PPx: Lovenox  #GI PPx: Protonix  FULL CODE  Dispo: Needs SNF  COVID swab today 79 yo male hx of CAD s/p CABG s/p 20+ years ago, HFrEF, right sided large inguinal hernia BIBA from hotel room because he was unable to ambulate due to Rt thigh weakness and numbness, pt also had mandibular swelling for over a year. CT lumbosacral spine showed mass suggestive of mets with obliteration of neural foramina so was started on decadron.  Sacral mass biopsied 3/10 showed prostate adenocarcinoma. Mandibluar mass biopsied 3/19 also showed prostate cancer. Hospital course was complicated by hypovolemic shock likely 2/2 UGIB, required pressors and upgrade to stepdown unit. Pt currently in hospital undergoing chemo and radiotherapy.     #Metastatic prostate cancer   - currently on chemo and radiation   - continue with bicalutamide 50mg daily (started 3/14/21)  - s/p Lupron (GnRH agonist) IM, first dose 3/29 --> next dose in 1 month  - received first dose of taxotere (3/27), will hold chemo until patient recovers from RT  - Completed RT to sacrum and spine 5 doses (3/24 - 3/30)  - s/p RT 5 DOSES to jaw area for palliation; last RT 4/6  - dental cleared for bisphosphonate, or denosumab s/p zoledronic acid 4mg q4 weeks (3/27)  - continue with Dexamethasone taper; now 4 mg daily for 5 days starting 4/6 (day 2 today)  - continue with  on  Fentanyl patch, c/w  Morphine PRN  - continue with  Neurontin 100 mg TID for neuropathic pain   - palliative care consulted for pain management and GOC; signed off 3/19     # SCC of jaw  - 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 Serum Osm 279  - Glu on BMP 120s-130s  - TSH 0.36  - free water restriction, fluid restriction 1500ml/day  - continue with Sodium chloride tablets  - monitor Na level     # Sacral ulcer   - now fouling smelling  - Burn consulted, will follow up recommendations--> Will perform debridement at bedside today  - ID consulted on 4/3, Ceftriaxone and Flagyl recommended instead of unasyn  - send wound cultures    # Thrombocytopenia/ Leukopenia   Plt 87 --> 68 --> 79  WBC 3.02 --> 2.5 --> 2.6  Platelets and WBC downtrending since 3/27; improved 4/5  - monitor for any signs of bleeding   - transfuse if plt <10K, or <50K with bleeding  - oncology follow up   - likely due to chemotherapy (taxotere)    # UGI bleed Stable  - s/p 1 unit of PRBC  - No egd done--only coffee ground emesis  - monitor H/H, keep Hb above 7.5 and active type/cross      # Chronic A fib  - on therapeutic dose of Lovenox     # CHFrEF  - fluid restriction 1200 ml in 24 hours   - intake and output monitoring, daily weight   - EF of 35%    # Hypercalcemia/  Vitamin D level Resolved  - likely due to advanced malignancy   - continue with vitamin D supplements 2000units daily   -   - f/u PTHrP still pending    # Hyperkalemia Resolved  s/p one dose of Kayexalate   - low potasium diet    - on [facility]   - Continue to monitor    # Severe protein-calorie malnutrition.  - dietary on board   - supplements added   - patient reports diarrhea with ensure     # Insomnia  - continue with melatonin     #DVT PPx: Lovenox  #GI PPx: Protonix  FULL CODE  Dispo: Needs SNF  COVID swab today

## 2021-04-07 NOTE — PROGRESS NOTE ADULT - ASSESSMENT
ASSESSMENT  81 yo male hx of CAD s/p CABG s/p 20+ years ago, HFrEF, right sided large inguinal hernia BIBA from hotel room because he was unable to ambulate due to Rt thigh weakness and numbness, pt also had mandibular swelling for over a year. CT lumbosacral spine showed mass suggestive of mets with obliteration of neural foramina so was started on decadron.  Sacral mass biopsied 3/10 showed prostate adenocarcinoma. Mandibluar mass biopsied 3/19 also showed prostate cancer. Hospital course was complicated by hypovolemic shock likely 2/2 UGIB, required pressors and upgrade to stepdown unit. Pt currently in hospital undergoing chemo and radiotherapy.     IMPRESSION  #Prostate Cancer with mets, including Sacral lesions  #SCC of Jaw  #Sacral Ulcer  #Leukopenia   #CAD s/p CABG  #Abx allergy: NKDA    Creatinine, Serum: 1.2 mg/dL (04.06.21 @ 09:10)  Weight (kg): 73.8 (15 Mar 2021 00:51)    RECOMMENDATIONS  - continue ceftriaxone 2g daily and flagyl 500 mg TID   - planned for bedside debridement, will follow culture data    Please call or message on [employer] Teams if with any questions.  Spectra 1965

## 2021-04-08 LAB
ALBUMIN SERPL ELPH-MCNC: 2.4 G/DL — LOW (ref 3.5–5.2)
ALP SERPL-CCNC: 53 U/L — SIGNIFICANT CHANGE UP (ref 30–115)
ALT FLD-CCNC: 12 U/L — SIGNIFICANT CHANGE UP (ref 0–41)
ANION GAP SERPL CALC-SCNC: 7 MMOL/L — SIGNIFICANT CHANGE UP (ref 7–14)
AST SERPL-CCNC: 17 U/L — SIGNIFICANT CHANGE UP (ref 0–41)
BASOPHILS # BLD AUTO: 0.01 K/UL — SIGNIFICANT CHANGE UP (ref 0–0.2)
BASOPHILS NFR BLD AUTO: 0.4 % — SIGNIFICANT CHANGE UP (ref 0–1)
BILIRUB SERPL-MCNC: 0.3 MG/DL — SIGNIFICANT CHANGE UP (ref 0.2–1.2)
BUN SERPL-MCNC: 33 MG/DL — HIGH (ref 10–20)
CALCIUM SERPL-MCNC: 8.1 MG/DL — LOW (ref 8.5–10.1)
CHLORIDE SERPL-SCNC: 108 MMOL/L — SIGNIFICANT CHANGE UP (ref 98–110)
CO2 SERPL-SCNC: 21 MMOL/L — SIGNIFICANT CHANGE UP (ref 17–32)
CREAT SERPL-MCNC: 0.5 MG/DL — LOW (ref 0.7–1.5)
EOSINOPHIL # BLD AUTO: 0 K/UL — SIGNIFICANT CHANGE UP (ref 0–0.7)
EOSINOPHIL NFR BLD AUTO: 0 % — SIGNIFICANT CHANGE UP (ref 0–8)
GLUCOSE SERPL-MCNC: 104 MG/DL — HIGH (ref 70–99)
HCT VFR BLD CALC: 24.7 % — LOW (ref 42–52)
HGB BLD-MCNC: 7.8 G/DL — LOW (ref 14–18)
IMM GRANULOCYTES NFR BLD AUTO: 1.5 % — HIGH (ref 0.1–0.3)
LYMPHOCYTES # BLD AUTO: 0.07 K/UL — LOW (ref 1.2–3.4)
LYMPHOCYTES # BLD AUTO: 2.6 % — LOW (ref 20.5–51.1)
MAGNESIUM SERPL-MCNC: 1.8 MG/DL — SIGNIFICANT CHANGE UP (ref 1.8–2.4)
MCHC RBC-ENTMCNC: 28.3 PG — SIGNIFICANT CHANGE UP (ref 27–31)
MCHC RBC-ENTMCNC: 31.6 G/DL — LOW (ref 32–37)
MCV RBC AUTO: 89.5 FL — SIGNIFICANT CHANGE UP (ref 80–94)
MONOCYTES # BLD AUTO: 0.09 K/UL — LOW (ref 0.1–0.6)
MONOCYTES NFR BLD AUTO: 3.3 % — SIGNIFICANT CHANGE UP (ref 1.7–9.3)
NEUTROPHILS # BLD AUTO: 2.5 K/UL — SIGNIFICANT CHANGE UP (ref 1.4–6.5)
NEUTROPHILS NFR BLD AUTO: 92.2 % — HIGH (ref 42.2–75.2)
NRBC # BLD: 0 /100 WBCS — SIGNIFICANT CHANGE UP (ref 0–0)
PLATELET # BLD AUTO: 66 K/UL — LOW (ref 130–400)
POTASSIUM SERPL-MCNC: 4.4 MMOL/L — SIGNIFICANT CHANGE UP (ref 3.5–5)
POTASSIUM SERPL-SCNC: 4.4 MMOL/L — SIGNIFICANT CHANGE UP (ref 3.5–5)
PROT SERPL-MCNC: 4.2 G/DL — LOW (ref 6–8)
RBC # BLD: 2.76 M/UL — LOW (ref 4.7–6.1)
RBC # FLD: 17.8 % — HIGH (ref 11.5–14.5)
SODIUM SERPL-SCNC: 136 MMOL/L — SIGNIFICANT CHANGE UP (ref 135–146)
WBC # BLD: 2.71 K/UL — LOW (ref 4.8–10.8)
WBC # FLD AUTO: 2.71 K/UL — LOW (ref 4.8–10.8)

## 2021-04-08 PROCEDURE — 11043 DBRDMT MUSC&/FSCA 1ST 20/<: CPT

## 2021-04-08 PROCEDURE — 99233 SBSQ HOSP IP/OBS HIGH 50: CPT

## 2021-04-08 PROCEDURE — 99232 SBSQ HOSP IP/OBS MODERATE 35: CPT

## 2021-04-08 PROCEDURE — 11046 DBRDMT MUSC&/FSCA EA ADDL: CPT

## 2021-04-08 RX ORDER — ERGOCALCIFEROL 1.25 MG/1
50000 CAPSULE ORAL DAILY
Refills: 0 | Status: COMPLETED | OUTPATIENT
Start: 2021-04-08 | End: 2021-04-11

## 2021-04-08 RX ORDER — OXYCODONE AND ACETAMINOPHEN 5; 325 MG/1; MG/1
1 TABLET ORAL EVERY 6 HOURS
Refills: 0 | Status: DISCONTINUED | OUTPATIENT
Start: 2021-04-08 | End: 2021-04-14

## 2021-04-08 RX ORDER — CHOLECALCIFEROL (VITAMIN D3) 125 MCG
4000 CAPSULE ORAL DAILY
Refills: 0 | Status: DISCONTINUED | OUTPATIENT
Start: 2021-04-13 | End: 2021-04-16

## 2021-04-08 RX ORDER — CHOLECALCIFEROL (VITAMIN D3) 125 MCG
50000 CAPSULE ORAL DAILY
Refills: 0 | Status: DISCONTINUED | OUTPATIENT
Start: 2021-04-08 | End: 2021-04-08

## 2021-04-08 RX ADMIN — BICALUTAMIDE 50 MILLIGRAM(S): 50 TABLET, FILM COATED ORAL at 11:02

## 2021-04-08 RX ADMIN — SODIUM CHLORIDE 1 GRAM(S): 9 INJECTION INTRAMUSCULAR; INTRAVENOUS; SUBCUTANEOUS at 05:36

## 2021-04-08 RX ADMIN — CEFTRIAXONE 100 MILLIGRAM(S): 500 INJECTION, POWDER, FOR SOLUTION INTRAMUSCULAR; INTRAVENOUS at 22:24

## 2021-04-08 RX ADMIN — METHOCARBAMOL 500 MILLIGRAM(S): 500 TABLET, FILM COATED ORAL at 13:27

## 2021-04-08 RX ADMIN — Medication 250 MILLIGRAM(S): at 05:36

## 2021-04-08 RX ADMIN — Medication 100 MILLIGRAM(S): at 13:47

## 2021-04-08 RX ADMIN — LIDOCAINE HYDROCHLORIDE AND EPINEPHRINE 20 MILLILITER(S): 10; 10 INJECTION, SOLUTION INFILTRATION; PERINEURAL at 17:00

## 2021-04-08 RX ADMIN — ENOXAPARIN SODIUM 70 MILLIGRAM(S): 100 INJECTION SUBCUTANEOUS at 17:13

## 2021-04-08 RX ADMIN — METHOCARBAMOL 500 MILLIGRAM(S): 500 TABLET, FILM COATED ORAL at 05:36

## 2021-04-08 RX ADMIN — Medication 100 MILLIGRAM(S): at 05:35

## 2021-04-08 RX ADMIN — FENTANYL CITRATE 1 PATCH: 50 INJECTION INTRAVENOUS at 06:40

## 2021-04-08 RX ADMIN — PANTOPRAZOLE SODIUM 40 MILLIGRAM(S): 20 TABLET, DELAYED RELEASE ORAL at 05:36

## 2021-04-08 RX ADMIN — Medication 4 MILLIGRAM(S): at 05:36

## 2021-04-08 RX ADMIN — MIDODRINE HYDROCHLORIDE 10 MILLIGRAM(S): 2.5 TABLET ORAL at 17:14

## 2021-04-08 RX ADMIN — SODIUM ZIRCONIUM CYCLOSILICATE 5 GRAM(S): 10 POWDER, FOR SUSPENSION ORAL at 05:36

## 2021-04-08 RX ADMIN — SODIUM CHLORIDE 1 GRAM(S): 9 INJECTION INTRAMUSCULAR; INTRAVENOUS; SUBCUTANEOUS at 17:13

## 2021-04-08 RX ADMIN — Medication 3 MILLIGRAM(S): at 21:24

## 2021-04-08 RX ADMIN — MIDODRINE HYDROCHLORIDE 10 MILLIGRAM(S): 2.5 TABLET ORAL at 05:36

## 2021-04-08 RX ADMIN — Medication 250 MILLIGRAM(S): at 17:13

## 2021-04-08 RX ADMIN — METHOCARBAMOL 500 MILLIGRAM(S): 500 TABLET, FILM COATED ORAL at 21:24

## 2021-04-08 RX ADMIN — CHLORHEXIDINE GLUCONATE 1 APPLICATION(S): 213 SOLUTION TOPICAL at 05:37

## 2021-04-08 RX ADMIN — GABAPENTIN 100 MILLIGRAM(S): 400 CAPSULE ORAL at 05:36

## 2021-04-08 RX ADMIN — ENOXAPARIN SODIUM 70 MILLIGRAM(S): 100 INJECTION SUBCUTANEOUS at 05:37

## 2021-04-08 RX ADMIN — MIDODRINE HYDROCHLORIDE 10 MILLIGRAM(S): 2.5 TABLET ORAL at 11:01

## 2021-04-08 RX ADMIN — GABAPENTIN 100 MILLIGRAM(S): 400 CAPSULE ORAL at 13:27

## 2021-04-08 RX ADMIN — FENTANYL CITRATE 1 PATCH: 50 INJECTION INTRAVENOUS at 20:52

## 2021-04-08 RX ADMIN — Medication 100 MILLIGRAM(S): at 21:24

## 2021-04-08 RX ADMIN — GABAPENTIN 100 MILLIGRAM(S): 400 CAPSULE ORAL at 21:24

## 2021-04-08 RX ADMIN — Medication 1 APPLICATION(S): at 05:34

## 2021-04-08 RX ADMIN — ERGOCALCIFEROL 50000 UNIT(S): 1.25 CAPSULE ORAL at 12:40

## 2021-04-08 RX ADMIN — LATANOPROST 1 DROP(S): 0.05 SOLUTION/ DROPS OPHTHALMIC; TOPICAL at 21:25

## 2021-04-08 NOTE — PROGRESS NOTE ADULT - SUBJECTIVE AND OBJECTIVE BOX
SUBJECTIVE:    Patient is a 80y old Male who presents with a chief complaint of 80 YEAR OLD MALE C/O MULTIPLE MEDICAL COMPLAINTS . (08 Apr 2021 06:54)    Currently admitted to medicine with the primary diagnosis of Prostate cancer metastatic to multiple sites       Today is hospital day 31d. This morning he is resting comfortably in bed and reports no new issues or overnight events.     PAST MEDICAL & SURGICAL HISTORY  CHF (congestive heart failure)    Inguinal hernia    S/P CABG x 3      SOCIAL HISTORY:  Negative for smoking/alcohol/drug use.     ALLERGIES:  No Known Allergies    MEDICATIONS:  STANDING MEDICATIONS  bicalutamide 50 milliGRAM(s) Oral daily  cefTRIAXone   IVPB 2000 milliGRAM(s) IV Intermittent every 24 hours  chlorhexidine 4% Liquid 1 Application(s) Topical <User Schedule>  cholecalciferol 2000 Unit(s) Oral daily  collagenase Ointment 1 Application(s) Topical daily  Dakins Solution - 1/2 Strength 1 Application(s) Topical two times a day  dexAMETHasone     Tablet 4 milliGRAM(s) Oral daily  enoxaparin Injectable 70 milliGRAM(s) SubCutaneous two times a day  fentaNYL   Patch  25 MICROgram(s)/Hr. 1 Patch Transdermal every 48 hours  gabapentin 100 milliGRAM(s) Oral three times a day  latanoprost 0.005% Ophthalmic Solution 1 Drop(s) Both EYES at bedtime  lidocaine   Patch 2 Patch Transdermal daily  lidocaine 1%/epinephrine 1:100,000 Inj 20 milliLiter(s) Local Injection once  melatonin 3 milliGRAM(s) Oral at bedtime  methocarbamol 500 milliGRAM(s) Oral three times a day  metroNIDAZOLE  IVPB      metroNIDAZOLE  IVPB 500 milliGRAM(s) IV Intermittent every 8 hours  midodrine. 10 milliGRAM(s) Oral three times a day  pantoprazole    Tablet 40 milliGRAM(s) Oral before breakfast  saccharomyces boulardii 250 milliGRAM(s) Oral two times a day  sodium chloride 1 Gram(s) Oral two times a day  sodium chloride 0.9%. 1000 milliLiter(s) IV Continuous <Continuous>  sodium zirconium cyclosilicate 5 Gram(s) Oral two times a day  zoledronic acid IVPB (ZOMETA) (Non - oncologic) 4 milliGRAM(s) IV Intermittent every 4 weeks    PRN MEDICATIONS  acetaminophen   Tablet .. 650 milliGRAM(s) Oral every 6 hours PRN  aluminum hydroxide/magnesium hydroxide/simethicone Suspension 30 milliLiter(s) Oral every 6 hours PRN  brimonidine 0.2% Ophthalmic Solution 1 Drop(s) Both EYES every 8 hours PRN  morphine  - Injectable 2 milliGRAM(s) IV Push every 6 hours PRN  oxycodone    5 mG/acetaminophen 325 mG 1 Tablet(s) Oral every 6 hours PRN    VITALS:   T(F): 96.7  HR: 79  BP: 90/48  RR: 18  SpO2: 96%    LABS:                        7.8    2.71  )-----------( 66       ( 08 Apr 2021 04:30 )             24.7     04-08    136  |  108  |  33<H>  ----------------------------<  104<H>  4.4   |  21  |  0.5<L>    Ca    8.1<L>      08 Apr 2021 04:30  Mg     1.8     04-08    TPro  4.2<L>  /  Alb  2.4<L>  /  TBili  0.3  /  DBili  x   /  AST  17  /  ALT  12  /  AlkPhos  53  04-08              Culture - Blood (collected 06 Apr 2021 09:11)  Source: .Blood None  Preliminary Report (07 Apr 2021 18:01):    No growth to date.    Culture - Blood (collected 06 Apr 2021 09:10)  Source: .Blood None  Preliminary Report (07 Apr 2021 18:01):    No growth to date.          RADIOLOGY:    PHYSICAL EXAM:  GENERAL: NAD, lying in bed comfortably  HEAD:  Atraumatic, Normocephalic  CHEST/LUNG: Clear to auscultation bilaterally; No rales, rhonchi, wheezing, or rubs. Unlabored respirations  HEART: Regular rate and rhythm; No murmurs, rubs, or gallops  ABDOMEN: Bowel sounds present; Soft, Nontender, Nondistended. No hepatomegally  EXTREMITIES:  No clubbing, cyanosis, or edema  NERVOUS SYSTEM:  Alert & Oriented X3, speech clear. No deficits   MSK: Unable to assess  SKIN: No rashes or lesions

## 2021-04-08 NOTE — PROGRESS NOTE ADULT - ATTENDING COMMENTS
Patient seen and examined. Patient has no complaints. OOBTC today no complaints except for low back pain at side of sacral ulcer.  Patient with metastatic prostate cancer s/p chemoradiation, now on hormonal therapy.   Planned for bedside surgical debridement of sacral ulcer by BURN, c/w abx as per ID.   Rest of plan as above.     #Progress Note Handoff  Pending (specify):  debridement   Family discussion: none available   Disposition: STR

## 2021-04-08 NOTE — PROGRESS NOTE ADULT - ASSESSMENT
Mr. Mckee is a 81 yo male hx of AFib, CAD s/p CABG s/p 20+ years ago/ CHF/ right sided large inguinal hernia BIBA from hotel room 2/2 unable to ambulate with right thigh weakness and numbness, now found to have metastatic disease with a mandibular mass and large sacral mass    # Stage IV Prostate cancer with diffuse osseous involvement/ spinal mets with paraspinal mass   - , improved to 66  - Sacral biopsy favors prostate ( neoplastic cells are positive for AE1/AE3, PSA and CK7)  - Jaw mass biopsy : prostate adenocarcinoma  - TLS labs wnl  - CT chest showing [id number] paravertebral lesion with mass effect.   - CT AP no RP bleed, no visceral mets  - MR CTL, brain and  jaw completed, mets throughout cervical and t-spine  - Decadron taper per rad onc  - Continue casodex 50mg  - Received 1 dose of taxotere 20mg/m2 on 3.26.21  - Zometa 4mg 3/27/21, cleared by dental  - Leupron 7.5mg 3/29/21 (1 month dose)  - Completed spine RT, on day 4/5 of palliative jaw radiation  - On discharge, he will continue daily casodex. He will return to [facility] on 4/27/2021 for Lupron injection, which after this will be every 3 months. We will not give taxotere at this time due to his poor performance status. If after rehab he is doing better we will consider restarting it. He is also hospice eligible if he elects for this.      # Pancytopenia: likely due to chemo, also most likely has bone marrow infiltration by his cancer    # Sacral ulcer: s/p debridement 4/8  - On ceftriaxone and flagyl    # Normocytic anemia:  - Per GI, outpatient scope  - Hapto high and retic low, not consistent with hemolysis  - TSAT 67%    # Hyponatremia:  - Fluid restriction    # AFib  - On lovenox BID    # Mild hypercalcemia, likely due to bone mets  - s/p calcitonin   - Monitor PTH, PTHrP and 25OH Vit D     DVT ppx on lovenox BID

## 2021-04-08 NOTE — PROGRESS NOTE ADULT - ASSESSMENT
81 yo male hx of CAD s/p CABG s/p 20+ years ago, HFrEF, right sided large inguinal hernia BIBA from hotel room because he was unable to ambulate due to Rt thigh weakness and numbness, pt also had mandibular swelling for over a year. CT lumbosacral spine showed mass suggestive of mets with obliteration of neural foramina so was started on decadron.  Sacral mass biopsied 3/10 showed prostate adenocarcinoma. Mandibluar mass biopsied 3/19 also showed prostate cancer. Hospital course was complicated by hypovolemic shock likely 2/2 UGIB, required pressors and upgrade to stepdown unit. Pt currently in hospital undergoing chemo and radiotherapy.     #Metastatic prostate cancer   - currently on chemo and radiation   - continue with bicalutamide 50mg daily (started 3/14/21)  - s/p Lupron (GnRH agonist) IM, first dose 3/29 --> next dose in 1 month  - received first dose of taxotere (3/27), will hold unless patient's functional status improves  - Completed RT to sacrum and spine 5 doses (3/24 - 3/30)  - s/p RT 5 DOSES to jaw area for palliation; last RT 4/6  - dental cleared for bisphosphonate, or denosumab s/p zoledronic acid 4mg q4 weeks (3/27)  - continue with Dexamethasone taper; now 4 mg daily for 5 days starting 4/6 (day 3 today)  - continue with  on  Fentanyl patch, c/w  Morphine PRN  - continue with  Neurontin 100 mg TID for neuropathic pain   - palliative care consulted for pain management and GOC; signed off 3/19     # SCC of jaw  - 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 Serum Osm 279  - Glu on BMP 120s-130s  - TSH 0.36  - free water restriction, fluid restriction 1500ml/day  - continue with Sodium chloride tablets  - monitor Na level     # Sacral ulcer   - now fouling smelling  - Burn consulted, will follow up recommendations--> Will perform debridement at bedside today  - ID consulted on 4/3, Ceftriaxone and Flagyl recommended instead of unasyn  - send wound cultures    # Thrombocytopenia/ Leukopenia Improving  Plt 87 --> 68 --> 79  WBC 3.02 --> 2.5 --> 2.6 -> 3.6  Platelets and WBC downtrending since 3/27; improved 4/5  - monitor for any signs of bleeding   - transfuse if plt <10K, or <50K with bleeding  - oncology follow up   - likely due to chemotherapy (taxotere)    # UGI bleed Stable  - s/p 1 unit of PRBC  - No egd done--only coffee ground emesis  - monitor H/H, keep Hb above 7.5 and active type/cross      # Chronic A fib  - on therapeutic dose of Lovenox     # CHFrEF  - fluid restriction 1200 ml in 24 hours   - intake and output monitoring, daily weight   - EF of 35%    # Hypercalcemia/  Vitamin D level Resolved  - likely due to advanced malignancy   - continue with vitamin D supplements 2000units daily   -   - f/u PTHrP still pending    # Hyperkalemia Resolved  s/p one dose of Kayexalate   - low potasium diet    - on [facility]   - Continue to monitor    # Severe protein-calorie malnutrition.  - dietary on board   - supplements added   - patient reports diarrhea with ensure     # Insomnia  - continue with melatonin     #DVT PPx: Lovenox  #GI PPx: Protonix  FULL CODE  Dispo: Needs SNF

## 2021-04-08 NOTE — BRIEF OPERATIVE NOTE - NSICDXBRIEFPROCEDURE_GEN_ALL_CORE_FT
PROCEDURES:  Selective debridement of wound 08-Apr-2021 17:13:19 debridement pressure sore Janak Moss

## 2021-04-08 NOTE — PROGRESS NOTE ADULT - ATTENDING COMMENTS
seen and examined w/ hemonc fellow  note reviewed  plan discussed    1. agree w/ STR placement; aggressive PT follow ups  2. debridement of decubitus ulcers  3. OOB to chair; please make sure he is in chair most of the time during the day, and not in bed  4. psych eval; he is clearly depressed and his meds need to be optimized  5. f/u w/ rad onc after discharge  6. regarding systemic therapy for prostate cancer: continue daily casodex; will continue LUPRON IM as outpatient: he is scheduled for his next dose on 4/27/21 in cancer center, same day as an outpt apt w/ me; will hold off w/ chemo TAXOTERE, and reevaluate in 3 weeks at the visit

## 2021-04-08 NOTE — PROGRESS NOTE ADULT - SUBJECTIVE AND OBJECTIVE BOX
24H events:    He was doing better today, sitting in chair out of bed  He had debridement today  No other events    PAST MEDICAL & SURGICAL HISTORY  CHF (congestive heart failure)    Inguinal hernia    S/P CABG x 3      SOCIAL HISTORY:  Negative for smoking/alcohol/drug use.     ALLERGIES:  No Known Allergies    MEDICATIONS:  STANDING MEDICATIONS  bicalutamide 50 milliGRAM(s) Oral daily  cefTRIAXone   IVPB 2000 milliGRAM(s) IV Intermittent every 24 hours  chlorhexidine 4% Liquid 1 Application(s) Topical <User Schedule>  collagenase Ointment 1 Application(s) Topical daily  Dakins Solution - 1/2 Strength 1 Application(s) Topical two times a day  dexAMETHasone     Tablet 4 milliGRAM(s) Oral daily  enoxaparin Injectable 70 milliGRAM(s) SubCutaneous two times a day  ergocalciferol 37082 Unit(s) Oral daily  fentaNYL   Patch  25 MICROgram(s)/Hr. 1 Patch Transdermal every 48 hours  gabapentin 100 milliGRAM(s) Oral three times a day  latanoprost 0.005% Ophthalmic Solution 1 Drop(s) Both EYES at bedtime  lidocaine   Patch 2 Patch Transdermal daily  melatonin 3 milliGRAM(s) Oral at bedtime  methocarbamol 500 milliGRAM(s) Oral three times a day  metroNIDAZOLE  IVPB      metroNIDAZOLE  IVPB 500 milliGRAM(s) IV Intermittent every 8 hours  midodrine. 10 milliGRAM(s) Oral three times a day  pantoprazole    Tablet 40 milliGRAM(s) Oral before breakfast  saccharomyces boulardii 250 milliGRAM(s) Oral two times a day  sodium chloride 1 Gram(s) Oral two times a day  sodium chloride 0.9%. 1000 milliLiter(s) IV Continuous <Continuous>  sodium zirconium cyclosilicate 5 Gram(s) Oral two times a day  zoledronic acid IVPB (ZOMETA) (Non - oncologic) 4 milliGRAM(s) IV Intermittent every 4 weeks    PRN MEDICATIONS  acetaminophen   Tablet .. 650 milliGRAM(s) Oral every 6 hours PRN  aluminum hydroxide/magnesium hydroxide/simethicone Suspension 30 milliLiter(s) Oral every 6 hours PRN  brimonidine 0.2% Ophthalmic Solution 1 Drop(s) Both EYES every 8 hours PRN  morphine  - Injectable 2 milliGRAM(s) IV Push every 6 hours PRN  oxycodone    5 mG/acetaminophen 325 mG 1 Tablet(s) Oral every 6 hours PRN    VITALS:   T(F): 97.4  HR: 97  BP: 94/50  RR: 18  SpO2: 96%    LABS:                        7.8    2.71  )-----------( 66       ( 08 Apr 2021 04:30 )             24.7     04-08    136  |  108  |  33<H>  ----------------------------<  104<H>  4.4   |  21  |  0.5<L>    Ca    8.1<L>      08 Apr 2021 04:30  Mg     1.8     04-08    TPro  4.2<L>  /  Alb  2.4<L>  /  TBili  0.3  /  DBili  x   /  AST  17  /  ALT  12  /  AlkPhos  53  04-08              Culture - Blood (collected 06 Apr 2021 09:11)  Source: .Blood None  Preliminary Report (07 Apr 2021 18:01):    No growth to date.    Culture - Blood (collected 06 Apr 2021 09:10)  Source: .Blood None  Preliminary Report (07 Apr 2021 18:01):    No growth to date.          RADIOLOGY:    PHYSICAL EXAM:  GEN: No acute distress  HENT: NCAT, EOMI  LYMPH: No appreciable adenopathy  LUNGS: No respiratory distress, clear to auscultation bilaterally   HEART: regular rate and rhythm  ABD: Soft, non-tender, non-distended  NEURO: AAOX3

## 2021-04-09 LAB
ALBUMIN SERPL ELPH-MCNC: 2.3 G/DL — LOW (ref 3.5–5.2)
ALP SERPL-CCNC: 55 U/L — SIGNIFICANT CHANGE UP (ref 30–115)
ALT FLD-CCNC: 13 U/L — SIGNIFICANT CHANGE UP (ref 0–41)
ANION GAP SERPL CALC-SCNC: 6 MMOL/L — LOW (ref 7–14)
AST SERPL-CCNC: 15 U/L — SIGNIFICANT CHANGE UP (ref 0–41)
BASOPHILS # BLD AUTO: 0 K/UL — SIGNIFICANT CHANGE UP (ref 0–0.2)
BASOPHILS NFR BLD AUTO: 0 % — SIGNIFICANT CHANGE UP (ref 0–1)
BILIRUB SERPL-MCNC: 0.2 MG/DL — SIGNIFICANT CHANGE UP (ref 0.2–1.2)
BLD GP AB SCN SERPL QL: SIGNIFICANT CHANGE UP
BUN SERPL-MCNC: 29 MG/DL — HIGH (ref 10–20)
CALCIUM SERPL-MCNC: 8.4 MG/DL — LOW (ref 8.5–10.1)
CHLORIDE SERPL-SCNC: 108 MMOL/L — SIGNIFICANT CHANGE UP (ref 98–110)
CO2 SERPL-SCNC: 21 MMOL/L — SIGNIFICANT CHANGE UP (ref 17–32)
CREAT SERPL-MCNC: 0.5 MG/DL — LOW (ref 0.7–1.5)
EOSINOPHIL # BLD AUTO: 0 K/UL — SIGNIFICANT CHANGE UP (ref 0–0.7)
EOSINOPHIL NFR BLD AUTO: 0 % — SIGNIFICANT CHANGE UP (ref 0–8)
GLUCOSE SERPL-MCNC: 92 MG/DL — SIGNIFICANT CHANGE UP (ref 70–99)
HCT VFR BLD CALC: 21.9 % — LOW (ref 42–52)
HGB BLD-MCNC: 7.1 G/DL — LOW (ref 14–18)
IMM GRANULOCYTES NFR BLD AUTO: 1.3 % — HIGH (ref 0.1–0.3)
LYMPHOCYTES # BLD AUTO: 0.08 K/UL — LOW (ref 1.2–3.4)
LYMPHOCYTES # BLD AUTO: 3.4 % — LOW (ref 20.5–51.1)
MAGNESIUM SERPL-MCNC: 1.7 MG/DL — LOW (ref 1.8–2.4)
MCHC RBC-ENTMCNC: 28.6 PG — SIGNIFICANT CHANGE UP (ref 27–31)
MCHC RBC-ENTMCNC: 32.4 G/DL — SIGNIFICANT CHANGE UP (ref 32–37)
MCV RBC AUTO: 88.3 FL — SIGNIFICANT CHANGE UP (ref 80–94)
MONOCYTES # BLD AUTO: 0.07 K/UL — LOW (ref 0.1–0.6)
MONOCYTES NFR BLD AUTO: 2.9 % — SIGNIFICANT CHANGE UP (ref 1.7–9.3)
NEUTROPHILS # BLD AUTO: 2.2 K/UL — SIGNIFICANT CHANGE UP (ref 1.4–6.5)
NEUTROPHILS NFR BLD AUTO: 92.4 % — HIGH (ref 42.2–75.2)
NRBC # BLD: 0 /100 WBCS — SIGNIFICANT CHANGE UP (ref 0–0)
PLATELET # BLD AUTO: 67 K/UL — LOW (ref 130–400)
POTASSIUM SERPL-MCNC: 4 MMOL/L — SIGNIFICANT CHANGE UP (ref 3.5–5)
POTASSIUM SERPL-SCNC: 4 MMOL/L — SIGNIFICANT CHANGE UP (ref 3.5–5)
PROT SERPL-MCNC: 4.1 G/DL — LOW (ref 6–8)
RBC # BLD: 2.48 M/UL — LOW (ref 4.7–6.1)
RBC # FLD: 17.4 % — HIGH (ref 11.5–14.5)
SODIUM SERPL-SCNC: 135 MMOL/L — SIGNIFICANT CHANGE UP (ref 135–146)
WBC # BLD: 2.38 K/UL — LOW (ref 4.8–10.8)
WBC # FLD AUTO: 2.38 K/UL — LOW (ref 4.8–10.8)

## 2021-04-09 PROCEDURE — 99233 SBSQ HOSP IP/OBS HIGH 50: CPT

## 2021-04-09 PROCEDURE — 99231 SBSQ HOSP IP/OBS SF/LOW 25: CPT

## 2021-04-09 RX ORDER — HYDROMORPHONE HYDROCHLORIDE 2 MG/ML
1 INJECTION INTRAMUSCULAR; INTRAVENOUS; SUBCUTANEOUS EVERY 4 HOURS
Refills: 0 | Status: DISCONTINUED | OUTPATIENT
Start: 2021-04-09 | End: 2021-04-14

## 2021-04-09 RX ORDER — HYDROMORPHONE HYDROCHLORIDE 2 MG/ML
0.5 INJECTION INTRAMUSCULAR; INTRAVENOUS; SUBCUTANEOUS EVERY 4 HOURS
Refills: 0 | Status: DISCONTINUED | OUTPATIENT
Start: 2021-04-09 | End: 2021-04-14

## 2021-04-09 RX ORDER — SODIUM CHLORIDE 9 MG/ML
500 INJECTION INTRAMUSCULAR; INTRAVENOUS; SUBCUTANEOUS ONCE
Refills: 0 | Status: COMPLETED | OUTPATIENT
Start: 2021-04-09 | End: 2021-04-09

## 2021-04-09 RX ORDER — DIPHENHYDRAMINE HYDROCHLORIDE AND LIDOCAINE HYDROCHLORIDE AND ALUMINUM HYDROXIDE AND MAGNESIUM HYDRO
10 KIT
Refills: 0 | Status: DISCONTINUED | OUTPATIENT
Start: 2021-04-09 | End: 2021-04-16

## 2021-04-09 RX ORDER — MAGNESIUM SULFATE 500 MG/ML
1 VIAL (ML) INJECTION ONCE
Refills: 0 | Status: COMPLETED | OUTPATIENT
Start: 2021-04-09 | End: 2021-04-09

## 2021-04-09 RX ORDER — MORPHINE SULFATE 50 MG/1
2 CAPSULE, EXTENDED RELEASE ORAL ONCE
Refills: 0 | Status: DISCONTINUED | OUTPATIENT
Start: 2021-04-09 | End: 2021-04-09

## 2021-04-09 RX ADMIN — MORPHINE SULFATE 2 MILLIGRAM(S): 50 CAPSULE, EXTENDED RELEASE ORAL at 09:58

## 2021-04-09 RX ADMIN — METHOCARBAMOL 500 MILLIGRAM(S): 500 TABLET, FILM COATED ORAL at 21:27

## 2021-04-09 RX ADMIN — CEFTRIAXONE 100 MILLIGRAM(S): 500 INJECTION, POWDER, FOR SOLUTION INTRAMUSCULAR; INTRAVENOUS at 21:26

## 2021-04-09 RX ADMIN — MORPHINE SULFATE 2 MILLIGRAM(S): 50 CAPSULE, EXTENDED RELEASE ORAL at 06:20

## 2021-04-09 RX ADMIN — ENOXAPARIN SODIUM 70 MILLIGRAM(S): 100 INJECTION SUBCUTANEOUS at 17:04

## 2021-04-09 RX ADMIN — SODIUM CHLORIDE 1 GRAM(S): 9 INJECTION INTRAMUSCULAR; INTRAVENOUS; SUBCUTANEOUS at 06:08

## 2021-04-09 RX ADMIN — METHOCARBAMOL 500 MILLIGRAM(S): 500 TABLET, FILM COATED ORAL at 06:06

## 2021-04-09 RX ADMIN — MORPHINE SULFATE 2 MILLIGRAM(S): 50 CAPSULE, EXTENDED RELEASE ORAL at 09:29

## 2021-04-09 RX ADMIN — ENOXAPARIN SODIUM 70 MILLIGRAM(S): 100 INJECTION SUBCUTANEOUS at 06:06

## 2021-04-09 RX ADMIN — MIDODRINE HYDROCHLORIDE 10 MILLIGRAM(S): 2.5 TABLET ORAL at 12:01

## 2021-04-09 RX ADMIN — FENTANYL CITRATE 1 PATCH: 50 INJECTION INTRAVENOUS at 12:04

## 2021-04-09 RX ADMIN — GABAPENTIN 100 MILLIGRAM(S): 400 CAPSULE ORAL at 12:04

## 2021-04-09 RX ADMIN — MORPHINE SULFATE 2 MILLIGRAM(S): 50 CAPSULE, EXTENDED RELEASE ORAL at 07:30

## 2021-04-09 RX ADMIN — ERGOCALCIFEROL 50000 UNIT(S): 1.25 CAPSULE ORAL at 12:01

## 2021-04-09 RX ADMIN — MIDODRINE HYDROCHLORIDE 10 MILLIGRAM(S): 2.5 TABLET ORAL at 17:04

## 2021-04-09 RX ADMIN — CHLORHEXIDINE GLUCONATE 1 APPLICATION(S): 213 SOLUTION TOPICAL at 06:06

## 2021-04-09 RX ADMIN — HYDROMORPHONE HYDROCHLORIDE 1 MILLIGRAM(S): 2 INJECTION INTRAMUSCULAR; INTRAVENOUS; SUBCUTANEOUS at 12:40

## 2021-04-09 RX ADMIN — Medication 100 GRAM(S): at 10:32

## 2021-04-09 RX ADMIN — Medication 100 MILLIGRAM(S): at 21:26

## 2021-04-09 RX ADMIN — Medication 250 MILLIGRAM(S): at 06:06

## 2021-04-09 RX ADMIN — FENTANYL CITRATE 1 PATCH: 50 INJECTION INTRAVENOUS at 12:03

## 2021-04-09 RX ADMIN — PANTOPRAZOLE SODIUM 40 MILLIGRAM(S): 20 TABLET, DELAYED RELEASE ORAL at 06:06

## 2021-04-09 RX ADMIN — Medication 100 MILLIGRAM(S): at 06:06

## 2021-04-09 RX ADMIN — GABAPENTIN 100 MILLIGRAM(S): 400 CAPSULE ORAL at 21:26

## 2021-04-09 RX ADMIN — Medication 3 MILLIGRAM(S): at 21:27

## 2021-04-09 RX ADMIN — BICALUTAMIDE 50 MILLIGRAM(S): 50 TABLET, FILM COATED ORAL at 12:01

## 2021-04-09 RX ADMIN — MIDODRINE HYDROCHLORIDE 10 MILLIGRAM(S): 2.5 TABLET ORAL at 06:06

## 2021-04-09 RX ADMIN — SODIUM CHLORIDE 500 MILLILITER(S): 9 INJECTION INTRAMUSCULAR; INTRAVENOUS; SUBCUTANEOUS at 15:59

## 2021-04-09 RX ADMIN — METHOCARBAMOL 500 MILLIGRAM(S): 500 TABLET, FILM COATED ORAL at 12:04

## 2021-04-09 RX ADMIN — MORPHINE SULFATE 2 MILLIGRAM(S): 50 CAPSULE, EXTENDED RELEASE ORAL at 01:00

## 2021-04-09 RX ADMIN — Medication 100 MILLIGRAM(S): at 12:26

## 2021-04-09 RX ADMIN — Medication 4 MILLIGRAM(S): at 06:06

## 2021-04-09 RX ADMIN — LATANOPROST 1 DROP(S): 0.05 SOLUTION/ DROPS OPHTHALMIC; TOPICAL at 21:27

## 2021-04-09 RX ADMIN — GABAPENTIN 100 MILLIGRAM(S): 400 CAPSULE ORAL at 06:06

## 2021-04-09 RX ADMIN — FENTANYL CITRATE 1 PATCH: 50 INJECTION INTRAVENOUS at 09:59

## 2021-04-09 RX ADMIN — MORPHINE SULFATE 2 MILLIGRAM(S): 50 CAPSULE, EXTENDED RELEASE ORAL at 00:34

## 2021-04-09 RX ADMIN — HYDROMORPHONE HYDROCHLORIDE 1 MILLIGRAM(S): 2 INJECTION INTRAMUSCULAR; INTRAVENOUS; SUBCUTANEOUS at 12:29

## 2021-04-09 RX ADMIN — DIPHENHYDRAMINE HYDROCHLORIDE AND LIDOCAINE HYDROCHLORIDE AND ALUMINUM HYDROXIDE AND MAGNESIUM HYDRO 10 MILLILITER(S): KIT at 12:25

## 2021-04-09 NOTE — PROGRESS NOTE ADULT - ATTENDING COMMENTS
dressing changed--> sacrum with viable tissue post debridement with residual devitalized tissue--> santyl/ dakins--> no further surgery needed

## 2021-04-09 NOTE — PROGRESS NOTE ADULT - ASSESSMENT
ASSESSMENT  79 yo male hx of CAD s/p CABG s/p 20+ years ago, HFrEF, right sided large inguinal hernia BIBA from hotel room because he was unable to ambulate due to Rt thigh weakness and numbness, pt also had mandibular swelling for over a year. CT lumbosacral spine showed mass suggestive of mets with obliteration of neural foramina so was started on decadron.  Sacral mass biopsied 3/10 showed prostate adenocarcinoma. Mandibluar mass biopsied 3/19 also showed prostate cancer. Hospital course was complicated by hypovolemic shock likely 2/2 UGIB, required pressors and upgrade to stepdown unit. Pt currently in hospital undergoing chemo and radiotherapy.     IMPRESSION  #Prostate Cancer with mets, including Sacral lesions  #SCC of Jaw  #Sacral Ulcer  - s/p bedside debridement with necrotic tissue 4/9   #Leukopenia   #CAD s/p CABG  #Abx allergy: NKDA    Creatinine, Serum: 1.2 mg/dL (04.06.21 @ 09:10)  Weight (kg): 73.8 (15 Mar 2021 00:51)    RECOMMENDATIONS  - no cultures sent --can send with Augmentin 875/125 mg BID to complete 14 days from debridement     Please call or message on [employer] Teams if with any questions.  Spectra [phone]

## 2021-04-09 NOTE — PROGRESS NOTE ADULT - SUBJECTIVE AND OBJECTIVE BOX
80Y male hx of CAD s/p CABG s/p 20+ years ago/ CHF/ right sided large inguinal hernia, seen by BURN team for sacral wound management.    Vital Signs Last 24 Hrs  T(C): 36.5 (09 Apr 2021 04:31), Max: 36.5 (09 Apr 2021 04:31)  T(F): 97.7 (09 Apr 2021 04:31), Max: 97.7 (09 Apr 2021 04:31)  HR: 93 (09 Apr 2021 04:31) (87 - 97)  BP: 83/52 (09 Apr 2021 04:31) (83/52 - 103/57)  RR: 18 (09 Apr 2021 04:31) (18 - 18)  SpO2: 97% (09 Apr 2021 07:30) (97% - 97%)    Allergies  No Known Allergies    Lab Results:                        7.1    2.38  )-----------( 67       ( 09 Apr 2021 05:54 )             21.9   04-09    135  |  108  |  29<H>  ----------------------------<  92  4.0   |  21  |  0.5<L>    Ca    8.4<L>      09 Apr 2021 05:54  Mg     1.7     04-09    TPro  4.1<L>  /  Alb  2.3<L>  /  TBili  0.2  /  DBili  x   /  AST  15  /  ALT  13  /  AlkPhos  55  04-09      MEDICATIONS  (STANDING):  bicalutamide 50 milliGRAM(s) Oral daily  cefTRIAXone   IVPB 2000 milliGRAM(s) IV Intermittent every 24 hours  chlorhexidine 4% Liquid 1 Application(s) Topical <User Schedule>  cholecalciferol 2000 Unit(s) Oral daily  collagenase Ointment 1 Application(s) Topical daily  Dakins Solution - 1/2 Strength 1 Application(s) Topical two times a day  dexAMETHasone     Tablet 4 milliGRAM(s) Oral daily  dextrose 5% + sodium chloride 0.45%. 1000 milliLiter(s) (50 mL/Hr) IV Continuous <Continuous>  enoxaparin Injectable 70 milliGRAM(s) SubCutaneous two times a day  fentaNYL   Patch  25 MICROgram(s)/Hr. 1 Patch Transdermal every 48 hours  gabapentin 100 milliGRAM(s) Oral three times a day  latanoprost 0.005% Ophthalmic Solution 1 Drop(s) Both EYES at bedtime  lidocaine   Patch 2 Patch Transdermal daily  melatonin 3 milliGRAM(s) Oral at bedtime  methocarbamol 500 milliGRAM(s) Oral three times a day  metroNIDAZOLE  IVPB      metroNIDAZOLE  IVPB 500 milliGRAM(s) IV Intermittent every 8 hours  midodrine. 10 milliGRAM(s) Oral three times a day  pantoprazole    Tablet 40 milliGRAM(s) Oral before breakfast  saccharomyces boulardii 250 milliGRAM(s) Oral two times a day  sodium chloride 1 Gram(s) Oral two times a day  sodium zirconium cyclosilicate 5 Gram(s) Oral two times a day  zoledronic acid IVPB (ZOMETA) (Non - oncologic) 4 milliGRAM(s) IV Intermittent every 4 weeks    MEDICATIONS  (PRN):  acetaminophen   Tablet .. 650 milliGRAM(s) Oral every 6 hours PRN Moderate Pain (4 - 6)  aluminum hydroxide/magnesium hydroxide/simethicone Suspension 30 milliLiter(s) Oral every 6 hours PRN Dyspepsia  brimonidine 0.2% Ophthalmic Solution 1 Drop(s) Both EYES every 8 hours PRN dry eyes  morphine  - Injectable 2 milliGRAM(s) IV Push every 6 hours PRN Severe Pain (7 - 10)  oxycodone    5 mG/acetaminophen 325 mG 1 Tablet(s) Oral every 6 hours PRN Severe Pain (7 - 10)      PHYSICAL EXAM:  GENERAL: seen on bedside, A&O x3, cooperative, not in distress  Wound: Full thickness wound to sacrum approximately 4x5cm, pink center, no obvious necrotic tissue, mild serosang drainage, no gross bleeding, no erythema, no swelling, no bleeding, no pus drainage.

## 2021-04-09 NOTE — PROGRESS NOTE ADULT - SUBJECTIVE AND OBJECTIVE BOX
RYLEY, MANUELITO  80y, Male  Allergy: No Known Allergies      LOS  32d    CHIEF COMPLAINT: 80 YEAR OLD MALE C/O MULTIPLE MEDICAL COMPLAINTS . (09 Apr 2021 10:24)      INTERVAL EVENTS/HPI  - No acute events overnight  - T(F): , Max: 98.7 (04-09-21 @ 15:45)  - Denies any worsening symptoms  - Tolerating medication  - WBC Count: 2.38 (04-09-21 @ 05:54)  WBC Count: 2.71 (04-08-21 @ 04:30)     - Creatinine, Serum: 0.5 (04-09-21 @ 05:54)  Creatinine, Serum: 0.5 (04-08-21 @ 04:30)       ROS  General: Denies rigors, nightsweats  HEENT: Denies headache, rhinorrhea, sore throat, eye pain  CV: Denies CP, palpitations  PULM: Denies wheezing, hemoptysis  GI: Denies hematemesis, hematochezia, melena  : Denies discharge, hematuria  MSK: Denies arthralgias, myalgias  SKIN: Denies rash, lesions  NEURO: Denies paresthesias, weakness  PSYCH: Denies depression, anxiety    VITALS:  T(F): 98.7, Max: 98.7 (04-09-21 @ 15:45)  HR: 82  BP: 91/52  RR: 19Vital Signs Last 24 Hrs  T(C): 37.1 (09 Apr 2021 15:45), Max: 37.1 (09 Apr 2021 15:45)  T(F): 98.7 (09 Apr 2021 15:45), Max: 98.7 (09 Apr 2021 15:45)  HR: 82 (09 Apr 2021 15:45) (82 - 93)  BP: 91/52 (09 Apr 2021 15:45) (81/46 - 103/57)  BP(mean): --  RR: 19 (09 Apr 2021 13:16) (18 - 19)  SpO2: 97% (09 Apr 2021 07:30) (97% - 97%)    PHYSICAL EXAM:  Gen: NAD, resting in bed  HEENT: Normocephalic, atraumatic  Neck: supple, no lymphadenopathy  CV: Regular rate & regular rhythm  Lungs: decreased BS at bases, no fremitus  Abdomen: Soft, BS present  Ext: Warm, well perfused  Neuro: non focal, awake  Skin: no rash, no erythema  Lines: no phlebitis    FH: Non-contributory  Social Hx: Non-contributory    TESTS & MEASUREMENTS:                        7.1    2.38  )-----------( 67       ( 09 Apr 2021 05:54 )             21.9     04-09    135  |  108  |  29<H>  ----------------------------<  92  4.0   |  21  |  0.5<L>    Ca    8.4<L>      09 Apr 2021 05:54  Mg     1.7     04-09    TPro  4.1<L>  /  Alb  2.3<L>  /  TBili  0.2  /  DBili  x   /  AST  15  /  ALT  13  /  AlkPhos  55  04-09    eGFR if Non African American: 103 mL/min/1.73M2 (04-09-21 @ 05:54)  eGFR if African American: 119 mL/min/1.73M2 (04-09-21 @ 05:54)    LIVER FUNCTIONS - ( 09 Apr 2021 05:54 )  Alb: 2.3 g/dL / Pro: 4.1 g/dL / ALK PHOS: 55 U/L / ALT: 13 U/L / AST: 15 U/L / GGT: x               Culture - Blood (collected 04-06-21 @ 09:11)  Source: .Blood None  Preliminary Report (04-07-21 @ 18:01):    No growth to date.    Culture - Blood (collected 04-06-21 @ 09:10)  Source: .Blood None  Preliminary Report (04-07-21 @ 18:01):    No growth to date.            INFECTIOUS DISEASES TESTING  COVID-19 PCR: NotDetec (03-31-21 @ 16:10)  MRSA PCR Result.: Negative (03-31-21 @ 11:15)  COVID-19 PCR: NotDetec (03-15-21 @ 09:46)  Rapid RVP Result: NotDetec (03-08-21 @ 00:45)      INFLAMMATORY MARKERS      RADIOLOGY & ADDITIONAL TESTS:  I have personally reviewed the last available Chest xray  CXR      CT      CARDIOLOGY TESTING      MEDICATIONS  bicalutamide 50 Oral daily  cefTRIAXone   IVPB 2000 IV Intermittent every 24 hours  chlorhexidine 4% Liquid 1 Topical <User Schedule>  collagenase Ointment 1 Topical daily  Dakins Solution - 1/2 Strength 1 Topical two times a day  dexAMETHasone     Tablet 4 Oral daily  enoxaparin Injectable 70 SubCutaneous two times a day  ergocalciferol 49417 Oral daily  FIRST- Mouthwash  BLM 10 Swish and Spit four times a day  gabapentin 100 Oral three times a day  latanoprost 0.005% Ophthalmic Solution 1 Both EYES at bedtime  lidocaine   Patch 2 Transdermal daily  melatonin 3 Oral at bedtime  methocarbamol 500 Oral three times a day  metroNIDAZOLE  IVPB 500 IV Intermittent every 8 hours  metroNIDAZOLE  IVPB     midodrine. 10 Oral three times a day  pantoprazole    Tablet 40 Oral before breakfast  saccharomyces boulardii 250 Oral two times a day  sodium chloride 1 Oral two times a day  sodium chloride 0.9%. 1000 IV Continuous <Continuous>  sodium zirconium cyclosilicate 5 Oral two times a day  zoledronic acid IVPB (ZOMETA) (Non - oncologic) 4 IV Intermittent every 4 weeks      WEIGHT  Weight (kg): 73.8 (03-15-21 @ 00:51)  Creatinine, Serum: 0.5 mg/dL (04-09-21 @ 05:54)      ANTIBIOTICS:  cefTRIAXone   IVPB 2000 milliGRAM(s) IV Intermittent every 24 hours  metroNIDAZOLE  IVPB      metroNIDAZOLE  IVPB 500 milliGRAM(s) IV Intermittent every 8 hours      All available historical records have been reviewed

## 2021-04-09 NOTE — PROGRESS NOTE ADULT - SUBJECTIVE AND OBJECTIVE BOX
SUBJECTIVE:    Patient is a 80y old Male who presents with a chief complaint of 80 YEAR OLD MALE C/O MULTIPLE MEDICAL COMPLAINTS . (09 Apr 2021 09:24)    Currently admitted to medicine with the primary diagnosis of Prostate cancer metastatic to multiple sites       Today is hospital day 32d. This morning he is resting comfortably in bed. Overnight he was in a lot of pain.    PAST MEDICAL & SURGICAL HISTORY  CHF (congestive heart failure)    Inguinal hernia    S/P CABG x 3      SOCIAL HISTORY:  Negative for smoking/alcohol/drug use.     ALLERGIES:  No Known Allergies    MEDICATIONS:  STANDING MEDICATIONS  bicalutamide 50 milliGRAM(s) Oral daily  cefTRIAXone   IVPB 2000 milliGRAM(s) IV Intermittent every 24 hours  chlorhexidine 4% Liquid 1 Application(s) Topical <User Schedule>  collagenase Ointment 1 Application(s) Topical daily  Dakins Solution - 1/2 Strength 1 Application(s) Topical two times a day  dexAMETHasone     Tablet 4 milliGRAM(s) Oral daily  enoxaparin Injectable 70 milliGRAM(s) SubCutaneous two times a day  ergocalciferol 05078 Unit(s) Oral daily  fentaNYL   Patch  25 MICROgram(s)/Hr. 1 Patch Transdermal every 48 hours  gabapentin 100 milliGRAM(s) Oral three times a day  latanoprost 0.005% Ophthalmic Solution 1 Drop(s) Both EYES at bedtime  lidocaine   Patch 2 Patch Transdermal daily  magnesium sulfate  IVPB 1 Gram(s) IV Intermittent once  melatonin 3 milliGRAM(s) Oral at bedtime  methocarbamol 500 milliGRAM(s) Oral three times a day  metroNIDAZOLE  IVPB      metroNIDAZOLE  IVPB 500 milliGRAM(s) IV Intermittent every 8 hours  midodrine. 10 milliGRAM(s) Oral three times a day  pantoprazole    Tablet 40 milliGRAM(s) Oral before breakfast  saccharomyces boulardii 250 milliGRAM(s) Oral two times a day  sodium chloride 1 Gram(s) Oral two times a day  sodium chloride 0.9%. 1000 milliLiter(s) IV Continuous <Continuous>  sodium zirconium cyclosilicate 5 Gram(s) Oral two times a day  zoledronic acid IVPB (ZOMETA) (Non - oncologic) 4 milliGRAM(s) IV Intermittent every 4 weeks    PRN MEDICATIONS  acetaminophen   Tablet .. 650 milliGRAM(s) Oral every 6 hours PRN  aluminum hydroxide/magnesium hydroxide/simethicone Suspension 30 milliLiter(s) Oral every 6 hours PRN  brimonidine 0.2% Ophthalmic Solution 1 Drop(s) Both EYES every 8 hours PRN  morphine  - Injectable 2 milliGRAM(s) IV Push every 6 hours PRN  oxycodone    5 mG/acetaminophen 325 mG 1 Tablet(s) Oral every 6 hours PRN    VITALS:   T(F): 97.7  HR: 93  BP: 83/52  RR: 18  SpO2: 97%    LABS:                        7.1    2.38  )-----------( 67       ( 09 Apr 2021 05:54 )             21.9     04-09    135  |  108  |  29<H>  ----------------------------<  92  4.0   |  21  |  0.5<L>    Ca    8.4<L>      09 Apr 2021 05:54  Mg     1.7     04-09    TPro  4.1<L>  /  Alb  2.3<L>  /  TBili  0.2  /  DBili  x   /  AST  15  /  ALT  13  /  AlkPhos  55  04-09                  RADIOLOGY:    PHYSICAL EXAM:  GENERAL: NAD, lying in bed comfortably  HEAD:  Atraumatic, Normocephalic  CHEST/LUNG: Clear to auscultation bilaterally; No rales, rhonchi, wheezing, or rubs. Unlabored respirations  HEART: Regular rate and rhythm; No murmurs, rubs, or gallops  ABDOMEN: Bowel sounds present; Soft, Nontender, Nondistended. No hepatomegally  EXTREMITIES:  No clubbing, cyanosis, or edema  NERVOUS SYSTEM:  Alert & Oriented X3, speech clear. No deficits   MSK: Unable to assess  SKIN: No rashes or lesions

## 2021-04-09 NOTE — PROGRESS NOTE ADULT - ASSESSMENT
79 yo male hx of CAD s/p CABG s/p 20+ years ago, HFrEF, right sided large inguinal hernia BIBA from hotel room because he was unable to ambulate due to Rt thigh weakness and numbness, pt also had mandibular swelling for over a year. CT lumbosacral spine showed mass suggestive of mets with obliteration of neural foramina so was started on decadron.  Sacral mass biopsied 3/10 showed prostate adenocarcinoma. Mandibluar mass biopsied 3/19 also showed prostate cancer. Hospital course was complicated by hypovolemic shock likely 2/2 UGIB, required pressors and upgrade to stepdown unit. Pt currently in hospital undergoing chemo and radiotherapy.     #Metastatic prostate cancer   - currently on chemo and radiation   - continue with bicalutamide 50mg daily (started 3/14/21)  - s/p Lupron (GnRH agonist) IM, first dose 3/29 --> next dose in 1 month  - received first dose of taxotere (3/27), will hold unless patient's functional status improves  - Completed RT to sacrum and spine 5 doses (3/24 - 3/30)  - s/p RT 5 DOSES to jaw area for palliation; last RT 4/6  - dental cleared for bisphosphonate, or denosumab s/p zoledronic acid 4mg q4 weeks (3/27)  - continue with Dexamethasone taper; now 4 mg daily for 5 days starting 4/6 (day 4 today)  - continue with  on  Fentanyl patch, c/w  Morphine PRN  - continue with  Neurontin 100 mg TID for neuropathic pain   - palliative care consulted for pain management and GOC; signed off 3/19     # SCC of jaw  - 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  - Evaluated by Dental 3/24 - no intervention   - dental cleared for bisphosphonate, or denosumab  - s/p zolendronic acid (3/27)    # Hypotension  Improved today  Complains of on and off lightheadedness  - c/w light fluids (NS)  - continue to monitor    # 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 Serum Osm 279  - Glu on BMP 120s-130s  - TSH 0.36  - free water restriction, fluid restriction 1500ml/day  - continue with Sodium chloride tablets  - monitor Na level     # Sacral ulcer   - now fouling smelling  - Burn performed debridement at bedside 4/8  - Wound cultures not sent; will follow up with ID for discharge abx    # Thrombocytopenia/ Leukopenia Improving  Plt 87 --> 68 --> 79  WBC 3.02 --> 2.5 --> 2.6 -> 3.6  Platelets and WBC downtrending since 3/27; improved 4/5  - monitor for any signs of bleeding   - transfuse if plt <10K, or <50K with bleeding  - oncology follow up   - likely due to chemotherapy (taxotere)    # UGI bleed   - s/p 1 unit of PRBC  - No egd done--only coffee ground emesis  - Hb decreasing; today 7.1 today (baseline 9-10)  - Maintain active type and screen; Next on 4/12      # Chronic A fib  - on therapeutic dose of Lovenox     # CHFrEF  - fluid restriction 1200 ml in 24 hours   - intake and output monitoring, daily weight   - EF of 35%    # Hypercalcemia/  Vitamin D level Resolved  - likely due to advanced malignancy   - continue with vitamin D supplements 2000units daily   -     # Hyperkalemia Resolved  s/p one dose of Kayexalate   - low potasium diet    - on [facility]   - Continue to monitor    # Severe protein-calorie malnutrition.  - dietary on board   - supplements added   - patient reports diarrhea with ensure     # Insomnia  - continue with melatonin     #DVT PPx: Lovenox  #GI PPx: Protonix  FULL CODE  Dispo: Needs SNF 81 yo male hx of CAD s/p CABG s/p 20+ years ago, HFrEF, right sided large inguinal hernia BIBA from hotel room because he was unable to ambulate due to Rt thigh weakness and numbness, pt also had mandibular swelling for over a year. CT lumbosacral spine showed mass suggestive of mets with obliteration of neural foramina so was started on decadron.  Sacral mass biopsied 3/10 showed prostate adenocarcinoma. Mandibluar mass biopsied 3/19 also showed prostate cancer. Hospital course was complicated by hypovolemic shock likely 2/2 UGIB, required pressors and upgrade to stepdown unit. Pt currently in hospital undergoing chemo and radiotherapy.     #Metastatic prostate cancer   - currently on chemo and radiation   - continue with bicalutamide 50mg daily (started 3/14/21)  - s/p Lupron (GnRH agonist) IM, first dose 3/29 --> next dose in 1 month  - received first dose of taxotere (3/27), will hold unless patient's functional status improves  - Completed RT to sacrum and spine 5 doses (3/24 - 3/30)  - s/p RT 5 DOSES to jaw area for palliation; last RT 4/6  - dental cleared for bisphosphonate, or denosumab s/p zoledronic acid 4mg q4 weeks (3/27)  - continue with Dexamethasone taper; now 4 mg daily for 5 days starting 4/6 (day 4 today)  - continue with  on  Fentanyl patch, c/w  Morphine PRN  - continue with  Neurontin 100 mg TID for neuropathic pain   - palliative care consulted for pain management and GOC; signed off 3/19     # SCC of jaw  - 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  - Evaluated by Dental 3/24 - no intervention   - dental cleared for bisphosphonate, or denosumab  - s/p zolendronic acid (3/27)    # Hypotension  Complains of on and off lightheadedness  - c/w light fluids (NS)  - c/w midodrine q8  - continue to monitor    # 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 Serum Osm 279  - Glu on BMP 120s-130s  - TSH 0.36  - free water restriction, fluid restriction 1500ml/day  - continue with Sodium chloride tablets  - monitor Na level     # Sacral ulcer   - now fouling smelling  - Burn performed debridement at bedside 4/8  - Wound cultures not sent; will follow up with ID for discharge abx    # Thrombocytopenia/ Leukopenia Improving  Plt 87 --> 68 --> 79  WBC 3.02 --> 2.5 --> 2.6 -> 3.6  Platelets and WBC downtrending since 3/27; improved 4/5  - monitor for any signs of bleeding   - transfuse if plt <10K, or <50K with bleeding  - oncology follow up   - likely due to chemotherapy (taxotere)    # UGI bleed   - s/p 1 unit of PRBC  - No egd done--only coffee ground emesis  - Hb decreasing; today 7.1 today (baseline 9-10)  - Will get 1 unit today  - Maintain active type and screen; Next on 4/12      # Chronic A fib  - on therapeutic dose of Lovenox     # CHFrEF  - fluid restriction 1200 ml in 24 hours   - intake and output monitoring, daily weight   - EF of 35%    # Hypercalcemia/  Vitamin D level Resolved  - likely due to advanced malignancy   - continue with vitamin D supplements 2000units daily   -     # Hyperkalemia Resolved  s/p one dose of Kayexalate   - low potasium diet    - on [facility]   - Continue to monitor    # Severe protein-calorie malnutrition.  - dietary on board   - supplements added   - patient reports diarrhea with ensure     # Insomnia  - continue with melatonin     #DVT PPx: Lovenox  #GI PPx: Protonix  FULL CODE  Dispo: Needs SNF 81 yo male hx of CAD s/p CABG s/p 20+ years ago, HFrEF, right sided large inguinal hernia BIBA from hotel room because he was unable to ambulate due to Rt thigh weakness and numbness, pt also had mandibular swelling for over a year. CT lumbosacral spine showed mass suggestive of mets with obliteration of neural foramina so was started on decadron.  Sacral mass biopsied 3/10 showed prostate adenocarcinoma. Mandibluar mass biopsied 3/19 also showed prostate cancer. Hospital course was complicated by hypovolemic shock likely 2/2 UGIB, required pressors and upgrade to stepdown unit. Pt currently in hospital undergoing chemo and radiotherapy.     #Metastatic prostate cancer   - currently on chemo and radiation   - continue with bicalutamide 50mg daily (started 3/14/21)  - s/p Lupron (GnRH agonist) IM, first dose 3/29 --> next dose in 1 month  - received first dose of taxotere (3/27), will hold unless patient's functional status improves  - Completed RT to sacrum and spine 5 doses (3/24 - 3/30)  - s/p RT 5 DOSES to jaw area for palliation; last RT 4/6  - dental cleared for bisphosphonate, or denosumab s/p zoledronic acid 4mg q4 weeks (3/27)  - continue with Dexamethasone taper; now 4 mg daily for 5 days starting 4/6 (day 4 today)  - continue with  on  Fentanyl patch, c/w  Morphine PRN  - continue with  Neurontin 100 mg TID for neuropathic pain   - palliative care consulted for pain management and GOC; signed off 3/19     # SCC of jaw  - 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  - Evaluated by Dental 3/24 - no intervention   - dental cleared for bisphosphonate, or denosumab  - s/p zolendronic acid (3/27)    # Hypotension  Complains of on and off lightheadedness  - c/w light fluids (NS)  - c/w midodrine q8  - continue to monitor    # 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 Serum Osm 279  - Glu on BMP 120s-130s  - TSH 0.36  - free water restriction, fluid restriction 1500ml/day  - continue with Sodium chloride tablets  - monitor Na level     # Sacral ulcer   - now fouling smelling  - Burn performed debridement at bedside 4/8  - Wound cultures not sent; will follow up with ID for discharge abx --> Augmentin BID on discharge for 2 weeks since date of debridement (4/8)     # Thrombocytopenia/ Leukopenia Improving  Plt 87 --> 68 --> 79  WBC 3.02 --> 2.5 --> 2.6 -> 3.6  Platelets and WBC downtrending since 3/27; improved 4/5  - monitor for any signs of bleeding   - transfuse if plt <10K, or <50K with bleeding  - oncology follow up   - likely due to chemotherapy (taxotere)    # UGI bleed   - s/p 1 unit of PRBC  - No egd done--only coffee ground emesis  - Hb decreasing; today 7.1 today (baseline 9-10)  - Will get 1 unit today  - Maintain active type and screen; Next on 4/12      # Chronic A fib  - on therapeutic dose of Lovenox     # CHFrEF  - fluid restriction 1200 ml in 24 hours   - intake and output monitoring, daily weight   - EF of 35%    # Hypercalcemia/  Vitamin D level Resolved  - likely due to advanced malignancy   - continue with vitamin D supplements 2000units daily   -     # Hyperkalemia Resolved  s/p one dose of Kayexalate   - low potasium diet    - on [facility]   - Continue to monitor    # Severe protein-calorie malnutrition.  - dietary on board   - supplements added   - patient reports diarrhea with ensure     # Insomnia  - continue with melatonin     #DVT PPx: Lovenox  #GI PPx: Protonix  FULL CODE  Dispo: Needs SNF 79 yo male hx of CAD s/p CABG s/p 20+ years ago, HFrEF, right sided large inguinal hernia BIBA from hotel room because he was unable to ambulate due to Rt thigh weakness and numbness, pt also had mandibular swelling for over a year. CT lumbosacral spine showed mass suggestive of mets with obliteration of neural foramina so was started on decadron.  Sacral mass biopsied 3/10 showed prostate adenocarcinoma. Mandibluar mass biopsied 3/19 also showed prostate cancer. Hospital course was complicated by hypovolemic shock likely 2/2 UGIB, required pressors and upgrade to stepdown unit. Pt currently in hospital undergoing chemo and radiotherapy.     #Metastatic prostate cancer   - currently on chemo and radiation   - continue with bicalutamide 50mg daily (started 3/14/21)  - s/p Lupron (GnRH agonist) IM, first dose 3/29 --> next dose in 1 month  - received first dose of taxotere (3/27), will hold unless patient's functional status improves  - Completed RT to sacrum and spine 5 doses (3/24 - 3/30)  - s/p RT 5 DOSES to jaw area for palliation; last RT 4/6  - dental cleared for bisphosphonate, or denosumab s/p zoledronic acid 4mg q4 weeks (3/27)  - continue with Dexamethasone taper; now 4 mg daily for 5 days starting 4/6 (day 4 today)  - continue with  on  Fentanyl patch, c/w  Morphine PRN  - continue with  Neurontin 100 mg TID for neuropathic pain   - palliative care consulted for pain management and GOC; signed off 3/19     # SCC of jaw  - 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  - Evaluated by Dental 3/24 - no intervention   - dental cleared for bisphosphonate, or denosumab  - s/p zolendronic acid (3/27)    # Hypotension  Complains of on and off lightheadedness  - c/w light fluids (NS)  - c/w midodrine q8  - continue to monitor    # 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 Serum Osm 279  - Glu on BMP 120s-130s  - TSH 0.36  - free water restriction, fluid restriction 1500ml/day  - continue with Sodium chloride tablets  - monitor Na level     # Sacral ulcer   - now fouling smelling  - Burn performed debridement at bedside 4/8  - Wound cultures not sent; will follow up with ID for discharge abx --> Augmentin BID on discharge for 2 weeks since date of debridement (4/8)     # Thrombocytopenia/ Leukopenia Improving  Plt 87 --> 68 --> 79  WBC 3.02 --> 2.5 --> 2.6 -> 3.6  Platelets and WBC downtrending since 3/27; improved 4/5  - monitor for any signs of bleeding   - transfuse if plt <10K, or <50K with bleeding  - oncology follow up   - likely due to chemotherapy (taxotere)    # UGI bleed   - s/p 2 unit of PRBC  - No egd done--only coffee ground emesis  - Hb decreasing; today 7.1 today (baseline 9-10)  - Will get 1 unit today  - Maintain active type and screen; Next on 4/12      # Chronic A fib  - on therapeutic dose of Lovenox     # CHFrEF  - fluid restriction 1200 ml in 24 hours   - intake and output monitoring, daily weight   - EF of 35%    # Hypercalcemia/  Vitamin D level Resolved  - likely due to advanced malignancy   - continue with vitamin D supplements 2000units daily   -     # Hyperkalemia Resolved  s/p one dose of Kayexalate   - low potasium diet    - on [facility]   - Continue to monitor    # Severe protein-calorie malnutrition.  - dietary on board   - supplements added   - patient reports diarrhea with ensure     # Insomnia  - continue with melatonin     #DVT PPx: Lovenox  #GI PPx: Protonix  FULL CODE  Dispo: Needs SNF

## 2021-04-09 NOTE — PROGRESS NOTE ADULT - TIME BILLING
wound treatment
Discussed with primary team.
Discussed with primary team.
For clinical carec and resident education.
For clinical care and resident education.

## 2021-04-09 NOTE — PROGRESS NOTE ADULT - ASSESSMENT
Assessment:  81 yo male hx of CAD s/p CABG s/p 20+ years ago/ CHF/ right sided large inguinal hernia, seen by BURN team for sacral wound management.    Plan:  Full thickness wound of sacrum:   -S/P Debdridement  -Wound care twice daily: wash wound with soap and water, apply santyl, then apply moist with Dakins solution gauze, then cover with ABD, and tape--BID  -Offloading/ positional changes  -Patient cleared for discharge from BURN standpoint   -F/U in outpatient burn clinic in 1 week

## 2021-04-09 NOTE — PROGRESS NOTE ADULT - ATTENDING COMMENTS
Patient seen and examined. Patient states low back pain during dressing change today with the BURN team. Pain not  relieved with morphine, will start Dilaudid low dose PRN. Patient refusing PO pain meds due to difficulty swallowing and dry mouth. Needs oral care and magic mouth wash.   Patient with metastatic prostate cancer s/p chemoradiation, now on hormonal therapy. DC planning on PO abx and local wound care.      Rest of plan as above.     #Progress Note Handoff  Pending (specify):  STR placement   Family discussion: none available   Disposition: STR

## 2021-04-10 LAB
ALBUMIN SERPL ELPH-MCNC: 2.3 G/DL — LOW (ref 3.5–5.2)
ALP SERPL-CCNC: 60 U/L — SIGNIFICANT CHANGE UP (ref 30–115)
ALT FLD-CCNC: 15 U/L — SIGNIFICANT CHANGE UP (ref 0–41)
ANION GAP SERPL CALC-SCNC: 12 MMOL/L — SIGNIFICANT CHANGE UP (ref 7–14)
AST SERPL-CCNC: 18 U/L — SIGNIFICANT CHANGE UP (ref 0–41)
BASOPHILS # BLD AUTO: 0.01 K/UL — SIGNIFICANT CHANGE UP (ref 0–0.2)
BASOPHILS NFR BLD AUTO: 0.3 % — SIGNIFICANT CHANGE UP (ref 0–1)
BILIRUB SERPL-MCNC: 0.5 MG/DL — SIGNIFICANT CHANGE UP (ref 0.2–1.2)
BUN SERPL-MCNC: 28 MG/DL — HIGH (ref 10–20)
CALCIUM SERPL-MCNC: 7.7 MG/DL — LOW (ref 8.5–10.1)
CHLORIDE SERPL-SCNC: 111 MMOL/L — HIGH (ref 98–110)
CO2 SERPL-SCNC: 18 MMOL/L — SIGNIFICANT CHANGE UP (ref 17–32)
CREAT SERPL-MCNC: 0.7 MG/DL — SIGNIFICANT CHANGE UP (ref 0.7–1.5)
EOSINOPHIL # BLD AUTO: 0 K/UL — SIGNIFICANT CHANGE UP (ref 0–0.7)
EOSINOPHIL NFR BLD AUTO: 0 % — SIGNIFICANT CHANGE UP (ref 0–8)
GLUCOSE SERPL-MCNC: 103 MG/DL — HIGH (ref 70–99)
HCT VFR BLD CALC: 29.4 % — LOW (ref 42–52)
HGB BLD-MCNC: 9.4 G/DL — LOW (ref 14–18)
IMM GRANULOCYTES NFR BLD AUTO: 2.8 % — HIGH (ref 0.1–0.3)
LYMPHOCYTES # BLD AUTO: 0.07 K/UL — LOW (ref 1.2–3.4)
LYMPHOCYTES # BLD AUTO: 2.2 % — LOW (ref 20.5–51.1)
MAGNESIUM SERPL-MCNC: 1.6 MG/DL — LOW (ref 1.8–2.4)
MCHC RBC-ENTMCNC: 29.1 PG — SIGNIFICANT CHANGE UP (ref 27–31)
MCHC RBC-ENTMCNC: 32 G/DL — SIGNIFICANT CHANGE UP (ref 32–37)
MCV RBC AUTO: 91 FL — SIGNIFICANT CHANGE UP (ref 80–94)
MONOCYTES # BLD AUTO: 0.08 K/UL — LOW (ref 0.1–0.6)
MONOCYTES NFR BLD AUTO: 2.5 % — SIGNIFICANT CHANGE UP (ref 1.7–9.3)
NEUTROPHILS # BLD AUTO: 2.95 K/UL — SIGNIFICANT CHANGE UP (ref 1.4–6.5)
NEUTROPHILS NFR BLD AUTO: 92.2 % — HIGH (ref 42.2–75.2)
NRBC # BLD: 0 /100 WBCS — SIGNIFICANT CHANGE UP (ref 0–0)
PLATELET # BLD AUTO: 69 K/UL — LOW (ref 130–400)
POTASSIUM SERPL-MCNC: 4 MMOL/L — SIGNIFICANT CHANGE UP (ref 3.5–5)
POTASSIUM SERPL-SCNC: 4 MMOL/L — SIGNIFICANT CHANGE UP (ref 3.5–5)
PROT SERPL-MCNC: 4.2 G/DL — LOW (ref 6–8)
RBC # BLD: 3.23 M/UL — LOW (ref 4.7–6.1)
RBC # FLD: 17.2 % — HIGH (ref 11.5–14.5)
SARS-COV-2 RNA SPEC QL NAA+PROBE: SIGNIFICANT CHANGE UP
SODIUM SERPL-SCNC: 141 MMOL/L — SIGNIFICANT CHANGE UP (ref 135–146)
WBC # BLD: 3.2 K/UL — LOW (ref 4.8–10.8)
WBC # FLD AUTO: 3.2 K/UL — LOW (ref 4.8–10.8)

## 2021-04-10 PROCEDURE — 99232 SBSQ HOSP IP/OBS MODERATE 35: CPT

## 2021-04-10 RX ADMIN — Medication 100 MILLIGRAM(S): at 05:17

## 2021-04-10 RX ADMIN — MIDODRINE HYDROCHLORIDE 10 MILLIGRAM(S): 2.5 TABLET ORAL at 11:15

## 2021-04-10 RX ADMIN — FENTANYL CITRATE 1 PATCH: 50 INJECTION INTRAVENOUS at 07:46

## 2021-04-10 RX ADMIN — PANTOPRAZOLE SODIUM 40 MILLIGRAM(S): 20 TABLET, DELAYED RELEASE ORAL at 05:49

## 2021-04-10 RX ADMIN — GABAPENTIN 100 MILLIGRAM(S): 400 CAPSULE ORAL at 05:49

## 2021-04-10 RX ADMIN — Medication 4 MILLIGRAM(S): at 05:49

## 2021-04-10 RX ADMIN — SODIUM CHLORIDE 1 GRAM(S): 9 INJECTION INTRAMUSCULAR; INTRAVENOUS; SUBCUTANEOUS at 05:49

## 2021-04-10 RX ADMIN — HYDROMORPHONE HYDROCHLORIDE 0.5 MILLIGRAM(S): 2 INJECTION INTRAMUSCULAR; INTRAVENOUS; SUBCUTANEOUS at 02:53

## 2021-04-10 RX ADMIN — BICALUTAMIDE 50 MILLIGRAM(S): 50 TABLET, FILM COATED ORAL at 11:18

## 2021-04-10 RX ADMIN — CEFTRIAXONE 100 MILLIGRAM(S): 500 INJECTION, POWDER, FOR SOLUTION INTRAMUSCULAR; INTRAVENOUS at 22:18

## 2021-04-10 RX ADMIN — DIPHENHYDRAMINE HYDROCHLORIDE AND LIDOCAINE HYDROCHLORIDE AND ALUMINUM HYDROXIDE AND MAGNESIUM HYDRO 10 MILLILITER(S): KIT at 05:50

## 2021-04-10 RX ADMIN — CHLORHEXIDINE GLUCONATE 1 APPLICATION(S): 213 SOLUTION TOPICAL at 05:17

## 2021-04-10 RX ADMIN — MIDODRINE HYDROCHLORIDE 10 MILLIGRAM(S): 2.5 TABLET ORAL at 05:49

## 2021-04-10 RX ADMIN — Medication 100 MILLIGRAM(S): at 22:18

## 2021-04-10 RX ADMIN — FENTANYL CITRATE 1 PATCH: 50 INJECTION INTRAVENOUS at 19:00

## 2021-04-10 RX ADMIN — Medication 100 MILLIGRAM(S): at 14:28

## 2021-04-10 RX ADMIN — Medication 250 MILLIGRAM(S): at 05:50

## 2021-04-10 RX ADMIN — ENOXAPARIN SODIUM 70 MILLIGRAM(S): 100 INJECTION SUBCUTANEOUS at 05:49

## 2021-04-10 RX ADMIN — HYDROMORPHONE HYDROCHLORIDE 0.5 MILLIGRAM(S): 2 INJECTION INTRAMUSCULAR; INTRAVENOUS; SUBCUTANEOUS at 03:30

## 2021-04-10 RX ADMIN — METHOCARBAMOL 500 MILLIGRAM(S): 500 TABLET, FILM COATED ORAL at 05:49

## 2021-04-10 RX ADMIN — FENTANYL CITRATE 1 PATCH: 50 INJECTION INTRAVENOUS at 00:01

## 2021-04-10 NOTE — PROGRESS NOTE ADULT - ASSESSMENT
79 yo male hx of CAD s/p CABG s/p 20+ years ago, chronic HFrEF, right sided large inguinal hernia BIBA from hotel room because he was unable to ambulate due to Rt thigh weakness and numbness, pt also had mandibular swelling for over a year. CT lumbosacral spine showed mass suggestive of mets with obliteration of neural foramina so was started on decadron.  Sacral mass biopsied 3/10 showed prostate adenocarcinoma. Mandibular mass biopsied 3/19 also showed prostate cancer. Hospital course was complicated by hypovolemic shock likely 2/2 UGIB, required pressors and upgrade to stepdown unit. Pt downgraded to medical burks and s/p chemo and radiotherapy, now pending placement to STR.     #Metastatic prostate cancer   - s/p chemoradiation   - continue with bicalutamide 50mg daily (started 3/14/21)  - s/p Lupron (GnRH agonist) IM, first dose 3/29    - s/p Zoledronic acid (3/27), cleared by dental for bisphosphonate therapy   - Completed RT to sacrum and spine 5 doses (3/24 - 3/30)  - s/p RT 5 doses to right mandible for palliation; last RT 4/6  - continue with Dexamethasone taper   - continue with on Fentanyl patch, c/w Dilaudid PRN, gabapentin   - palliative care consulted for pain management     # Hyponatremia, resolved     # Sacral ulcer   - Burn performed debridement at bedside 4/8  - Augmentin BID on discharge for 2 weeks since date of debridement (4/8)   - Pain control     # Pancytopenia   - transfuse if plt <10K, or <50K with bleeding  - oncology following   - likely due to chemotherapy      # Suspected UGIB   - s/p 2 unit of PRBC  - No egd done--coffee ground emesis on gastric lavage   - stable, c/w PPI     # Chronic A fib  - c/w therapeutic dose of Lovenox, DOAC on discharge     # Chronic HFrEF - compensated   - not on GDMT due to hypotension   - no evidence of fluid overload     # Hypercalcemia - resolved     # Hyperkalemia - resolved   - low potasium diet    - c/w Lokelma   - Continue to monitor    # Severe protein-calorie malnutrition.  - c/w supplements     # Insomnia  - continue with melatonin     #DVT PPx: Lovenox  #GI PPx: Protonix  FULL CODE - Spoke with nephew who will discuss GOC with patient   Dispo: STR     #Progress Note Handoff  Pending (specify):  STR placement   Family discussion: updated nephew on plan for STR placement   Disposition: STR

## 2021-04-10 NOTE — PROGRESS NOTE ADULT - SUBJECTIVE AND OBJECTIVE BOX
Brief Summarry:    Pt seen and examined at bedside.    VITAL SIGNS (Last 24 hrs):  T(C): 36 (04-10-21 @ 12:06), Max: 37.4 (04-10-21 @ 05:02)  HR: 79 (04-10-21 @ 12:06) (79 - 104)  BP: 99/57 (04-10-21 @ 12:06) (91/52 - 116/53)  RR: 18 (04-10-21 @ 12:06) (18 - 18)  SpO2: --  Wt(kg): --  Daily     Daily     I&O's Summary    09 Apr 2021 07:01  -  10 Apr 2021 07:00  --------------------------------------------------------  IN: 0 mL / OUT: 500 mL / NET: -500 mL        PHYSICAL EXAM:  GENERAL: NAD, chronically ill appearing male in bed, pale   HEAD:  Atraumatic, Normocephalic  EYES: conjuctival pallor   NECK: Supple, No JVD  CHEST/LUNG: Clear to auscultation bilaterally; No wheeze  HEART: Regular rate and rhythm; No murmurs, rubs, or gallops  ABDOMEN: Soft, Nontender, Nondistended; Bowel sounds present  EXTREMITIES:  2+ Peripheral Pulses, No clubbing, cyanosis, or edema  PSYCH: AAOx3  SKIN: sacral ulcer     Labs Reviewed  Spoke to patient in regards to abnormal labs.    CBC Full  -  ( 10 Apr 2021 07:48 )  WBC Count : 3.20 K/uL  Hemoglobin : 9.4 g/dL  Hematocrit : 29.4 %  Platelet Count - Automated : 69 K/uL  Mean Cell Volume : 91.0 fL  Mean Cell Hemoglobin : 29.1 pg  Mean Cell Hemoglobin Concentration : 32.0 g/dL  Auto Neutrophil # : 2.95 K/uL  Auto Lymphocyte # : 0.07 K/uL  Auto Monocyte # : 0.08 K/uL  Auto Eosinophil # : 0.00 K/uL  Auto Basophil # : 0.01 K/uL  Auto Neutrophil % : 92.2 %  Auto Lymphocyte % : 2.2 %  Auto Monocyte % : 2.5 %  Auto Eosinophil % : 0.0 %  Auto Basophil % : 0.3 %    BMP:    04-10 @ 07:48    Blood Urea Nitrogen - 28  Calcium - 7.7  Carbond Dioxide - 18  Chloride - 111  Creatinine - 0.7  Glucose - 103  Potassium - 4.0  Sodium - 141      Hemoglobin A1c -     Urine Culture:  04-06 @ 09:11 Urine culture: --    Culture Results:   No growth to date.  Method Type: --  Organism: --  Organism Identification: --  Specimen Source: .Blood None  04-06 @ 09:10 Urine culture: --    Culture Results:   No growth to date.  Method Type: --  Organism: --  Organism Identification: --  Specimen Source: .Blood None         MEDICATIONS  (STANDING):  bicalutamide 50 milliGRAM(s) Oral daily  cefTRIAXone   IVPB 2000 milliGRAM(s) IV Intermittent every 24 hours  chlorhexidine 4% Liquid 1 Application(s) Topical <User Schedule>  collagenase Ointment 1 Application(s) Topical daily  Dakins Solution - 1/2 Strength 1 Application(s) Topical two times a day  enoxaparin Injectable 70 milliGRAM(s) SubCutaneous two times a day  ergocalciferol 13570 Unit(s) Oral daily  FIRST- Mouthwash  BLM 10 milliLiter(s) Swish and Spit four times a day  gabapentin 100 milliGRAM(s) Oral three times a day  latanoprost 0.005% Ophthalmic Solution 1 Drop(s) Both EYES at bedtime  lidocaine   Patch 2 Patch Transdermal daily  melatonin 3 milliGRAM(s) Oral at bedtime  methocarbamol 500 milliGRAM(s) Oral three times a day  metroNIDAZOLE  IVPB 500 milliGRAM(s) IV Intermittent every 8 hours  metroNIDAZOLE  IVPB      midodrine. 10 milliGRAM(s) Oral three times a day  pantoprazole    Tablet 40 milliGRAM(s) Oral before breakfast  saccharomyces boulardii 250 milliGRAM(s) Oral two times a day  sodium chloride 1 Gram(s) Oral two times a day  sodium chloride 0.9%. 1000 milliLiter(s) (50 mL/Hr) IV Continuous <Continuous>  sodium zirconium cyclosilicate 5 Gram(s) Oral two times a day  zoledronic acid IVPB (ZOMETA) (Non - oncologic) 4 milliGRAM(s) IV Intermittent every 4 weeks    MEDICATIONS  (PRN):  acetaminophen   Tablet .. 650 milliGRAM(s) Oral every 6 hours PRN Moderate Pain (4 - 6)  aluminum hydroxide/magnesium hydroxide/simethicone Suspension 30 milliLiter(s) Oral every 6 hours PRN Dyspepsia  brimonidine 0.2% Ophthalmic Solution 1 Drop(s) Both EYES every 8 hours PRN dry eyes  HYDROmorphone  Injectable 0.5 milliGRAM(s) IV Push every 4 hours PRN Moderate Pain (4 - 6)  HYDROmorphone  Injectable 1 milliGRAM(s) IV Push every 4 hours PRN Severe Pain (7 - 10)  oxycodone    5 mG/acetaminophen 325 mG 1 Tablet(s) Oral every 6 hours PRN Severe Pain (7 - 10)

## 2021-04-11 LAB
CULTURE RESULTS: SIGNIFICANT CHANGE UP
CULTURE RESULTS: SIGNIFICANT CHANGE UP
SPECIMEN SOURCE: SIGNIFICANT CHANGE UP
SPECIMEN SOURCE: SIGNIFICANT CHANGE UP

## 2021-04-11 PROCEDURE — 99232 SBSQ HOSP IP/OBS MODERATE 35: CPT

## 2021-04-11 RX ORDER — MAGNESIUM SULFATE 500 MG/ML
2 VIAL (ML) INJECTION ONCE
Refills: 0 | Status: COMPLETED | OUTPATIENT
Start: 2021-04-11 | End: 2021-04-11

## 2021-04-11 RX ADMIN — Medication 100 MILLIGRAM(S): at 05:34

## 2021-04-11 RX ADMIN — HYDROMORPHONE HYDROCHLORIDE 0.5 MILLIGRAM(S): 2 INJECTION INTRAMUSCULAR; INTRAVENOUS; SUBCUTANEOUS at 00:00

## 2021-04-11 RX ADMIN — FENTANYL CITRATE 1 PATCH: 50 INJECTION INTRAVENOUS at 11:32

## 2021-04-11 RX ADMIN — LATANOPROST 1 DROP(S): 0.05 SOLUTION/ DROPS OPHTHALMIC; TOPICAL at 21:18

## 2021-04-11 RX ADMIN — FENTANYL CITRATE 1 PATCH: 50 INJECTION INTRAVENOUS at 07:22

## 2021-04-11 RX ADMIN — Medication 50 GRAM(S): at 15:53

## 2021-04-11 RX ADMIN — GABAPENTIN 100 MILLIGRAM(S): 400 CAPSULE ORAL at 21:17

## 2021-04-11 RX ADMIN — METHOCARBAMOL 500 MILLIGRAM(S): 500 TABLET, FILM COATED ORAL at 21:18

## 2021-04-11 RX ADMIN — BICALUTAMIDE 50 MILLIGRAM(S): 50 TABLET, FILM COATED ORAL at 15:50

## 2021-04-11 RX ADMIN — Medication 100 MILLIGRAM(S): at 21:16

## 2021-04-11 RX ADMIN — HYDROMORPHONE HYDROCHLORIDE 1 MILLIGRAM(S): 2 INJECTION INTRAMUSCULAR; INTRAVENOUS; SUBCUTANEOUS at 15:34

## 2021-04-11 RX ADMIN — SODIUM CHLORIDE 50 MILLILITER(S): 9 INJECTION INTRAMUSCULAR; INTRAVENOUS; SUBCUTANEOUS at 12:22

## 2021-04-11 RX ADMIN — Medication 3 MILLIGRAM(S): at 21:18

## 2021-04-11 RX ADMIN — MIDODRINE HYDROCHLORIDE 10 MILLIGRAM(S): 2.5 TABLET ORAL at 17:02

## 2021-04-11 RX ADMIN — MIDODRINE HYDROCHLORIDE 10 MILLIGRAM(S): 2.5 TABLET ORAL at 11:32

## 2021-04-11 RX ADMIN — HYDROMORPHONE HYDROCHLORIDE 1 MILLIGRAM(S): 2 INJECTION INTRAMUSCULAR; INTRAVENOUS; SUBCUTANEOUS at 15:50

## 2021-04-11 RX ADMIN — Medication 100 MILLIGRAM(S): at 13:00

## 2021-04-11 RX ADMIN — HYDROMORPHONE HYDROCHLORIDE 1 MILLIGRAM(S): 2 INJECTION INTRAMUSCULAR; INTRAVENOUS; SUBCUTANEOUS at 21:27

## 2021-04-11 RX ADMIN — HYDROMORPHONE HYDROCHLORIDE 0.5 MILLIGRAM(S): 2 INJECTION INTRAMUSCULAR; INTRAVENOUS; SUBCUTANEOUS at 09:37

## 2021-04-11 NOTE — PROGRESS NOTE ADULT - SUBJECTIVE AND OBJECTIVE BOX
Pt seen and examined at bedside. Dysphagia has improved. No SOB, CP.     VITAL SIGNS (Last 24 hrs):  T(C): 36.4 (04-11-21 @ 12:23), Max: 36.7 (04-10-21 @ 20:22)  HR: 93 (04-11-21 @ 12:23) (91 - 99)  BP: 101/53 (04-11-21 @ 12:23) (84/50 - 106/56)  RR: 18 (04-11-21 @ 12:23) (18 - 20)  SpO2: --  Wt(kg): --  Daily     Daily     I&O's Summary    10 Apr 2021 07:01  -  11 Apr 2021 07:00  --------------------------------------------------------  IN: 0 mL / OUT: 565 mL / NET: -565 mL        PHYSICAL EXAM:  GENERAL: NAD   HEAD:  Atraumatic, Normocephalic  EYES:  conjunctiva and sclera clear  NECK: Supple, No JVD  CHEST/LUNG: Clear to auscultation bilaterally; No wheeze  HEART: Regular rate and rhythm; No murmurs, rubs, or gallops  ABDOMEN: Soft, Nontender, Nondistended; Bowel sounds present  EXTREMITIES:  2+ Peripheral Pulses, No clubbing, cyanosis, or edema  PSYCH: AAOx3       Labs Reviewed  Spoke to patient in regards to abnormal labs.    CBC Full  -  ( 10 Apr 2021 07:48 )  WBC Count : 3.20 K/uL  Hemoglobin : 9.4 g/dL  Hematocrit : 29.4 %  Platelet Count - Automated : 69 K/uL  Mean Cell Volume : 91.0 fL  Mean Cell Hemoglobin : 29.1 pg  Mean Cell Hemoglobin Concentration : 32.0 g/dL  Auto Neutrophil # : 2.95 K/uL  Auto Lymphocyte # : 0.07 K/uL  Auto Monocyte # : 0.08 K/uL  Auto Eosinophil # : 0.00 K/uL  Auto Basophil # : 0.01 K/uL  Auto Neutrophil % : 92.2 %  Auto Lymphocyte % : 2.2 %  Auto Monocyte % : 2.5 %  Auto Eosinophil % : 0.0 %  Auto Basophil % : 0.3 %    BMP:    04-10 @ 07:48    Blood Urea Nitrogen - 28  Calcium - 7.7  Carbond Dioxide - 18  Chloride - 111  Creatinine - 0.7  Glucose - 103  Potassium - 4.0  Sodium - 141           MEDICATIONS  (STANDING):  bicalutamide 50 milliGRAM(s) Oral daily  chlorhexidine 4% Liquid 1 Application(s) Topical <User Schedule>  collagenase Ointment 1 Application(s) Topical daily  Dakins Solution - 1/2 Strength 1 Application(s) Topical two times a day  enoxaparin Injectable 70 milliGRAM(s) SubCutaneous two times a day  FIRST- Mouthwash  BLM 10 milliLiter(s) Swish and Spit four times a day  gabapentin 100 milliGRAM(s) Oral three times a day  latanoprost 0.005% Ophthalmic Solution 1 Drop(s) Both EYES at bedtime  lidocaine   Patch 2 Patch Transdermal daily  melatonin 3 milliGRAM(s) Oral at bedtime  methocarbamol 500 milliGRAM(s) Oral three times a day  metroNIDAZOLE  IVPB      metroNIDAZOLE  IVPB 500 milliGRAM(s) IV Intermittent every 8 hours  midodrine. 10 milliGRAM(s) Oral three times a day  pantoprazole    Tablet 40 milliGRAM(s) Oral before breakfast  saccharomyces boulardii 250 milliGRAM(s) Oral two times a day  sodium chloride 1 Gram(s) Oral two times a day  sodium chloride 0.9%. 1000 milliLiter(s) (50 mL/Hr) IV Continuous <Continuous>  sodium zirconium cyclosilicate 5 Gram(s) Oral two times a day  zoledronic acid IVPB (ZOMETA) (Non - oncologic) 4 milliGRAM(s) IV Intermittent every 4 weeks    MEDICATIONS  (PRN):  acetaminophen   Tablet .. 650 milliGRAM(s) Oral every 6 hours PRN Moderate Pain (4 - 6)  aluminum hydroxide/magnesium hydroxide/simethicone Suspension 30 milliLiter(s) Oral every 6 hours PRN Dyspepsia  brimonidine 0.2% Ophthalmic Solution 1 Drop(s) Both EYES every 8 hours PRN dry eyes  HYDROmorphone  Injectable 0.5 milliGRAM(s) IV Push every 4 hours PRN Moderate Pain (4 - 6)  HYDROmorphone  Injectable 1 milliGRAM(s) IV Push every 4 hours PRN Severe Pain (7 - 10)  oxycodone    5 mG/acetaminophen 325 mG 1 Tablet(s) Oral every 6 hours PRN Severe Pain (7 - 10)

## 2021-04-11 NOTE — PROGRESS NOTE ADULT - ASSESSMENT
81 yo male hx of CAD s/p CABG s/p 20+ years ago, chronic HFrEF, right sided large inguinal hernia BIBA from hotel room because he was unable to ambulate due to Rt thigh weakness and numbness, pt also had mandibular swelling for over a year. CT lumbosacral spine showed mass suggestive of mets with obliteration of neural foramina so was started on decadron.  Sacral mass biopsied 3/10 showed prostate adenocarcinoma. Mandibular mass biopsied 3/19 also showed prostate cancer. Hospital course was complicated by hypovolemic shock likely 2/2 UGIB, required pressors and upgrade to stepdown unit. Pt downgraded to medical burks and s/p chemo and radiotherapy, now pending placement to STR.     #Metastatic prostate cancer   - s/p chemoradiation   - continue with bicalutamide 50mg daily (started 3/14/21)  - s/p Lupron (GnRH agonist) IM, first dose 3/29    - s/p Zoledronic acid (3/27), cleared by dental for bisphosphonate therapy   - Completed RT to sacrum and spine 5 doses (3/24 - 3/30)  - s/p RT 5 doses to right mandible for palliation; last RT 4/6  - continue with Dexamethasone taper   - continue with on Fentanyl patch, c/w Dilaudid PRN, gabapentin   - palliative care consulted for pain management     # Hyponatremia, resolved     # Sacral ulcer   - Burn performed debridement at bedside 4/8  - Augmentin BID on discharge for 2 weeks since date of debridement (4/8)   - Pain control     # Pancytopenia   - transfuse if plt <10K, or <50K with bleeding  - oncology following   - likely due to chemotherapy      # Suspected UGIB   - s/p 2 unit of PRBC  - No egd done--coffee ground emesis on gastric lavage   - stable, c/w PPI     # Chronic A fib  - c/w therapeutic dose of Lovenox, DOAC on discharge     # Chronic HFrEF - compensated   - not on GDMT due to hypotension   - no evidence of fluid overload     # Hypercalcemia - resolved     # Hyperkalemia - resolved   - low potasium diet    - c/w Lokelma   - Continue to monitor    # Severe protein-calorie malnutrition.  - c/w supplements     # Insomnia  - continue with melatonin     #DVT PPx: Lovenox  #GI PPx: Protonix  FULL CODE - Spoke with nephew who will discuss GOC with patient   Dispo: STR     #Progress Note Handoff  Pending (specify):  STR placement   Family discussion: updated nephew on plan for STR placement   Disposition: STR

## 2021-04-11 NOTE — SWALLOW BEDSIDE ASSESSMENT ADULT - SWALLOW EVAL: DIAGNOSIS
mild to moderate oral dysphagia for Dysphagia Diet II mechanical soft consistency with ground meat, Dysphagia Diet III Mechanical soft consistency with cut up meats, +toleration of thins with no overt s/s of aspiration/penetration

## 2021-04-11 NOTE — SWALLOW BEDSIDE ASSESSMENT ADULT - SLP PERTINENT HISTORY OF CURRENT PROBLEM
79 yo male hx of CAD s/p CABG s/p 20+ years ago/ CHF/ right sided large inguinal hernia BIBA from hotel room 2/2 unable to ambulate with right thigh weakness and numbness. reported it started from knee to hip.

## 2021-04-12 LAB
ALBUMIN SERPL ELPH-MCNC: 2.2 G/DL — LOW (ref 3.5–5.2)
ALP SERPL-CCNC: 61 U/L — SIGNIFICANT CHANGE UP (ref 30–115)
ALT FLD-CCNC: 12 U/L — SIGNIFICANT CHANGE UP (ref 0–41)
ANION GAP SERPL CALC-SCNC: 12 MMOL/L — SIGNIFICANT CHANGE UP (ref 7–14)
AST SERPL-CCNC: 14 U/L — SIGNIFICANT CHANGE UP (ref 0–41)
BASOPHILS # BLD AUTO: 0.02 K/UL — SIGNIFICANT CHANGE UP (ref 0–0.2)
BASOPHILS NFR BLD AUTO: 0.6 % — SIGNIFICANT CHANGE UP (ref 0–1)
BILIRUB SERPL-MCNC: 0.5 MG/DL — SIGNIFICANT CHANGE UP (ref 0.2–1.2)
BLD GP AB SCN SERPL QL: SIGNIFICANT CHANGE UP
BUN SERPL-MCNC: 18 MG/DL — SIGNIFICANT CHANGE UP (ref 10–20)
CALCIUM SERPL-MCNC: 7.5 MG/DL — LOW (ref 8.5–10.1)
CHLORIDE SERPL-SCNC: 117 MMOL/L — HIGH (ref 98–110)
CO2 SERPL-SCNC: 17 MMOL/L — SIGNIFICANT CHANGE UP (ref 17–32)
CREAT SERPL-MCNC: <0.5 MG/DL — LOW (ref 0.7–1.5)
EOSINOPHIL # BLD AUTO: 0 K/UL — SIGNIFICANT CHANGE UP (ref 0–0.7)
EOSINOPHIL NFR BLD AUTO: 0 % — SIGNIFICANT CHANGE UP (ref 0–8)
GLUCOSE SERPL-MCNC: 126 MG/DL — HIGH (ref 70–99)
HCT VFR BLD CALC: 29.9 % — LOW (ref 42–52)
HGB BLD-MCNC: 9.7 G/DL — LOW (ref 14–18)
IMM GRANULOCYTES NFR BLD AUTO: 3.1 % — HIGH (ref 0.1–0.3)
LYMPHOCYTES # BLD AUTO: 0.12 K/UL — LOW (ref 1.2–3.4)
LYMPHOCYTES # BLD AUTO: 3.4 % — LOW (ref 20.5–51.1)
MAGNESIUM SERPL-MCNC: 1.9 MG/DL — SIGNIFICANT CHANGE UP (ref 1.8–2.4)
MCHC RBC-ENTMCNC: 29 PG — SIGNIFICANT CHANGE UP (ref 27–31)
MCHC RBC-ENTMCNC: 32.4 G/DL — SIGNIFICANT CHANGE UP (ref 32–37)
MCV RBC AUTO: 89.5 FL — SIGNIFICANT CHANGE UP (ref 80–94)
MONOCYTES # BLD AUTO: 0.08 K/UL — LOW (ref 0.1–0.6)
MONOCYTES NFR BLD AUTO: 2.3 % — SIGNIFICANT CHANGE UP (ref 1.7–9.3)
NEUTROPHILS # BLD AUTO: 3.17 K/UL — SIGNIFICANT CHANGE UP (ref 1.4–6.5)
NEUTROPHILS NFR BLD AUTO: 90.6 % — HIGH (ref 42.2–75.2)
NRBC # BLD: 0 /100 WBCS — SIGNIFICANT CHANGE UP (ref 0–0)
PLATELET # BLD AUTO: 97 K/UL — LOW (ref 130–400)
POTASSIUM SERPL-MCNC: 3.1 MMOL/L — LOW (ref 3.5–5)
POTASSIUM SERPL-SCNC: 3.1 MMOL/L — LOW (ref 3.5–5)
PROT SERPL-MCNC: 4 G/DL — LOW (ref 6–8)
RBC # BLD: 3.34 M/UL — LOW (ref 4.7–6.1)
RBC # FLD: 17.8 % — HIGH (ref 11.5–14.5)
SARS-COV-2 RNA SPEC QL NAA+PROBE: SIGNIFICANT CHANGE UP
SODIUM SERPL-SCNC: 146 MMOL/L — SIGNIFICANT CHANGE UP (ref 135–146)
WBC # BLD: 3.5 K/UL — LOW (ref 4.8–10.8)
WBC # FLD AUTO: 3.5 K/UL — LOW (ref 4.8–10.8)

## 2021-04-12 PROCEDURE — 99232 SBSQ HOSP IP/OBS MODERATE 35: CPT

## 2021-04-12 RX ORDER — POTASSIUM CHLORIDE 20 MEQ
20 PACKET (EA) ORAL ONCE
Refills: 0 | Status: COMPLETED | OUTPATIENT
Start: 2021-04-12 | End: 2021-04-12

## 2021-04-12 RX ORDER — FENTANYL CITRATE 50 UG/ML
1 INJECTION INTRAVENOUS
Refills: 0 | Status: DISCONTINUED | OUTPATIENT
Start: 2021-04-12 | End: 2021-04-16

## 2021-04-12 RX ADMIN — HYDROMORPHONE HYDROCHLORIDE 1 MILLIGRAM(S): 2 INJECTION INTRAMUSCULAR; INTRAVENOUS; SUBCUTANEOUS at 00:22

## 2021-04-12 RX ADMIN — FENTANYL CITRATE 1 PATCH: 50 INJECTION INTRAVENOUS at 19:48

## 2021-04-12 RX ADMIN — HYDROMORPHONE HYDROCHLORIDE 1 MILLIGRAM(S): 2 INJECTION INTRAMUSCULAR; INTRAVENOUS; SUBCUTANEOUS at 14:05

## 2021-04-12 RX ADMIN — MIDODRINE HYDROCHLORIDE 10 MILLIGRAM(S): 2.5 TABLET ORAL at 05:22

## 2021-04-12 RX ADMIN — DIPHENHYDRAMINE HYDROCHLORIDE AND LIDOCAINE HYDROCHLORIDE AND ALUMINUM HYDROXIDE AND MAGNESIUM HYDRO 10 MILLILITER(S): KIT at 05:26

## 2021-04-12 RX ADMIN — HYDROMORPHONE HYDROCHLORIDE 1 MILLIGRAM(S): 2 INJECTION INTRAMUSCULAR; INTRAVENOUS; SUBCUTANEOUS at 13:48

## 2021-04-12 RX ADMIN — Medication 250 MILLIGRAM(S): at 05:31

## 2021-04-12 RX ADMIN — FENTANYL CITRATE 1 PATCH: 50 INJECTION INTRAVENOUS at 17:07

## 2021-04-12 RX ADMIN — HYDROMORPHONE HYDROCHLORIDE 1 MILLIGRAM(S): 2 INJECTION INTRAMUSCULAR; INTRAVENOUS; SUBCUTANEOUS at 07:01

## 2021-04-12 RX ADMIN — HYDROMORPHONE HYDROCHLORIDE 1 MILLIGRAM(S): 2 INJECTION INTRAMUSCULAR; INTRAVENOUS; SUBCUTANEOUS at 06:33

## 2021-04-12 RX ADMIN — Medication 100 MILLIGRAM(S): at 05:20

## 2021-04-12 RX ADMIN — SODIUM CHLORIDE 1 GRAM(S): 9 INJECTION INTRAMUSCULAR; INTRAVENOUS; SUBCUTANEOUS at 05:22

## 2021-04-12 RX ADMIN — ENOXAPARIN SODIUM 70 MILLIGRAM(S): 100 INJECTION SUBCUTANEOUS at 05:22

## 2021-04-12 RX ADMIN — Medication 50 MILLIEQUIVALENT(S): at 12:00

## 2021-04-12 RX ADMIN — METHOCARBAMOL 500 MILLIGRAM(S): 500 TABLET, FILM COATED ORAL at 05:22

## 2021-04-12 RX ADMIN — HYDROMORPHONE HYDROCHLORIDE 1 MILLIGRAM(S): 2 INJECTION INTRAMUSCULAR; INTRAVENOUS; SUBCUTANEOUS at 20:51

## 2021-04-12 RX ADMIN — Medication 100 MILLIGRAM(S): at 13:42

## 2021-04-12 RX ADMIN — PANTOPRAZOLE SODIUM 40 MILLIGRAM(S): 20 TABLET, DELAYED RELEASE ORAL at 06:03

## 2021-04-12 RX ADMIN — GABAPENTIN 100 MILLIGRAM(S): 400 CAPSULE ORAL at 05:22

## 2021-04-12 RX ADMIN — CHLORHEXIDINE GLUCONATE 1 APPLICATION(S): 213 SOLUTION TOPICAL at 05:33

## 2021-04-12 NOTE — PROGRESS NOTE ADULT - ASSESSMENT
79 yo male hx of CAD s/p CABG s/p 20+ years ago, HFrEF, right sided large inguinal hernia BIBA from hotel room because he was unable to ambulate due to Rt thigh weakness and numbness, pt also had mandibular swelling for over a year. CT lumbosacral spine showed mass suggestive of mets with obliteration of neural foramina so was started on decadron.  Sacral mass biopsied 3/10 showed prostate adenocarcinoma. Mandibluar mass biopsied 3/19 also showed prostate cancer. Hospital course was complicated by hypovolemic shock likely 2/2 UGIB, required pressors and upgrade to stepdown unit. Pt currently in hospital undergoing chemo and radiotherapy.     #Metastatic prostate cancer   - currently on chemo and radiation   - continue with bicalutamide 50mg daily (started 3/14/21)  - s/p Lupron (GnRH agonist) IM, first dose 3/29 --> next dose in 1 month  - received first dose of taxotere (3/27), will hold unless patient's functional status improves  - Completed RT to sacrum and spine 5 doses (3/24 - 3/30)  - s/p RT 5 DOSES to jaw area for palliation; last RT 4/6  - dental cleared for bisphosphonate, or denosumab s/p zoledronic acid 4mg q4 weeks (3/27)  - s/p Dexamethasone taper  - continue with  on  Fentanyl patch, c/w  Morphine PRN  - continue with  Neurontin 100 mg TID for neuropathic pain   - palliative care consulted for pain management and GOC; signed off 3/19     # SCC of jaw  - 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  - Evaluated by Dental 3/24 - no intervention   - dental cleared for bisphosphonate, or denosumab  - s/p zolendronic acid (3/27)    # LLE coldness  Asymptomatic  No pain or numbness and full sensation  Pulses assessed by doppler  - Will monitor for now given the likelihood of patient becoming hospice    # Hypotension  Complains of on and off lightheadedness  - c/w light fluids (NS)  - c/w midodrine q8  - continue to monitor    # 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 Serum Osm 279  - Glu on BMP 120s-130s  - TSH 0.36  - free water restriction, fluid restriction 1500ml/day  - continue with Sodium chloride tablets  - monitor Na level     # Sacral ulcer   - now fouling smelling  - Burn performed debridement at bedside 4/8  - Augmentin BID on discharge for 2 weeks since date of debridement (4/8)   #############################  # Thrombocytopenia/ Leukopenia Improving  Plt 87 --> 68 --> 79  WBC 3.02 --> 2.5 --> 2.6 -> 3.6  Platelets and WBC downtrending since 3/27; improved 4/5  - monitor for any signs of bleeding   - transfuse if plt <10K, or <50K with bleeding  - oncology follow up   - likely due to chemotherapy (taxotere)    # UGI bleed   - s/p 2 unit of PRBC  - No egd done--only coffee ground emesis  - Hb decreasing; today 7.1 today (baseline 9-10)  - Will get 1 unit today  - Maintain active type and screen; Next on 4/12      # Chronic A fib  - on therapeutic dose of Lovenox     # CHFrEF  - fluid restriction 1200 ml in 24 hours   - intake and output monitoring, daily weight   - EF of 35%    # Hypercalcemia/  Vitamin D level Resolved  - likely due to advanced malignancy   - continue with vitamin D supplements 2000units daily   -     # Hyperkalemia Resolved  s/p one dose of Kayexalate   - low potasium diet    - on Lokelma   - Continue to monitor    # Severe protein-calorie malnutrition.  - dietary on board   - supplements added   - patient reports diarrhea with ensure     # Insomnia  - continue with melatonin     #DVT PPx: Lovenox  #GI PPx: Protonix  FULL CODE  Dispo: Needs SNF   81 yo male hx of CAD s/p CABG s/p 20+ years ago, HFrEF, right sided large inguinal hernia BIBA from hotel room because he was unable to ambulate due to Rt thigh weakness and numbness, pt also had mandibular swelling for over a year. CT lumbosacral spine showed mass suggestive of mets with obliteration of neural foramina so was started on decadron.  Sacral mass biopsied 3/10 showed prostate adenocarcinoma. Mandibluar mass biopsied 3/19 also showed prostate cancer. Hospital course was complicated by hypovolemic shock likely 2/2 UGIB, required pressors and upgrade to stepdown unit. Pt currently in hospital undergoing chemo and radiotherapy.     #Metastatic prostate cancer   - currently on chemo and radiation   - continue with bicalutamide 50mg daily (started 3/14/21)  - s/p Lupron (GnRH agonist) IM, first dose 3/29 --> next dose in 1 month  - received first dose of taxotere (3/27), will hold unless patient's functional status improves  - Completed RT to sacrum and spine 5 doses (3/24 - 3/30)  - s/p RT 5 DOSES to jaw area for palliation; last RT 4/6  - dental cleared for bisphosphonate, or denosumab s/p zoledronic acid 4mg q4 weeks (3/27)  - s/p Dexamethasone taper  - continue with  on  Fentanyl patch, c/w  Morphine PRN  - continue with  Neurontin 100 mg TID for neuropathic pain   - palliative care consulted for pain management and GOC; signed off 3/19     # SCC of jaw  - 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  - Evaluated by Dental 3/24 - no intervention   - dental cleared for bisphosphonate, or denosumab  - s/p zolendronic acid (3/27)    # GOC  Over weekend discussion was had with family about code status and hospice  - Attempted to call back nephew today (HCP) but no answered; left message  - Hospice consult placed    # LLE coldness  Asymptomatic  No pain or numbness and full sensation  Pulses assessed by doppler  - Will monitor for now given the likelihood of patient becoming hospice    # Hypotension  Complains of on and off lightheadedness  - c/w light fluids (NS)  - c/w midodrine q8  - continue to monitor    # 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 Serum Osm 279  - Glu on BMP 120s-130s  - TSH 0.36  - free water restriction, fluid restriction 1500ml/day  - continue with Sodium chloride tablets  - monitor Na level     # Sacral ulcer   - now fouling smelling  - Burn performed debridement at bedside 4/8  - Augmentin BID on discharge for 2 weeks since date of debridement (4/8)     # Thrombocytopenia/ Leukopenia Improving  Platelets and WBC downtrending since 3/27; improved 4/5  - monitor for any signs of bleeding   - transfuse if plt <10K, or <50K with bleeding  - oncology follow up   - likely due to chemotherapy (taxotere)    # UGI bleed Stable for now  - s/p 3 unit of PRBC  - No egd done--only coffee ground emesis  - Maintain active type and screen; Next on 4/15      # Chronic A fib  - on therapeutic dose of Lovenox     # CHFrEF  - fluid restriction 1200 ml in 24 hours   - intake and output monitoring, daily weight   - EF of 35%    # Hypercalcemia/  Vitamin D level Resolved  - likely due to advanced malignancy   - continue with vitamin D supplements 2000units daily   -     # Hyperkalemia Resolved  Now hypokalemic  s/p one dose of Kayexalate   - low potasium diet    - d/c Lokelma   - Continue to monitor    # Severe protein-calorie malnutrition.  - dietary on board   - supplements added   - patient reports diarrhea with ensure     # Insomnia  - continue with melatonin     #DVT PPx: Lovenox  #GI PPx: Protonix  FULL CODE  Dispo: SNF vs hospice

## 2021-04-12 NOTE — PROGRESS NOTE ADULT - SUBJECTIVE AND OBJECTIVE BOX
SUBJECTIVE:    Patient is a 80y old Male who presents with a chief complaint of 80 YEAR OLD MALE C/O MULTIPLE MEDICAL COMPLAINTS . (11 Apr 2021 15:16)    Currently admitted to medicine with the primary diagnosis of Prostate cancer metastatic to multiple sites       Today is hospital day 35d. This morning he is resting comfortably in bed and reports no new issues or overnight events. His L leg is colder than his right but he is not complaining of any pain. He is thinking that he wants to go to hospice.    PAST MEDICAL & SURGICAL HISTORY  CHF (congestive heart failure)    Inguinal hernia    S/P CABG x 3      SOCIAL HISTORY:  Negative for smoking/alcohol/drug use.     ALLERGIES:  No Known Allergies    MEDICATIONS:  STANDING MEDICATIONS  bicalutamide 50 milliGRAM(s) Oral daily  chlorhexidine 4% Liquid 1 Application(s) Topical <User Schedule>  collagenase Ointment 1 Application(s) Topical daily  Dakins Solution - 1/2 Strength 1 Application(s) Topical two times a day  enoxaparin Injectable 70 milliGRAM(s) SubCutaneous two times a day  FIRST- Mouthwash  BLM 10 milliLiter(s) Swish and Spit four times a day  gabapentin 100 milliGRAM(s) Oral three times a day  latanoprost 0.005% Ophthalmic Solution 1 Drop(s) Both EYES at bedtime  lidocaine   Patch 2 Patch Transdermal daily  melatonin 3 milliGRAM(s) Oral at bedtime  methocarbamol 500 milliGRAM(s) Oral three times a day  metroNIDAZOLE  IVPB 500 milliGRAM(s) IV Intermittent every 8 hours  metroNIDAZOLE  IVPB      midodrine. 10 milliGRAM(s) Oral three times a day  pantoprazole    Tablet 40 milliGRAM(s) Oral before breakfast  saccharomyces boulardii 250 milliGRAM(s) Oral two times a day  sodium chloride 1 Gram(s) Oral two times a day  sodium chloride 0.9%. 1000 milliLiter(s) IV Continuous <Continuous>  sodium zirconium cyclosilicate 5 Gram(s) Oral two times a day  zoledronic acid IVPB (ZOMETA) (Non - oncologic) 4 milliGRAM(s) IV Intermittent every 4 weeks    PRN MEDICATIONS  acetaminophen   Tablet .. 650 milliGRAM(s) Oral every 6 hours PRN  aluminum hydroxide/magnesium hydroxide/simethicone Suspension 30 milliLiter(s) Oral every 6 hours PRN  brimonidine 0.2% Ophthalmic Solution 1 Drop(s) Both EYES every 8 hours PRN  HYDROmorphone  Injectable 0.5 milliGRAM(s) IV Push every 4 hours PRN  HYDROmorphone  Injectable 1 milliGRAM(s) IV Push every 4 hours PRN  oxycodone    5 mG/acetaminophen 325 mG 1 Tablet(s) Oral every 6 hours PRN    VITALS:   T(F): 98.8  HR: 89  BP: 106/59  RR: 18  SpO2: --    LABS:                        9.7    3.50  )-----------( 97       ( 12 Apr 2021 07:23 )             29.9     04-12    146  |  117<H>  |  18  ----------------------------<  126<H>  3.1<L>   |  17  |  <0.5<L>    Ca    7.5<L>      12 Apr 2021 07:23  Mg     1.9     04-12    TPro  4.0<L>  /  Alb  2.2<L>  /  TBili  0.5  /  DBili  x   /  AST  14  /  ALT  12  /  AlkPhos  61  04-12                  RADIOLOGY:    PHYSICAL EXAM:  GENERAL: NAD, lying in bed comfortably  HEAD:  Atraumatic, Normocephalic  CHEST/LUNG: Clear to auscultation bilaterally; No rales, rhonchi, wheezing, or rubs. Unlabored respirations  HEART: Regular rate and rhythm; No murmurs, rubs, or gallops  ABDOMEN: Bowel sounds present; Soft, Nontender, Nondistended. No hepatomegally  EXTREMITIES:  No clubbing, cyanosis, or edema; LLE cold compared to R  NERVOUS SYSTEM:  Alert & Oriented X3, speech clear. No deficits   MSK: Unable to assess  SKIN: No rashes or lesions

## 2021-04-12 NOTE — PROGRESS NOTE ADULT - ATTENDING COMMENTS
81 yo male hx of CAD s/p CABG s/p 20+ years ago, chronic HFrEF, right sided large inguinal hernia BIBA from hotel room because he was unable to ambulate due to Rt thigh weakness and numbness, pt also had mandibular swelling for over a year. CT lumbosacral spine showed mass suggestive of mets with obliteration of neural foramina so was started on decadron.  Sacral mass biopsied 3/10 showed prostate adenocarcinoma. Mandibular mass biopsied 3/19 also showed prostate cancer. Hospital course was complicated by hypovolemic shock likely 2/2 UGIB, required pressors and upgrade to stepdown unit. Pt downgraded to medical burks and s/p chemo and radiotherapy, now pending hospice.     #Metastatic prostate cancer   - s/p chemoradiation   - continue with bicalutamide 50mg daily (started 3/14/21)  - s/p Lupron (GnRH agonist) IM, first dose 3/29    - s/p Zoledronic acid (3/27), cleared by dental for bisphosphonate therapy   - Completed RT to sacrum and spine 5 doses (3/24 - 3/30)  - s/p RT 5 doses to right mandible for palliation; last RT 4/6  - s/p Dexamethasone taper   - continue with Fentanyl patch, c/w Dilaudid PRN   - palliative care consulted for pain management     # Hyponatremia, resolved     # Sacral ulcer   - Burn performed debridement at bedside 4/8  - Augmentin BID for 2 weeks since date of debridement (4/8)   - Pain control     # Pancytopenia   - transfuse if plt <10K, or <50K with bleeding  - oncology following   - likely due to chemotherapy      # Suspected UGIB   - s/p 2 unit of PRBC  - No egd done--coffee ground emesis on gastric lavage   - stable, c/w PPI     # Chronic A fib  - c/w therapeutic dose of Lovenox, DOAC on discharge     # Chronic HFrEF - compensated   - not on GDMT due to hypotension   - no evidence of fluid overload     # Hypercalcemia - resolved     # Hypokalemia - resolved   - dc Lokelma and dc low k diet     # Severe protein-calorie malnutrition.  - c/w supplements     # Insomnia  - continue with melatonin     #DVT PPx: Lovenox  #GI PPx: Protonix  FULL CODE - Spoke with nephew who will discuss GOC with patient        #Progress Note Handoff  Pending (specify):  Hospice SNF   Family discussion: yes   Disposition: Hospice SNF

## 2021-04-13 LAB
ACANTHOCYTES BLD QL SMEAR: SLIGHT — SIGNIFICANT CHANGE UP
ALBUMIN SERPL ELPH-MCNC: 2.2 G/DL — LOW (ref 3.5–5.2)
ALP SERPL-CCNC: 61 U/L — SIGNIFICANT CHANGE UP (ref 30–115)
ALT FLD-CCNC: 12 U/L — SIGNIFICANT CHANGE UP (ref 0–41)
ANION GAP SERPL CALC-SCNC: 13 MMOL/L — SIGNIFICANT CHANGE UP (ref 7–14)
ANISOCYTOSIS BLD QL: SIGNIFICANT CHANGE UP
AST SERPL-CCNC: 12 U/L — SIGNIFICANT CHANGE UP (ref 0–41)
BASOPHILS # BLD AUTO: 0.03 K/UL — SIGNIFICANT CHANGE UP (ref 0–0.2)
BASOPHILS NFR BLD AUTO: 0.9 % — SIGNIFICANT CHANGE UP (ref 0–1)
BILIRUB SERPL-MCNC: 0.5 MG/DL — SIGNIFICANT CHANGE UP (ref 0.2–1.2)
BUN SERPL-MCNC: 13 MG/DL — SIGNIFICANT CHANGE UP (ref 10–20)
BURR CELLS BLD QL SMEAR: PRESENT — SIGNIFICANT CHANGE UP
CALCIUM SERPL-MCNC: 7.3 MG/DL — LOW (ref 8.5–10.1)
CHLORIDE SERPL-SCNC: 116 MMOL/L — HIGH (ref 98–110)
CO2 SERPL-SCNC: 18 MMOL/L — SIGNIFICANT CHANGE UP (ref 17–32)
CREAT SERPL-MCNC: <0.5 MG/DL — LOW (ref 0.7–1.5)
ELLIPTOCYTES BLD QL SMEAR: SLIGHT — SIGNIFICANT CHANGE UP
EOSINOPHIL # BLD AUTO: 0 K/UL — SIGNIFICANT CHANGE UP (ref 0–0.7)
EOSINOPHIL NFR BLD AUTO: 0 % — SIGNIFICANT CHANGE UP (ref 0–8)
GIANT PLATELETS BLD QL SMEAR: PRESENT — SIGNIFICANT CHANGE UP
GLUCOSE SERPL-MCNC: 93 MG/DL — SIGNIFICANT CHANGE UP (ref 70–99)
HCT VFR BLD CALC: 27.1 % — LOW (ref 42–52)
HGB BLD-MCNC: 8.6 G/DL — LOW (ref 14–18)
HYPOCHROMIA BLD QL: SLIGHT — SIGNIFICANT CHANGE UP
HYPOSEGMENTATION: PRESENT — SIGNIFICANT CHANGE UP
LYMPHOCYTES # BLD AUTO: 0.06 K/UL — LOW (ref 1.2–3.4)
LYMPHOCYTES # BLD AUTO: 1.8 % — LOW (ref 20.5–51.1)
MAGNESIUM SERPL-MCNC: 1.7 MG/DL — LOW (ref 1.8–2.4)
MANUAL SMEAR VERIFICATION: SIGNIFICANT CHANGE UP
MCHC RBC-ENTMCNC: 28.5 PG — SIGNIFICANT CHANGE UP (ref 27–31)
MCHC RBC-ENTMCNC: 31.7 G/DL — LOW (ref 32–37)
MCV RBC AUTO: 89.7 FL — SIGNIFICANT CHANGE UP (ref 80–94)
METAMYELOCYTES # FLD: 2.7 % — HIGH (ref 0–0)
MICROCYTES BLD QL: SIGNIFICANT CHANGE UP
MONOCYTES # BLD AUTO: 0.03 K/UL — LOW (ref 0.1–0.6)
MONOCYTES NFR BLD AUTO: 0.9 % — LOW (ref 1.7–9.3)
NEUTROPHILS # BLD AUTO: 3.13 K/UL — SIGNIFICANT CHANGE UP (ref 1.4–6.5)
NEUTROPHILS NFR BLD AUTO: 85.6 % — HIGH (ref 42.2–75.2)
NEUTS BAND # BLD: 8.1 % — HIGH (ref 0–6)
NRBC # BLD: 1 /100 — HIGH (ref 0–0)
NRBC # BLD: SIGNIFICANT CHANGE UP /100 WBCS (ref 0–0)
OVALOCYTES BLD QL SMEAR: SLIGHT — SIGNIFICANT CHANGE UP
PLAT MORPH BLD: ABNORMAL
PLATELET # BLD AUTO: 108 K/UL — LOW (ref 130–400)
POIKILOCYTOSIS BLD QL AUTO: SIGNIFICANT CHANGE UP
POLYCHROMASIA BLD QL SMEAR: SLIGHT — SIGNIFICANT CHANGE UP
POTASSIUM SERPL-MCNC: 2.9 MMOL/L — LOW (ref 3.5–5)
POTASSIUM SERPL-SCNC: 2.9 MMOL/L — LOW (ref 3.5–5)
PROT SERPL-MCNC: 3.9 G/DL — LOW (ref 6–8)
RBC # BLD: 3.02 M/UL — LOW (ref 4.7–6.1)
RBC # FLD: 17.8 % — HIGH (ref 11.5–14.5)
RBC BLD AUTO: ABNORMAL
SCHISTOCYTES BLD QL AUTO: SLIGHT — SIGNIFICANT CHANGE UP
SODIUM SERPL-SCNC: 147 MMOL/L — HIGH (ref 135–146)
TOXIC GRANULES BLD QL SMEAR: PRESENT — SIGNIFICANT CHANGE UP
WBC # BLD: 3.34 K/UL — LOW (ref 4.8–10.8)
WBC # FLD AUTO: 3.34 K/UL — LOW (ref 4.8–10.8)

## 2021-04-13 PROCEDURE — 99232 SBSQ HOSP IP/OBS MODERATE 35: CPT

## 2021-04-13 RX ORDER — SODIUM CHLORIDE 9 MG/ML
1000 INJECTION, SOLUTION INTRAVENOUS
Refills: 0 | Status: DISCONTINUED | OUTPATIENT
Start: 2021-04-13 | End: 2021-04-16

## 2021-04-13 RX ORDER — MAGNESIUM OXIDE 400 MG ORAL TABLET 241.3 MG
400 TABLET ORAL ONCE
Refills: 0 | Status: DISCONTINUED | OUTPATIENT
Start: 2021-04-13 | End: 2021-04-13

## 2021-04-13 RX ORDER — MAGNESIUM SULFATE 500 MG/ML
2 VIAL (ML) INJECTION ONCE
Refills: 0 | Status: COMPLETED | OUTPATIENT
Start: 2021-04-13 | End: 2021-04-13

## 2021-04-13 RX ORDER — POTASSIUM CHLORIDE 20 MEQ
20 PACKET (EA) ORAL
Refills: 0 | Status: COMPLETED | OUTPATIENT
Start: 2021-04-13 | End: 2021-04-13

## 2021-04-13 RX ADMIN — Medication 3 MILLIGRAM(S): at 21:45

## 2021-04-13 RX ADMIN — Medication 50 MILLIEQUIVALENT(S): at 11:15

## 2021-04-13 RX ADMIN — HYDROMORPHONE HYDROCHLORIDE 0.5 MILLIGRAM(S): 2 INJECTION INTRAMUSCULAR; INTRAVENOUS; SUBCUTANEOUS at 13:16

## 2021-04-13 RX ADMIN — FENTANYL CITRATE 1 PATCH: 50 INJECTION INTRAVENOUS at 19:54

## 2021-04-13 RX ADMIN — LATANOPROST 1 DROP(S): 0.05 SOLUTION/ DROPS OPHTHALMIC; TOPICAL at 21:45

## 2021-04-13 RX ADMIN — FENTANYL CITRATE 1 PATCH: 50 INJECTION INTRAVENOUS at 07:45

## 2021-04-13 RX ADMIN — HYDROMORPHONE HYDROCHLORIDE 1 MILLIGRAM(S): 2 INJECTION INTRAMUSCULAR; INTRAVENOUS; SUBCUTANEOUS at 10:20

## 2021-04-13 RX ADMIN — SODIUM CHLORIDE 50 MILLILITER(S): 9 INJECTION INTRAMUSCULAR; INTRAVENOUS; SUBCUTANEOUS at 03:02

## 2021-04-13 RX ADMIN — MIDODRINE HYDROCHLORIDE 10 MILLIGRAM(S): 2.5 TABLET ORAL at 05:06

## 2021-04-13 RX ADMIN — HYDROMORPHONE HYDROCHLORIDE 1 MILLIGRAM(S): 2 INJECTION INTRAMUSCULAR; INTRAVENOUS; SUBCUTANEOUS at 21:28

## 2021-04-13 RX ADMIN — Medication 50 GRAM(S): at 11:10

## 2021-04-13 RX ADMIN — HYDROMORPHONE HYDROCHLORIDE 0.5 MILLIGRAM(S): 2 INJECTION INTRAMUSCULAR; INTRAVENOUS; SUBCUTANEOUS at 13:50

## 2021-04-13 RX ADMIN — HYDROMORPHONE HYDROCHLORIDE 1 MILLIGRAM(S): 2 INJECTION INTRAMUSCULAR; INTRAVENOUS; SUBCUTANEOUS at 10:45

## 2021-04-13 RX ADMIN — SODIUM CHLORIDE 75 MILLILITER(S): 9 INJECTION, SOLUTION INTRAVENOUS at 10:53

## 2021-04-13 RX ADMIN — HYDROMORPHONE HYDROCHLORIDE 1 MILLIGRAM(S): 2 INJECTION INTRAMUSCULAR; INTRAVENOUS; SUBCUTANEOUS at 05:07

## 2021-04-13 RX ADMIN — Medication 50 MILLIEQUIVALENT(S): at 09:26

## 2021-04-13 NOTE — GOALS OF CARE CONVERSATION - ADVANCED CARE PLANNING - CONVERSATION DETAILS
Discussion was had with patient about CPR and intubation in the case his heart stops or he cannot breath on his own. Patient does not want to have CPR performed or intubation in case the need arises. He is DNR/DNI. Comfort care was discussed but for now patient still wants to be treated.

## 2021-04-13 NOTE — HOSPICE CARE NOTE - CONVESATION DETAILS
Active communication with Physician and CM Maribell. Patient requires NH placement and financial screen. Attempts to reach patient via bedside phone unsuccessful. Hospice to continue to call this afternoon. Patient lives in a hotel, minimal family support. Financial screen may be a challenge. Hospice available once patient has D/C plan. Hospice to remain available. Will provide update once  communicate further with patient. 
Consult completed via phone call to patient's nephew Jacob who reports he has been estranged from his Uncle and unable to assist with providing documents for a financial for medicaid. Notified Cm Maribell that patient requires placement and may not be able to produce documents required for screen. Hospice unable to provide assistance with plan at this time. Hospice consult closed. 
Hospice Consult has been received and chart reviewed - Hospice RN has initiated consult and left Voicemail for Nephnai Laureano to discuss   hospice services as he was considering hospice care at a facility. As per CM note - family considering [facility] care [facility].     Hospice pending a return call from  nephnai .

## 2021-04-13 NOTE — PROGRESS NOTE ADULT - SUBJECTIVE AND OBJECTIVE BOX
SUBJECTIVE:    Patient is a 80y old Male who presents with a chief complaint of 80 YEAR OLD MALE C/O MULTIPLE MEDICAL COMPLAINTS . (12 Apr 2021 10:13)    Currently admitted to medicine with the primary diagnosis of Prostate cancer metastatic to multiple sites       Today is hospital day 36d. This morning he is resting comfortably in bed and reports no new issues or overnight events. His L leg is improved.    PAST MEDICAL & SURGICAL HISTORY  CHF (congestive heart failure)    Inguinal hernia    S/P CABG x 3      SOCIAL HISTORY:  Negative for smoking/alcohol/drug use.     ALLERGIES:  No Known Allergies    MEDICATIONS:  STANDING MEDICATIONS  amoxicillin  875 milliGRAM(s)/clavulanate 1 Tablet(s) Oral two times a day  bicalutamide 50 milliGRAM(s) Oral daily  chlorhexidine 4% Liquid 1 Application(s) Topical <User Schedule>  cholecalciferol 4000 Unit(s) Oral daily  collagenase Ointment 1 Application(s) Topical daily  Dakins Solution - 1/2 Strength 1 Application(s) Topical two times a day  dextrose 5% + sodium chloride 0.45% 1000 milliLiter(s) IV Continuous <Continuous>  enoxaparin Injectable 70 milliGRAM(s) SubCutaneous two times a day  fentaNYL   Patch  25 MICROgram(s)/Hr. 1 Patch Transdermal every 48 hours  FIRST- Mouthwash  BLM 10 milliLiter(s) Swish and Spit four times a day  latanoprost 0.005% Ophthalmic Solution 1 Drop(s) Both EYES at bedtime  lidocaine   Patch 2 Patch Transdermal daily  melatonin 3 milliGRAM(s) Oral at bedtime  midodrine. 10 milliGRAM(s) Oral three times a day  pantoprazole    Tablet 40 milliGRAM(s) Oral before breakfast  sodium chloride 0.9%. 1000 milliLiter(s) IV Continuous <Continuous>  zoledronic acid IVPB (ZOMETA) (Non - oncologic) 4 milliGRAM(s) IV Intermittent every 4 weeks    PRN MEDICATIONS  acetaminophen   Tablet .. 650 milliGRAM(s) Oral every 6 hours PRN  aluminum hydroxide/magnesium hydroxide/simethicone Suspension 30 milliLiter(s) Oral every 6 hours PRN  brimonidine 0.2% Ophthalmic Solution 1 Drop(s) Both EYES every 8 hours PRN  HYDROmorphone  Injectable 0.5 milliGRAM(s) IV Push every 4 hours PRN  HYDROmorphone  Injectable 1 milliGRAM(s) IV Push every 4 hours PRN  oxycodone    5 mG/acetaminophen 325 mG 1 Tablet(s) Oral every 6 hours PRN    VITALS:   T(F): 96.8  HR: 107  BP: 99/54  RR: 18  SpO2: --    LABS:                        8.6    3.34  )-----------( 108      ( 13 Apr 2021 04:30 )             27.1     04-13    147<H>  |  116<H>  |  13  ----------------------------<  93  2.9<L>   |  18  |  <0.5<L>    Ca    7.3<L>      13 Apr 2021 04:30  Mg     1.7     04-13    TPro  3.9<L>  /  Alb  2.2<L>  /  TBili  0.5  /  DBili  x   /  AST  12  /  ALT  12  /  AlkPhos  61  04-13                  RADIOLOGY:    PHYSICAL EXAM:  GENERAL: NAD, lying in bed comfortably  HEAD:  Atraumatic, Normocephalic  CHEST/LUNG: Clear to auscultation bilaterally; No rales, rhonchi, wheezing, or rubs. Unlabored respirations  HEART: Regular rate and rhythm; No murmurs, rubs, or gallops  ABDOMEN: Bowel sounds present; Soft, Nontender, Nondistended. No hepatomegally  EXTREMITIES:  No clubbing, cyanosis, or edema  NERVOUS SYSTEM:  Alert & Oriented X3, speech clear. No deficits   MSK: Unable to assess  SKIN: No rashes or lesions

## 2021-04-13 NOTE — PROGRESS NOTE ADULT - ATTENDING COMMENTS
81 yo male hx of CAD s/p CABG s/p 20+ years ago, chronic HFrEF, right sided large inguinal hernia BIBA from hotel room because he was unable to ambulate due to Rt thigh weakness and numbness, pt also had mandibular swelling for over a year. CT lumbosacral spine showed mass suggestive of mets with obliteration of neural foramina so was started on decadron.  Sacral mass biopsied 3/10 showed prostate adenocarcinoma. Mandibular mass biopsied 3/19 also showed prostate cancer. Hospital course was complicated by hypovolemic shock likely 2/2 UGIB, required upgrade to stepdown unit. Pt downgraded to medical burks and s/p chemo and radiotherapy, now pending hospice SNF.      #Metastatic prostate cancer   - s/p chemoradiation   - continue with bicalutamide 50mg daily (started 3/14/21)  - s/p Lupron (GnRH agonist) IM, first dose 3/29    - s/p Zoledronic acid (3/27), cleared by dental for bisphosphonate therapy   - Completed RT to sacrum and spine 5 doses (3/24 - 3/30)  - s/p RT 5 doses to right mandible for palliation; last RT 4/6  - s/p Dexamethasone taper   - continue with Fentanyl patch, c/w Dilaudid PRN   - palliative care consulted for pain management     # Hyponatremia, resolved     # Sacral ulcer   - Burn performed debridement at bedside 4/8  - Augmentin BID for 2 weeks since date of debridement (4/8)   - Pain control     # Pancytopenia   - transfuse if plt <10K, or <50K with bleeding  - oncology following   - likely due to chemotherapy      # Suspected UGIB, treated medically   - s/p 2 unit of PRBC  - No egd done--coffee ground emesis on gastric lavage   - stable, c/w PPI     # Chronic A fib  - c/w therapeutic dose of Lovenox, DOAC on discharge     # Chronic HFrEF - compensated   - not on GDMT due to hypotension   - no evidence of fluid overload     # Hypercalcemia - resolved     # Hypokalemia - resolved   # Hypomagnesemia   # Hypernatremia   - add KCl to D5W 1/2 NS     # Severe protein-calorie malnutrition.  - c/w supplements     # Insomnia  - continue with melatonin     #DVT PPx: Lovenox  #GI PPx: Protonix     #Progress Note Handoff  Pending (specify):  Hospice SNF   Family discussion: yes   Disposition: Hospice SNF

## 2021-04-13 NOTE — PROGRESS NOTE ADULT - ASSESSMENT
79 yo male hx of CAD s/p CABG s/p 20+ years ago, HFrEF, right sided large inguinal hernia BIBA from hotel room because he was unable to ambulate due to Rt thigh weakness and numbness, pt also had mandibular swelling for over a year. CT lumbosacral spine showed mass suggestive of mets with obliteration of neural foramina so was started on decadron.  Sacral mass biopsied 3/10 showed prostate adenocarcinoma. Mandibluar mass biopsied 3/19 also showed prostate cancer. Hospital course was complicated by hypovolemic shock likely 2/2 UGIB, required pressors and upgrade to stepdown unit. Pt currently in hospital awaiting discharge to NH    #Metastatic prostate cancer    - continue with bicalutamide 50mg daily (started 3/14/21)  - s/p Lupron (GnRH agonist) IM, first dose 3/29 --> next dose in 1 month  - received one dose of taxotere (3/27), will hold for now  - Completed RT to sacrum and spine 5 doses (3/24 - 3/30)  - s/p RT 5 DOSES to jaw area for palliation; last RT 4/6  - dental cleared for bisphosphonate, or denosumab s/p zoledronic acid 4mg q4 weeks (3/27)  - s/p Dexamethasone taper  - continue with  on  Fentanyl patch, c/w  Morphine PRN  - continue with  Neurontin 100 mg TID for neuropathic pain   - palliative care consulted for pain management and GOC; signed off 3/19     # SCC of jaw  - 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  - Evaluated by Dental 3/24 - no intervention   - dental cleared for bisphosphonate, or denosumab  - s/p zolendronic acid (3/27)    # GOC  Over weekend discussion was had with family about code status and hospice  - Attempted to call back nephew today (HCP) but no answered; left message --> attempted again but no answer  - Hospice consult placed    # LLE coldness improved today  Asymptomatic  No pain or numbness and full sensation  Pulses assessed by doppler  - Will monitor for now given the likelihood of patient becoming hospice    # Hypotension  - c/w light fluids (NS)  - c/w midodrine q8  - continue to monitor    # 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 Serum Osm 279  - TSH 0.36  - monitor Na level     # Sacral ulcer   - Burn performed debridement at bedside 4/8  - Augmentin BID on discharge for 2 weeks since date of debridement (4/8)     # Thrombocytopenia/ Leukopenia Improving  Platelets and WBC downtrending since 3/27; improved 4/5  - monitor for any signs of bleeding   - transfuse if plt <10K, or <50K with bleeding  - oncology follow up   - likely due to chemotherapy (taxotere)    # UGI bleed Stable for now  - s/p 3 unit of PRBC  - No egd done--only coffee ground emesis  - Maintain active type and screen; Next on 4/15      # Chronic A fib  - on therapeutic dose of Lovenox     # CHFrEF  - fluid restriction 1200 ml in 24 hours   - intake and output monitoring, daily weight   - EF of 35%    # Hypercalcemia/  Vitamin D level Resolved  - likely due to advanced malignancy   - continue with vitamin D supplements 2000units daily   -     # Hyperkalemia Resolved  Now hypokalemic  s/p one dose of Kayexalate   s/p lokelma  - Continue to monitor  - Replete K as needed    # Severe protein-calorie malnutrition.  - dietary on board   - supplements added   - patient reports diarrhea with ensure     # Insomnia  - continue with melatonin     #DVT PPx: Lovenox  #GI PPx: Protonix  FULL CODE  Dispo: SNF vs hospice 79 yo male hx of CAD s/p CABG s/p 20+ years ago, HFrEF, right sided large inguinal hernia BIBA from hotel room because he was unable to ambulate due to Rt thigh weakness and numbness, pt also had mandibular swelling for over a year. CT lumbosacral spine showed mass suggestive of mets with obliteration of neural foramina so was started on decadron.  Sacral mass biopsied 3/10 showed prostate adenocarcinoma. Mandibluar mass biopsied 3/19 also showed prostate cancer. Hospital course was complicated by hypovolemic shock likely 2/2 UGIB, required pressors and upgrade to stepdown unit. Pt currently in hospital awaiting discharge to NH    #Metastatic prostate cancer    - continue with bicalutamide 50mg daily (started 3/14/21)  - s/p Lupron (GnRH agonist) IM, first dose 3/29 --> next dose in 1 month  - received one dose of taxotere (3/27), will hold for now  - Completed RT to sacrum and spine 5 doses (3/24 - 3/30)  - s/p RT 5 DOSES to jaw area for palliation; last RT 4/6  - dental cleared for bisphosphonate, or denosumab s/p zoledronic acid 4mg q4 weeks (3/27)  - s/p Dexamethasone taper  - continue with  on  Fentanyl patch, c/w  Morphine PRN  - continue with  Neurontin 100 mg TID for neuropathic pain   - palliative care consulted for pain management and GOC; signed off 3/19     # SCC of jaw  - 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  - Evaluated by Dental 3/24 - no intervention   - dental cleared for bisphosphonate, or denosumab  - s/p zolendronic acid (3/27)    # GOC  Over weekend discussion was had with family about code status and hospice  - Attempted to call back nephew today (HCP) but no answered; left message --> attempted again but no answer  - Hospice consult placed    # LLE coldness improved today  Asymptomatic  No pain or numbness and full sensation  Pulses assessed by doppler  - Will monitor for now given the likelihood of patient becoming hospice    # Hypotension  - c/w light fluids (NS)  - c/w midodrine q8  - continue to monitor    # 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 Serum Osm 279  - TSH 0.36  - monitor Na level     # Sacral ulcer   - Burn performed debridement at bedside 4/8  - Augmentin BID on discharge for 2 weeks since date of debridement (4/8)     # Thrombocytopenia/ Leukopenia Improving  Platelets and WBC downtrending since 3/27; improved 4/5  - monitor for any signs of bleeding   - transfuse if plt <10K, or <50K with bleeding  - oncology follow up   - likely due to chemotherapy (taxotere)    # UGI bleed Stable for now  - s/p 3 unit of PRBC  - No egd done--only coffee ground emesis  - Maintain active type and screen; Next on 4/15      # Chronic A fib  - on therapeutic dose of Lovenox     # CHFrEF  - fluid restriction 1200 ml in 24 hours   - intake and output monitoring, daily weight   - EF of 35%    # Hypercalcemia/  Vitamin D level Resolved  - likely due to advanced malignancy   - continue with vitamin D supplements 2000units daily   -     # Hyperkalemia Resolved  Now hypokalemic  s/p one dose of Kayexalate   s/p lokelma  - Continue to monitor  - Replete K as needed    # Severe protein-calorie malnutrition.  - dietary on board   - supplements added   - patient reports diarrhea with ensure     # Insomnia  - continue with melatonin     #DVT PPx: Lovenox  #GI PPx: Protonix  DNR/DNI  Dispo: SNF vs hospice

## 2021-04-14 LAB
ALBUMIN SERPL ELPH-MCNC: 2.1 G/DL — LOW (ref 3.5–5.2)
ALP SERPL-CCNC: 63 U/L — SIGNIFICANT CHANGE UP (ref 30–115)
ALT FLD-CCNC: 13 U/L — SIGNIFICANT CHANGE UP (ref 0–41)
ANION GAP SERPL CALC-SCNC: 11 MMOL/L — SIGNIFICANT CHANGE UP (ref 7–14)
AST SERPL-CCNC: 12 U/L — SIGNIFICANT CHANGE UP (ref 0–41)
BASOPHILS # BLD AUTO: 0.03 K/UL — SIGNIFICANT CHANGE UP (ref 0–0.2)
BASOPHILS NFR BLD AUTO: 0.7 % — SIGNIFICANT CHANGE UP (ref 0–1)
BILIRUB SERPL-MCNC: 0.5 MG/DL — SIGNIFICANT CHANGE UP (ref 0.2–1.2)
BUN SERPL-MCNC: 12 MG/DL — SIGNIFICANT CHANGE UP (ref 10–20)
CALCIUM SERPL-MCNC: 7.8 MG/DL — LOW (ref 8.5–10.1)
CHLORIDE SERPL-SCNC: 114 MMOL/L — HIGH (ref 98–110)
CO2 SERPL-SCNC: 19 MMOL/L — SIGNIFICANT CHANGE UP (ref 17–32)
CREAT SERPL-MCNC: <0.5 MG/DL — LOW (ref 0.7–1.5)
EOSINOPHIL # BLD AUTO: 0 K/UL — SIGNIFICANT CHANGE UP (ref 0–0.7)
EOSINOPHIL NFR BLD AUTO: 0 % — SIGNIFICANT CHANGE UP (ref 0–8)
GLUCOSE SERPL-MCNC: 158 MG/DL — HIGH (ref 70–99)
HCT VFR BLD CALC: 27.4 % — LOW (ref 42–52)
HGB BLD-MCNC: 8.8 G/DL — LOW (ref 14–18)
IMM GRANULOCYTES NFR BLD AUTO: 5.3 % — HIGH (ref 0.1–0.3)
LYMPHOCYTES # BLD AUTO: 0.26 K/UL — LOW (ref 1.2–3.4)
LYMPHOCYTES # BLD AUTO: 6.2 % — LOW (ref 20.5–51.1)
MAGNESIUM SERPL-MCNC: 2 MG/DL — SIGNIFICANT CHANGE UP (ref 1.8–2.4)
MCHC RBC-ENTMCNC: 28.7 PG — SIGNIFICANT CHANGE UP (ref 27–31)
MCHC RBC-ENTMCNC: 32.1 G/DL — SIGNIFICANT CHANGE UP (ref 32–37)
MCV RBC AUTO: 89.3 FL — SIGNIFICANT CHANGE UP (ref 80–94)
MONOCYTES # BLD AUTO: 0.1 K/UL — SIGNIFICANT CHANGE UP (ref 0.1–0.6)
MONOCYTES NFR BLD AUTO: 2.4 % — SIGNIFICANT CHANGE UP (ref 1.7–9.3)
NEUTROPHILS # BLD AUTO: 3.56 K/UL — SIGNIFICANT CHANGE UP (ref 1.4–6.5)
NEUTROPHILS NFR BLD AUTO: 85.4 % — HIGH (ref 42.2–75.2)
NRBC # BLD: 0 /100 WBCS — SIGNIFICANT CHANGE UP (ref 0–0)
PLATELET # BLD AUTO: 157 K/UL — SIGNIFICANT CHANGE UP (ref 130–400)
POTASSIUM SERPL-MCNC: 3.8 MMOL/L — SIGNIFICANT CHANGE UP (ref 3.5–5)
POTASSIUM SERPL-SCNC: 3.8 MMOL/L — SIGNIFICANT CHANGE UP (ref 3.5–5)
PROT SERPL-MCNC: 4.1 G/DL — LOW (ref 6–8)
RBC # BLD: 3.07 M/UL — LOW (ref 4.7–6.1)
RBC # FLD: 18.3 % — HIGH (ref 11.5–14.5)
SODIUM SERPL-SCNC: 144 MMOL/L — SIGNIFICANT CHANGE UP (ref 135–146)
WBC # BLD: 4.17 K/UL — LOW (ref 4.8–10.8)
WBC # FLD AUTO: 4.17 K/UL — LOW (ref 4.8–10.8)

## 2021-04-14 PROCEDURE — 99233 SBSQ HOSP IP/OBS HIGH 50: CPT

## 2021-04-14 PROCEDURE — 99223 1ST HOSP IP/OBS HIGH 75: CPT

## 2021-04-14 RX ORDER — SODIUM CHLORIDE 9 MG/ML
500 INJECTION INTRAMUSCULAR; INTRAVENOUS; SUBCUTANEOUS ONCE
Refills: 0 | Status: COMPLETED | OUTPATIENT
Start: 2021-04-14 | End: 2021-04-14

## 2021-04-14 RX ORDER — MORPHINE SULFATE 50 MG/1
15 CAPSULE, EXTENDED RELEASE ORAL EVERY 4 HOURS
Refills: 0 | Status: DISCONTINUED | OUTPATIENT
Start: 2021-04-14 | End: 2021-04-15

## 2021-04-14 RX ADMIN — BICALUTAMIDE 50 MILLIGRAM(S): 50 TABLET, FILM COATED ORAL at 12:45

## 2021-04-14 RX ADMIN — HYDROMORPHONE HYDROCHLORIDE 1 MILLIGRAM(S): 2 INJECTION INTRAMUSCULAR; INTRAVENOUS; SUBCUTANEOUS at 11:27

## 2021-04-14 RX ADMIN — FENTANYL CITRATE 1 PATCH: 50 INJECTION INTRAVENOUS at 20:46

## 2021-04-14 RX ADMIN — CHLORHEXIDINE GLUCONATE 1 APPLICATION(S): 213 SOLUTION TOPICAL at 05:21

## 2021-04-14 RX ADMIN — SODIUM CHLORIDE 75 MILLILITER(S): 9 INJECTION, SOLUTION INTRAVENOUS at 06:13

## 2021-04-14 RX ADMIN — Medication 1 APPLICATION(S): at 17:04

## 2021-04-14 RX ADMIN — Medication 1 TABLET(S): at 17:05

## 2021-04-14 RX ADMIN — ENOXAPARIN SODIUM 70 MILLIGRAM(S): 100 INJECTION SUBCUTANEOUS at 17:05

## 2021-04-14 RX ADMIN — MIDODRINE HYDROCHLORIDE 10 MILLIGRAM(S): 2.5 TABLET ORAL at 12:46

## 2021-04-14 RX ADMIN — FENTANYL CITRATE 1 PATCH: 50 INJECTION INTRAVENOUS at 17:08

## 2021-04-14 RX ADMIN — Medication 1 TABLET(S): at 05:22

## 2021-04-14 RX ADMIN — HYDROMORPHONE HYDROCHLORIDE 0.5 MILLIGRAM(S): 2 INJECTION INTRAMUSCULAR; INTRAVENOUS; SUBCUTANEOUS at 00:43

## 2021-04-14 RX ADMIN — MIDODRINE HYDROCHLORIDE 10 MILLIGRAM(S): 2.5 TABLET ORAL at 05:22

## 2021-04-14 RX ADMIN — MIDODRINE HYDROCHLORIDE 10 MILLIGRAM(S): 2.5 TABLET ORAL at 17:05

## 2021-04-14 RX ADMIN — HYDROMORPHONE HYDROCHLORIDE 1 MILLIGRAM(S): 2 INJECTION INTRAMUSCULAR; INTRAVENOUS; SUBCUTANEOUS at 05:22

## 2021-04-14 RX ADMIN — SODIUM CHLORIDE 166.67 MILLILITER(S): 9 INJECTION INTRAMUSCULAR; INTRAVENOUS; SUBCUTANEOUS at 13:22

## 2021-04-14 NOTE — PROGRESS NOTE ADULT - PROBLEM SELECTOR PLAN 2
DNR/DNI  Continue medical management  GOC to be re-addressed at later date when patient is more alert

## 2021-04-14 NOTE — PROGRESS NOTE ADULT - ASSESSMENT
80yMale known to palliative care team being evaluated for pain management in the setting of metastatic prostate cancer, s/p palliative radiation to spine and jaw, on Casodex per heme-onc, with plan to potentially follow up for further treatment on outpatient basis. Patient has significant cancer related pain from bone mets, but is sensitive to opioid pain relievers. On exam, patient was extremely lethargy 2/2 Dilaudid 1 mg IVP dose.     Per bedside      MEDD (morphine equivalent daily dose):      See Recs below.    Please call [phone] with questions or concerns 24/7.   We will continue to follow.    80yMale known to palliative care team being evaluated for pain management in the setting of metastatic prostate cancer, s/p palliative radiation to spine and jaw, on Casodex per heme-onc, with plan to potentially follow up for further treatment on outpatient basis. Patient has significant cancer related pain from bone mets, but is sensitive to opioid pain relievers. On exam, patient was extremely lethargy 2/2 Dilaudid 1 mg IVP dose.     Per bedside RN, he was screaming in pain prior to administration of Dilaudid, so it seems Dilaudid 0.5 would not be enough. Will drop dose of opioid pain reliever to less than current dose but higher than previous dose.   Plan is for patient to be D/C'd to facility with palliative care, they may re-address GOC (cancer tx versus comfort care) at that time.       MEDD (morphine equivalent daily dose): 130 mg MEDD/24 H  (Fentanyl patch 25 X 2 = 50 MEDD) + (Dilaudid 4 mg total IVP X 20 = 80 mg MEDD) = 130 mg     * Plan discussed with primary team.      See Recs below.    Please call [phone] with questions or concerns 24/7.   We will continue to follow.

## 2021-04-14 NOTE — PROGRESS NOTE ADULT - ATTENDING COMMENTS
81 yo male hx of CAD s/p CABG s/p 20+ years ago, chronic HFrEF, right sided large inguinal hernia BIBA from hotel room because he was unable to ambulate due to Rt thigh weakness and numbness, pt also had mandibular swelling for over a year. CT lumbosacral spine showed mass suggestive of mets with obliteration of neural foramina so was started on decadron.  Sacral mass biopsied 3/10 showed prostate adenocarcinoma. Mandibular mass biopsied 3/19 also showed prostate cancer. Hospital course was complicated by hypovolemic shock likely 2/2 UGIB, required pressors and upgrade to stepdown unit. Pt downgraded to medical burks and s/p chemo and radiotherapy. While in the hospital pt was consulted by palliative care and hospice, agreed for end of life care, awaiting for placement.     A/P   #Metastatic prostate cancer   - s/p chemotherapy and RT   - continue with bicalutamide 50mg daily (started 3/14/21)  - s/p Lupron (GnRH agonist) IM, first dose 3/29    - s/p Zoledronic acid (3/27), cleared by dental for bisphosphonate therapy   - Completed RT to sacrum and spine 5 doses (3/24 - 3/30)  - s/p RT 5 doses to right mandible for palliation; last RT 4/6  - s/p Dexamethasone taper   - palliative care followed up today  for pain management, will transition to po pain meds and anticipate discharge in 24 hours ( [facility]  with palliative care)       # Sacral ulcer   - c/w local wound care   - change position Q 2 hours, offload pressure points   - Burn performed debridement at bedside 4/8  - Augmentin BID for 2 weeks since date of debridement (4/8)   - Pain control     # Pancytopenia / h/o suspected GI bleed   - likely due to chemotherapy    - no more blood draws   - s/p 2 unit of PRBC  - c/w PPIS     # Chronic A fib  - c/w therapeutic dose of Lovenox while in the hospital   - will d/c AC on discharge and continue with comfort/ palliative care only     # Chronic HFrEF   - compensated   - maintain fluid balance     # Severe protein-calorie malnutrition.  - c/w supplements     # Insomnia  - continue with melatonin     #DVT PPx: Lovenox  #GI PPx: Protonix    #Progress Note Handoff  Pending (specify):  palliative care team follow up for po pain meds, observe overnight and anticipate discharge in 24 hours   Disposition: [facility] with palliative care in 24 hours

## 2021-04-14 NOTE — PROGRESS NOTE ADULT - SUBJECTIVE AND OBJECTIVE BOX
SUBJECTIVE:    Patient is a 80y old Male who presents with a chief complaint of 80 YEAR OLD MALE C/O MULTIPLE MEDICAL COMPLAINTS . (13 Apr 2021 11:31)    Currently admitted to medicine with the primary diagnosis of Prostate cancer metastatic to multiple sites       Today is hospital day 37d. This morning he is resting comfortably in bed and reports no new issues or overnight events.     PAST MEDICAL & SURGICAL HISTORY  CHF (congestive heart failure)    Inguinal hernia    S/P CABG x 3      SOCIAL HISTORY:  Negative for smoking/alcohol/drug use.     ALLERGIES:  No Known Allergies    MEDICATIONS:  STANDING MEDICATIONS  amoxicillin  875 milliGRAM(s)/clavulanate 1 Tablet(s) Oral two times a day  bicalutamide 50 milliGRAM(s) Oral daily  chlorhexidine 4% Liquid 1 Application(s) Topical <User Schedule>  cholecalciferol 4000 Unit(s) Oral daily  collagenase Ointment 1 Application(s) Topical daily  Dakins Solution - 1/2 Strength 1 Application(s) Topical two times a day  dextrose 5% + sodium chloride 0.45% 1000 milliLiter(s) IV Continuous <Continuous>  enoxaparin Injectable 70 milliGRAM(s) SubCutaneous two times a day  fentaNYL   Patch  25 MICROgram(s)/Hr. 1 Patch Transdermal every 48 hours  FIRST- Mouthwash  BLM 10 milliLiter(s) Swish and Spit four times a day  latanoprost 0.005% Ophthalmic Solution 1 Drop(s) Both EYES at bedtime  lidocaine   Patch 2 Patch Transdermal daily  melatonin 3 milliGRAM(s) Oral at bedtime  midodrine. 10 milliGRAM(s) Oral three times a day  pantoprazole    Tablet 40 milliGRAM(s) Oral before breakfast  zoledronic acid IVPB (ZOMETA) (Non - oncologic) 4 milliGRAM(s) IV Intermittent every 4 weeks    PRN MEDICATIONS  acetaminophen   Tablet .. 650 milliGRAM(s) Oral every 6 hours PRN  aluminum hydroxide/magnesium hydroxide/simethicone Suspension 30 milliLiter(s) Oral every 6 hours PRN  brimonidine 0.2% Ophthalmic Solution 1 Drop(s) Both EYES every 8 hours PRN  HYDROmorphone  Injectable 0.5 milliGRAM(s) IV Push every 4 hours PRN  HYDROmorphone  Injectable 1 milliGRAM(s) IV Push every 4 hours PRN  oxycodone    5 mG/acetaminophen 325 mG 1 Tablet(s) Oral every 6 hours PRN    VITALS:   T(F): 98.4  HR: 118  BP: 119/64  RR: 20  SpO2: --    LABS:                        8.8    4.17  )-----------( 157      ( 14 Apr 2021 08:03 )             27.4     04-14    144  |  114<H>  |  12  ----------------------------<  158<H>  3.8   |  19  |  <0.5<L>    Ca    7.8<L>      14 Apr 2021 08:03  Mg     2.0     04-14    TPro  4.1<L>  /  Alb  2.1<L>  /  TBili  0.5  /  DBili  x   /  AST  12  /  ALT  13  /  AlkPhos  63  04-14                  RADIOLOGY:    PHYSICAL EXAM:  GENERAL: NAD, lying in bed comfortably  HEAD:  Atraumatic, Normocephalic  CHEST/LUNG: Clear to auscultation bilaterally; No rales, rhonchi, wheezing, or rubs. Unlabored respirations  HEART: Regular rate and rhythm; No murmurs, rubs, or gallops  ABDOMEN: Bowel sounds present; Soft, Nontender, Nondistended. No hepatomegally  EXTREMITIES:  No clubbing, cyanosis, or edema; Cold LLE  NERVOUS SYSTEM:  Alert & Oriented X3, speech clear. No deficits   MSK: Unable to assess  SKIN: No rashes or lesions

## 2021-04-14 NOTE — PROGRESS NOTE ADULT - ASSESSMENT
79 yo male hx of CAD s/p CABG s/p 20+ years ago, HFrEF, right sided large inguinal hernia BIBA from hotel room because he was unable to ambulate due to Rt thigh weakness and numbness, pt also had mandibular swelling for over a year. CT lumbosacral spine showed mass suggestive of mets with obliteration of neural foramina so was started on decadron.  Sacral mass biopsied 3/10 showed prostate adenocarcinoma. Mandibluar mass biopsied 3/19 also showed prostate cancer. Hospital course was complicated by hypovolemic shock likely 2/2 UGIB, required pressors and upgrade to stepdown unit. Pt currently in hospital awaiting discharge to NH    #Metastatic prostate cancer    - continue with bicalutamide 50mg daily (started 3/14/21)  - s/p Lupron (GnRH agonist) IM, first dose 3/29 --> next dose in 1 month  - received one dose of taxotere (3/27), will hold for now  - Completed RT to sacrum and spine 5 doses (3/24 - 3/30)  - s/p RT 5 DOSES to jaw area for palliation; last RT 4/6  - dental cleared for bisphosphonate, or denosumab s/p zoledronic acid 4mg q4 weeks (3/27)  - s/p Dexamethasone taper  - continue with  on  Fentanyl patch, c/w  Morphine PRN  - continue with  Neurontin 100 mg TID for neuropathic pain   - palliative care consulted for pain management and GOC; signed off 3/19     # SCC of jaw  - 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  - Evaluated by Dental 3/24 - no intervention   - dental cleared for bisphosphonate, or denosumab  - s/p zolendronic acid (3/27)    # GOC  Over weekend discussion was had with family about code status and hospice  - Hospice consult placed --> Was not able to get required screening documents from nephew --> will follow up with CM    # LLE coldness  On and Off  Asymptomatic  No pain or numbness and full sensation  Pulses assessed by doppler  - Will monitor for now given the likelihood of patient becoming hospice    # Hypotension  - c/w light fluids (NS)  - c/w midodrine q8  - continue to monitor    # 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 Serum Osm 279  - TSH 0.36  - monitor Na level     # Sacral ulcer   - Burn performed debridement at bedside 4/8  - Augmentin BID on discharge for 2 weeks since date of debridement (4/8)     # Thrombocytopenia/ Leukopenia Improving  Platelets and WBC downtrending since 3/27; improved 4/5  - monitor for any signs of bleeding   - transfuse if plt <10K, or <50K with bleeding  - oncology follow up   - likely due to chemotherapy (taxotere)    # UGI bleed Stable for now  - s/p 3 unit of PRBC  - No egd done--only coffee ground emesis  - Maintain active type and screen; Next on 4/15      # Chronic A fib  - on therapeutic dose of Lovenox     # CHFrEF  - fluid restriction 1200 ml in 24 hours   - intake and output monitoring, daily weight   - EF of 35%    # Hypercalcemia/  Vitamin D level Resolved  - likely due to advanced malignancy   - continue with vitamin D supplements 2000units daily   -     # Hyperkalemia Resolved  Now hypokalemic  s/p one dose of Kayexalate   s/p lokelma  - Continue to monitor  - Replete K as needed    # Severe protein-calorie malnutrition.  - dietary on board   - supplements added   - patient reports diarrhea with ensure     # Insomnia  - continue with melatonin     #DVT PPx: Lovenox  #GI PPx: Protonix  DNR/DNI  Dispo: SNF vs hospice     79 yo male hx of CAD s/p CABG s/p 20+ years ago, HFrEF, right sided large inguinal hernia BIBA from hotel room because he was unable to ambulate due to Rt thigh weakness and numbness, pt also had mandibular swelling for over a year. CT lumbosacral spine showed mass suggestive of mets with obliteration of neural foramina so was started on decadron.  Sacral mass biopsied 3/10 showed prostate adenocarcinoma. Mandibluar mass biopsied 3/19 also showed prostate cancer. Hospital course was complicated by hypovolemic shock likely 2/2 UGIB, required pressors and upgrade to stepdown unit. Pt currently in hospital awaiting discharge to NH    #Metastatic prostate cancer    - continue with bicalutamide 50mg daily (started 3/14/21)  - s/p Lupron (GnRH agonist) IM, first dose 3/29 --> next dose in 1 month  - received one dose of taxotere (3/27), will hold for now  - Completed RT to sacrum and spine 5 doses (3/24 - 3/30)  - s/p RT 5 DOSES to jaw area for palliation; last RT 4/6  - dental cleared for bisphosphonate, or denosumab s/p zoledronic acid 4mg q4 weeks (3/27)  - s/p Dexamethasone taper  - continue with  on  Fentanyl patch, c/w  Morphine PRN  - continue with  Neurontin 100 mg TID for neuropathic pain   - palliative care consulted for pain management and GOC; signed off 3/19     # SCC of jaw  - 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  - Evaluated by Dental 3/24 - no intervention   - dental cleared for bisphosphonate, or denosumab  - s/p zolendronic acid (3/27)    # GOC  Over weekend discussion was had with family about code status and hospice  - Hospice consult placed --> Was not able to get required screening documents from nephew --> will follow up with CM  - Palliative reconsulted for pain management    # LLE coldness  On and Off  Asymptomatic  No pain or numbness and full sensation  Pulses assessed by doppler  - Will monitor for now given the likelihood of patient becoming hospice    # Hypotension  - c/w light fluids (NS)  - c/w midodrine q8  - continue to monitor    # 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 Serum Osm 279  - TSH 0.36  - monitor Na level     # Sacral ulcer   - Burn performed debridement at bedside 4/8  - Augmentin BID on discharge for 2 weeks since date of debridement (4/8)     # Thrombocytopenia/ Leukopenia Improving  Platelets and WBC downtrending since 3/27; improved 4/5  - monitor for any signs of bleeding   - transfuse if plt <10K, or <50K with bleeding  - oncology follow up   - likely due to chemotherapy (taxotere)    # UGI bleed Stable for now  - s/p 3 unit of PRBC  - No egd done--only coffee ground emesis  - Maintain active type and screen; Next on 4/15      # Chronic A fib  - on therapeutic dose of Lovenox     # CHFrEF  - fluid restriction 1200 ml in 24 hours   - intake and output monitoring, daily weight   - EF of 35%    # Hypercalcemia/  Vitamin D level Resolved  - likely due to advanced malignancy   - continue with vitamin D supplements 2000units daily   -     # Hyperkalemia Resolved  Now hypokalemic  s/p one dose of Kayexalate   s/p lokelma  - Continue to monitor  - Replete K as needed    # Severe protein-calorie malnutrition.  - dietary on board   - supplements added   - patient reports diarrhea with ensure     # Insomnia  - continue with melatonin     #DVT PPx: Lovenox  #GI PPx: Protonix  DNR/DNI  Dispo: SNF vs hospice   79 yo male hx of CAD s/p CABG s/p 20+ years ago, HFrEF, right sided large inguinal hernia BIBA from hotel room because he was unable to ambulate due to Rt thigh weakness and numbness, pt also had mandibular swelling for over a year. CT lumbosacral spine showed mass suggestive of mets with obliteration of neural foramina so was started on decadron.  Sacral mass biopsied 3/10 showed prostate adenocarcinoma. Mandibluar mass biopsied 3/19 also showed prostate cancer. Hospital course was complicated by hypovolemic shock likely 2/2 UGIB, required pressors and upgrade to stepdown unit. Pt currently in hospital awaiting discharge to NH    #Metastatic prostate cancer    - continue with bicalutamide 50mg daily (started 3/14/21)  - s/p Lupron (GnRH agonist) IM, first dose 3/29 --> next dose in 1 month  - received one dose of taxotere (3/27), will hold for now  - Completed RT to sacrum and spine 5 doses (3/24 - 3/30)  - s/p RT 5 DOSES to jaw area for palliation; last RT 4/6  - dental cleared for bisphosphonate, or denosumab s/p zoledronic acid 4mg q4 weeks (3/27)  - s/p Dexamethasone taper  - continue with  on  Fentanyl patch, c/w  Morphine PRN  - continue with  Neurontin 100 mg TID for neuropathic pain   - palliative care consulted for pain management and GOC; signed off 3/19     # SCC of jaw  - 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  - Evaluated by Dental 3/24 - no intervention   - dental cleared for bisphosphonate, or denosumab  - s/p zolendronic acid (3/27)    # GOC  Over weekend discussion was had with family about code status and hospice  - Hospice consult placed --> Was not able to get required screening documents from nephew --> will follow up with CM  - Palliative reconsulted for pain management --> attempting to transition from IV to oral pain meds    # LLE coldness  On and Off  Asymptomatic  No pain or numbness and full sensation  Pulses assessed by doppler  - Will monitor for now given the likelihood of patient becoming hospice    # Hypotension  - c/w light fluids (NS)  - c/w midodrine q8  - continue to monitor    # 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 Serum Osm 279  - TSH 0.36  - monitor Na level     # Sacral ulcer   - Burn performed debridement at bedside 4/8  - Augmentin BID on discharge for 2 weeks since date of debridement (4/8)     # Thrombocytopenia/ Leukopenia Improving  Platelets and WBC downtrending since 3/27; improved 4/5  - monitor for any signs of bleeding   - transfuse if plt <10K, or <50K with bleeding  - oncology follow up   - likely due to chemotherapy (taxotere)    # UGI bleed Stable for now  - s/p 3 unit of PRBC  - No egd done--only coffee ground emesis  - Maintain active type and screen; Next on 4/15      # Chronic A fib  - on therapeutic dose of Lovenox     # CHFrEF  - fluid restriction 1200 ml in 24 hours   - intake and output monitoring, daily weight   - EF of 35%    # Hypercalcemia/  Vitamin D level Resolved  - likely due to advanced malignancy   - continue with vitamin D supplements 2000units daily   -     # Hyperkalemia Resolved  Now hypokalemic  s/p one dose of Kayexalate   s/p lokelma  - Continue to monitor  - Replete K as needed    # Severe protein-calorie malnutrition.  - dietary on board   - supplements added   - patient reports diarrhea with ensure     # Insomnia  - continue with melatonin     #DVT PPx: Lovenox  #GI PPx: Protonix  DNR/DNI  Dispo: SNF vs hospice

## 2021-04-15 LAB
ALBUMIN SERPL ELPH-MCNC: 2.1 G/DL — LOW (ref 3.5–5.2)
ALP SERPL-CCNC: 62 U/L — SIGNIFICANT CHANGE UP (ref 30–115)
ALT FLD-CCNC: 15 U/L — SIGNIFICANT CHANGE UP (ref 0–41)
ANION GAP SERPL CALC-SCNC: 8 MMOL/L — SIGNIFICANT CHANGE UP (ref 7–14)
AST SERPL-CCNC: 13 U/L — SIGNIFICANT CHANGE UP (ref 0–41)
BASOPHILS # BLD AUTO: 0.03 K/UL — SIGNIFICANT CHANGE UP (ref 0–0.2)
BASOPHILS NFR BLD AUTO: 0.6 % — SIGNIFICANT CHANGE UP (ref 0–1)
BILIRUB SERPL-MCNC: 0.5 MG/DL — SIGNIFICANT CHANGE UP (ref 0.2–1.2)
BUN SERPL-MCNC: 15 MG/DL — SIGNIFICANT CHANGE UP (ref 10–20)
CALCIUM SERPL-MCNC: 8 MG/DL — LOW (ref 8.5–10.1)
CHLORIDE SERPL-SCNC: 117 MMOL/L — HIGH (ref 98–110)
CO2 SERPL-SCNC: 19 MMOL/L — SIGNIFICANT CHANGE UP (ref 17–32)
CREAT SERPL-MCNC: 0.6 MG/DL — LOW (ref 0.7–1.5)
EOSINOPHIL # BLD AUTO: 0 K/UL — SIGNIFICANT CHANGE UP (ref 0–0.7)
EOSINOPHIL NFR BLD AUTO: 0 % — SIGNIFICANT CHANGE UP (ref 0–8)
GLUCOSE SERPL-MCNC: 142 MG/DL — HIGH (ref 70–99)
HCT VFR BLD CALC: 27.1 % — LOW (ref 42–52)
HGB BLD-MCNC: 8.6 G/DL — LOW (ref 14–18)
IMM GRANULOCYTES NFR BLD AUTO: 6.6 % — HIGH (ref 0.1–0.3)
LYMPHOCYTES # BLD AUTO: 0.38 K/UL — LOW (ref 1.2–3.4)
LYMPHOCYTES # BLD AUTO: 7 % — LOW (ref 20.5–51.1)
MAGNESIUM SERPL-MCNC: 1.9 MG/DL — SIGNIFICANT CHANGE UP (ref 1.8–2.4)
MCHC RBC-ENTMCNC: 29.2 PG — SIGNIFICANT CHANGE UP (ref 27–31)
MCHC RBC-ENTMCNC: 31.7 G/DL — LOW (ref 32–37)
MCV RBC AUTO: 91.9 FL — SIGNIFICANT CHANGE UP (ref 80–94)
MONOCYTES # BLD AUTO: 0.12 K/UL — SIGNIFICANT CHANGE UP (ref 0.1–0.6)
MONOCYTES NFR BLD AUTO: 2.2 % — SIGNIFICANT CHANGE UP (ref 1.7–9.3)
NEUTROPHILS # BLD AUTO: 4.54 K/UL — SIGNIFICANT CHANGE UP (ref 1.4–6.5)
NEUTROPHILS NFR BLD AUTO: 83.6 % — HIGH (ref 42.2–75.2)
NRBC # BLD: 0 /100 WBCS — SIGNIFICANT CHANGE UP (ref 0–0)
PLATELET # BLD AUTO: 170 K/UL — SIGNIFICANT CHANGE UP (ref 130–400)
POTASSIUM SERPL-MCNC: 5.3 MMOL/L — HIGH (ref 3.5–5)
POTASSIUM SERPL-SCNC: 5.3 MMOL/L — HIGH (ref 3.5–5)
PROT SERPL-MCNC: 4 G/DL — LOW (ref 6–8)
RBC # BLD: 2.95 M/UL — LOW (ref 4.7–6.1)
RBC # FLD: 18.9 % — HIGH (ref 11.5–14.5)
SODIUM SERPL-SCNC: 144 MMOL/L — SIGNIFICANT CHANGE UP (ref 135–146)
WBC # BLD: 5.43 K/UL — SIGNIFICANT CHANGE UP (ref 4.8–10.8)
WBC # FLD AUTO: 5.43 K/UL — SIGNIFICANT CHANGE UP (ref 4.8–10.8)

## 2021-04-15 PROCEDURE — 99233 SBSQ HOSP IP/OBS HIGH 50: CPT

## 2021-04-15 PROCEDURE — 99232 SBSQ HOSP IP/OBS MODERATE 35: CPT

## 2021-04-15 RX ORDER — POVIDONE-IODINE 5 %
1 AEROSOL (ML) TOPICAL
Refills: 0 | Status: DISCONTINUED | OUTPATIENT
Start: 2021-04-15 | End: 2021-04-16

## 2021-04-15 RX ORDER — SCOPALAMINE 1 MG/3D
1 PATCH, EXTENDED RELEASE TRANSDERMAL
Refills: 0 | Status: DISCONTINUED | OUTPATIENT
Start: 2021-04-15 | End: 2021-04-16

## 2021-04-15 RX ORDER — HYDROMORPHONE HYDROCHLORIDE 2 MG/ML
2 INJECTION INTRAMUSCULAR; INTRAVENOUS; SUBCUTANEOUS EVERY 4 HOURS
Refills: 0 | Status: DISCONTINUED | OUTPATIENT
Start: 2021-04-15 | End: 2021-04-16

## 2021-04-15 RX ORDER — MORPHINE SULFATE 50 MG/1
2 CAPSULE, EXTENDED RELEASE ORAL ONCE
Refills: 0 | Status: DISCONTINUED | OUTPATIENT
Start: 2021-04-15 | End: 2021-04-15

## 2021-04-15 RX ORDER — HYDROMORPHONE HYDROCHLORIDE 2 MG/ML
2 INJECTION INTRAMUSCULAR; INTRAVENOUS; SUBCUTANEOUS EVERY 4 HOURS
Refills: 0 | Status: DISCONTINUED | OUTPATIENT
Start: 2021-04-15 | End: 2021-04-15

## 2021-04-15 RX ADMIN — Medication 1 APPLICATION(S): at 05:09

## 2021-04-15 RX ADMIN — HYDROMORPHONE HYDROCHLORIDE 2 MILLIGRAM(S): 2 INJECTION INTRAMUSCULAR; INTRAVENOUS; SUBCUTANEOUS at 23:42

## 2021-04-15 RX ADMIN — HYDROMORPHONE HYDROCHLORIDE 2 MILLIGRAM(S): 2 INJECTION INTRAMUSCULAR; INTRAVENOUS; SUBCUTANEOUS at 22:42

## 2021-04-15 RX ADMIN — MORPHINE SULFATE 2 MILLIGRAM(S): 50 CAPSULE, EXTENDED RELEASE ORAL at 05:48

## 2021-04-15 RX ADMIN — Medication 1 APPLICATION(S): at 16:50

## 2021-04-15 RX ADMIN — ENOXAPARIN SODIUM 70 MILLIGRAM(S): 100 INJECTION SUBCUTANEOUS at 17:02

## 2021-04-15 RX ADMIN — SODIUM CHLORIDE 75 MILLILITER(S): 9 INJECTION, SOLUTION INTRAVENOUS at 00:00

## 2021-04-15 RX ADMIN — Medication 3 MILLIGRAM(S): at 22:43

## 2021-04-15 RX ADMIN — SCOPALAMINE 1 PATCH: 1 PATCH, EXTENDED RELEASE TRANSDERMAL at 17:01

## 2021-04-15 RX ADMIN — CHLORHEXIDINE GLUCONATE 1 APPLICATION(S): 213 SOLUTION TOPICAL at 05:11

## 2021-04-15 RX ADMIN — MORPHINE SULFATE 15 MILLIGRAM(S): 50 CAPSULE, EXTENDED RELEASE ORAL at 03:08

## 2021-04-15 RX ADMIN — FENTANYL CITRATE 1 PATCH: 50 INJECTION INTRAVENOUS at 21:07

## 2021-04-15 RX ADMIN — SCOPALAMINE 1 PATCH: 1 PATCH, EXTENDED RELEASE TRANSDERMAL at 21:07

## 2021-04-15 NOTE — ADVANCED PRACTICE NURSE CONSULT - RECOMMEDATIONS
1. STage 3 coccyx-b/l buttock-Continue w/ previous recs-Cleanse wound bed w/ normal saline, gently pat dry.  Apply Cavilon no-sting barrier film to wound edges and periwound to prevent maceration.  Apply Collagenase Santyl nickel-thick to entire wound bed to enzymatically debride devitalized tissue. Apply saline moistened gauze on top of wound bed for moisture to activate debriding enzymes. Cover w/ foam Allevyn dressing. Apply Collagenase and change dressing daily and prn for strike-through drainage or soiling.  2. DTPI L heel- Continue w/ previous recs-Apply silicone foam Allevyn dressing to cushion area, decrease friction and shear, and prevent further skin breakdown. Change dressing q3d and prn for soiling.  -Assess skin/wound qshift, report changes to primary provider.     Additional recs:  -Continue turning/positioning patient from side-to-side q2h while in bed, q1h when/if OOB chair, or in accordance w/ pt's plan of care. Continue utilizing pillows and/or z-evelyn fluidized positioner to assist w/ turning/positioning. When/if OOB chair, utilize pillows or chair cushion to offload pressure.   -Continue to offload heels from bed surface with soft pillow under calfs or by applying offloading boots to BLEs.   -Continue applying Coloplast Purvi Protect moisture barrier cream to buttock and perineal area daily and prn after each incontinent episode.    -Continue utilizing one underpad underneath patient to contain incontinence episodes; change pad when saturated/soiled.   -When/if lemus d/c'ed, consider utilizing condom catheter (if patient candidate) to contain any urinary incontinence.   -Continue nutrition consult for optimal wound healing & nutritional status.     Plan of Care: Primary RN Yesi at bedside & made aware of above recs. Spoke w/ covering/primary MD Roman (at [phone]) in regards to above. No further needs/recs from [facility] service at this time. Staff RN to perform routine skin/wound assessment and manage wound care. Questions or concerns or if wound worsens and reconsult needed, please contact [facility], [facility] #[phone].
1. DTPI R heel- Discontinue previous rec for Allevyn, new order for Betadine- Swab w/ Betadine 10% to maintain clean, dry wound bed and allow devitalized tissue to uproof naturally. Leave open to air.  -Assess skin/wound qshift, report changes to primary provider.     Additional recs:  -Continue turning/positioning patient from side-to-side q2h while in bed, q1h when/if OOB chair, or in accordance w/ pt's plan of care. Continue utilizing pillows and/or z-evelyn fluidized positioner to assist w/ turning/positioning. When/if OOB chair, utilize pillows or chair cushion to offload pressure.   -Continue to offload heels from bed surface with soft pillow under calfs or by applying offloading boots to BLEs.   -Continue applying Coloplast Purvi Protect moisture barrier cream to buttock and perineal area daily and prn after each incontinent episode.    -Continue utilizing one underpad underneath patient to contain incontinence episodes; change pad when saturated/soiled.   -When/if lemus d/c'ed, consider utilizing condom catheter (if patient candidate) to contain any urinary incontinence.   -Continue nutrition consult for optimal wound healing & nutritional status.     Plan of Care: Primary NARINDER Rees made aware in regards to new wound recs. Spoke w/ primary/covering MD Cohn (at [phone]) in regards to above recs. No further needs/recs from [facility] service at this time. Staff RN to perform routine skin/wound assessment and manage wound care. Questions or concerns or if wound worsens and reconsult needed, please contact [facility], [facility] #[phone].
1. DTPI R heel- Continue w/ previous recs-Apply silicone foam Allevyn dressing to cushion area, decrease friction and shear, and prevent further skin breakdown. Change dressing q3d and prn for soiling.  -Assess skin/wound qshift, report changes to primary provider.     Additional recs:  -Continue turning/positioning patient from side-to-side q2h while in bed, q1h when/if OOB chair, or in accordance w/ pt's plan of care. Continue utilizing pillows and/or z-evelyn fluidized positioner to assist w/ turning/positioning. When/if OOB chair, utilize pillows or chair cushion to offload pressure.   -Continue to offload heels from bed surface with soft pillow under calfs or by applying offloading boots to BLEs.   -Continue applying Coloplast Purvi Protect moisture barrier cream to buttock and perineal area daily and prn after each incontinent episode.    -Continue utilizing one underpad underneath patient to contain incontinence episodes; change pad when saturated/soiled.   -When/if lemus d/c'ed, consider utilizing condom catheter (if patient candidate) to contain any urinary incontinence.   -Continue nutrition consult for optimal wound healing & nutritional status.     Plan of Care:  No further needs/recs from [facility] service at this time. Staff RN to perform routine skin/wound assessment and manage wound care. Questions or concerns or if wound worsens and reconsult needed, please contact [facility], [facility] #[phone].
1. DTPI R heel- Continue w/ previous recs-Apply silicone foam Allevyn dressing to cushion area, decrease friction and shear, and prevent further skin breakdown. Change dressing q3d and prn for soiling.  -Assess skin/wound qshift, report changes to primary provider.     Additional recs:  -Continue turning/positioning patient from side-to-side q2h while in bed, q1h when/if OOB chair, or in accordance w/ pt's plan of care. Continue utilizing pillows and/or z-evelyn fluidized positioner to assist w/ turning/positioning. When/if OOB chair, utilize pillows or chair cushion to offload pressure.   -Continue to offload heels from bed surface with soft pillow under calfs or by applying offloading boots to BLEs.   -Continue applying Coloplast Purvi Protect moisture barrier cream to buttock and perineal area daily and prn after each incontinent episode.    -Continue utilizing one underpad underneath patient to contain incontinence episodes; change pad when saturated/soiled.   -When/if lemus d/c'ed, consider utilizing condom catheter (if patient candidate) to contain any urinary incontinence.   -Continue nutrition consult for optimal wound healing & nutritional status.     Plan of Care: Primary RN Negrita at bedside & made aware of above recs. No further needs/recs from [facility] service at this time. Staff RN to perform routine skin/wound assessment and manage wound care. Questions or concerns or if wound worsens and reconsult needed, please contact [facility], [facility] #[phone].
1. DTPI coccyx-b/l buttock-Cleanse wound bed w/ normal saline, gently pat dry.  Apply Cavilon no-sting barrier film to wound edges and periwound to prevent maceration.  Apply Collagenase Santyl nickel-thick to entire wound bed to enzymatically debride devitalized tissue. Apply saline moistened gauze on top of wound bed for moisture to activate debriding enzymes. Cover w/ foam Allevyn dressing. Apply Collagenase and change dressing daily and prn for strike-through drainage or soiling.  2. DTPI R heel- Apply silicone foam Allevyn dressing to cushion area, decrease friction and shear, and prevent further skin breakdown. Change dressing q3d and prn for soiling.  -Assess skin/wound qshift, report changes to primary provider.     Additional recs:  -Continue turning/positioning patient from side-to-side q2h while in bed, q1h when/if OOB chair, or in accordance w/ pt's plan of care. Continue utilizing pillows and/or z-evelyn fluidized positioner to assist w/ turning/positioning. When/if OOB chair, utilize pillows or chair cushion to offload pressure.   -Continue to offload heels from bed surface with soft pillow under calfs or by applying offloading boots to BLEs.   -Continue applying Coloplast Purvi Protect moisture barrier cream to buttock and perineal area daily and prn after each incontinent episode.    -Continue utilizing one underpad underneath patient to contain incontinence episodes; change pad when saturated/soiled.   -When/if lemus d/c'ed, consider utilizing condom catheter (if patient candidate) to contain any urinary incontinence.   -Continue nutrition consult for optimal wound healing & nutritional status.     Plan of Care: Primary RN Lazara at bedside & made aware of above recs. Spoke w/  covering/primary CCU MD Chi Baez in regards to above. No further needs/recs from [facility] service at this time. Staff RN to perform routine skin/wound assessment and manage wound care. Questions or concerns or if wound worsens and reconsult needed, please contact [facility], [facility] #[phone].

## 2021-04-15 NOTE — ADVANCED PRACTICE NURSE CONSULT - REASON FOR CONSULT
Pressure injury assessment & recommendations 
Pressure injury reassessment/follow-up 

## 2021-04-15 NOTE — PROGRESS NOTE ADULT - SUBJECTIVE AND OBJECTIVE BOX
SUBJECTIVE:    Patient is a 80y old Male who presents with a chief complaint of 80 YEAR OLD MALE C/O MULTIPLE MEDICAL COMPLAINTS . (14 Apr 2021 14:22)    Currently admitted to medicine with the primary diagnosis of Prostate cancer metastatic to multiple sites       Today is hospital day 38d. This morning he is resting comfortably in bed and reports no new issues. She is still complaining of pain requiring IV abx.    PAST MEDICAL & SURGICAL HISTORY  CHF (congestive heart failure)    Inguinal hernia    S/P CABG x 3      SOCIAL HISTORY:  Negative for smoking/alcohol/drug use.     ALLERGIES:  No Known Allergies    MEDICATIONS:  STANDING MEDICATIONS  amoxicillin  875 milliGRAM(s)/clavulanate 1 Tablet(s) Oral two times a day  bicalutamide 50 milliGRAM(s) Oral daily  chlorhexidine 4% Liquid 1 Application(s) Topical <User Schedule>  cholecalciferol 4000 Unit(s) Oral daily  collagenase Ointment 1 Application(s) Topical daily  Dakins Solution - 1/2 Strength 1 Application(s) Topical two times a day  dextrose 5% + sodium chloride 0.45% 1000 milliLiter(s) IV Continuous <Continuous>  enoxaparin Injectable 70 milliGRAM(s) SubCutaneous two times a day  fentaNYL   Patch  25 MICROgram(s)/Hr. 1 Patch Transdermal every 48 hours  FIRST- Mouthwash  BLM 10 milliLiter(s) Swish and Spit four times a day  latanoprost 0.005% Ophthalmic Solution 1 Drop(s) Both EYES at bedtime  lidocaine   Patch 2 Patch Transdermal daily  melatonin 3 milliGRAM(s) Oral at bedtime  midodrine. 10 milliGRAM(s) Oral three times a day  pantoprazole    Tablet 40 milliGRAM(s) Oral before breakfast  zoledronic acid IVPB (ZOMETA) (Non - oncologic) 4 milliGRAM(s) IV Intermittent every 4 weeks    PRN MEDICATIONS  aluminum hydroxide/magnesium hydroxide/simethicone Suspension 30 milliLiter(s) Oral every 6 hours PRN  brimonidine 0.2% Ophthalmic Solution 1 Drop(s) Both EYES every 8 hours PRN  morphine Concentrate 15 milliGRAM(s) SubLingual every 4 hours PRN    VITALS:   T(F): 98.6  HR: 101  BP: 105/55  RR: 18  SpO2: --    LABS:                        8.6    5.43  )-----------( 170      ( 15 Apr 2021 08:27 )             27.1     04-14    144  |  114<H>  |  12  ----------------------------<  158<H>  3.8   |  19  |  <0.5<L>    Ca    7.8<L>      14 Apr 2021 08:03  Mg     2.0     04-14    TPro  4.1<L>  /  Alb  2.1<L>  /  TBili  0.5  /  DBili  x   /  AST  12  /  ALT  13  /  AlkPhos  63  04-14                  RADIOLOGY:    PHYSICAL EXAM:  GENERAL: NAD, lying in bed comfortably  HEAD:  Atraumatic, Normocephalic  CHEST/LUNG: Clear to auscultation bilaterally; No rales, rhonchi, wheezing, or rubs. Unlabored respirations  HEART: Regular rate and rhythm; No murmurs, rubs, or gallops  ABDOMEN: Bowel sounds present; Soft, Nontender, Nondistended. No hepatomegally  EXTREMITIES:  No clubbing, cyanosis, or edema  NERVOUS SYSTEM:  Alert & Oriented X3, speech clear. No deficits   MSK: Unable to assess  SKIN: No rashes or lesions

## 2021-04-15 NOTE — PROGRESS NOTE ADULT - ASSESSMENT
80yMale being evaluated for      Patient does not like Roxanol solution and often is spitting into tissues. Will switch to PO tablet dilaudid, patient agreeable to trying this.       MEDD (morphine equivalent daily dose):      See Recs below.    Please call [phone] with questions or concerns 24/7.   We will continue to follow.    80y Male being evaluated for pain mgmt in the setting of metastatic prostate cancer to bone with pain in the Rt jaw, back. He remains lethargic although more awake than yesterday. He does not wish to continue on Roxanol for PRN pain relief, so will switch to PO tablet. Given his lethargy, will start low, and may increase later if needed.     Will switch to PO tablet Dilaudid 2 mg Q 4 H PRN, patient agreeable to trying this.       MEDD (morphine equivalent daily dose): 71 mg MED/24 H  (Fentanyl patch 25 mcg X 2 = 50) + (Morphine SL 15 + (Morphine 2 mg IV X 3 = 6)) = 71 mg     * Plan discussed with primary team.       See Recs below.    Please call [phone] with questions or concerns 24/7.   We will continue to follow.

## 2021-04-15 NOTE — ADVANCED PRACTICE NURSE CONSULT - ASSESSMENT
79 yo male hx of CAD s/p CABG s/p 20+ years ago/ CHF/ right sided large inguinal hernia BIBA from hotel room 2/2 unable to ambulate with right thigh weakness and numbness, reported it started from knee to hip, denies fall and injury. He was just sitting and the symptoms started, reported off/on back pain in the past. denies fever/chill/recent illness/coughing/chest pain/abd pain/n/v/d and urinary sxs. Also has right sided facial swelling for about 1 year after 3 teeth extraction. reports swelling worsens after eating. swelling was better in the past and started swelling again in the past few months. didn't follow up with his dentist 2/2 COVID. Patient found w/ new sacral mass found on CT, new onset LE weakness, also has large right mandibular mass and further bony lesions in spine all suggestive of metastatic disease.   Sacral mass biopsied 3/10 showed prostate adenocarcinoma. Mandibluar mass biopsied 3/19 also showed prostate cancer. Hospital course was complicated by hypovolemic shock likely 2/2 UGIB, required pressors and upgrade to stepdown unit. Pt currently in hospital undergoing chemo and radiotherapy. Patient made himself DNR/DNI-plan as of now is to be D/C'd to NH on palliative service.    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    Received patient on 4B, laying supine in bed, turned to right side, (pillow under left side & underneath BLEs elevating heels), HOB elevated 30 degrees. Pt asleep upon WOCN arrival, arouseable but lethargic, made aware of purpose of this WOCN visit, agreeable to consult.  BLEs edematous. Foam Allevyn dressings to b/l heels in place, dressings removed for skin assessment. Left heel intact.     Type of wound: Deep tissue pressure injury (DTPI)  Location: right heel  Wound measurements: 5cm x 5cm x 0cm, true depth indeterminable   Tunneling/Undermining: none  Wound bed: now presenting as intact blood blister   Wound edges: attached, flush, irregular   Periwound: intact   Wound exudate: none  Wound odor: none  Induration, erythema, warmth: none   Wound pain: denies & no signs present      Patient mostly bedbound, very limited mobility in bed, + lemus, incontinent of stool. Ordered for soft diet, probably inadequate intake as per reported Thai score.

## 2021-04-15 NOTE — PROGRESS NOTE ADULT - SUBJECTIVE AND OBJECTIVE BOX
MANUELITO HEDRICK             MRN-[id number]    CC:    HPI:   81 yo male hx of CAD s/p CABG s/p 20+ years ago/ CHF/ right sided large inguinal hernia BIBA from hotel room 2/2 unable to ambulate with right thigh weakness and numbness. reported it started from knee to hip. denies fall and injury. He was just sitting and the symptoms started. reported off/on back pain in the past. denies fever/chill/recent illness/coughing/chest pain/abd pain/n/v/d and urinary sxs.   	Also has right sided facial swelling for about 1 year after 3 teeth extraction. reports swelling worsens after eating. swelling was better in the past and started swelling again in the past few months. didn't follow up with his dentist 2/2 COVID.   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 he used to be able to ambulate without assistant and he commutes with his car. (08 Mar 2021 05:03)      SUBJECTIVE:    ROS:  DYSPNEA: Y / N	  NAUS/VOM: Y / N	  SECRETIONS: Y / N	  AGITATION: Y / N  Pain (Y/N):       -Provocation/Palliation:  -Quality/Quantity:  -Radiating:  -Severity:  -Timing/Frequency:  -Impact on ADLs:    OTHER REVIEW OF SYSTEMS:  UNABLE TO OBTAIN  due to:    PEx:  80y              General: awake. well nourished, lying in bed, NAD, conversant  HEENT:  NCAT PERRL EOMI Non icteric   Neck: Supple no masses  CVS: RR S1S2 No M/G/R  Resp: Unlabored Non tachypneic No increased WOB, clear to ascultation bilaterally  GI:  Soft NT ND BS+  :  Voiding / Santos / PrimaFit  Musc: No C/C/E    Neuro: Follows commands No focal deficits  Psych: Calm, a & o x 3  Skin: Non jaundiced, no rash   Lymph: Normal      Last BM:       ALLERGIES:     OPIATE NAÏVE (Y/N):    MEDICATIONS: REVIEWED  MEDICATIONS  (STANDING):  amoxicillin  875 milliGRAM(s)/clavulanate 1 Tablet(s) Oral two times a day  bicalutamide 50 milliGRAM(s) Oral daily  chlorhexidine 4% Liquid 1 Application(s) Topical <User Schedule>  cholecalciferol 4000 Unit(s) Oral daily  collagenase Ointment 1 Application(s) Topical daily  Dakins Solution - 1/2 Strength 1 Application(s) Topical two times a day  dextrose 5% + sodium chloride 0.45% 1000 milliLiter(s) (75 mL/Hr) IV Continuous <Continuous>  enoxaparin Injectable 70 milliGRAM(s) SubCutaneous two times a day  fentaNYL   Patch  25 MICROgram(s)/Hr. 1 Patch Transdermal every 48 hours  FIRST- Mouthwash  BLM 10 milliLiter(s) Swish and Spit four times a day  HYDROmorphone   Tablet 2 milliGRAM(s) Oral every 4 hours  latanoprost 0.005% Ophthalmic Solution 1 Drop(s) Both EYES at bedtime  lidocaine   Patch 2 Patch Transdermal daily  melatonin 3 milliGRAM(s) Oral at bedtime  midodrine. 10 milliGRAM(s) Oral three times a day  pantoprazole    Tablet 40 milliGRAM(s) Oral before breakfast  povidone iodine 10% Ointment 1 Application(s) Topical two times a day  zoledronic acid IVPB (ZOMETA) (Non - oncologic) 4 milliGRAM(s) IV Intermittent every 4 weeks    MEDICATIONS  (PRN):  aluminum hydroxide/magnesium hydroxide/simethicone Suspension 30 milliLiter(s) Oral every 6 hours PRN Dyspepsia  brimonidine 0.2% Ophthalmic Solution 1 Drop(s) Both EYES every 8 hours PRN dry eyes      LABS: REVIEWED  CBC:                        8.6    5.43  )-----------( 170      ( 15 Apr 2021 08:27 )             27.1     CMP:    04-15    144  |  117<H>  |  15  ----------------------------<  142<H>  5.3<H>   |  19  |  0.6<L>    Ca    8.0<L>      15 Apr 2021 08:27  Mg     1.9     04-15    TPro  4.0<L>  /  Alb  2.1<L>  /  TBili  0.5  /  DBili  x   /  AST  13  /  ALT  15  /  AlkPhos  62  04-15  Albumin, Serum: 2.1 g/dL (04-15-21 @ 08:27)      IMAGING: REVIEWED    ADVANCED DIRECTIVES:            FULL CODE            DNR DNI            LIVING WILL            DPOA       HCP       MOLST    DECISION MAKER:   LEGAL SURROGATE:    PSYCHOSOCIAL-SPIRITUAL ASSESSMENT:       Reviewed       Care plan unchanged       Care plan adjusted as above	    GOALS OF CARE DISCUSSION       Palliative care info/counseling provided	           Family meeting       Advanced Directives addressed please see Advance Care Planning Note	           See previous Palliative Medicine Note       Documentation of GOC:    CURRENT DISPO PLAN:     WILL REMAIN IN HOSPITAL  [facility]  Hospice  Home      REFERRALS	        Palliative Med        Unit SW/Case Mgmt              Speech/Swallow       Patient/Family Support      Hospice       Nutrition       Dietician       PT/OT MANUELITO HEDRICK             MRN-[id number]    CC: Prostate cancer    HPI:   79 yo male hx of CAD s/p CABG s/p 20+ years ago/ CHF/ right sided large inguinal hernia BIBA from hotel room 2/2 unable to ambulate with right thigh weakness and numbness. reported it started from knee to hip. denies fall and injury. He was just sitting and the symptoms started. reported off/on back pain in the past. denies fever/chill/recent illness/coughing/chest pain/abd pain/n/v/d and urinary sxs.   	Also has right sided facial swelling for about 1 year after 3 teeth extraction. reports swelling worsens after eating. swelling was better in the past and started swelling again in the past few months. didn't follow up with his dentist 2/2 COVID.   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 he used to be able to ambulate without assistant and he commutes with his car. (08 Mar 2021 05:03)      SUBJECTIVE:    ROS:  DYSPNEA: No   NAUS/VOM: No 	  SECRETIONS: he is spitting frequently into tissues.   Pain (Y/N):     Yes, but not right  now. it was worse overnight. He reports he really did not like Roxanol, he did not like the texture, and would be more happy with a tablet.     PEx:  Vital Signs Last 24 Hrs  T(C): 36.7 (15 Apr 2021 12:55), Max: 37 (14 Apr 2021 21:12)  T(F): 98 (15 Apr 2021 12:55), Max: 98.6 (14 Apr 2021 21:12)  HR: 95 (15 Apr 2021 12:55) (95 - 101)  BP: 104/54 (15 Apr 2021 15:12) (92/45 - 110/70)  RR: 18 (15 Apr 2021 05:43) (18 - 18)    General: sleepy, ill-appearing, lying in bed, NAD  HEENT:  Swelling on the R jaw, PERRL EOMI Non icteric   Neck: Supple no masses  Resp: Unlabored Non tachypneic No increased WOB, on RA  GI:  Soft NT ND  Musc: No C/C/E    Neuro: Follows commands, No focal deficits, + lethargic  Psych: Calm, a & o x 3  Skin: Non jaundiced, no rashes      MEDICATIONS: REVIEWED  MEDICATIONS  (STANDING):  amoxicillin  875 milliGRAM(s)/clavulanate 1 Tablet(s) Oral two times a day  bicalutamide 50 milliGRAM(s) Oral daily  chlorhexidine 4% Liquid 1 Application(s) Topical <User Schedule>  cholecalciferol 4000 Unit(s) Oral daily  collagenase Ointment 1 Application(s) Topical daily  Dakins Solution - 1/2 Strength 1 Application(s) Topical two times a day  dextrose 5% + sodium chloride 0.45% 1000 milliLiter(s) (75 mL/Hr) IV Continuous <Continuous>  enoxaparin Injectable 70 milliGRAM(s) SubCutaneous two times a day  fentaNYL   Patch  25 MICROgram(s)/Hr. 1 Patch Transdermal every 48 hours  FIRST- Mouthwash  BLM 10 milliLiter(s) Swish and Spit four times a day  HYDROmorphone   Tablet 2 milliGRAM(s) Oral every 4 hours  latanoprost 0.005% Ophthalmic Solution 1 Drop(s) Both EYES at bedtime  lidocaine   Patch 2 Patch Transdermal daily  melatonin 3 milliGRAM(s) Oral at bedtime  midodrine. 10 milliGRAM(s) Oral three times a day  pantoprazole    Tablet 40 milliGRAM(s) Oral before breakfast  povidone iodine 10% Ointment 1 Application(s) Topical two times a day  zoledronic acid IVPB (ZOMETA) (Non - oncologic) 4 milliGRAM(s) IV Intermittent every 4 weeks    MEDICATIONS  (PRN):  aluminum hydroxide/magnesium hydroxide/simethicone Suspension 30 milliLiter(s) Oral every 6 hours PRN Dyspepsia  brimonidine 0.2% Ophthalmic Solution 1 Drop(s) Both EYES every 8 hours PRN dry eyes      LABS: REVIEWED  CBC:                        8.6    5.43  )-----------( 170      ( 15 Apr 2021 08:27 )             27.1     CMP:    04-15    144  |  117<H>  |  15  ----------------------------<  142<H>  5.3<H>   |  19  |  0.6<L>    Ca    8.0<L>      15 Apr 2021 08:27  Mg     1.9     04-15    TPro  4.0<L>  /  Alb  2.1<L>  /  TBili  0.5  /  DBili  x   /  AST  13  /  ALT  15  /  AlkPhos  62  04-15  Albumin, Serum: 2.1 g/dL (04-15-21 @ 08:27)    ADVANCED DIRECTIVES:              DNR DNI         DECISION MAKER: Patient is able to make decisions. Estranged nephew is next of kin. 	    GOALS OF CARE DISCUSSION  - plan for D/C to facility on palliative care  - DNR/DNI  - palliative at facility will re-address GOC (comfort versus med mgmt)    CURRENT DISPO PLAN:     Long term care

## 2021-04-15 NOTE — PROGRESS NOTE ADULT - ASSESSMENT
81 yo male hx of CAD s/p CABG s/p 20+ years ago, HFrEF, right sided large inguinal hernia BIBA from hotel room because he was unable to ambulate due to Rt thigh weakness and numbness, pt also had mandibular swelling for over a year. CT lumbosacral spine showed mass suggestive of mets with obliteration of neural foramina so was started on decadron.  Sacral mass biopsied 3/10 showed prostate adenocarcinoma. Mandibluar mass biopsied 3/19 also showed prostate cancer. Hospital course was complicated by hypovolemic shock likely 2/2 UGIB, required pressors and upgrade to stepdown unit. Pt currently in hospital awaiting discharge to NH    #Metastatic prostate cancer    - continue with bicalutamide 50mg daily (started 3/14/21)  - s/p Lupron (GnRH agonist) IM, first dose 3/29 --> next dose in 1 month  - received one dose of taxotere (3/27), will hold for now  - Completed RT to sacrum and spine 5 doses (3/24 - 3/30)  - s/p RT 5 DOSES to jaw area for palliation; last RT 4/6  - dental cleared for bisphosphonate, or denosumab s/p zoledronic acid 4mg q4 weeks (3/27)  - s/p Dexamethasone taper  - continue with  on  Fentanyl patch, c/w  Morphine PRN  - continue with  Neurontin 100 mg TID for neuropathic pain     # SCC of jaw  - 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  - Evaluated by Dental 3/24 - no intervention   - dental cleared for bisphosphonate, or denosumab  - s/p zolendronic acid (3/27)    # GOC  Over weekend discussion was had with family about code status and hospice  - Hospice consult placed --> Was not able to get required screening documents from nephew --> will follow up with CM  - Palliative reconsulted for pain management --> attempting to transition from IV to oral pain meds    # LLE coldness  On and Off  Asymptomatic  No pain or numbness and full sensation  Pulses assessed by doppler  - Will monitor for now given the likelihood of patient becoming hospice    # Hypotension  - c/w light fluids (NS)  - c/w midodrine q8  - continue to monitor    # 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 Serum Osm 279  - TSH 0.36  - monitor Na level     # Sacral ulcer   - Burn performed debridement at bedside 4/8  - Augmentin BID on discharge for 2 weeks since date of debridement (4/8)     # Thrombocytopenia/ Leukopenia Improving  Platelets and WBC downtrending since 3/27; improved 4/5  - monitor for any signs of bleeding   - transfuse if plt <10K, or <50K with bleeding  - oncology follow up   - likely due to chemotherapy (taxotere)    # UGI bleed Stable for now  - s/p 3 unit of PRBC  - No egd done--only coffee ground emesis  - Maintain active type and screen; Next on 4/15      # Chronic A fib  - on therapeutic dose of Lovenox     # CHFrEF  - fluid restriction 1200 ml in 24 hours   - intake and output monitoring, daily weight   - EF of 35%    # Hypercalcemia/  Vitamin D level Resolved  - likely due to advanced malignancy   - continue with vitamin D supplements 2000units daily   -     # Hyperkalemia Resolved  s/p one dose of Kayexalate   s/p lokelma  - Continue to monitor  - Replete K as needed    # Severe protein-calorie malnutrition.  - dietary on board   - supplements added   - patient reports diarrhea with ensure     # Insomnia  - continue with melatonin     #DVT PPx: Lovenox  #GI PPx: Protonix  DNR/DNI  Dispo: SNF vs hospice

## 2021-04-15 NOTE — PROGRESS NOTE ADULT - PROBLEM SELECTOR PLAN 2
DNR/DNI  Ongoing medical management  Plan for palliative care at long term care facility  Will re-address GOC as appropriate

## 2021-04-15 NOTE — PROGRESS NOTE ADULT - ATTENDING COMMENTS
81 yo male hx of CAD s/p CABG s/p 20+ years ago, chronic HFrEF, right sided large inguinal hernia BIBA from hotel room because he was unable to ambulate due to Rt thigh weakness and numbness, pt also had mandibular swelling for over a year. CT lumbosacral spine showed mass suggestive of mets with obliteration of neural foramina so was started on decadron.  Sacral mass biopsied 3/10 showed prostate adenocarcinoma. Mandibular mass biopsied 3/19 also showed prostate cancer. Hospital course was complicated by hypovolemic shock likely 2/2 UGIB, required pressors and upgrade to stepdown unit. Pt downgraded to medical burks and s/p chemo and radiotherapy. While in the hospital pt was consulted by palliative care and hospice, agreed for end of life care, awaiting for placement.     A/P   #Metastatic prostate cancer   - s/p chemotherapy and RT   - continue with bicalutamide 50mg daily (started 3/14/21)  - s/p Lupron (GnRH agonist) IM, first dose 3/29    - s/p Zoledronic acid (3/27), cleared by dental for bisphosphonate therapy   - Completed RT to sacrum and spine 5 doses (3/24 - 3/30)  - s/p RT 5 doses to right mandible for palliation; last RT 4/6  - s/p Dexamethasone taper   - palliative care following  for pain management, will transition to po pain meds and anticipate discharge in 24 hours ( [facility]  with palliative care)       # Sacral ulcer   - c/w local wound care   - change position Q 2 hours, offload pressure points   - Burn performed debridement at bedside 4/8  - Augmentin BID for 2 weeks since date of debridement (4/8)   - Pain control     # Pancytopenia / h/o suspected GI bleed   - likely due to chemotherapy    - no more blood draws   - s/p 2 unit of PRBC  - c/w PPIS     # Chronic A fib  - c/w therapeutic dose of Lovenox while in the hospital   - will d/c AC on discharge and continue with comfort/ palliative care only     # Chronic HFrEF   - compensated   - maintain fluid balance     # Severe protein-calorie malnutrition.  - c/w supplements     # Insomnia  - continue with melatonin     #DVT PPx: Lovenox  #GI PPx: Protonix    # Dispo: anticipate discharge to [facility] in 24 hours

## 2021-04-16 ENCOUNTER — TRANSCRIPTION ENCOUNTER (OUTPATIENT)
Age: 81
End: 2021-04-16

## 2021-04-16 VITALS
HEART RATE: 82 BPM | RESPIRATION RATE: 20 BRPM | DIASTOLIC BLOOD PRESSURE: 53 MMHG | TEMPERATURE: 97 F | SYSTOLIC BLOOD PRESSURE: 98 MMHG

## 2021-04-16 LAB
ALBUMIN SERPL ELPH-MCNC: 1.9 G/DL — LOW (ref 3.5–5.2)
ALP SERPL-CCNC: 75 U/L — SIGNIFICANT CHANGE UP (ref 30–115)
ALT FLD-CCNC: 14 U/L — SIGNIFICANT CHANGE UP (ref 0–41)
ANION GAP SERPL CALC-SCNC: 9 MMOL/L — SIGNIFICANT CHANGE UP (ref 7–14)
AST SERPL-CCNC: 14 U/L — SIGNIFICANT CHANGE UP (ref 0–41)
BASOPHILS # BLD AUTO: 0.03 K/UL — SIGNIFICANT CHANGE UP (ref 0–0.2)
BASOPHILS NFR BLD AUTO: 0.4 % — SIGNIFICANT CHANGE UP (ref 0–1)
BILIRUB SERPL-MCNC: 0.5 MG/DL — SIGNIFICANT CHANGE UP (ref 0.2–1.2)
BUN SERPL-MCNC: 12 MG/DL — SIGNIFICANT CHANGE UP (ref 10–20)
CALCIUM SERPL-MCNC: 7.6 MG/DL — LOW (ref 8.5–10.1)
CHLORIDE SERPL-SCNC: 108 MMOL/L — SIGNIFICANT CHANGE UP (ref 98–110)
CO2 SERPL-SCNC: 18 MMOL/L — SIGNIFICANT CHANGE UP (ref 17–32)
CREAT SERPL-MCNC: <0.5 MG/DL — LOW (ref 0.7–1.5)
EOSINOPHIL # BLD AUTO: 0.01 K/UL — SIGNIFICANT CHANGE UP (ref 0–0.7)
EOSINOPHIL NFR BLD AUTO: 0.1 % — SIGNIFICANT CHANGE UP (ref 0–8)
GLUCOSE SERPL-MCNC: 121 MG/DL — HIGH (ref 70–99)
HCT VFR BLD CALC: 28.4 % — LOW (ref 42–52)
HGB BLD-MCNC: 8.9 G/DL — LOW (ref 14–18)
IMM GRANULOCYTES NFR BLD AUTO: 5.2 % — HIGH (ref 0.1–0.3)
LYMPHOCYTES # BLD AUTO: 0.28 K/UL — LOW (ref 1.2–3.4)
LYMPHOCYTES # BLD AUTO: 3.7 % — LOW (ref 20.5–51.1)
MAGNESIUM SERPL-MCNC: 1.7 MG/DL — LOW (ref 1.8–2.4)
MCHC RBC-ENTMCNC: 28.3 PG — SIGNIFICANT CHANGE UP (ref 27–31)
MCHC RBC-ENTMCNC: 31.3 G/DL — LOW (ref 32–37)
MCV RBC AUTO: 90.4 FL — SIGNIFICANT CHANGE UP (ref 80–94)
MONOCYTES # BLD AUTO: 0.24 K/UL — SIGNIFICANT CHANGE UP (ref 0.1–0.6)
MONOCYTES NFR BLD AUTO: 3.2 % — SIGNIFICANT CHANGE UP (ref 1.7–9.3)
NEUTROPHILS # BLD AUTO: 6.6 K/UL — HIGH (ref 1.4–6.5)
NEUTROPHILS NFR BLD AUTO: 87.4 % — HIGH (ref 42.2–75.2)
NRBC # BLD: 0 /100 WBCS — SIGNIFICANT CHANGE UP (ref 0–0)
PLATELET # BLD AUTO: 195 K/UL — SIGNIFICANT CHANGE UP (ref 130–400)
POTASSIUM SERPL-MCNC: 5 MMOL/L — SIGNIFICANT CHANGE UP (ref 3.5–5)
POTASSIUM SERPL-SCNC: 5 MMOL/L — SIGNIFICANT CHANGE UP (ref 3.5–5)
PROT SERPL-MCNC: 4.1 G/DL — LOW (ref 6–8)
RBC # BLD: 3.14 M/UL — LOW (ref 4.7–6.1)
RBC # FLD: 19.1 % — HIGH (ref 11.5–14.5)
SODIUM SERPL-SCNC: 135 MMOL/L — SIGNIFICANT CHANGE UP (ref 135–146)
WBC # BLD: 7.55 K/UL — SIGNIFICANT CHANGE UP (ref 4.8–10.8)
WBC # FLD AUTO: 7.55 K/UL — SIGNIFICANT CHANGE UP (ref 4.8–10.8)

## 2021-04-16 PROCEDURE — 99233 SBSQ HOSP IP/OBS HIGH 50: CPT

## 2021-04-16 PROCEDURE — 99232 SBSQ HOSP IP/OBS MODERATE 35: CPT

## 2021-04-16 RX ORDER — DIPHENHYDRAMINE HYDROCHLORIDE AND LIDOCAINE HYDROCHLORIDE AND ALUMINUM HYDROXIDE AND MAGNESIUM HYDRO
10 KIT
Qty: 0 | Refills: 0 | DISCHARGE

## 2021-04-16 RX ORDER — LISINOPRIL 2.5 MG/1
10 TABLET ORAL
Qty: 0 | Refills: 0 | DISCHARGE

## 2021-04-16 RX ORDER — COLLAGENASE CLOSTRIDIUM HIST. 250 UNIT/G
1 OINTMENT (GRAM) TOPICAL
Qty: 0 | Refills: 0 | DISCHARGE
Start: 2021-04-16

## 2021-04-16 RX ORDER — FENTANYL CITRATE 50 UG/ML
1 INJECTION INTRAVENOUS
Qty: 0 | Refills: 0 | DISCHARGE
Start: 2021-04-16

## 2021-04-16 RX ORDER — POVIDONE-IODINE 5 %
1 AEROSOL (ML) TOPICAL
Qty: 0 | Refills: 0 | DISCHARGE
Start: 2021-04-16

## 2021-04-16 RX ORDER — MAGNESIUM OXIDE 400 MG ORAL TABLET 241.3 MG
400 TABLET ORAL ONCE
Refills: 0 | Status: COMPLETED | OUTPATIENT
Start: 2021-04-16 | End: 2021-04-16

## 2021-04-16 RX ORDER — LATANOPROST 0.05 MG/ML
1 SOLUTION/ DROPS OPHTHALMIC; TOPICAL
Qty: 0 | Refills: 0 | DISCHARGE
Start: 2021-04-16

## 2021-04-16 RX ORDER — SPIRONOLACTONE 25 MG/1
1 TABLET, FILM COATED ORAL
Qty: 0 | Refills: 0 | DISCHARGE

## 2021-04-16 RX ORDER — ALPRAZOLAM 0.25 MG
0.25 TABLET ORAL EVERY 8 HOURS
Refills: 0 | Status: DISCONTINUED | OUTPATIENT
Start: 2021-04-16 | End: 2021-04-16

## 2021-04-16 RX ORDER — LANOLIN ALCOHOL/MO/W.PET/CERES
1 CREAM (GRAM) TOPICAL
Qty: 0 | Refills: 0 | DISCHARGE
Start: 2021-04-16

## 2021-04-16 RX ORDER — SODIUM HYPOCHLORITE 0.125 %
1 SOLUTION, NON-ORAL MISCELLANEOUS
Qty: 0 | Refills: 0 | DISCHARGE
Start: 2021-04-16

## 2021-04-16 RX ORDER — FUROSEMIDE 40 MG
10 TABLET ORAL
Qty: 0 | Refills: 0 | DISCHARGE

## 2021-04-16 RX ORDER — ALPRAZOLAM 0.25 MG
1 TABLET ORAL
Qty: 0 | Refills: 0 | DISCHARGE
Start: 2021-04-16

## 2021-04-16 RX ORDER — APIXABAN 2.5 MG/1
1 TABLET, FILM COATED ORAL
Qty: 0 | Refills: 0 | DISCHARGE

## 2021-04-16 RX ORDER — PANTOPRAZOLE SODIUM 20 MG/1
1 TABLET, DELAYED RELEASE ORAL
Qty: 0 | Refills: 0 | DISCHARGE
Start: 2021-04-16

## 2021-04-16 RX ORDER — LIDOCAINE 4 G/100G
2 CREAM TOPICAL
Qty: 0 | Refills: 0 | DISCHARGE

## 2021-04-16 RX ORDER — SCOPALAMINE 1 MG/3D
1 PATCH, EXTENDED RELEASE TRANSDERMAL
Qty: 0 | Refills: 0 | DISCHARGE

## 2021-04-16 RX ORDER — METOPROLOL TARTRATE 50 MG
1 TABLET ORAL
Qty: 0 | Refills: 0 | DISCHARGE

## 2021-04-16 RX ORDER — MIDODRINE HYDROCHLORIDE 2.5 MG/1
1 TABLET ORAL
Qty: 0 | Refills: 0 | DISCHARGE
Start: 2021-04-16

## 2021-04-16 RX ORDER — HYDROMORPHONE HYDROCHLORIDE 2 MG/ML
1 INJECTION INTRAMUSCULAR; INTRAVENOUS; SUBCUTANEOUS
Qty: 0 | Refills: 0 | DISCHARGE
Start: 2021-04-16

## 2021-04-16 RX ORDER — BRIMONIDINE TARTRATE 2 MG/MG
1 SOLUTION/ DROPS OPHTHALMIC
Qty: 0 | Refills: 0 | DISCHARGE
Start: 2021-04-16

## 2021-04-16 RX ADMIN — Medication 1 APPLICATION(S): at 05:18

## 2021-04-16 RX ADMIN — FENTANYL CITRATE 1 PATCH: 50 INJECTION INTRAVENOUS at 07:42

## 2021-04-16 RX ADMIN — SCOPALAMINE 1 PATCH: 1 PATCH, EXTENDED RELEASE TRANSDERMAL at 07:49

## 2021-04-16 RX ADMIN — HYDROMORPHONE HYDROCHLORIDE 2 MILLIGRAM(S): 2 INJECTION INTRAMUSCULAR; INTRAVENOUS; SUBCUTANEOUS at 11:11

## 2021-04-16 RX ADMIN — FENTANYL CITRATE 1 PATCH: 50 INJECTION INTRAVENOUS at 16:03

## 2021-04-16 RX ADMIN — MIDODRINE HYDROCHLORIDE 10 MILLIGRAM(S): 2.5 TABLET ORAL at 11:11

## 2021-04-16 RX ADMIN — CHLORHEXIDINE GLUCONATE 1 APPLICATION(S): 213 SOLUTION TOPICAL at 05:18

## 2021-04-16 RX ADMIN — HYDROMORPHONE HYDROCHLORIDE 2 MILLIGRAM(S): 2 INJECTION INTRAMUSCULAR; INTRAVENOUS; SUBCUTANEOUS at 06:17

## 2021-04-16 RX ADMIN — MIDODRINE HYDROCHLORIDE 10 MILLIGRAM(S): 2.5 TABLET ORAL at 06:18

## 2021-04-16 RX ADMIN — SODIUM CHLORIDE 75 MILLILITER(S): 9 INJECTION, SOLUTION INTRAVENOUS at 02:55

## 2021-04-16 RX ADMIN — FENTANYL CITRATE 1 PATCH: 50 INJECTION INTRAVENOUS at 15:04

## 2021-04-16 RX ADMIN — Medication 1 TABLET(S): at 06:19

## 2021-04-16 RX ADMIN — HYDROMORPHONE HYDROCHLORIDE 2 MILLIGRAM(S): 2 INJECTION INTRAMUSCULAR; INTRAVENOUS; SUBCUTANEOUS at 10:35

## 2021-04-16 RX ADMIN — Medication 1 APPLICATION(S): at 06:19

## 2021-04-16 RX ADMIN — Medication 0.25 MILLIGRAM(S): at 12:16

## 2021-04-16 NOTE — PROGRESS NOTE ADULT - NUTRITIONAL ASSESSMENT
This patient has been assessed with a concern for Malnutrition and has been determined to have a diagnosis/diagnoses of Severe protein-calorie malnutrition.    This patient is being managed with:   Diet Clear Liquid-  Entered: Mar 15 2021 11:53AM    
This patient has been assessed with a concern for Malnutrition and has been determined to have a diagnosis/diagnoses of Severe protein-calorie malnutrition.    This patient is being managed with:   Diet Dysphagia 3 Soft-Thin Liquids-  Supplement Feeding Modality:  Oral  Ensure Enlive Servings Per Day:  1       Frequency:  Two Times a day  Entered: Mar 10 2021 12:51PM    Diet NPO-  NPO for Procedure/Test     NPO Start Date: 10-Mar-2021   NPO Start Time: 00:00  Entered: Mar 10 2021  3:53AM    
This patient has been assessed with a concern for Malnutrition and has been determined to have a diagnosis/diagnoses of Severe protein-calorie malnutrition.    This patient is being managed with:   Diet Soft-  1500mL Fluid Restriction ([id number])  No Concentrated Potassium  Prosource Gelatein Plus     Qty per Day:  2  Supplement Feeding Modality:  Oral  Ensure Enlive Cans or Servings Per Day:  1       Frequency:  Two Times a day  Ensure Pudding Cans or Servings Per Day:  1       Frequency:  Two Times a day  Entered: Mar 30 2021  3:52PM    
This patient has been assessed with a concern for Malnutrition and has been determined to have a diagnosis/diagnoses of Severe protein-calorie malnutrition.    This patient is being managed with:   Diet Soft-  1500mL Fluid Restriction ([id number])  No Concentrated Potassium  Prosource Gelatein Plus     Qty per Day:  2  Supplement Feeding Modality:  Oral  Ensure Enlive Cans or Servings Per Day:  1       Frequency:  Two Times a day  Ensure Pudding Cans or Servings Per Day:  1       Frequency:  Two Times a day  Entered: Mar 30 2021  3:52PM    
This patient has been assessed with a concern for Malnutrition and has been determined to have a diagnosis/diagnoses of Severe protein-calorie malnutrition.    This patient is being managed with:   Diet Soft-  Dysphagia 2 Mechanical Soft Thin Liquids ([id number])  Prosource Gelatein Plus     Qty per Day:  2  Supplement Feeding Modality:  Oral  Ensure Enlive Cans or Servings Per Day:  1       Frequency:  Two Times a day  Ensure Pudding Cans or Servings Per Day:  1       Frequency:  Two Times a day  Entered: Apr 12 2021  4:27PM    
This patient has been assessed with a concern for Malnutrition and has been determined to have a diagnosis/diagnoses of Severe protein-calorie malnutrition.    This patient is being managed with:   Diet Soft-  Dysphagia 2 Mechanical Soft Thin Liquids ([id number])  Prosource Gelatein Plus     Qty per Day:  2  Supplement Feeding Modality:  Oral  Ensure Enlive Cans or Servings Per Day:  1       Frequency:  Two Times a day  Ensure Pudding Cans or Servings Per Day:  1       Frequency:  Two Times a day  Entered: Apr 12 2021  4:27PM    
This patient has been assessed with a concern for Malnutrition and has been determined to have a diagnosis/diagnoses of Severe protein-calorie malnutrition.    This patient is being managed with:   Diet Soft-  Entered: Mar 15 2021  3:49PM    Diet NPO after Midnight-     NPO Start Date: 15-Mar-2021   NPO Start Time: 23:59  Except Medications  Entered: Mar 15 2021  3:43PM    
This patient has been assessed with a concern for Malnutrition and has been determined to have a diagnosis/diagnoses of Severe protein-calorie malnutrition.    This patient is being managed with:   Diet Soft-  No Concentrated Potassium  Prosource Gelatein Plus     Qty per Day:  2  Supplement Feeding Modality:  Oral  Ensure Enlive Cans or Servings Per Day:  1       Frequency:  Two Times a day  Ensure Pudding Cans or Servings Per Day:  1       Frequency:  Two Times a day  Entered: Mar 29 2021  9:42AM    
This patient has been assessed with a concern for Malnutrition and has been determined to have a diagnosis/diagnoses of Severe protein-calorie malnutrition.    This patient is being managed with:   Diet Clear Liquid-  Entered: Mar 15 2021 11:53AM    
This patient has been assessed with a concern for Malnutrition and has been determined to have a diagnosis/diagnoses of Severe protein-calorie malnutrition.    This patient is being managed with:   Diet Soft-  1500mL Fluid Restriction ([id number])  No Concentrated Potassium  Prosource Gelatein Plus     Qty per Day:  2  Supplement Feeding Modality:  Oral  Ensure Enlive Cans or Servings Per Day:  1       Frequency:  Two Times a day  Ensure Pudding Cans or Servings Per Day:  1       Frequency:  Two Times a day  Entered: Mar 30 2021  3:52PM    
This patient has been assessed with a concern for Malnutrition and has been determined to have a diagnosis/diagnoses of Severe protein-calorie malnutrition.    This patient is being managed with:   Diet Soft-  1500mL Fluid Restriction ([id number])  No Concentrated Potassium  Prosource Gelatein Plus     Qty per Day:  2  Supplement Feeding Modality:  Oral  Ensure Enlive Cans or Servings Per Day:  1       Frequency:  Two Times a day  Ensure Pudding Cans or Servings Per Day:  1       Frequency:  Two Times a day  Entered: Mar 30 2021  3:52PM    
This patient has been assessed with a concern for Malnutrition and has been determined to have a diagnosis/diagnoses of Severe protein-calorie malnutrition.    This patient is being managed with:   Diet Soft-  1500mL Fluid Restriction ([id number])  No Concentrated Potassium  Prosource Gelatein Plus     Qty per Day:  2  Supplement Feeding Modality:  Oral  Ensure Enlive Cans or Servings Per Day:  1       Frequency:  Two Times a day  Ensure Pudding Cans or Servings Per Day:  1       Frequency:  Two Times a day  Entered: Mar 30 2021  3:52PM    
This patient has been assessed with a concern for Malnutrition and has been determined to have a diagnosis/diagnoses of Severe protein-calorie malnutrition.    This patient is being managed with:   Diet Soft-  1500mL Fluid Restriction ([id number])  No Concentrated Potassium  Prosource Gelatein Plus     Qty per Day:  2  Supplement Feeding Modality:  Oral  Ensure Enlive Cans or Servings Per Day:  1       Frequency:  Two Times a day  Ensure Pudding Cans or Servings Per Day:  1       Frequency:  Two Times a day  Entered: Mar 30 2021  3:52PM    
This patient has been assessed with a concern for Malnutrition and has been determined to have a diagnosis/diagnoses of Severe protein-calorie malnutrition.    This patient is being managed with:   Diet Soft-  No Concentrated Potassium  Prosource Gelatein Plus     Qty per Day:  2  Supplement Feeding Modality:  Oral  Ensure Enlive Cans or Servings Per Day:  1       Frequency:  Two Times a day  Ensure Pudding Cans or Servings Per Day:  1       Frequency:  Two Times a day  Entered: Mar 17 2021  2:20PM    
This patient has been assessed with a concern for Malnutrition and has been determined to have a diagnosis/diagnoses of Severe protein-calorie malnutrition.    This patient is being managed with:   Diet Dysphagia 3 Soft-Thin Liquids-  Supplement Feeding Modality:  Oral  Ensure Enlive Servings Per Day:  1       Frequency:  Two Times a day  Entered: Mar 10 2021 12:51PM    Diet NPO-  NPO for Procedure/Test     NPO Start Date: 10-Mar-2021   NPO Start Time: 00:00  Entered: Mar 10 2021  3:53AM    
This patient has been assessed with a concern for Malnutrition and has been determined to have a diagnosis/diagnoses of Severe protein-calorie malnutrition.    This patient is being managed with:   Diet Soft-  1500mL Fluid Restriction ([id number])  No Concentrated Potassium  Prosource Gelatein Plus     Qty per Day:  2  Supplement Feeding Modality:  Oral  Ensure Enlive Cans or Servings Per Day:  1       Frequency:  Two Times a day  Ensure Pudding Cans or Servings Per Day:  1       Frequency:  Two Times a day  Entered: Mar 30 2021  3:52PM    
This patient has been assessed with a concern for Malnutrition and has been determined to have a diagnosis/diagnoses of Severe protein-calorie malnutrition.    This patient is being managed with:   Diet Soft-  1500mL Fluid Restriction ([id number])  No Concentrated Potassium  Prosource Gelatein Plus     Qty per Day:  2  Supplement Feeding Modality:  Oral  Ensure Enlive Cans or Servings Per Day:  1       Frequency:  Two Times a day  Ensure Pudding Cans or Servings Per Day:  1       Frequency:  Two Times a day  Entered: Mar 30 2021  3:52PM    
This patient has been assessed with a concern for Malnutrition and has been determined to have a diagnosis/diagnoses of Severe protein-calorie malnutrition.    This patient is being managed with:   Diet Soft-  Entered: Mar 15 2021  3:49PM    Diet NPO after Midnight-     NPO Start Date: 15-Mar-2021   NPO Start Time: 23:59  Except Medications  Entered: Mar 15 2021  3:43PM    
This patient has been assessed with a concern for Malnutrition and has been determined to have a diagnosis/diagnoses of Severe protein-calorie malnutrition.    This patient is being managed with:   Diet Soft-  Prosource Gelatein Plus     Qty per Day:  2  Supplement Feeding Modality:  Oral  Ensure Enlive Cans or Servings Per Day:  1       Frequency:  Two Times a day  Ensure Pudding Cans or Servings Per Day:  1       Frequency:  Two Times a day  Entered: Mar 18 2021  2:31PM    
This patient has been assessed with a concern for Malnutrition and has been determined to have a diagnosis/diagnoses of Severe protein-calorie malnutrition.    This patient is being managed with:   Diet NPO-  Except Medications  Entered: Mar 14 2021 10:08AM    Diet NPO-  NPO for Procedure/Test     NPO Start Date: 10-Mar-2021   NPO Start Time: 00:00  Entered: Mar 10 2021  3:53AM    
This patient has been assessed with a concern for Malnutrition and has been determined to have a diagnosis/diagnoses of Severe protein-calorie malnutrition.    This patient is being managed with:   Diet Soft-  1500mL Fluid Restriction ([id number])  No Concentrated Potassium  Prosource Gelatein Plus     Qty per Day:  2  Supplement Feeding Modality:  Oral  Ensure Enlive Cans or Servings Per Day:  1       Frequency:  Two Times a day  Ensure Pudding Cans or Servings Per Day:  1       Frequency:  Two Times a day  Entered: Mar 30 2021  3:52PM    
This patient has been assessed with a concern for Malnutrition and has been determined to have a diagnosis/diagnoses of Severe protein-calorie malnutrition.    This patient is being managed with:   Diet Soft-  1500mL Fluid Restriction ([id number])  No Concentrated Potassium  Prosource Gelatein Plus     Qty per Day:  2  Supplement Feeding Modality:  Oral  Ensure Enlive Cans or Servings Per Day:  1       Frequency:  Two Times a day  Ensure Pudding Cans or Servings Per Day:  1       Frequency:  Two Times a day  Entered: Mar 30 2021  3:52PM    
This patient has been assessed with a concern for Malnutrition and has been determined to have a diagnosis/diagnoses of Severe protein-calorie malnutrition.    This patient is being managed with:   Diet Soft-  Dysphagia 2 Mechanical Soft Thin Liquids ([id number])  Prosource Gelatein Plus     Qty per Day:  2  Supplement Feeding Modality:  Oral  Ensure Enlive Cans or Servings Per Day:  1       Frequency:  Two Times a day  Ensure Pudding Cans or Servings Per Day:  1       Frequency:  Two Times a day  Entered: Apr 12 2021  4:27PM    
This patient has been assessed with a concern for Malnutrition and has been determined to have a diagnosis/diagnoses of Severe protein-calorie malnutrition.    This patient is being managed with:   Diet Soft-  No Concentrated Potassium  Prosource Gelatein Plus     Qty per Day:  2  Supplement Feeding Modality:  Oral  Ensure Enlive Cans or Servings Per Day:  1       Frequency:  Two Times a day  Ensure Pudding Cans or Servings Per Day:  1       Frequency:  Two Times a day  Entered: Mar 29 2021  9:42AM    
This patient has been assessed with a concern for Malnutrition and has been determined to have a diagnosis/diagnoses of Severe protein-calorie malnutrition.    This patient is being managed with:   Diet Soft-  Prosource Gelatein Plus     Qty per Day:  2  Supplement Feeding Modality:  Oral  Ensure Enlive Cans or Servings Per Day:  1       Frequency:  Two Times a day  Ensure Pudding Cans or Servings Per Day:  1       Frequency:  Two Times a day  Entered: Mar 18 2021  2:31PM    
This patient has been assessed with a concern for Malnutrition and has been determined to have a diagnosis/diagnoses of Severe protein-calorie malnutrition.    This patient is being managed with:   Diet Dysphagia 3 Soft-Thin Liquids-  Supplement Feeding Modality:  Oral  Ensure Enlive Servings Per Day:  1       Frequency:  Two Times a day  Entered: Mar 10 2021 12:51PM    Diet NPO-  NPO for Procedure/Test     NPO Start Date: 10-Mar-2021   NPO Start Time: 00:00  Entered: Mar 10 2021  3:53AM    
This patient has been assessed with a concern for Malnutrition and has been determined to have a diagnosis/diagnoses of Severe protein-calorie malnutrition.    This patient is being managed with:   Diet NPO-  Except Medications  Entered: Mar 14 2021 10:08AM    Diet NPO-  NPO for Procedure/Test     NPO Start Date: 10-Mar-2021   NPO Start Time: 00:00  Entered: Mar 10 2021  3:53AM    
This patient has been assessed with a concern for Malnutrition and has been determined to have a diagnosis/diagnoses of Severe protein-calorie malnutrition.    This patient is being managed with:   Diet Soft-  1500mL Fluid Restriction ([id number])  No Concentrated Potassium  Prosource Gelatein Plus     Qty per Day:  2  Supplement Feeding Modality:  Oral  Ensure Enlive Cans or Servings Per Day:  1       Frequency:  Two Times a day  Ensure Pudding Cans or Servings Per Day:  1       Frequency:  Two Times a day  Entered: Mar 30 2021  3:52PM    
This patient has been assessed with a concern for Malnutrition and has been determined to have a diagnosis/diagnoses of Severe protein-calorie malnutrition.    This patient is being managed with:   Diet Soft-  1500mL Fluid Restriction ([id number])  No Concentrated Potassium  Prosource Gelatein Plus     Qty per Day:  2  Supplement Feeding Modality:  Oral  Ensure Enlive Cans or Servings Per Day:  1       Frequency:  Two Times a day  Ensure Pudding Cans or Servings Per Day:  1       Frequency:  Two Times a day  Entered: Mar 30 2021  3:52PM    
This patient has been assessed with a concern for Malnutrition and has been determined to have a diagnosis/diagnoses of Severe protein-calorie malnutrition.    This patient is being managed with:   Diet Soft-  1500mL Fluid Restriction ([id number])  No Concentrated Potassium  Prosource Gelatein Plus     Qty per Day:  2  Supplement Feeding Modality:  Oral  Ensure Enlive Cans or Servings Per Day:  1       Frequency:  Two Times a day  Ensure Pudding Cans or Servings Per Day:  1       Frequency:  Two Times a day  Entered: Mar 30 2021  3:52PM    
This patient has been assessed with a concern for Malnutrition and has been determined to have a diagnosis/diagnoses of Severe protein-calorie malnutrition.    This patient is being managed with:   Diet Soft-  1500mL Fluid Restriction ([id number])  No Concentrated Potassium  Prosource Gelatein Plus     Qty per Day:  2  Supplement Feeding Modality:  Oral  Ensure Enlive Cans or Servings Per Day:  1       Frequency:  Two Times a day  Ensure Pudding Cans or Servings Per Day:  1       Frequency:  Two Times a day  Entered: Mar 30 2021  3:52PM    
This patient has been assessed with a concern for Malnutrition and has been determined to have a diagnosis/diagnoses of Severe protein-calorie malnutrition.    This patient is being managed with:   Diet Soft-  Dysphagia 2 Mechanical Soft Thin Liquids ([id number])  No Concentrated Potassium  Prosource Gelatein Plus     Qty per Day:  2  Supplement Feeding Modality:  Oral  Ensure Enlive Cans or Servings Per Day:  1       Frequency:  Two Times a day  Ensure Pudding Cans or Servings Per Day:  1       Frequency:  Two Times a day  Entered: Apr 11 2021  3:17PM    
This patient has been assessed with a concern for Malnutrition and has been determined to have a diagnosis/diagnoses of Severe protein-calorie malnutrition.    This patient is being managed with:   Diet Soft-  Dysphagia 2 Mechanical Soft Thin Liquids ([id number])  Prosource Gelatein Plus     Qty per Day:  2  Supplement Feeding Modality:  Oral  Ensure Enlive Cans or Servings Per Day:  1       Frequency:  Two Times a day  Ensure Pudding Cans or Servings Per Day:  1       Frequency:  Two Times a day  Entered: Apr 12 2021  4:27PM    
This patient has been assessed with a concern for Malnutrition and has been determined to have a diagnosis/diagnoses of Severe protein-calorie malnutrition.    This patient is being managed with:   Diet Soft-  Entered: Mar 15 2021  3:49PM    Diet NPO after Midnight-     NPO Start Date: 15-Mar-2021   NPO Start Time: 23:59  Except Medications  Entered: Mar 15 2021  3:43PM    
This patient has been assessed with a concern for Malnutrition and has been determined to have a diagnosis/diagnoses of Severe protein-calorie malnutrition.    This patient is being managed with:   Diet Soft-  No Concentrated Potassium  Entered: Mar 16 2021  1:56PM    Diet NPO after Midnight-     NPO Start Date: 16-Mar-2021   NPO Start Time: 23:59  Except Medications  Entered: Mar 16 2021 12:19PM    
This patient has been assessed with a concern for Malnutrition and has been determined to have a diagnosis/diagnoses of Severe protein-calorie malnutrition.    This patient is being managed with:   Diet Soft-  No Concentrated Potassium  Prosource Gelatein Plus     Qty per Day:  2  Supplement Feeding Modality:  Oral  Ensure Enlive Cans or Servings Per Day:  1       Frequency:  Two Times a day  Ensure Pudding Cans or Servings Per Day:  1       Frequency:  Two Times a day  Entered: Mar 17 2021  2:20PM    
This patient has been assessed with a concern for Malnutrition and has been determined to have a diagnosis/diagnoses of Severe protein-calorie malnutrition.    This patient is being managed with:   Diet Soft-  No Concentrated Potassium  Prosource Gelatein Plus     Qty per Day:  2  Supplement Feeding Modality:  Oral  Ensure Enlive Cans or Servings Per Day:  1       Frequency:  Two Times a day  Ensure Pudding Cans or Servings Per Day:  1       Frequency:  Two Times a day  Entered: Mar 17 2021  2:20PM    
This patient has been assessed with a concern for Malnutrition and has been determined to have a diagnosis/diagnoses of Severe protein-calorie malnutrition.    This patient is being managed with:   Diet Soft-  Prosource Gelatein Plus     Qty per Day:  2  Supplement Feeding Modality:  Oral  Ensure Enlive Cans or Servings Per Day:  1       Frequency:  Two Times a day  Ensure Pudding Cans or Servings Per Day:  1       Frequency:  Two Times a day  Entered: Mar 18 2021  2:31PM    
This patient has been assessed with a concern for Malnutrition and has been determined to have a diagnosis/diagnoses of Severe protein-calorie malnutrition.    This patient is being managed with:   Diet Soft-  1500mL Fluid Restriction ([id number])  No Concentrated Potassium  Prosource Gelatein Plus     Qty per Day:  2  Supplement Feeding Modality:  Oral  Ensure Enlive Cans or Servings Per Day:  1       Frequency:  Two Times a day  Ensure Pudding Cans or Servings Per Day:  1       Frequency:  Two Times a day  Entered: Mar 30 2021  3:52PM    
This patient has been assessed with a concern for Malnutrition and has been determined to have a diagnosis/diagnoses of Severe protein-calorie malnutrition.    This patient is being managed with:   Diet Soft-  1500mL Fluid Restriction ([id number])  No Concentrated Potassium  Prosource Gelatein Plus     Qty per Day:  2  Supplement Feeding Modality:  Oral  Ensure Enlive Cans or Servings Per Day:  1       Frequency:  Two Times a day  Ensure Pudding Cans or Servings Per Day:  1       Frequency:  Two Times a day  Entered: Mar 30 2021  3:52PM    
This patient has been assessed with a concern for Malnutrition and has been determined to have a diagnosis/diagnoses of Severe protein-calorie malnutrition.    This patient is being managed with:   Diet Soft-  1500mL Fluid Restriction ([id number])  No Concentrated Potassium  Prosource Gelatein Plus     Qty per Day:  2  Supplement Feeding Modality:  Oral  Ensure Enlive Cans or Servings Per Day:  1       Frequency:  Two Times a day  Ensure Pudding Cans or Servings Per Day:  1       Frequency:  Two Times a day  Entered: Mar 30 2021  3:52PM    
This patient has been assessed with a concern for Malnutrition and has been determined to have a diagnosis/diagnoses of Severe protein-calorie malnutrition.    This patient is being managed with:   Diet Soft-  Prosource Gelatein Plus     Qty per Day:  2  Supplement Feeding Modality:  Oral  Ensure Enlive Cans or Servings Per Day:  1       Frequency:  Two Times a day  Ensure Pudding Cans or Servings Per Day:  1       Frequency:  Two Times a day  Entered: Mar 18 2021  2:31PM    

## 2021-04-16 NOTE — PROGRESS NOTE ADULT - PROVIDER SPECIALTY LIST ADULT
Burn
CCU
Heme/Onc
Infectious Disease
Internal Medicine
Neurosurgery
Nutrition Support
Palliative Care
Dental
ENT
Heme/Onc
Heme/Onc
Infectious Disease
Internal Medicine
Palliative Care
Palliative Care
Rad Onc
Burn
CCU
CCU
Critical Care
Gastroenterology
Gastroenterology
Heme/Onc
Hospitalist
Hospitalist
Internal Medicine
Nutrition Support
Palliative Care
Critical Care
ENT
ENT
Heme/Onc
Internal Medicine
Neurosurgery
Critical Care
Critical Care
Heme/Onc
Heme/Onc
Hospitalist
Hospitalist
Internal Medicine
Heme/Onc
Heme/Onc
Hospitalist
Internal Medicine
Palliative Care

## 2021-04-16 NOTE — PROGRESS NOTE ADULT - PROBLEM SELECTOR PLAN 2
Fentanyl 25 mcg Q 48 H   Dilaudid 2 mg PO tab Q 4 H PRN for pain Fentanyl 25 mcg Q 48 H   Dilaudid 2 mg PO tab Q 4 H PRN for pain  daily bowel regimen

## 2021-04-16 NOTE — PROGRESS NOTE ADULT - SUBJECTIVE AND OBJECTIVE BOX
MANUELITO HEDRICK             MRN-[id number]    CC: Prostate Cancer     HPI:   79 yo male hx of CAD s/p CABG s/p 20+ years ago/ CHF/ right sided large inguinal hernia BIBA from hotel room 2/2 unable to ambulate with right thigh weakness and numbness. reported it started from knee to hip. denies fall and injury. He was just sitting and the symptoms started. reported off/on back pain in the past. denies fever/chill/recent illness/coughing/chest pain/abd pain/n/v/d and urinary sxs.   	Also has right sided facial swelling for about 1 year after 3 teeth extraction. reports swelling worsens after eating. swelling was better in the past and started swelling again in the past few months. didn't follow up with his dentist 2/2 COVID.   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 he used to be able to ambulate without assistant and he commutes with his car. (08 Mar 2021 05:03)      SUBJECTIVE:  ROS:  DYSPNEA: No  NAUS/VOM: No	  SECRETIONS: Yes 	  AGITATION: No  Pain (Y/N):   yes, in the sides of trunk/back. He reports the Dilaudid pills do help but they "knock him out". He is distressed about being stuck in bed all day and wishes he could get up and move around more. He doesnt like feeling stuck and feels it makes his body achy.     PEx:  Vital Signs Last 24 Hrs  T(C): 35.9 (16 Apr 2021 14:00), Max: 36.5 (16 Apr 2021 05:36)  T(F): 96.7 (16 Apr 2021 14:00), Max: 97.7 (16 Apr 2021 05:36)  HR: 82 (16 Apr 2021 14:00) (80 - 125)  BP: 98/53 (16 Apr 2021 14:00) (98/50 - 105/55)  RR: 20 (16 Apr 2021 14:00) (18 - 20)    General: awake. ill appearing, lying in bed, NAD, conversant  HEENT:  Swelling on the R side jaw. PERRL EOMI Non icteric   Neck: Supple no masses  Resp: Unlabored Non tachypneic No increased WOB, on room air  :  Santos  Musc: No C/C/E    Neuro: Follows commands, No focal deficits  Psych: Calm, a & o x 3  Skin: Non jaundiced, no rash       MEDICATIONS: REVIEWED  MEDICATIONS  (STANDING):  amoxicillin  875 milliGRAM(s)/clavulanate 1 Tablet(s) Oral two times a day  bicalutamide 50 milliGRAM(s) Oral daily  chlorhexidine 4% Liquid 1 Application(s) Topical <User Schedule>  cholecalciferol 4000 Unit(s) Oral daily  collagenase Ointment 1 Application(s) Topical daily  Dakins Solution - 1/2 Strength 1 Application(s) Topical two times a day  dextrose 5% + sodium chloride 0.45% 1000 milliLiter(s) (75 mL/Hr) IV Continuous <Continuous>  enoxaparin Injectable 70 milliGRAM(s) SubCutaneous two times a day  fentaNYL   Patch  25 MICROgram(s)/Hr. 1 Patch Transdermal every 48 hours  FIRST- Mouthwash  BLM 10 milliLiter(s) Swish and Spit four times a day  latanoprost 0.005% Ophthalmic Solution 1 Drop(s) Both EYES at bedtime  lidocaine   Patch 2 Patch Transdermal daily  melatonin 3 milliGRAM(s) Oral at bedtime  midodrine. 10 milliGRAM(s) Oral three times a day  pantoprazole    Tablet 40 milliGRAM(s) Oral before breakfast  povidone iodine 10% Ointment 1 Application(s) Topical two times a day  scopolamine 1 mG/72 Hr(s) Patch 1 Patch Transdermal every 72 hours  scopolamine 1 mG/72 Hr(s) Patch 1 Patch Transdermal every 72 hours  zoledronic acid IVPB (ZOMETA) (Non - oncologic) 4 milliGRAM(s) IV Intermittent every 4 weeks    MEDICATIONS  (PRN):  ALPRAZolam 0.25 milliGRAM(s) Oral every 8 hours PRN Anxiety  aluminum hydroxide/magnesium hydroxide/simethicone Suspension 30 milliLiter(s) Oral every 6 hours PRN Dyspepsia  brimonidine 0.2% Ophthalmic Solution 1 Drop(s) Both EYES every 8 hours PRN dry eyes  HYDROmorphone   Tablet 2 milliGRAM(s) Oral every 4 hours PRN Mild, moderate, severe pain (1-10)      LABS: REVIEWED  CBC:                        8.9    7.55  )-----------( 195      ( 16 Apr 2021 11:42 )             28.4     CMP:    04-16    135  |  108  |  12  ----------------------------<  121<H>  5.0   |  18  |  <0.5<L>    Ca    7.6<L>      16 Apr 2021 11:42  Mg     1.7     04-16    TPro  4.1<L>  /  Alb  1.9<L>  /  TBili  0.5  /  DBili  x   /  AST  14  /  ALT  14  /  AlkPhos  75  04-16  Albumin, Serum: 1.9 g/dL (04-16-21 @ 11:42)      IMAGING: REVIEWED    ADVANCED DIRECTIVES:             DNR DNI         DECISION MAKER: Patient is able to participate in decision making. Next of kin is nephew.     PSYCHOSOCIAL-SPIRITUAL ASSESSMENT:       Reviewed       Care plan unchanged       Care plan adjusted as above	    GOALS OF CARE DISCUSSION       Palliative care info/counseling provided	           Family meeting       Advanced Directives addressed please see Advance Care Planning Note	           See previous Palliative Medicine Note       Documentation of GOC:    CURRENT DISPO PLAN:     WILL REMAIN IN HOSPITAL  [facility]  Hospice  Home      REFERRALS	        Palliative Med        Unit SW/Case Mgmt              Speech/Swallow       Patient/Family Support      Hospice       Nutrition       Dietician       PT/OT MANUELITO HEDRICK             MRN-[id number]    CC: Prostate Cancer     HPI:   79 yo male hx of CAD s/p CABG s/p 20+ years ago/ CHF/ right sided large inguinal hernia BIBA from hotel room 2/2 unable to ambulate with right thigh weakness and numbness. reported it started from knee to hip. denies fall and injury. He was just sitting and the symptoms started. reported off/on back pain in the past. denies fever/chill/recent illness/coughing/chest pain/abd pain/n/v/d and urinary sxs.   	Also has right sided facial swelling for about 1 year after 3 teeth extraction. reports swelling worsens after eating. swelling was better in the past and started swelling again in the past few months. didn't follow up with his dentist 2/2 COVID.   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 he used to be able to ambulate without assistant and he commutes with his car. (08 Mar 2021 05:03)      SUBJECTIVE:  ROS:  DYSPNEA: No  NAUS/VOM: No	  SECRETIONS: Yes 	  AGITATION: No  Pain (Y/N):   yes, in the sides of trunk/back. He reports the Dilaudid pills do help but they "knock him out". He is distressed about being stuck in bed all day and wishes he could get up and move around more. He doesnt like feeling stuck and feels it makes his body achy.     PEx:  Vital Signs Last 24 Hrs  T(C): 35.9 (16 Apr 2021 14:00), Max: 36.5 (16 Apr 2021 05:36)  T(F): 96.7 (16 Apr 2021 14:00), Max: 97.7 (16 Apr 2021 05:36)  HR: 82 (16 Apr 2021 14:00) (80 - 125)  BP: 98/53 (16 Apr 2021 14:00) (98/50 - 105/55)  RR: 20 (16 Apr 2021 14:00) (18 - 20)    General: awake. ill appearing, lying in bed, NAD, conversant  HEENT:  Swelling on the R side jaw. PERRL EOMI Non icteric   Neck: Supple no masses  Resp: Unlabored Non tachypneic No increased WOB, on room air  :  Santos  Musc: No C/C/E    Neuro: Follows commands, No focal deficits  Psych: Calm, a & o x 3  Skin: Non jaundiced, no rash       MEDICATIONS: REVIEWED  MEDICATIONS  (STANDING):  amoxicillin  875 milliGRAM(s)/clavulanate 1 Tablet(s) Oral two times a day  bicalutamide 50 milliGRAM(s) Oral daily  chlorhexidine 4% Liquid 1 Application(s) Topical <User Schedule>  cholecalciferol 4000 Unit(s) Oral daily  collagenase Ointment 1 Application(s) Topical daily  Dakins Solution - 1/2 Strength 1 Application(s) Topical two times a day  dextrose 5% + sodium chloride 0.45% 1000 milliLiter(s) (75 mL/Hr) IV Continuous <Continuous>  enoxaparin Injectable 70 milliGRAM(s) SubCutaneous two times a day  fentaNYL   Patch  25 MICROgram(s)/Hr. 1 Patch Transdermal every 48 hours  FIRST- Mouthwash  BLM 10 milliLiter(s) Swish and Spit four times a day  latanoprost 0.005% Ophthalmic Solution 1 Drop(s) Both EYES at bedtime  lidocaine   Patch 2 Patch Transdermal daily  melatonin 3 milliGRAM(s) Oral at bedtime  midodrine. 10 milliGRAM(s) Oral three times a day  pantoprazole    Tablet 40 milliGRAM(s) Oral before breakfast  povidone iodine 10% Ointment 1 Application(s) Topical two times a day  scopolamine 1 mG/72 Hr(s) Patch 1 Patch Transdermal every 72 hours  scopolamine 1 mG/72 Hr(s) Patch 1 Patch Transdermal every 72 hours  zoledronic acid IVPB (ZOMETA) (Non - oncologic) 4 milliGRAM(s) IV Intermittent every 4 weeks    MEDICATIONS  (PRN):  ALPRAZolam 0.25 milliGRAM(s) Oral every 8 hours PRN Anxiety  aluminum hydroxide/magnesium hydroxide/simethicone Suspension 30 milliLiter(s) Oral every 6 hours PRN Dyspepsia  brimonidine 0.2% Ophthalmic Solution 1 Drop(s) Both EYES every 8 hours PRN dry eyes  HYDROmorphone   Tablet 2 milliGRAM(s) Oral every 4 hours PRN Mild, moderate, severe pain (1-10)      LABS: REVIEWED  CBC:                        8.9    7.55  )-----------( 195      ( 16 Apr 2021 11:42 )             28.4     CMP:    04-16    135  |  108  |  12  ----------------------------<  121<H>  5.0   |  18  |  <0.5<L>    Ca    7.6<L>      16 Apr 2021 11:42  Mg     1.7     04-16    TPro  4.1<L>  /  Alb  1.9<L>  /  TBili  0.5  /  DBili  x   /  AST  14  /  ALT  14  /  AlkPhos  75  04-16  Albumin, Serum: 1.9 g/dL (04-16-21 @ 11:42)      IMAGING: REVIEWED    ADVANCED DIRECTIVES:             DNR DNI         DECISION MAKER: Patient is able to participate in decision making. Next of kin is nephew.       GOALS OF CARE DISCUSSION  - spoke with patient today about comfort care  - discussed that he has the choice to not be rehospitalized from the long term facility  - he does not feel ready to decide on this today  - encouraged him to re-address with providers at [facility] after discharge    CURRENT DISPO PLAN:     [facility]

## 2021-04-16 NOTE — PROGRESS NOTE ADULT - ASSESSMENT
81 yo male hx of CAD s/p CABG s/p 20+ years ago, HFrEF, right sided large inguinal hernia BIBA from hotel room because he was unable to ambulate due to Rt thigh weakness and numbness, pt also had mandibular swelling for over a year. CT lumbosacral spine showed mass suggestive of mets with obliteration of neural foramina so was started on decadron.  Sacral mass biopsied 3/10 showed prostate adenocarcinoma. Mandibluar mass biopsied 3/19 also showed prostate cancer. Hospital course was complicated by hypovolemic shock likely 2/2 UGIB, required pressors and upgrade to stepdown unit. Pt currently in hospital awaiting discharge to NH    #Metastatic prostate cancer    - consulted and treated by medical oncology during this admission  - s/p Lupron (GnRH agonist) IM, first dose 3/29 --> next dose in 1 month  - received one dose of taxotere (3/27), will hold for now  - Completed RT to sacrum and spine 5 doses (3/24 - 3/30)  - s/p RT 5 DOSES to jaw area for palliation; last RT 4/6  - dental cleared for bisphosphonate, or denosumab s/p zoledronic acid 4mg q4 weeks (3/27)  - s/p Dexamethasone taper  - continue with  on  Fentanyl patch, c/w  Morphine PRN  - continue with  Neurontin 100 mg TID for neuropathic pain   - pt was consulted by palliative care team and hospice, will discharge to the NH with palliative care only  - prognosis gurded     # SCC of jaw  - 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  - Evaluated by Dental 3/24 - no intervention   - dental cleared for bisphosphonate, or denosumab  - s/p zolendronic acid (3/27)    # Hypotension  - c/w light fluids (NS)  - c/w midodrine q8  - continue to monitor    # Sacral ulcer   - Burn performed debridement at bedside 4/8  - Augmentin BID on discharge for 2 weeks since date of debridement (4/8)     # Thrombocytopenia/ Leukopenia   - stable     # Chronic A fib  -  no AC on discharge, c/w comfort care only     # CHFrEF  - fluid restriction 1200 ml in 24 hours   - intake and output monitoring, daily weight   - monitor for fluid overload     # Hypercalcemia  - likely due to advanced malignancy   - continue with vitamin D supplements 2000units daily   -     # Severe protein-calorie malnutrition.  - dietary on board   - supplements added   - patient reports diarrhea with ensure     # Insomnia  - continue with melatonin     #DVT PPx: Lovenox  #GI PPx: Protonix  DNR/DNI  Dispo: Pt is stable for discharge to [facility] with palliative care only.

## 2021-04-16 NOTE — PROGRESS NOTE ADULT - PROBLEM SELECTOR PLAN 1
Continue Xanax 0.5 mg PO QD PRN for anxiety
Xanax 0.25 mg PO Q 8 H PRN
Fentanyl parch 25 mcg Q 48 H  D/C PRN Dilaudid  Start Roxanol 15 mg SL Q 4 H PRN for mild, moderate, severe pain (1-10)  Continue Tylenol PRN if patient has preference for this, otherwise may D/C as PRN pain medication
Full Code  Continue ongoing medical management  Will try to acquire a HCP  Will address code status again prior to discharge
Recommendations per heme-onc  May follow up outpatient should he choose to pursue medical management

## 2021-04-16 NOTE — PROGRESS NOTE ADULT - SUBJECTIVE AND OBJECTIVE BOX
80y old Male who presents with a chief complaint of 80 YEAR OLD MALE C/O MULTIPLE MEDICAL COMPLAINTS  was admitted to medicine with the primary diagnosis of metastatic prostate CA, he was treated with RT and chemo, consulted by palliative team and hospice and decided to proceed with palliative care only.  Today he is comfortable, gets anxious at times, very weak.     PAST MEDICAL & SURGICAL HISTORY  CHF (congestive heart failure)    Inguinal hernia    S/P CABG x 3      SOCIAL HISTORY:  Negative for smoking/alcohol/drug use.     ALLERGIES:  No Known Allergies      VITALS:   T(C): 35.9 (16 Apr 2021 14:00), Max: 36.5 (16 Apr 2021 05:36)  T(F): 96.7 (16 Apr 2021 14:00), Max: 97.7 (16 Apr 2021 05:36)  HR: 82 (16 Apr 2021 14:00) (80 - 125)  BP: 98/53 (16 Apr 2021 14:00) (98/50 - 105/55)  BP(mean): --  RR: 20 (16 Apr 2021 14:00) (18 - 20)  SpO2: ---    PHYSICAL EXAM:  GENERAL: NAD, lying in bed comfortably  HEAD:  Atraumatic, Normocephalic  CHEST/LUNG: Clear to auscultation bilaterally; No rales, rhonchi   HEART: Regular rate and rhythm; No murmurs, rubs, or gallops  ABDOMEN: Bowel sounds present; Soft, Nontender, Nondistended  EXTREMITIES:  No clubbing, cyanosis, or edema  NERVOUS SYSTEM:  Alert & Oriented X3, no focal deficit   MSK: Unable to assess  SKIN: No rashes or lesions    LABS:                                   8.9    7.55  )-----------( 195      ( 16 Apr 2021 11:42 )             28.4   04-16    135  |  108  |  12  ----------------------------<  121<H>  5.0   |  18  |  <0.5<L>    Ca    7.6<L>      16 Apr 2021 11:42  Mg     1.7     04-16    TPro  4.1<L>  /  Alb  1.9<L>  /  TBili  0.5  /  DBili  x   /  AST  14  /  ALT  14  /  AlkPhos  75  04-16    MEDICATIONS  (STANDING):  amoxicillin  875 milliGRAM(s)/clavulanate 1 Tablet(s) Oral two times a day  bicalutamide 50 milliGRAM(s) Oral daily  chlorhexidine 4% Liquid 1 Application(s) Topical <User Schedule>  cholecalciferol 4000 Unit(s) Oral daily  collagenase Ointment 1 Application(s) Topical daily  Dakins Solution - 1/2 Strength 1 Application(s) Topical two times a day  dextrose 5% + sodium chloride 0.45% 1000 milliLiter(s) (75 mL/Hr) IV Continuous <Continuous>  enoxaparin Injectable 70 milliGRAM(s) SubCutaneous two times a day  fentaNYL   Patch  25 MICROgram(s)/Hr. 1 Patch Transdermal every 48 hours  FIRST- Mouthwash  BLM 10 milliLiter(s) Swish and Spit four times a day  latanoprost 0.005% Ophthalmic Solution 1 Drop(s) Both EYES at bedtime  lidocaine   Patch 2 Patch Transdermal daily  melatonin 3 milliGRAM(s) Oral at bedtime  midodrine. 10 milliGRAM(s) Oral three times a day  pantoprazole    Tablet 40 milliGRAM(s) Oral before breakfast  povidone iodine 10% Ointment 1 Application(s) Topical two times a day  scopolamine 1 mG/72 Hr(s) Patch 1 Patch Transdermal every 72 hours  scopolamine 1 mG/72 Hr(s) Patch 1 Patch Transdermal every 72 hours  zoledronic acid IVPB (ZOMETA) (Non - oncologic) 4 milliGRAM(s) IV Intermittent every 4 weeks    MEDICATIONS  (PRN):  ALPRAZolam 0.25 milliGRAM(s) Oral every 8 hours PRN Anxiety  aluminum hydroxide/magnesium hydroxide/simethicone Suspension 30 milliLiter(s) Oral every 6 hours PRN Dyspepsia  brimonidine 0.2% Ophthalmic Solution 1 Drop(s) Both EYES every 8 hours PRN dry eyes  HYDROmorphone   Tablet 2 milliGRAM(s) Oral every 4 hours PRN Mild, moderate, severe pain (1-10)

## 2021-04-16 NOTE — PROGRESS NOTE ADULT - ATTENDING SUPERVISION STATEMENT
Fellow
ACP
Fellow
Resident
Fellow
Resident
ACP
Fellow
Fellow
Resident
ACP

## 2021-04-16 NOTE — CHART NOTE - NSCHARTNOTESELECT_GEN_ALL_CORE
Burn/Event Note
Event Note
Family contact/Event Note
Pain Mangement/Event Note
Palliative Biopsychosocial/Event Note
Palliative Care/Event Note
Transfer Note
Calorie Count Results/Event Note
Discharge/Event Note
Downgrade/Event Note
ENT/Event Note
ENT/Event Note
Event
Event Note
Follow up/Event Note
Palliative Care - Social Work F/u/Event Note
Palliative Sign Off Note/Event Note
RD follow up/Event Note
Steroid Taper/Event Note
Transfer Note

## 2021-04-16 NOTE — CHART NOTE - NSCHARTNOTEFT_GEN_A_CORE
Case discussed in PC rounds and EMR reviewed.  PC care attending Dr. Amezquita, NP DAMIÁN Coley and this [facility] met with pt. at bedside.  Pt. alert and oriented x4 at time of visit, but anxious and c/o pain.  Pt. able to participate in decision making, and is aware and agreeable to his planned discharged to [facility] today.  Reviewed with pt. treatment options, including a Do Not Hospitalize.  Pt. states that is something he would consider, stating he would prefer to have his symptoms managed at a SNF as opposed to returning to hospital in future, but wanted to think about it prior to committing to a DNH.  Informed patient he could review with nursing home staff after discharge should he decide DNH.  Continue DNR/I status.

## 2021-04-16 NOTE — PROGRESS NOTE ADULT - ASSESSMENT
80yMale being evaluated for GOC and symptom management in the setting of metastatic prostate cancer, being discharged to long term care facility today. Early this AM, per nurse the patient was screaming in pain. He gets extremely lethargic on higher doses of PRN dilaudid. Will continue current pain regimen for now. Encouraged him to utilize PRN dilaudid early when he feels pain coming on.     Patient seems to be very anxious as well about his medical condition. He is agreeable to trying PRN Xanax when he gets overwhelmed. He has tried this in the past and it helped him significantly.         MEDD (morphine equivalent daily dose):  74 mg MEDD/24 H  (Fentanyl patch 25 mcg X 2 = 50) + (Dilaudid 2 mg tab X 3 dose X 4 = 24) = 74 mg    * Plan discussed with bedside RN and primary team.     See Recs below.    Please call [phone] with questions or concerns 24/7.   We will continue to follow.

## 2021-04-16 NOTE — DISCHARGE NOTE NURSING/CASE MANAGEMENT/SOCIAL WORK - PATIENT PORTAL LINK FT
You can access the FollowMyHealth Patient Portal offered by [facility] by registering at the following website: [email or web address]. By joining [facility]’s FollowMyHealth portal, you will also be able to view your health information using other applications (apps) compatible with our system.

## 2021-04-16 NOTE — CHART NOTE - NSCHARTNOTEFT_GEN_A_CORE
<<<RESIDENT DISCHARGE NOTE>>>     MANUELITO HEDRICK  MRN-[id number]    VITAL SIGNS:  T(F): 97.7 (04-16-21 @ 05:36), Max: 97.7 (04-16-21 @ 05:36)  HR: 80 (04-16-21 @ 06:54)  BP: 98/50 (04-16-21 @ 05:36)  SpO2: --      PHYSICAL EXAMINATION:  GENERAL: NAD, lying in bed comfortably  HEAD:  Atraumatic, Normocephalic  CHEST/LUNG: Clear to auscultation bilaterally; No rales, rhonchi, wheezing, or rubs. Unlabored respirations  HEART: Regular rate and rhythm; No murmurs, rubs, or gallops  ABDOMEN: Bowel sounds present; Soft, Nontender, Nondistended  EXTREMITIES:  No clubbing, cyanosis, or edema  NERVOUS SYSTEM:  Alert & Oriented X3, speech clear. No deficits   MSK: Unable to assess  SKIN: No rashes or lesions    TEST RESULTS:                        8.9    7.55  )-----------( 195      ( 16 Apr 2021 11:42 )             28.4       04-15    144  |  117<H>  |  15  ----------------------------<  142<H>  5.3<H>   |  19  |  0.6<L>    Ca    8.0<L>      15 Apr 2021 08:27  Mg     1.9     04-15    TPro  4.0<L>  /  Alb  2.1<L>  /  TBili  0.5  /  DBili  x   /  AST  13  /  ALT  15  /  AlkPhos  62  04-15    Patient seen and examined at bedside and stable for discharge.    FINAL DISCHARGE INTERVIEW:  Resident(s) Present: (Name: Dr. Cohn)    DISCHARGE MEDICATION RECONCILIATION  reviewed with Attending (Name: Dr. Arevalo)    DISPOSITION:   [  ] Home,    [  ] Home with Visiting Nursing Services,   [  X  ]  SNF/ NH,    [   ] Acute Rehab (4A),   [   ] Other (Specify:_________)

## 2021-04-16 NOTE — PROGRESS NOTE ADULT - PROBLEM SELECTOR PLAN 3
Continue Dilaudid 0.5 mg IVP Q 4 H PRN for mild, moderate, severe pain (1-10)   Start Robaxin 500 mg TID ATC  Continue Lidocaine patches QD to ribs
Continue Fentanyl 25 mcg patch Q 48 H  D/C Roxanol  Start Dilaudid 2 mg PO Q 4 H PRN for pain
Plan to potentially pursue ongoing cancer tx versus Comfort care
Recommendations per heme-onc
Full Code  Ongoing medical management per heme-onc and primary team  Will try to get HCP for patient completed   will continue to follow for spiritual support

## 2021-04-16 NOTE — PROGRESS NOTE ADULT - REASON FOR ADMISSION
80 YEAR OLD MALE C/O MULTIPLE MEDICAL COMPLAINTS .

## 2021-04-16 NOTE — PROGRESS NOTE ADULT - PROBLEM SELECTOR PLAN 4
DNR/DNI  Continue medical management  Palliative care at long term facility  Patient not ready to commit to comfort care/do not rehospitalize

## 2021-04-16 NOTE — PROGRESS NOTE ADULT - PROBLEM SELECTOR PROBLEM 2
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
Palliative care encounter
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
Pain
Palliative care encounter

## 2021-04-16 NOTE — PROGRESS NOTE ADULT - NSICDXPILOT_GEN_ALL_CORE
[place]

## 2021-04-27 ENCOUNTER — NON-APPOINTMENT (OUTPATIENT)
Age: 81
End: 2021-04-27

## 2021-04-27 ENCOUNTER — APPOINTMENT (OUTPATIENT)
Dept: HEMATOLOGY ONCOLOGY | Facility: CLINIC | Age: 81
End: 2021-04-27

## 2021-04-27 ENCOUNTER — APPOINTMENT (OUTPATIENT)
Dept: INFUSION THERAPY | Facility: CLINIC | Age: 81
End: 2021-04-27

## 2021-04-28 DIAGNOSIS — C78.00 SECONDARY MALIGNANT NEOPLASM OF UNSPECIFIED LUNG: ICD-10-CM

## 2021-04-28 DIAGNOSIS — N17.9 ACUTE KIDNEY FAILURE, UNSPECIFIED: ICD-10-CM

## 2021-04-28 DIAGNOSIS — L89.616 PRESSURE-INDUCED DEEP TISSUE DAMAGE OF RIGHT HEEL: ICD-10-CM

## 2021-04-28 DIAGNOSIS — C79.51 SECONDARY MALIGNANT NEOPLASM OF BONE: ICD-10-CM

## 2021-04-28 DIAGNOSIS — I49.3 VENTRICULAR PREMATURE DEPOLARIZATION: ICD-10-CM

## 2021-04-28 DIAGNOSIS — E22.2 SYNDROME OF INAPPROPRIATE SECRETION OF ANTIDIURETIC HORMONE: ICD-10-CM

## 2021-04-28 DIAGNOSIS — E87.0 HYPEROSMOLALITY AND HYPERNATREMIA: ICD-10-CM

## 2021-04-28 DIAGNOSIS — Z95.1 PRESENCE OF AORTOCORONARY BYPASS GRAFT: ICD-10-CM

## 2021-04-28 DIAGNOSIS — Z51.5 ENCOUNTER FOR PALLIATIVE CARE: ICD-10-CM

## 2021-04-28 DIAGNOSIS — Z66 DO NOT RESUSCITATE: ICD-10-CM

## 2021-04-28 DIAGNOSIS — I25.10 ATHEROSCLEROTIC HEART DISEASE OF NATIVE CORONARY ARTERY WITHOUT ANGINA PECTORIS: ICD-10-CM

## 2021-04-28 DIAGNOSIS — I48.20 CHRONIC ATRIAL FIBRILLATION, UNSPECIFIED: ICD-10-CM

## 2021-04-28 DIAGNOSIS — K40.90 UNILATERAL INGUINAL HERNIA, WITHOUT OBSTRUCTION OR GANGRENE, NOT SPECIFIED AS RECURRENT: ICD-10-CM

## 2021-04-28 DIAGNOSIS — D64.9 ANEMIA, UNSPECIFIED: ICD-10-CM

## 2021-04-28 DIAGNOSIS — I11.0 HYPERTENSIVE HEART DISEASE WITH HEART FAILURE: ICD-10-CM

## 2021-04-28 DIAGNOSIS — C79.89 SECONDARY MALIGNANT NEOPLASM OF OTHER SPECIFIED SITES: ICD-10-CM

## 2021-04-28 DIAGNOSIS — Z87.891 PERSONAL HISTORY OF NICOTINE DEPENDENCE: ICD-10-CM

## 2021-04-28 DIAGNOSIS — E83.52 HYPERCALCEMIA: ICD-10-CM

## 2021-04-28 DIAGNOSIS — I50.22 CHRONIC SYSTOLIC (CONGESTIVE) HEART FAILURE: ICD-10-CM

## 2021-04-28 DIAGNOSIS — C61 MALIGNANT NEOPLASM OF PROSTATE: ICD-10-CM

## 2021-04-28 DIAGNOSIS — R57.1 HYPOVOLEMIC SHOCK: ICD-10-CM

## 2021-04-28 DIAGNOSIS — K92.2 GASTROINTESTINAL HEMORRHAGE, UNSPECIFIED: ICD-10-CM

## 2021-04-28 DIAGNOSIS — D69.6 THROMBOCYTOPENIA, UNSPECIFIED: ICD-10-CM

## 2021-04-28 DIAGNOSIS — D61.818 OTHER PANCYTOPENIA: ICD-10-CM

## 2021-04-28 DIAGNOSIS — N13.8 OTHER OBSTRUCTIVE AND REFLUX UROPATHY: ICD-10-CM

## 2021-04-28 DIAGNOSIS — E43 UNSPECIFIED SEVERE PROTEIN-CALORIE MALNUTRITION: ICD-10-CM

## 2021-04-28 DIAGNOSIS — N13.30 UNSPECIFIED HYDRONEPHROSIS: ICD-10-CM

## 2021-04-28 DIAGNOSIS — E87.2 ACIDOSIS: ICD-10-CM

## 2021-04-28 DIAGNOSIS — L89.150 PRESSURE ULCER OF SACRAL REGION, UNSTAGEABLE: ICD-10-CM

## 2021-04-28 DIAGNOSIS — I96 GANGRENE, NOT ELSEWHERE CLASSIFIED: ICD-10-CM

## 2021-05-12 ENCOUNTER — APPOINTMENT (OUTPATIENT)
Dept: RADIATION ONCOLOGY | Facility: HOSPITAL | Age: 81
End: 2021-05-12

## 2022-09-01 NOTE — DIETITIAN INITIAL EVALUATION ADULT. - OTHER CALCULATIONS
Oriented to time, place, person, situation est kcal needs = 1695-1975kcal/day (MSJ x1.2-1.4 considering PCM, possible metastatic disease & avd age); est protein needs = 80-94 g/day (1.2-1.4 g/kg CBW, reasons mentioned above); est fluid needs = 1mL/kcal or per LIP.